# Patient Record
Sex: FEMALE | Race: OTHER | Employment: OTHER | URBAN - METROPOLITAN AREA
[De-identification: names, ages, dates, MRNs, and addresses within clinical notes are randomized per-mention and may not be internally consistent; named-entity substitution may affect disease eponyms.]

---

## 2019-03-14 ENCOUNTER — APPOINTMENT (OUTPATIENT)
Dept: GENERAL RADIOLOGY | Facility: HOSPITAL | Age: 29
DRG: 871 | End: 2019-03-14
Attending: EMERGENCY MEDICINE
Payer: MEDICAID

## 2019-03-14 ENCOUNTER — HOSPITAL ENCOUNTER (INPATIENT)
Facility: HOSPITAL | Age: 29
LOS: 6 days | Discharge: NURSING, REHAB OR LONG-TERM CARE | DRG: 871 | End: 2019-03-20
Attending: EMERGENCY MEDICINE | Admitting: HOSPITALIST
Payer: MEDICAID

## 2019-03-14 DIAGNOSIS — D64.9 ANEMIA, UNSPECIFIED TYPE: ICD-10-CM

## 2019-03-14 DIAGNOSIS — Z51.5 PALLIATIVE CARE ENCOUNTER: ICD-10-CM

## 2019-03-14 DIAGNOSIS — J18.9 NOSOCOMIAL PNEUMONIA: Primary | ICD-10-CM

## 2019-03-14 DIAGNOSIS — E86.0 DEHYDRATION: ICD-10-CM
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
## 2019-03-14 DIAGNOSIS — Z71.89 COUNSELING REGARDING ADVANCE CARE PLANNING AND GOALS OF CARE: ICD-10-CM

## 2019-03-14 DIAGNOSIS — D69.6 THROMBOCYTOPENIA (HCC): ICD-10-CM

## 2019-03-14 PROCEDURE — 99223 1ST HOSP IP/OBS HIGH 75: CPT | Performed by: HOSPITALIST

## 2019-03-14 PROCEDURE — 71045 X-RAY EXAM CHEST 1 VIEW: CPT | Performed by: EMERGENCY MEDICINE

## 2019-03-14 RX ORDER — METOCLOPRAMIDE HYDROCHLORIDE 5 MG/ML
10 INJECTION INTRAMUSCULAR; INTRAVENOUS EVERY 8 HOURS PRN
Status: DISCONTINUED | OUTPATIENT
Start: 2019-03-14 | End: 2019-03-20

## 2019-03-14 RX ORDER — TRAMADOL HYDROCHLORIDE 50 MG/1
50 TABLET ORAL EVERY 6 HOURS PRN
Status: DISCONTINUED | OUTPATIENT
Start: 2019-03-14 | End: 2019-03-20

## 2019-03-14 RX ORDER — ALBUTEROL SULFATE 2.5 MG/3ML
2.5 SOLUTION RESPIRATORY (INHALATION) EVERY 4 HOURS PRN
COMMUNITY

## 2019-03-14 RX ORDER — OXYCODONE HCL 5 MG/5 ML
2.5 SOLUTION, ORAL ORAL EVERY 8 HOURS PRN
Status: DISCONTINUED | OUTPATIENT
Start: 2019-03-14 | End: 2019-03-20

## 2019-03-14 RX ORDER — GABAPENTIN 250 MG/5ML
100 SOLUTION ORAL 2 TIMES DAILY
Status: DISCONTINUED | OUTPATIENT
Start: 2019-03-14 | End: 2019-03-20

## 2019-03-14 RX ORDER — IPRATROPIUM BROMIDE AND ALBUTEROL SULFATE 2.5; .5 MG/3ML; MG/3ML
SOLUTION RESPIRATORY (INHALATION)
Status: COMPLETED
Start: 2019-03-14 | End: 2019-03-14

## 2019-03-14 RX ORDER — ALBUTEROL SULFATE 2.5 MG/3ML
2.5 SOLUTION RESPIRATORY (INHALATION) EVERY 4 HOURS PRN
Status: DISCONTINUED | OUTPATIENT
Start: 2019-03-14 | End: 2019-03-20

## 2019-03-14 RX ORDER — LOPERAMIDE HYDROCHLORIDE 2 MG/1
2 CAPSULE ORAL 4 TIMES DAILY PRN
Status: ON HOLD | COMMUNITY
End: 2019-03-20

## 2019-03-14 RX ORDER — ACETAMINOPHEN 160 MG/5ML
650 SOLUTION ORAL EVERY 6 HOURS PRN
Status: DISCONTINUED | OUTPATIENT
Start: 2019-03-14 | End: 2019-03-15

## 2019-03-14 RX ORDER — PROPRANOLOL HYDROCHLORIDE 10 MG/1
10 TABLET ORAL 3 TIMES DAILY
COMMUNITY

## 2019-03-14 RX ORDER — ACETAMINOPHEN 650 MG/1
650 SUPPOSITORY RECTAL ONCE
Status: COMPLETED | OUTPATIENT
Start: 2019-03-14 | End: 2019-03-14

## 2019-03-14 RX ORDER — GABAPENTIN 100 MG/1
100 CAPSULE ORAL 2 TIMES DAILY
COMMUNITY

## 2019-03-14 RX ORDER — ONDANSETRON 2 MG/ML
4 INJECTION INTRAMUSCULAR; INTRAVENOUS EVERY 6 HOURS PRN
Status: DISCONTINUED | OUTPATIENT
Start: 2019-03-14 | End: 2019-03-20

## 2019-03-14 RX ORDER — OXYCODONE HCL 5 MG/5 ML
5 SOLUTION, ORAL ORAL EVERY 4 HOURS PRN
Status: ON HOLD | COMMUNITY
End: 2019-03-20

## 2019-03-14 RX ORDER — TRAMADOL HYDROCHLORIDE 50 MG/1
50 TABLET ORAL EVERY 6 HOURS PRN
Status: ON HOLD | COMMUNITY
End: 2019-03-20

## 2019-03-14 RX ORDER — PROPRANOLOL HYDROCHLORIDE 20 MG/5ML
SOLUTION ORAL 3 TIMES DAILY
Status: DISCONTINUED | OUTPATIENT
Start: 2019-03-14 | End: 2019-03-20

## 2019-03-14 RX ORDER — SODIUM CHLORIDE 9 MG/ML
INJECTION, SOLUTION INTRAVENOUS CONTINUOUS
Status: DISCONTINUED | OUTPATIENT
Start: 2019-03-14 | End: 2019-03-15

## 2019-03-14 RX ORDER — BACLOFEN 10 MG/1
15 TABLET ORAL 3 TIMES DAILY
COMMUNITY

## 2019-03-14 RX ORDER — ENOXAPARIN SODIUM 100 MG/ML
40 INJECTION SUBCUTANEOUS DAILY
Status: DISCONTINUED | OUTPATIENT
Start: 2019-03-14 | End: 2019-03-15

## 2019-03-14 RX ORDER — LEVOFLOXACIN 750 MG/1
750 TABLET ORAL DAILY
Status: ON HOLD | COMMUNITY
End: 2019-03-20

## 2019-03-15 ENCOUNTER — APPOINTMENT (OUTPATIENT)
Dept: GENERAL RADIOLOGY | Facility: HOSPITAL | Age: 29
DRG: 871 | End: 2019-03-15
Attending: NURSE PRACTITIONER
Payer: MEDICAID

## 2019-03-15 PROBLEM — Z51.5 PALLIATIVE CARE ENCOUNTER: Status: ACTIVE | Noted: 2019-03-15

## 2019-03-15 PROBLEM — Z71.89 COUNSELING REGARDING ADVANCE CARE PLANNING AND GOALS OF CARE: Status: ACTIVE | Noted: 2019-03-15

## 2019-03-15 PROCEDURE — B548ZZA ULTRASONOGRAPHY OF SUPERIOR VENA CAVA, GUIDANCE: ICD-10-PCS | Performed by: HOSPITALIST

## 2019-03-15 PROCEDURE — 99253 IP/OBS CNSLTJ NEW/EST LOW 45: CPT | Performed by: CLINICAL NURSE SPECIALIST

## 2019-03-15 PROCEDURE — 71045 X-RAY EXAM CHEST 1 VIEW: CPT | Performed by: NURSE PRACTITIONER

## 2019-03-15 PROCEDURE — 99223 1ST HOSP IP/OBS HIGH 75: CPT | Performed by: NURSE PRACTITIONER

## 2019-03-15 PROCEDURE — 99232 SBSQ HOSP IP/OBS MODERATE 35: CPT | Performed by: HOSPITALIST

## 2019-03-15 PROCEDURE — 02HV33Z INSERTION OF INFUSION DEVICE INTO SUPERIOR VENA CAVA, PERCUTANEOUS APPROACH: ICD-10-PCS | Performed by: HOSPITALIST

## 2019-03-15 PROCEDURE — 3E0G76Z INTRODUCTION OF NUTRITIONAL SUBSTANCE INTO UPPER GI, VIA NATURAL OR ARTIFICIAL OPENING: ICD-10-PCS | Performed by: HOSPITALIST

## 2019-03-15 RX ORDER — DIPHENHYDRAMINE HCL/ZINC ACET 2 %-0.1 %
AEROSOL, SPRAY (GRAM) TOPICAL
Status: ON HOLD | COMMUNITY
End: 2019-03-20

## 2019-03-15 RX ORDER — SODIUM CHLORIDE 0.9 % (FLUSH) 0.9 %
10 SYRINGE (ML) INJECTION AS NEEDED
Status: DISCONTINUED | OUTPATIENT
Start: 2019-03-15 | End: 2019-03-20

## 2019-03-15 RX ORDER — MAGNESIUM OXIDE 400 MG (241.3 MG MAGNESIUM) TABLET
400 TABLET 2 TIMES DAILY
Status: ON HOLD | COMMUNITY
End: 2019-03-20

## 2019-03-15 RX ORDER — SODIUM CHLORIDE 9 MG/ML
INJECTION, SOLUTION INTRAVENOUS CONTINUOUS
Status: DISCONTINUED | OUTPATIENT
Start: 2019-03-15 | End: 2019-03-16

## 2019-03-15 RX ORDER — ACETAMINOPHEN 160 MG/5ML
512 SOLUTION ORAL EVERY 6 HOURS
Status: DISCONTINUED | OUTPATIENT
Start: 2019-03-15 | End: 2019-03-20

## 2019-03-15 RX ORDER — ACETAMINOPHEN 325 MG/1
650 TABLET ORAL EVERY 6 HOURS PRN
COMMUNITY

## 2019-03-15 RX ORDER — ENOXAPARIN SODIUM 100 MG/ML
30 INJECTION SUBCUTANEOUS NIGHTLY
Status: DISCONTINUED | OUTPATIENT
Start: 2019-03-15 | End: 2019-03-20

## 2019-03-15 NOTE — ED INITIAL ASSESSMENT (HPI)
[facility] difficulty breathing and fever noted by staff. Pt has trach, suctioning by medics produced thick secretions.

## 2019-03-15 NOTE — RESPIRATORY THERAPY NOTE
Flu swab done. PPE worn and sample sent to lab. Place patient on trach collar. 5L 30%. Suctioning large pale yellow thick via trach zelaya.

## 2019-03-15 NOTE — PROGRESS NOTES
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   Endotracheal tube tip about 4 cm above the eliz A total of 35 minutes of critical care time (exclusive of billable procedures) was administered. This involved direct patient intervention, complex decision making, and/or extensive discussions with the patient, family, and clinical staff.

## 2019-03-15 NOTE — PAYOR COMM NOTE
--------------  ADMISSION REVIEW     Payor: Han Treviño #:  [id number]  Authorization Number: Matthew Zelaya date: 3/14/19  Admit time: 2330       Admitting Physician: Rosemarie Benítez MD  Attending Physician:  Jerry Hatch MD  Primary Care Physi ED Triage Vitals   BP 03/14/19 2043 132/74   Pulse 03/14/19 2043 (!) 134   Resp 03/14/19 2043 (!) 32   Temp 03/14/19 2043 (!) 101.4 °F (38.6 °C)   Temp src 03/14/19 2043 Rectal   SpO2 03/14/19 2040 97 %   O2 Device 03/14/19 2040 Trach mask     Current: limits   PROTHROMBIN TIME (PT) - Abnormal; Notable for the following components:    PT 16.1 (*)     INR 1.23 (*)     All other components within normal limits   CBC W/ DIFFERENTIAL - Abnormal; Notable for the following components:    WBC 17.3 (*)     RBC 2 above the carinal.  Left lower lobe consolidation with air bronchograms may reflect pneumonia or atelectasis, seen retrocardiac.  Small blunting costophrenic angles bilateral,   chronicity uncertain, could reflect small effusion or fibrosis.  The heart is Rsoemarie Benítez MD (Physician) filed at 3/14/2019 11:19 PM           [facility]  History and Physical     Juan Miguel Nielsen Patient Status:  Emergency    1990 MRN [id number]   Location [facility] [facility] Attending Abbie Thorpe MD route every 4 (four) hours as needed for Pain. Disp:  Rfl:    Propranolol HCl 10 MG Oral Tab 10 mg by Per G Tube route 3 (three) times daily. Disp:  Rfl:    traMADol HCl 50 MG Oral Tab 50 mg by Per G Tube route every 6 (six) hours as needed for Pain.  Disp: PNA. Also possible she has central fevers. 1. ID   2. Pulm   3. Abx, cultures   2. Anoxic brain injury  1. Nutrition   2. Palliative care to discuss caregiver fatigue and help with long term planning as able   3. Baclofen   3. Chronic pain   1.  Cont home injury  - check abg  - wean O2 as able  - per mother has not required ventilator in months. 5. Fen: dietary for tf recs  6. Prophy: lmwh  7. Dispo: full code. ICu monitoring. D/w mother  - palliative care consulted.   Consideration should be given to DNR suppository 650 mg     Date Action Dose Route User    3/14/2019 2106 Given 650 mg Rectal Sugey Puentes RN      acetaminophen (TYLENOL) 160 MG/5ML oral liquid 650 mg     Date Action Dose Route User    3/15/2019 0859 Given 650 mg Per Bedarthur Rivera RN Per Chon Sams RN      Vancomycin HCl (VANCOCIN) 500 mg in sodium chloride 0.9% 150 mL IVPB     Date Action Dose Route User    3/15/2019 0846 New Bag 500 mg Intravenous Sergio Miller RN      Vancomycin HCl (VANCOCIN) 750 mg in sodium chl

## 2019-03-15 NOTE — PLAN OF CARE
Pt is nonverbal. Contractures to all extremities. HR sinus tachycardia. Lung sounds course. Grimaces with trach suctioning and repositioning. Multiple wounds. Trach collar- 5L 30%. Suctioning moderate to large amount of thick white secretions. Febrile.  t

## 2019-03-15 NOTE — SLP NOTE
Order received per dysphagia screen protocol. Patient baseline is NPO with PEG tube feedings. Not appropriate for swallowing evaluation. Will sign off. Discussed with SEB.     Staci Castro Chris 87 CCC-SLP  Pager [phone]

## 2019-03-15 NOTE — ED NOTES
Disclosure form signed by mother at bedside to attempt to obtain pt medical records from [facility] [place] [facility]. Per mother, pt was admitted there most recently and was discharged to [facility] yesterday.

## 2019-03-15 NOTE — ED PROVIDER NOTES
Patient Seen in: [facility] Emergency Department    History   Patient presents with:  Fever (infectious)    Stated Complaint: VANDANA, fever     HPI    Patient is unable to provide any history of pending results or review of systems.   Some history is ob Physical Exam  General: The patient is nonverbal.  She is tachypneic and tachycardic.   She has a fever of 101.4 °F.  Eyes: sclera white, conjunctiva pale and moist.  Lids and lashes are normal.  Nose: Unremarkable   Or mucosa and lips are dry  Neck RDW-SD 71.8 (*)     Neutrophil Absolute Prelim 12.78 (*)     Neutrophil Absolute 12.78 (*)     Monocyte Absolute 1.64 (*)     Eosinophil Absolute 0.80 (*)     All other components within normal limits   LACTIC ACID, PLASMA - Normal   TROPONIN I - Normal Mild elevation of AST. Urinalysis with 5-10 WBCs and negative nitrites.   Lactic acid neg    Chest x-ray  CONCLUSION:    Rotated to the left.  Endotracheal tube tip about 4 cm above the carinal.  Left lower lobe consolidation with air bronchograms may refl Secs    Skin:  Temp/Moisture: Warm and Dry  Color: Normal      Addison Gandara  3/14/2019  10:46 PM

## 2019-03-15 NOTE — CONSULTS
Pulmonary / Critical Care H&P/Consult       NAME: Pamella Reynolds - ROOM: [address] [place] [address] MRN: [id number] - Age: 34year old - :  1990    Date of Admission: 3/14/2019  8:30 PM  Admission Diagnosis: Dehydration [E86.0]  Thrombocytopenia ([facility] [address] [D69.6]  Nosocomial file        Attends [employer] service: Not on file        Active member of club or organization: Not on file        Attends meetings of clubs or organizations: Not on file        Relationship status: Not on file      Intimate partner violence:        Fear HCl   Per G Tube TID   • enoxaparin  40 mg Subcutaneous Daily     Continuous Infusing Medication:  • sodium chloride 83 mL/hr at 03/15/19 0022     PRN Medication:albuterol sulfate, oxyCODONE HCl, traMADol HCl, acetaminophen, ondansetron HCl, Metoclopramide 03/14/19   2048  03/15/19   0432   WBC  17.3*  15.9*   HGB  8.9*  7.8*   HCT  27.9*  24.3*   PLT  141.0*  118.0*     Recent Labs   Lab  03/14/19 2048   INR  1.23*         Recent Labs   Lab  03/14/19   2048  03/15/19   0432   NA  139  143   K  4.7  4.2

## 2019-03-15 NOTE — CONSULTS
[facility]  Report of Inpatient Wound Care Consultation    Alka Rensarah Patient Status:  Inpatient    1990 MRN [id number]   [facility] 4SW-A Attending Anca Eller MD   Hosp Day # 1 PCP Unknown Pcp     Reason for Consultation:  Stewart Vincent pink, scant serous drainage  Left top of knee stage 2 pressure injury L1.6cmxW1.5cm 100% pink, scant serous drainage  Left elbow dry skin  Right elbow DTI [id number] deep purple intact skin    Left lateral hip stage 3 pressure injury L1.5cmxW1.0cmxD. 1cm 50%

## 2019-03-15 NOTE — CONSULTS
[address] [place] [address]  [id number]  Hospital Day #1  Date of Consult: 03/15/19       Reason for Consultation:      Consult requested by Dr. La Nena Kidd for evaluation of palliative care needs and goals of care dis Lucy. Today is day 1 of hospitalization. HPI has been obtained from [facility] and the patient's mother Martha Magaña.      Medical History:     Past Medical History:   Diagnosis Date   • Anoxic brain injury [facility])    • Dysphagia      Past Surgical History:   Proce solution   Per G Tube TID   traMADol HCl (ULTRAM) tab 50 mg 50 mg Per G Tube Q6H PRN   acetaminophen (TYLENOL) 160 MG/5ML oral liquid 650 mg 650 mg Per G Tube Q6H PRN   ondansetron HCl (ZOFRAN) injection 4 mg 4 mg Intravenous Q6H PRN   Metoclopramide HCl ( Pulse 111   Temp (!) 100.7 °F (38.2 °C) (Temporal)   Resp 18   Ht 4' 9\" (1.448 m)   Wt 75 lb (34 kg)   SpO2 92%   BMI 16.23 kg/m²     General: Lying in bed, eyes closed, grimaces with touching of sternum or when uncovering BLE  Body habitus: Bi-temporal w Emily's mom Laura Moss in a consult room. I introduced palliative care and reason for consultation.  She was familiar with Palliative Care as there was a Palliative physician that saw them when she was at [facility]. She reported that he was very support available prior to patient's accident. Mother is surrogate decision maker according to [address] [place] [address].       Healthcare Agent Appointed: Yes  Healthcare Agent's Name: Jadyn Quesada - mother  Healthcare Agent's Phone Number: [phone]     Psychosocial: Teto Mukherjee spent on this consult; >50% was spent counseling and coordinating care. Thank you for allowing the Palliative Care Team to participate in the care of your patient. We will continue to follow.     Above plan reviewed with the ICU team. Dr. Concetta Ramirez updated

## 2019-03-15 NOTE — PROGRESS NOTES
[facility] dosing service    Initial Pharmacokinetic Consult for Vancomycin Dosing     Denver Lawrence is a 34year old female admitted on 3/14/19 who is being treated for pneumonia. Pharmacy has been asked to dose Vancomycin by Dr. Sandro Sauer.     She has No Known Goal trough level 15-20 ug/mL. 3.  Pharmacy will need BUN/Scr daily while on Vancomycin to assess renal function. 4.  Pharmacy will follow and monitor renal function changes, toxicity and efficacy. Pharmacy will continue to follow her.   We appreci

## 2019-03-15 NOTE — PROGRESS NOTES
[facility]  Progress note     [facility] Patient Status:  Emergency    1990 MRN [id number]   Location [facility] Attending Martha Whittaker MD   Hosp Day # 1 PCP Unknown Pcp     Chief Complaint: Fever, dyspnea     Subj child  5.  Anemia-monitor      Quality:  · DVT Prophylaxis: heparin   · CODE status: full  · Baker: none    Plan of care discussed with mother, dr.mampilly Deion Armstrong MD  [facility]  Internal Medicine Hospitalist  Pager [phone]

## 2019-03-15 NOTE — H&P
[facility]  History and Physical     [facility] Patient Status:  Emergency    1990 MRN [id number]   Location 6[facility] Attending Minal Jonas MD   Hosp Day # 0 PCP Unknown Pcp     Chief Complaint: Fever, dyspnea Oral Tab 750 mg by Per G Tube route daily. Disp:  Rfl:    Loperamide HCl 2 MG Oral Cap Take 2 mg by mouth 4 (four) times daily as needed for Diarrhea.  Disp:  Rfl:    oxyCODONE HCl 5 MG/5ML Oral Solution 5 mg by Per G Tube route every 4 (four) hours as need Clearance: 81 mL/min (based on SCr of 0.56 mg/dL). Recent Labs   Lab  03/14/19 2048   PTP  16.1*   INR  1.23*       Recent Labs   Lab  03/14/19 2048   TROP  <0.045       Imaging: Imaging data reviewed in Epic.       ASSESSMENT / PLAN:     1. Rule out

## 2019-03-15 NOTE — CONSULTS
INFECTIOUS DISEASE CONSULTATION    Micheal Kim Patient Status:  Inpatient    1990 MRN [id number]   [facility] 4SW-A Attending Rolando Ford MD   Hosp Day # 1 PCP Unknown Pcp       Reque 100 mg, Per G Tube, BID  •  oxyCODONE HCl (roxiCODONE) solution 2.5 mg, 2.5 mg, Per G Tube, Q8H PRN  •  Propranolol HCl (INDERAL) 10 mg/2.5 ml solution , , Per G Tube, TID  •  traMADol HCl (ULTRAM) tab 50 mg, 50 mg, Per G Tube, Q6H PRN  •  acetaminophen (T Review of Systems:    A comprehensive 10 point review of systems was completed. Pertinent positives and negatives noted in the the HPI.       Physical Exam:  Non responsive  Vital signs: Temp:  [99.5 °F (37.5 °C)-101.4 °F (38.6 °C)] 100.7 °F (38.2 °C abx, LTAC likely to have MDRO,  -continue meropenem, pending cx update  Sputum cultures likely to also reflect tracheal colonization    2. Sacral wounds/continue local wound care, no signs of necrosis  Wound care to see    3.  CDIFF(presumbed)- was on po va

## 2019-03-15 NOTE — DIETARY MALNUTRITION NOTE
[facility]    NUTRITION INITIAL ASSESSMENT    Pt meets malnutrition criteria.     CRITERIA FOR MALNUTRITION DIAGNOSIS:  Criteria for severe malnutrition diagnosis: chronic illness related to wt loss greater than 10% in 6 months, body fat severe depleti exam.    NUTRITION PRESCRIPTION based on IBW 42 kg:  Calories: 9104-1735 calories/day (40-50 calories per kg)  Protein: >2 grams protein/day (84 grams protein per kg)  Fluid: ~1 ml/kcal or per MD discretion    MONITOR AND EVALUATE/NUTRITION GOALS:    3.  No

## 2019-03-15 NOTE — CM/SW NOTE
Responding to orders for resources, needs, support, meds. Pt here from [address], being there approx 2 days. Was apparently at [facility] before that. Hx trach and PEG S/P anoxic brain injury. Stage 4 sacral wound and multiple other lesser wounds.  Co

## 2019-03-15 NOTE — ED NOTES
RT at bedside for trach suctioning at this time. Thick mucus noted during suctioning. Mucus collected and sent for culture.

## 2019-03-16 PROCEDURE — 99232 SBSQ HOSP IP/OBS MODERATE 35: CPT | Performed by: HOSPITALIST

## 2019-03-16 NOTE — PROGRESS NOTES
INFECTIOUS DISEASE PROGRESS NOTE    Verba Coma Patient Status:  Inpatient    1990 MRN [id number]   [facility] 4SW-A Attending Alida Banuelos MD   Hosp Day # 2 PCP Unknown Pcp     Subjective:  Low grade fever. TC. Still having secretions.  [facility]  [phone]

## 2019-03-16 NOTE — PROGRESS NOTES
Critical Care Progress Note        NAME: Martha Анна - ROOM: 623/793-J - MRN: [id number] - Age: 34year old - : 1990  Date of Admission: 3/14/2019  8:30 PM  Admission Diagnosis: Dehydration [E86.0]  Thrombocytopenia ([facility] [address] [D69.6]  Nosocomial pneumonia [ normal, atraumatic, no cyanosis or edema      Recent Labs      03/14/19   2048  03/15/19   0432  03/16/19   0428   WBC  17.3*  15.9*  7.5   HGB  8.9*  7.8*  7.1*   MCV  113.4*  113.6*  112.6*   PLT  141.0*  118.0*  93.0*   INR  1.23*   --    --        Rece Charles HAASG Pulmonary and Critical Care

## 2019-03-16 NOTE — PROGRESS NOTES
[facility]  Progress Note     Verba Coma Patient Status:  Inpatient    1990 MRN [id number]   [facility] 4SW-A Attending Gilberto Rhodes MD   Hosp Day # 2 PCP Unknown Pcp     Chief Complaint: increased trach secretions    S: Dee Dee Soares HCl  125 mg Per G Tube Q6H   • meropenem  500 mg Intravenous Q12H   • acetaminophen  512 mg Per G Tube Q6H   • baclofen  15 mg Per G Tube TID   • gabapentin  100 mg Per G Tube BID   • Propranolol HCl   Per G Tube TID       ASSESSMENT / PLAN:     1.  Sepsis

## 2019-03-16 NOTE — PLAN OF CARE
RESPIRATORY - ADULT    • Achieves optimal ventilation and oxygenation Progressing          GASTROINTESTINAL - ADULT    • Maintains adequate nutritional intake (undernourished) Progressing        Received patient this am minimally responsive, attempts to op

## 2019-03-17 ENCOUNTER — APPOINTMENT (OUTPATIENT)
Dept: ULTRASOUND IMAGING | Facility: HOSPITAL | Age: 29
DRG: 871 | End: 2019-03-17
Attending: NURSE PRACTITIONER
Payer: MEDICAID

## 2019-03-17 PROCEDURE — 93971 EXTREMITY STUDY: CPT | Performed by: NURSE PRACTITIONER

## 2019-03-17 PROCEDURE — 99232 SBSQ HOSP IP/OBS MODERATE 35: CPT | Performed by: HOSPITALIST

## 2019-03-17 RX ORDER — MAGNESIUM SULFATE HEPTAHYDRATE 40 MG/ML
2 INJECTION, SOLUTION INTRAVENOUS ONCE
Status: COMPLETED | OUTPATIENT
Start: 2019-03-17 | End: 2019-03-17

## 2019-03-17 NOTE — PROGRESS NOTES
Critical Care Progress Note        NAME: Abdoulaye Reilly - ROOM: [facility]- - MRN: [id number] - Age: 34year old - : 1990  Date of Admission: 3/14/2019  8:30 PM  Admission Diagnosis: Dehydration [E86.0]  Thrombocytopenia ([facility] [address] [D69.6]  Nosocomial pneumonia [ swollen by comparison    Recent Labs      03/14/19   2048  03/15/19   0432  03/16/19   0428   WBC  17.3*  15.9*  7.5   HGB  8.9*  7.8*  7.1*   MCV  113.4*  113.6*  112.6*   PLT  141.0*  118.0*  93.0*   INR  1.23*   --    --        Recent Labs      03/14/19

## 2019-03-17 NOTE — PLAN OF CARE
RESPIRATORY - ADULT    • Achieves optimal ventilation and oxygenation Progressing          GASTROINTESTINAL - ADULT    • Maintains adequate nutritional intake (undernourished) Progressing        Received patient this am minimally responsive, occas attempts

## 2019-03-17 NOTE — PROGRESS NOTES
[facility]  Progress Note     Marchangel Lopez Patient Status:  Inpatient    1990 MRN [id number]   [facility] 4SW-A Attending Brenna Tim MD   Hosp Day # 3 PCP Unknown Pcp     Chief Complaint: increased trach secretions    S: Patient w 03/14/19 2048   PTP  16.1*   INR  1.23*       Recent Labs   Lab  03/14/19 2048   TROP  <0.045            Imaging: Imaging data reviewed in Epic.     Medications:   • enoxaparin  30 mg Subcutaneous Nightly   • Vancomycin HCl  125 mg Per G Tube Q6H   • me

## 2019-03-18 PROCEDURE — 99233 SBSQ HOSP IP/OBS HIGH 50: CPT | Performed by: CLINICAL NURSE SPECIALIST

## 2019-03-18 PROCEDURE — 99232 SBSQ HOSP IP/OBS MODERATE 35: CPT | Performed by: HOSPITALIST

## 2019-03-18 RX ORDER — GARLIC EXTRACT 500 MG
1 CAPSULE ORAL DAILY
Status: DISCONTINUED | OUTPATIENT
Start: 2019-03-18 | End: 2019-03-20

## 2019-03-18 RX ORDER — MAGNESIUM SULFATE HEPTAHYDRATE 40 MG/ML
2 INJECTION, SOLUTION INTRAVENOUS ONCE
Status: COMPLETED | OUTPATIENT
Start: 2019-03-18 | End: 2019-03-18

## 2019-03-18 RX ORDER — ALBUTEROL SULFATE 2.5 MG/3ML
2.5 SOLUTION RESPIRATORY (INHALATION)
Status: DISCONTINUED | OUTPATIENT
Start: 2019-03-18 | End: 2019-03-20

## 2019-03-18 NOTE — PALLIATIVE CARE NOTE
[address] Follow Up    Gustavo Peguero  [id number]  Patient seen at: [facility]    Subjective:      Unable to report. Patient seen and evaluated, mother at bedside.      Review of Systems:   Palliative Care symptom needs ass (H) 03/17/2019    CA 8.8 03/17/2019    ALB 1.9 (L) 03/14/2019    ALKPHO 179 (H) 03/14/2019    BILT 0.4 03/14/2019    TP 8.6 (H) 03/14/2019    AST 41 (H) 03/14/2019    ALT 20 03/14/2019    MG 1.8 03/18/2019    TROP <0.045 03/14/2019       Imaging:  Us Venou Full Normal Full   80 Full Some disease  Normal w/effort Full Normal or  Reduced Full   70 Reduced Some disease  Can’t perform job Full Normal or   Reduced Full   60 Reduced Significant disease  Can’t perform hobby Occasional  Assist Normal or   Reduced Fu was accepted to [facility] she would have to change her insurance from [address] to [place] which she would pursue if she was accepted. Discussed with Navarro Buchanan.      Parvez Brito discussed that many doctors at the various facilities have told her that [facility] Palliative care encounter     Counseling regarding advance care planning and goals of care      Palliative Care Recommendations/Plan:     1. Goals of care: Continued aggressive medical care for treatment of infection and for trach care.  Mother requesting r

## 2019-03-18 NOTE — PROGRESS NOTES
[facility]  Progress Note     Martha Jj Patient Status:  Inpatient    1990 MRN [id number]   [facility] 5NW-A Attending Avinash Kc MD   Hosp Day # 4 PCP Unknown Pcp     Chief Complaint: diarrhea, increased secretions    S: Patie 03/14/19 2048   TROP  <0.045            Imaging: Imaging data reviewed in Epic.     Medications:   • albuterol sulfate  2.5 mg Nebulization 6 times per day   • enoxaparin  30 mg Subcutaneous Nightly   • Vancomycin HCl  125 mg Per G Tube Q6H   • meropenem

## 2019-03-18 NOTE — CM/SW NOTE
SW received referral from palliative APN, [facility] for possible alternate placement options. Pt's mother has requested referrals be sent to 1. [facility] & 2. [facility]. Referrals sent via [facility] [address].  / to remain a

## 2019-03-18 NOTE — PROGRESS NOTES
[address] Patient Status:  Inpatient    1990 MRN [id number]   [facility] 5NW-A Attending Yovani Gamino MD   Hosp Day # 4 PCP Unknown Pcp     Pulm / Critical Care Progress Note     S: pt without overnight events.        Venida Lamp 03/16/19   0428  03/17/19   0446   NA  143  143  136   K  4.2  3.9  4.5   CL  110*  111*  104   CO2  32.0  29.0  30.0   BUN  22*  10  7     Creatinine (mg/dL)   Date Value   03/17/2019 0.24 (L)   03/16/2019 0.35 (L)   03/15/2019 0.47 (L)   ]    Recent Labs

## 2019-03-18 NOTE — DIETARY NOTE
[facility]    NUTRITION FOLLOW UP ASSESSMENT    Pt meets malnutrition criteria.     CRITERIA FOR MALNUTRITION DIAGNOSIS:  Criteria for severe malnutrition diagnosis: chronic illness related to wt loss greater than 10% in 6 months, body fat severe deple severe depletion body fat and severe depletion muscle mass per visual exam.     2. Fluid Accumulation: none per visual exam.    NUTRITION PRESCRIPTION based on IBW 42 kg:  Calories: 2659-7187 calories/day (40-50 calories per kg)  Protein: >2 grams protein/

## 2019-03-18 NOTE — PROGRESS NOTES
[facility]                INFECTIOUS DISEASE PROGRESS NOTE    Rosalino Daigle Patient Status:  Inpatient    1990 MRN [id number]   [facility] 5NW-A Attending Consuelo Buenrostro MD   Hosp Day # 4 PCP Unknown Pcp     Antibiotics:Kiera#4    Sub 2.  BLOOD CULTURE     Status: None (Preliminary result)    Collection Time: 03/14/19  9:00 PM   Result Value Ref Range    Blood Culture Result No Growth 3 Days N/A     Sputum culture Once [[id number]] (Abnormal)  Collected: 03/14/19 2048   Order Status: Comp

## 2019-03-19 PROCEDURE — 99232 SBSQ HOSP IP/OBS MODERATE 35: CPT | Performed by: HOSPITALIST

## 2019-03-19 RX ORDER — MAGNESIUM SULFATE 1 G/100ML
1 INJECTION INTRAVENOUS ONCE
Status: COMPLETED | OUTPATIENT
Start: 2019-03-19 | End: 2019-03-20

## 2019-03-19 NOTE — CM/SW NOTE
Received call from [facility] re: referral. At this time, patient can not be accepted to this facility with [place] insurance. Left message to [facility] admissions re: referral. Await call back.      Greg Jefferson RN   [phone]  66

## 2019-03-19 NOTE — PALLIATIVE CARE NOTE
[address] Follow Up    Raymond Rodriguez  [id number]  Patient seen at: [facility]    Subjective:      Unable to contribute  Patient seen and evaluated, mother at bedside.      Review of Systems:   Palliative Care symptom needs a K 4.5 03/17/2019     03/17/2019    CO2 30.0 03/17/2019     (H) 03/17/2019    CA 8.8 03/17/2019    ALB 1.9 (L) 03/14/2019    ALKPHO 179 (H) 03/14/2019    BILT 0.4 03/14/2019    TP 8.6 (H) 03/14/2019    AST 41 (H) 03/14/2019    ALT 20 03/14/2 Extensive Disease  Can’t do any work Max Assist  Total Care Reduced Drowsy/confused   20 Bedbound Extensive Disease  Can’t do any work Max Assist  Total Care Minimal Drowsy/confused   10 Bedbound/coma Extensive Disease  Can’t do any work  coma  Max Assist FEEDING. HCPOA:      Healthcare Agent Appointed: Yes  Healthcare Agent's Name: Jadyn Quesada - mother  Healthcare Agent's Phone Number: [phone]       Disposition: SNF palliative care to be arranged at [facility], likely [facility] in [place].      Problem L

## 2019-03-19 NOTE — PROGRESS NOTES
[facility]ITAL[facility]  Progress Note     Henri Six Patient Status:  Inpatient    1990 MRN [id number]   [facility] 5NW-A Attending Jaxson Kay MD   Hosp Day # 5 PCP Unknown Pcp     Chief Complaint: fever and increased trach secretions    S: (based on SCr of 0.24 mg/dL (L)). Recent Labs   Lab  03/14/19 2048   PTP  16.1*   INR  1.23*       Recent Labs   Lab  03/14/19 2048   TROP  <0.045            Imaging: Imaging data reviewed in Epic.     Medications:   • albuterol sulfate  2.5 mg Nebul

## 2019-03-19 NOTE — CONSULTS
Lovenox Dose Evaluation - Anti Xa Result    Lovenox dose given 3/18 @  0835  AntiXa level drawn   3/19 @  0051  (4.25 hours after dose)    Anti Xa result = 0.25 IUnits/mL (0.2-0.4 unitl/mL)    Anti-Xa result is within therapeutic range for enoxaparin (love

## 2019-03-20 VITALS
TEMPERATURE: 98 F | RESPIRATION RATE: 20 BRPM | OXYGEN SATURATION: 99 % | WEIGHT: 63.94 LBS | HEART RATE: 93 BPM | SYSTOLIC BLOOD PRESSURE: 149 MMHG | HEIGHT: 57 IN | DIASTOLIC BLOOD PRESSURE: 95 MMHG | BODY MASS INDEX: 13.79 KG/M2

## 2019-03-20 PROCEDURE — 99238 HOSP IP/OBS DSCHRG MGMT 30/<: CPT | Performed by: HOSPITALIST

## 2019-03-20 RX ORDER — TRAMADOL HYDROCHLORIDE 50 MG/1
50 TABLET ORAL EVERY 6 HOURS PRN
Qty: 20 TABLET | Refills: 0 | Status: SHIPPED | OUTPATIENT
Start: 2019-03-20

## 2019-03-20 RX ORDER — OXYCODONE HCL 5 MG/5 ML
2.5 SOLUTION, ORAL ORAL EVERY 8 HOURS PRN
Qty: 100 ML | Refills: 0 | Status: SHIPPED | OUTPATIENT
Start: 2019-03-20

## 2019-03-20 RX ORDER — GARLIC EXTRACT 500 MG
1 CAPSULE ORAL DAILY
Qty: 30 CAPSULE | Refills: 0 | Status: SHIPPED | OUTPATIENT
Start: 2019-03-20

## 2019-03-20 NOTE — PALLIATIVE CARE NOTE
Brief visit in follow up to yesterday's visit with lengthy discussion with mother's regarding code status and discussion of POLST form.  Mom has expressed her wishes for CONTINUED FULL CODE AND FULL TREATMENT (including ventilator support) FOR RESPIRATORY D

## 2019-03-20 NOTE — PROGRESS NOTES
PICC line removed. Pressure put to site for first five minutes. Neosporin, gauze, and tegaderm applied. PICC line tip intact at 39 cm joo. [facility].

## 2019-03-20 NOTE — CM/SW NOTE
Met with Naida Delarosa, liaison from [facility] ([phone]) who met with pt/mother today. They are able to accept pt for admission.     Received call from Skylar Valentino with [facility] ([phone]) who spoke with pt's mother and arranged palliative care me

## 2019-03-20 NOTE — PROGRESS NOTES
[facility]                INFECTIOUS DISEASE PROGRESS NOTE    Micheal Kim Patient Status:  Inpatient    1990 MRN [id number]   [facility] 5NW-A Attending Nadia Mosley MD   Hosp Day # 6 PCP Unknown Pcp     Antibiotics:completed 3/1 < > = values in this interval not displayed. No results found for: [facility] Encounter on 03/14/19   1.  URINE CULTURE, ROUTINE     Status: None    Collection Time: 03/14/19  9:18 PM   Result Value Ref Range    Urine Culture No

## 2019-03-20 NOTE — CM/SW NOTE
Late Entry:  ALENA received call from admissions at [facility] who confirms pt has been accepted for transfer upon discharge. SW requested name of contracted palliative care agencies.  SW updated Palliative APN, Serafin Nolan who has updated pt's mother,emmanuel Gonzalez

## 2019-03-20 NOTE — CM/SW NOTE
Spoke with pt's RN who stated pt can discharge to NH today. Met with pt's mother at bedside to discuss DC planning. Pt's mother again stating she prefers pt to go to [facility] facility.   Previous CM notes indicating [facility] is unable to accept pt fo

## 2019-03-20 NOTE — PROGRESS NOTES
NURSING DISCHARGE NOTE    Discharged Nursing home via Ambulance. Accompanied by Family member  Belongings Taken by patient/family. Pt discharged to [facility] with personal belongings and stable vital signs. Trach. G tube.  Pt and mother give

## 2019-03-20 NOTE — PLAN OF CARE
Assumed care of patient at 7am  Patient is grimacing to pain; will not open eyes, will not follow commands, will not withdraw from pain  Trach collar 28%with inline suction of white sputum; lungs coarse  VSS  Abdomen flat; had 4 episodes of yellow mushy st

## 2019-03-20 NOTE — PLAN OF CARE
Multidisciplinary Discharge Rounds held 3/19/2019. Treatment team members present today include , , Charge Nurse,  Nurse, RT, PT and Pharmacy caring for [employer].      Other care providers present:    Patient Active Problem List:

## 2019-03-21 NOTE — CM/SW NOTE
Patient discharged on 3/20/19 as previously planned.        03/21/19 0800   Discharge disposition   Expected discharge disposition Skilled Nurs   Name of [facility] services after discharge Other (comment)  ([address]

## 2019-03-21 NOTE — DISCHARGE SUMMARY
[facility] [place] HOSPITAL[facility]  DISCHARGE SUMMARY     Arvel Draft Patient Status:  Inpatient    1990 MRN [id number]   [facility] 5NW-A Attending No att. providers found   Hosp Day # 6 PCP Unknown Pcp     Date of Admission: 3/14/2019  Date of Discharge 29: Low Risk of readmission after discharge from the hospital. No TCM follow-up needed.     Procedures during hospitalization:   • none    Incidental or significant findings and recommendations (brief descriptions):  • none    Lab/Test results pending at [facility] (six) hours for 14 days.    Stop taking on:  4/3/2019  Quantity:  140 mL  Refills:  0        CONTINUE taking these medications      Instructions Prescription details   acetaminophen 325 MG Tabs  Commonly known as:  TYLENOL      650 mg by Per G Tube route ev chronically ill   HEENT: trach in place  Respiratory: Seemingly clear. Cardiovascular: S1, S2. Regular rate and rhythm. No murmurs, rubs or gallops. Equal pulses.    Abdomen: Soft, g tube in place, no apparent discomfort with palpation   Neurologic: very c

## 2019-03-24 NOTE — PAYOR COMM NOTE
--------------  DISCHARGE REVIEW    Payor: [employer] In[employer] #:  [id number]  Authorization Number: [id number]    Admit date: 3/14/19  Admit time:  2330  Discharge Date: 3/20/2019  6:40 PM     Admitting Physician: Ariana Fonseca MD  Attending Physician:  Sally marrero Synopsis: admitted for above symptoms, initially felt to have pneumonia or tracheitis; was on merrem. Found to have sputum cultures for acinetobacter and pseudomonoas, both resistant to merrem.   ID involved and thought that cultures were more consistent w total) by Per G Tube route every 6 (six) hours as needed. Quantity:  20 tablet  Refills:  0     vancomycin 50 mg/ml Soln  Commonly known as:  VANCOCIN  What changed:  Another medication with the same name was added.  Make sure you understand how and when for each of these medications  · Acidophilus/Pectin Caps  · MULTIVITAL-M Tabs  · oxyCODONE HCl 5 MG/5ML Soln  · traMADol HCl 50 MG Tabs  · Vancomycin HCl 50 MG/ML Solr         [place]    Follow-up appointment:         needs bmp/cbc in 1 week at

## 2019-03-25 NOTE — CDS QUERY
Diagnosis Associated with Risk for Impaired Response to Stimuli  CLINICAL DOCUMENTATION CLARIFICATION FORM    Clinical information (provided below) suggests a diagnosis with risk for impaired response to stimuli.  For accurate ICD-10-CM code assignment to r

## 2019-06-11 ENCOUNTER — HOSPITAL (OUTPATIENT)
Dept: OTHER | Age: 29
End: 2019-06-11

## 2019-06-11 ENCOUNTER — DIAGNOSTIC TRANS (OUTPATIENT)
Dept: OTHER | Age: 29
End: 2019-06-11

## 2019-06-11 LAB
ALBUMIN SERPL-MCNC: 2.2 G/DL (ref 3.6–5.1)
ALBUMIN/GLOB SERPL: 0.3 {RATIO} (ref 1–2.4)
ALP SERPL-CCNC: 286 UNITS/L (ref 45–117)
ALT SERPL-CCNC: 34 UNITS/L
AMORPH SED URNS QL MICRO: ABNORMAL
ANALYZER ANC (IANC): ABNORMAL
ANALYZER ANC (IANC): ABNORMAL
ANION GAP SERPL CALC-SCNC: 6 MMOL/L (ref 10–20)
ANION GAP SERPL CALC-SCNC: 6 MMOL/L (ref 10–20)
APPEARANCE UR: ABNORMAL
APTT PPP: 41 SEC (ref 22–32)
APTT PPP: ABNORMAL S
APTT PPP: ABNORMAL S
AST SERPL-CCNC: 26 UNITS/L
BACTERIA #/AREA URNS HPF: ABNORMAL /HPF
BASE DEFICIT BLDA-SCNC: ABNORMAL MMOL/L
BASE EXCESS BLDA CALC-SCNC: 6 MMOL/L (ref 0–3)
BASOPHILIC STIPLING (BAST): PRESENT
BASOPHILS # BLD: 0 K/MCL (ref 0–0.3)
BASOPHILS # BLD: 0 K/MCL (ref 0–0.3)
BASOPHILS NFR BLD: 0 %
BASOPHILS NFR BLD: 0 %
BILIRUB SERPL-MCNC: 0.2 MG/DL (ref 0.2–1)
BILIRUB UR QL: NEGATIVE
BODY TEMPERATURE: 36.4 DEGREES
BUN SERPL-MCNC: 36 MG/DL (ref 6–20)
BUN SERPL-MCNC: 45 MG/DL (ref 6–20)
BUN/CREAT SERPL: 123 (ref 7–25)
BUN/CREAT SERPL: 125 (ref 7–25)
CALCIUM SERPL-MCNC: 10.8 MG/DL (ref 8.4–10.2)
CALCIUM SERPL-MCNC: 9.8 MG/DL (ref 8.4–10.2)
CAOX CRY URNS QL MICRO: ABNORMAL
CHLORIDE SERPL-SCNC: 106 MMOL/L (ref 98–107)
CHLORIDE SERPL-SCNC: 99 MMOL/L (ref 98–107)
CO2 SERPL-SCNC: 33 MMOL/L (ref 21–32)
CO2 SERPL-SCNC: 36 MMOL/L (ref 21–32)
COLOR UR: ABNORMAL
CREAT SERPL-MCNC: 0.29 MG/DL (ref 0.51–0.95)
CREAT SERPL-MCNC: 0.36 MG/DL (ref 0.51–0.95)
DIFFERENTIAL METHOD BLD: ABNORMAL
DIFFERENTIAL METHOD BLD: ABNORMAL
EOSINOPHIL # BLD: 0.2 K/MCL (ref 0.1–0.5)
EOSINOPHIL # BLD: 0.2 K/MCL (ref 0.1–0.5)
EOSINOPHIL NFR BLD: 1 %
EOSINOPHIL NFR BLD: 1 %
EPITH CASTS #/AREA URNS LPF: ABNORMAL /[LPF]
ERYTHROCYTE [DISTWIDTH] IN BLOOD: 13.9 % (ref 11–15)
ERYTHROCYTE [DISTWIDTH] IN BLOOD: 14.1 % (ref 11–15)
FATTY CASTS #/AREA URNS LPF: ABNORMAL /[LPF]
GAS FLOW.O2 O2 DELIVERY SYS: ABNORMAL L/MIN
GLOBULIN SER-MCNC: 6.5 G/DL (ref 2–4)
GLUCOSE SERPL-MCNC: 100 MG/DL (ref 65–99)
GLUCOSE SERPL-MCNC: 226 MG/DL (ref 65–99)
GLUCOSE UR-MCNC: NEGATIVE MG/DL
GRAN CASTS #/AREA URNS LPF: ABNORMAL /[LPF]
HCO3 BLDA-SCNC: 33 MMOL/L (ref 22–28)
HCT VFR BLD CALC: 25.5 % (ref 36–46.5)
HCT VFR BLD CALC: 31 % (ref 36–46.5)
HGB BLD-MCNC: 7.9 G/DL (ref 12–15.5)
HGB BLD-MCNC: 9.5 G/DL (ref 12–15.5)
HGB UR QL: NEGATIVE
HYALINE CASTS #/AREA URNS LPF: ABNORMAL /LPF (ref 0–5)
INHALED O2 CONCENTRATION: ABNORMAL %
INR PPP: 3
INR PPP: 4.5
INR PPP: ABNORMAL
INR PPP: ABNORMAL
KETONES UR-MCNC: NEGATIVE MG/DL
LACTATE BLDV-SCNC: 0.9 MMOL/L (ref 0–2)
LACTATE BLDV-SCNC: 1 MMOL/L (ref 0–2)
LEUKOCYTE ESTERASE UR QL STRIP: NEGATIVE
LYMPHOCYTES # BLD: 1.3 K/MCL (ref 1–4.8)
LYMPHOCYTES # BLD: 1.4 K/MCL (ref 1–4.8)
LYMPHOCYTES NFR BLD: 7 %
LYMPHOCYTES NFR BLD: 8 %
MACROCYTOSIS (MACRO): ABNORMAL
MACROCYTOSIS (MACRO): ABNORMAL
MCH RBC QN AUTO: 35.3 PG (ref 26–34)
MCH RBC QN AUTO: 35.3 PG (ref 26–34)
MCHC RBC AUTO-ENTMCNC: 30.6 G/DL (ref 32–36.5)
MCHC RBC AUTO-ENTMCNC: 31 G/DL (ref 32–36.5)
MCV RBC AUTO: 113.8 FL (ref 78–100)
MCV RBC AUTO: 115.2 FL (ref 78–100)
MIXED CELL CASTS #/AREA URNS LPF: ABNORMAL /[LPF]
MONOCYTES # BLD: 0.2 K/MCL (ref 0.3–0.9)
MONOCYTES # BLD: 0.8 K/MCL (ref 0.3–0.9)
MONOCYTES NFR BLD: 1 %
MONOCYTES NFR BLD: 4 %
MRSA DNA SPEC QL NAA+PROBE: NORMAL
MRSA DNA SPEC QL NAA+PROBE: NORMAL
MRSA DNA SPEC QL NAA+PROBE: NOT DETECTED
MUCOUS THREADS URNS QL MICRO: PRESENT
NEUTROPHILS # BLD: 16.3 K/MCL (ref 1.8–7.7)
NEUTROPHILS # BLD: 16.8 K/MCL (ref 1.8–7.7)
NEUTS BAND NFR BLD: 2 % (ref 0–10)
NEUTS SEG NFR BLD: 88 %
NEUTS SEG NFR BLD: 88 %
NITRITE UR QL: NEGATIVE
NRBC (NRBCRE): 0 /100 WBC
NRBC (NRBCRE): 0 /100 WBC
OXYHGB MFR BLDA: 93 % (ref 94–98)
PATH REV BLD -IMP: ABNORMAL
PATH REV BLD -IMP: ABNORMAL
PCO2 BLDA: 58 MM HG (ref 32–45)
PH BLDA: 7.37 UNITS (ref 7.35–7.45)
PH UR: 5 UNITS (ref 5–7)
PLAT MORPH BLD: NORMAL
PLAT MORPH BLD: NORMAL
PLATELET # BLD: 106 K/MCL (ref 140–450)
PLATELET # BLD: 151 K/MCL (ref 140–450)
PO2 BLDA: 74 MM HG (ref 83–108)
POLYCHROMASIA (POLY): ABNORMAL
POTASSIUM SERPL-SCNC: 4.5 MMOL/L (ref 3.4–5.1)
POTASSIUM SERPL-SCNC: 5.5 MMOL/L (ref 3.4–5.1)
PROCALCITONIN SERPL IA-MCNC: 4.32 NG/ML
PROCALCITONIN SERPL IA-MCNC: ABNORMAL NG/ML
PROT SERPL-MCNC: 8.7 G/DL (ref 6.4–8.2)
PROT UR QL: 100 MG/DL
PROTHROMBIN TIME (PRT2): ABNORMAL
PROTHROMBIN TIME (PRT2): ABNORMAL
PROTHROMBIN TIME: 29.8 SEC (ref 9.7–11.8)
PROTHROMBIN TIME: 42.9 SEC (ref 9.7–11.8)
RBC # BLD: 2.24 MIL/MCL (ref 4–5.2)
RBC # BLD: 2.69 MIL/MCL (ref 4–5.2)
RBC #/AREA URNS HPF: ABNORMAL /HPF (ref 0–2)
RBC CASTS #/AREA URNS LPF: ABNORMAL /[LPF]
RENAL EPI CELLS #/AREA URNS HPF: ABNORMAL /[HPF]
SAO2 % BLDA: 95 % (ref 95–99)
SODIUM SERPL-SCNC: 135 MMOL/L (ref 135–145)
SODIUM SERPL-SCNC: 140 MMOL/L (ref 135–145)
SP GR UR: 1.02 (ref 1–1.03)
SPECIMEN SOURCE: ABNORMAL
SPECIMEN SOURCE: NORMAL
SPERM URNS QL MICRO: ABNORMAL
SQUAMOUS #/AREA URNS HPF: ABNORMAL /HPF (ref 0–5)
T VAGINALIS URNS QL MICRO: ABNORMAL
TRI-PHOS CRY URNS QL MICRO: ABNORMAL
TROPONIN I SERPL HS-MCNC: <0.02 NG/ML
URATE CRY URNS QL MICRO: ABNORMAL
URINE REFLEX: ABNORMAL
URNS CMNT MICRO: ABNORMAL
UROBILINOGEN UR QL: 0.2 MG/DL (ref 0–1)
WAXY CASTS #/AREA URNS LPF: ABNORMAL /[LPF]
WBC # BLD: 18.1 K/MCL (ref 4.2–11)
WBC # BLD: 19.1 K/MCL (ref 4.2–11)
WBC #/AREA URNS HPF: ABNORMAL /HPF (ref 0–5)
WBC CASTS #/AREA URNS LPF: ABNORMAL /[LPF]
WBC MORPH BLD: NORMAL
WBC MORPH BLD: NORMAL
YEAST HYPHAE URNS QL MICRO: ABNORMAL
YEAST URNS QL MICRO: ABNORMAL

## 2019-06-11 PROCEDURE — 99291 CRITICAL CARE FIRST HOUR: CPT | Performed by: EMERGENCY MEDICINE

## 2019-06-12 LAB
ANALYZER ANC (IANC): ABNORMAL
ANION GAP SERPL CALC-SCNC: 6 MMOL/L (ref 10–20)
BASOPHILS # BLD: 0 K/MCL (ref 0–0.3)
BASOPHILS NFR BLD: 0 %
BUN SERPL-MCNC: 26 MG/DL (ref 6–20)
BUN/CREAT SERPL: 104 (ref 7–25)
CALCIUM SERPL-MCNC: 9.4 MG/DL (ref 8.4–10.2)
CHLORIDE SERPL-SCNC: 109 MMOL/L (ref 98–107)
CO2 SERPL-SCNC: 33 MMOL/L (ref 21–32)
CREAT SERPL-MCNC: 0.25 MG/DL (ref 0.51–0.95)
DIFFERENTIAL METHOD BLD: ABNORMAL
EOSINOPHIL # BLD: 0.2 K/MCL (ref 0.1–0.5)
EOSINOPHIL NFR BLD: 2 %
ERYTHROCYTE [DISTWIDTH] IN BLOOD: 14.2 % (ref 11–15)
GLUCOSE BLDC GLUCOMTR-MCNC: 105 MG/DL (ref 65–99)
GLUCOSE BLDC GLUCOMTR-MCNC: ABNORMAL MG/DL
GLUCOSE SERPL-MCNC: 138 MG/DL (ref 65–99)
HCT VFR BLD CALC: 23.2 % (ref 36–46.5)
HCT VFR BLD CALC: 30.2 % (ref 36–46.5)
HGB BLD-MCNC: 7 G/DL (ref 12–15.5)
HGB BLD-MCNC: 9.9 G/DL (ref 12–15.5)
IMM GRANULOCYTES # BLD AUTO: 0 K/MCL (ref 0–0.2)
IMM GRANULOCYTES NFR BLD: 0 %
INR PPP: 5.8
INR PPP: ABNORMAL
LEGIONELLA AB TITR SER IF: NORMAL {TITER}
LYMPHOCYTES # BLD: 1.1 K/MCL (ref 1–4.8)
LYMPHOCYTES NFR BLD: 10 %
MAGNESIUM SERPL-MCNC: 1.5 MG/DL (ref 1.7–2.4)
MCH RBC QN AUTO: 34.3 PG (ref 26–34)
MCHC RBC AUTO-ENTMCNC: 30.2 G/DL (ref 32–36.5)
MCV RBC AUTO: 113.7 FL (ref 78–100)
MONOCYTES # BLD: 0.8 K/MCL (ref 0.3–0.9)
MONOCYTES NFR BLD: 7 %
NEUTROPHILS # BLD: 9.8 K/MCL (ref 1.8–7.7)
NEUTROPHILS NFR BLD: 81 %
NEUTS SEG NFR BLD: ABNORMAL %
NRBC (NRBCRE): 0 /100 WBC
PHOSPHATE SERPL-MCNC: 1.8 MG/DL (ref 2.4–4.7)
PLATELET # BLD: 101 K/MCL (ref 140–450)
POTASSIUM SERPL-SCNC: 3.9 MMOL/L (ref 3.4–5.1)
PROTHROMBIN TIME (PRT2): ABNORMAL
PROTHROMBIN TIME: 55.2 SEC (ref 9.7–11.8)
RBC # BLD: 2.04 MIL/MCL (ref 4–5.2)
SODIUM SERPL-SCNC: 144 MMOL/L (ref 135–145)
STREP PNEUMONIAE ANTIGEN DETECTION: NORMAL
WBC # BLD: 12 K/MCL (ref 4.2–11)

## 2019-06-13 LAB
2009 H1N1 SUBTYPE (RF1N1): NOT DETECTED
ADENOVIRUS (RADENO): NOT DETECTED
ALBUMIN SERPL-MCNC: 1.9 G/DL (ref 3.6–5.1)
ALBUMIN/GLOB SERPL: 0.4 {RATIO} (ref 1–2.4)
ALP SERPL-CCNC: 250 UNITS/L (ref 45–117)
ALT SERPL-CCNC: 33 UNITS/L
ANALYZER ANC (IANC): ABNORMAL
ANION GAP SERPL CALC-SCNC: 8 MMOL/L (ref 10–20)
AST SERPL-CCNC: 40 UNITS/L
BACTERIA BLD CULT: ABNORMAL
BASOPHILS # BLD: 0 K/MCL (ref 0–0.3)
BASOPHILS NFR BLD: 0 %
BILIRUB SERPL-MCNC: 0.5 MG/DL (ref 0.2–1)
BOCAVIRUS (RBOCA): NOT DETECTED
BUN SERPL-MCNC: 12 MG/DL (ref 6–20)
BUN/CREAT SERPL: 41 (ref 7–25)
C. PNEUMONIAE (RCHLP): NOT DETECTED
CALCIUM SERPL-MCNC: 9.7 MG/DL (ref 8.4–10.2)
CHLORIDE SERPL-SCNC: 113 MMOL/L (ref 98–107)
CO2 SERPL-SCNC: 27 MMOL/L (ref 21–32)
CORONAVIRUS 229E (RC229E): NOT DETECTED
CORONAVIRUS HKU1 (RCHKU1): NOT DETECTED
CORONAVIRUS NL63 (RCNL63): NOT DETECTED
CORONAVIRUS OC43 (RCO43): NOT DETECTED
CREAT SERPL-MCNC: 0.3 MG/DL (ref 0.51–0.95)
DIFFERENTIAL METHOD BLD: ABNORMAL
EOSINOPHIL # BLD: 0.3 K/MCL (ref 0.1–0.5)
EOSINOPHIL NFR BLD: 4 %
ERYTHROCYTE [DISTWIDTH] IN BLOOD: 18 % (ref 11–15)
GLOBULIN SER-MCNC: 5.2 G/DL (ref 2–4)
GLUCOSE BLDC GLUCOMTR-MCNC: 112 MG/DL (ref 65–99)
GLUCOSE BLDC GLUCOMTR-MCNC: 165 MG/DL (ref 65–99)
GLUCOSE SERPL-MCNC: 132 MG/DL (ref 65–99)
HCT VFR BLD CALC: 29.3 % (ref 36–46.5)
HGB BLD-MCNC: 9.4 G/DL (ref 12–15.5)
IMM GRANULOCYTES # BLD AUTO: 0 K/MCL (ref 0–0.2)
IMM GRANULOCYTES NFR BLD: 0 %
INFLUENZA A SUBTYPE H1 (RFLH1): NOT DETECTED
INFLUENZA A SUBTYPE H3 (RFLH3): NOT DETECTED
INFLUENZA A UNSUBTYPABLE (RIAU): NORMAL
INFLUENZA B VIRUS (RFLUB): NOT DETECTED
INR PPP: 3.4
INR PPP: ABNORMAL
LYMPHOCYTES # BLD: 1.2 K/MCL (ref 1–4.8)
LYMPHOCYTES NFR BLD: 15 %
M. PNEUMONIAE (RMYPP): NORMAL
M. PNEUMONIAE (RMYPP): NOT DETECTED
MCH RBC QN AUTO: 33.2 PG (ref 26–34)
MCHC RBC AUTO-ENTMCNC: 32.1 G/DL (ref 32–36.5)
MCV RBC AUTO: 103.5 FL (ref 78–100)
METAPNEUMOVIRUS (RMETA): NOT DETECTED
MONOCYTES # BLD: 0.6 K/MCL (ref 0.3–0.9)
MONOCYTES NFR BLD: 8 %
NEUTROPHILS # BLD: 6 K/MCL (ref 1.8–7.7)
NEUTROPHILS NFR BLD: 73 %
NEUTS SEG NFR BLD: ABNORMAL %
NRBC (NRBCRE): 0 /100 WBC
PARAINFLUENZA, TYPE 1 (RPAR1): NOT DETECTED
PARAINFLUENZA, TYPE 2 (RPAR2): NOT DETECTED
PARAINFLUENZA, TYPE 3 (RPAR3): NOT DETECTED
PARAINFLUENZA, TYPE 4 (RPAR4): NOT DETECTED
PLATELET # BLD: 96 K/MCL (ref 140–450)
PLATELET # BLD: ABNORMAL 10*3/UL
POTASSIUM SERPL-SCNC: 4.1 MMOL/L (ref 3.4–5.1)
PREALB SERPL NEPH-MCNC: 7.3 MG/DL (ref 18–38)
PROCALCITONIN SERPL IA-MCNC: 2.38 NG/ML
PROCALCITONIN SERPL IA-MCNC: ABNORMAL NG/ML
PROT SERPL-MCNC: 7.1 G/DL (ref 6.4–8.2)
PROTHROMBIN TIME (PRT2): ABNORMAL
PROTHROMBIN TIME: 33.4 SEC (ref 9.7–11.8)
RBC # BLD: 2.83 MIL/MCL (ref 4–5.2)
RHINOVIRUS/ENTEROVIRUS (RRHINO): NOT DETECTED
RSV, SUBTYPE A (RRSVA): NOT DETECTED
RSV, SUBTYPE B (RRSVB): NOT DETECTED
SODIUM SERPL-SCNC: 144 MMOL/L (ref 135–145)
SPECIMEN SOURCE: NORMAL
WBC # BLD: 8.2 K/MCL (ref 4.2–11)

## 2019-06-14 LAB
ALBUMIN SERPL-MCNC: 1.9 G/DL (ref 3.6–5.1)
ALBUMIN/GLOB SERPL: 0.3 {RATIO} (ref 1–2.4)
ALP SERPL-CCNC: 252 UNITS/L (ref 45–117)
ALT SERPL-CCNC: 49 UNITS/L
ANALYZER ANC (IANC): ABNORMAL
ANION GAP SERPL CALC-SCNC: 7 MMOL/L (ref 10–20)
AST SERPL-CCNC: 58 UNITS/L
BASOPHILS # BLD: 0 K/MCL (ref 0–0.3)
BASOPHILS NFR BLD: 0 %
BILIRUB SERPL-MCNC: 0.8 MG/DL (ref 0.2–1)
BUN SERPL-MCNC: 8 MG/DL (ref 6–20)
BUN/CREAT SERPL: 31 (ref 7–25)
CALCIUM SERPL-MCNC: 9.7 MG/DL (ref 8.4–10.2)
CHLORIDE SERPL-SCNC: 112 MMOL/L (ref 98–107)
CO2 SERPL-SCNC: 29 MMOL/L (ref 21–32)
CREAT SERPL-MCNC: 0.26 MG/DL (ref 0.51–0.95)
DIFFERENTIAL METHOD BLD: ABNORMAL
EOSINOPHIL # BLD: 0.4 K/MCL (ref 0.1–0.5)
EOSINOPHIL NFR BLD: 4 %
ERYTHROCYTE [DISTWIDTH] IN BLOOD: 17.2 % (ref 11–15)
GLOBULIN SER-MCNC: 5.5 G/DL (ref 2–4)
GLUCOSE SERPL-MCNC: 124 MG/DL (ref 65–99)
HCT VFR BLD CALC: 30.3 % (ref 36–46.5)
HGB BLD-MCNC: 9.5 G/DL (ref 12–15.5)
IMM GRANULOCYTES # BLD AUTO: 0 K/MCL (ref 0–0.2)
IMM GRANULOCYTES NFR BLD: 0 %
INR PPP: 2.8
INR PPP: ABNORMAL
LYMPHOCYTES # BLD: 1.5 K/MCL (ref 1–4.8)
LYMPHOCYTES NFR BLD: 18 %
MCH RBC QN AUTO: 33.2 PG (ref 26–34)
MCHC RBC AUTO-ENTMCNC: 31.4 G/DL (ref 32–36.5)
MCV RBC AUTO: 105.9 FL (ref 78–100)
MONOCYTES # BLD: 0.7 K/MCL (ref 0.3–0.9)
MONOCYTES NFR BLD: 8 %
NEUTROPHILS # BLD: 5.6 K/MCL (ref 1.8–7.7)
NEUTROPHILS NFR BLD: 70 %
NEUTS SEG NFR BLD: ABNORMAL %
NRBC (NRBCRE): 0 /100 WBC
PLATELET # BLD: 94 K/MCL (ref 140–450)
POTASSIUM SERPL-SCNC: 4 MMOL/L (ref 3.4–5.1)
PROT SERPL-MCNC: 7.4 G/DL (ref 6.4–8.2)
PROTHROMBIN TIME (PRT2): ABNORMAL
PROTHROMBIN TIME: 27.4 SEC (ref 9.7–11.8)
RBC # BLD: 2.86 MIL/MCL (ref 4–5.2)
RESPIRATORY CUL/SMR (RTCS) HL: NORMAL
SODIUM SERPL-SCNC: 144 MMOL/L (ref 135–145)
WBC # BLD: 8.1 K/MCL (ref 4.2–11)

## 2019-06-14 PROCEDURE — 99253 IP/OBS CNSLTJ NEW/EST LOW 45: CPT | Performed by: NURSE PRACTITIONER

## 2019-06-14 PROCEDURE — 99497 ADVNCD CARE PLAN 30 MIN: CPT | Performed by: NURSE PRACTITIONER

## 2019-06-16 LAB
BACTERIA BLD CULT: NORMAL

## 2019-07-08 ENCOUNTER — HOSPITAL (OUTPATIENT)
Dept: OTHER | Age: 29
End: 2019-07-08

## 2019-07-09 PROCEDURE — 99283 EMERGENCY DEPT VISIT LOW MDM: CPT | Performed by: EMERGENCY MEDICINE

## 2019-07-10 ENCOUNTER — HOSPITAL (OUTPATIENT)
Dept: OTHER | Age: 29
End: 2019-07-10

## 2019-07-10 LAB
ALBUMIN SERPL-MCNC: 2.4 G/DL (ref 3.6–5.1)
ALBUMIN/GLOB SERPL: 0.4 {RATIO} (ref 1–2.4)
ALP SERPL-CCNC: 295 UNITS/L (ref 45–117)
ALT SERPL-CCNC: 90 UNITS/L
AMORPH SED URNS QL MICRO: ABNORMAL
ANALYZER ANC (IANC): ABNORMAL
ANION GAP SERPL CALC-SCNC: 4 MMOL/L (ref 10–20)
APPEARANCE UR: ABNORMAL
AST SERPL-CCNC: 71 UNITS/L
BACTERIA #/AREA URNS HPF: ABNORMAL /HPF
BASOPHILS # BLD: 0 K/MCL (ref 0–0.3)
BASOPHILS NFR BLD: 0 %
BILIRUB SERPL-MCNC: 0.3 MG/DL (ref 0.2–1)
BILIRUB UR QL: NEGATIVE
BUN SERPL-MCNC: 42 MG/DL (ref 6–20)
BUN/CREAT SERPL: 177 (ref 7–25)
CALCIUM SERPL-MCNC: 10.3 MG/DL (ref 8.4–10.2)
CAOX CRY URNS QL MICRO: ABNORMAL
CHLORIDE SERPL-SCNC: 94 MMOL/L (ref 98–107)
CO2 SERPL-SCNC: 42 MMOL/L (ref 21–32)
CO2 SERPL-SCNC: ABNORMAL MMOL/L
COLOR UR: YELLOW
CREAT SERPL-MCNC: 0.24 MG/DL (ref 0.51–0.95)
DIFFERENTIAL METHOD BLD: ABNORMAL
EOSINOPHIL # BLD: 0.4 K/MCL (ref 0.1–0.5)
EOSINOPHIL NFR BLD: 4 %
EPITH CASTS #/AREA URNS LPF: ABNORMAL /[LPF]
ERYTHROCYTE [DISTWIDTH] IN BLOOD: 14 % (ref 11–15)
FATTY CASTS #/AREA URNS LPF: ABNORMAL /[LPF]
GLOBULIN SER-MCNC: 6 G/DL (ref 2–4)
GLUCOSE SERPL-MCNC: 91 MG/DL (ref 65–99)
GLUCOSE UR-MCNC: NEGATIVE MG/DL
GRAN CASTS #/AREA URNS LPF: ABNORMAL /[LPF]
HCT VFR BLD CALC: 29.4 % (ref 36–46.5)
HGB BLD-MCNC: 9.7 G/DL (ref 12–15.5)
HGB UR QL: NEGATIVE
HYALINE CASTS #/AREA URNS LPF: ABNORMAL /LPF (ref 0–5)
IMM GRANULOCYTES # BLD AUTO: 0 K/MCL (ref 0–0.2)
IMM GRANULOCYTES NFR BLD: 0 %
INR PPP: 1.1
INR PPP: NORMAL
KETONES UR-MCNC: NEGATIVE MG/DL
LACTATE BLDV-SCNC: 0.9 MMOL/L (ref 0–2)
LARGE PLATELETS (PLTL): PRESENT
LEUKOCYTE ESTERASE UR QL STRIP: NEGATIVE
LYMPHOCYTES # BLD: 1.8 K/MCL (ref 1–4.8)
LYMPHOCYTES NFR BLD: 17 %
MCH RBC QN AUTO: 34.4 PG (ref 26–34)
MCHC RBC AUTO-ENTMCNC: 33 G/DL (ref 32–36.5)
MCV RBC AUTO: 104.3 FL (ref 78–100)
MIXED CELL CASTS #/AREA URNS LPF: ABNORMAL /[LPF]
MONOCYTES # BLD: 0.6 K/MCL (ref 0.3–0.9)
MONOCYTES NFR BLD: 6 %
MUCOUS THREADS URNS QL MICRO: PRESENT
NEUTROPHILS # BLD: 7.6 K/MCL (ref 1.8–7.7)
NEUTROPHILS NFR BLD: 73 %
NEUTS SEG NFR BLD: ABNORMAL %
NITRITE UR QL: NEGATIVE
NRBC (NRBCRE): 0 /100 WBC
PH UR: 6 UNITS (ref 5–7)
PLATELET # BLD: 101 K/MCL (ref 140–450)
POTASSIUM SERPL-SCNC: 3.7 MMOL/L (ref 3.4–5.1)
PROT SERPL-MCNC: 8.4 G/DL (ref 6.4–8.2)
PROT UR QL: 30 MG/DL
PROTHROMBIN TIME (PRT2): NORMAL
PROTHROMBIN TIME: 11.6 SEC (ref 9.7–11.8)
RBC # BLD: 2.82 MIL/MCL (ref 4–5.2)
RBC #/AREA URNS HPF: ABNORMAL /HPF (ref 0–2)
RBC CASTS #/AREA URNS LPF: ABNORMAL /[LPF]
RBC MORPH BLD: NORMAL
RENAL EPI CELLS #/AREA URNS HPF: ABNORMAL /[HPF]
SODIUM SERPL-SCNC: 136 MMOL/L (ref 135–145)
SP GR UR: 1.02 (ref 1–1.03)
SPECIMEN SOURCE: ABNORMAL
SPERM URNS QL MICRO: ABNORMAL
SQUAMOUS #/AREA URNS HPF: ABNORMAL /HPF (ref 0–5)
T VAGINALIS URNS QL MICRO: ABNORMAL
TRI-PHOS CRY URNS QL MICRO: ABNORMAL
URATE CRY URNS QL MICRO: ABNORMAL
URINE REFLEX: ABNORMAL
URNS CMNT MICRO: ABNORMAL
UROBILINOGEN UR QL: 0.2 MG/DL (ref 0–1)
WAXY CASTS #/AREA URNS LPF: ABNORMAL /[LPF]
WBC # BLD: 10.6 K/MCL (ref 4.2–11)
WBC #/AREA URNS HPF: ABNORMAL /HPF (ref 0–5)
WBC CASTS #/AREA URNS LPF: ABNORMAL /[LPF]
WBC MORPH BLD: NORMAL
YEAST HYPHAE URNS QL MICRO: ABNORMAL
YEAST URNS QL MICRO: ABNORMAL

## 2019-07-10 PROCEDURE — 99285 EMERGENCY DEPT VISIT HI MDM: CPT | Performed by: EMERGENCY MEDICINE

## 2019-07-11 LAB
B-HCG UR QL: NEGATIVE
BASE DEFICIT BLDV-SCNC: ABNORMAL MMOL/L
BASE EXCESS BLDV CALC-SCNC: 17 MMOL/L (ref 0–2)
BODY TEMPERATURE: 37 DEGREES
GAS FLOW.O2 O2 DELIVERY SYS: ABNORMAL L/MIN
GLUCOSE BLDC GLUCOMTR-MCNC: 151 MG/DL (ref 65–99)
GLUCOSE BLDC GLUCOMTR-MCNC: ABNORMAL MG/DL
HCO3 BLDV-SCNC: 43 MMOL/L (ref 22–28)
HCO3 BLDV-SCNC: ABNORMAL MMOL/L
HCO3 BLDV-SCNC: ABNORMAL MMOL/L
INHALED O2 CONCENTRATION: ABNORMAL %
MRSA DNA SPEC QL NAA+PROBE: NORMAL
MRSA DNA SPEC QL NAA+PROBE: NORMAL
MRSA DNA SPEC QL NAA+PROBE: NOT DETECTED
OXYHGB MFR BLDV: 50 % (ref 60–80)
PCO2 BLDV: 66 MM HG (ref 38–51)
PH BLDV: 7.44 UNITS (ref 7.35–7.45)
PO2 BLDV: 29 MM HG (ref 35–42)
SAO2 % BLDV: 51 % (ref 60–80)
SPECIMEN SOURCE: NORMAL

## 2019-07-12 LAB
2009 H1N1 SUBTYPE (RF1N1): NOT DETECTED
ADENOVIRUS (RADENO): NOT DETECTED
ALBUMIN SERPL-MCNC: 2.4 G/DL (ref 3.6–5.1)
ALP SERPL-CCNC: 267 UNITS/L (ref 45–117)
ALT SERPL-CCNC: 59 UNITS/L
ANALYZER ANC (IANC): ABNORMAL
ANION GAP SERPL CALC-SCNC: 7 MMOL/L (ref 10–20)
AST SERPL-CCNC: 44 UNITS/L
BILIRUB CONJ SERPL-MCNC: 0.2 MG/DL (ref 0–0.2)
BILIRUB SERPL-MCNC: 0.7 MG/DL (ref 0.2–1)
BOCAVIRUS (RBOCA): NOT DETECTED
BUN SERPL-MCNC: 35 MG/DL (ref 6–20)
BUN/CREAT SERPL: 104 (ref 7–25)
C. PNEUMONIAE (RCHLP): NOT DETECTED
CALCIUM SERPL-MCNC: 9.8 MG/DL (ref 8.4–10.2)
CHLORIDE SERPL-SCNC: 100 MMOL/L (ref 98–107)
CO2 SERPL-SCNC: 35 MMOL/L (ref 21–32)
CORONAVIRUS 229E (RC229E): NOT DETECTED
CORONAVIRUS HKU1 (RCHKU1): NOT DETECTED
CORONAVIRUS NL63 (RCNL63): NOT DETECTED
CORONAVIRUS OC43 (RCO43): NOT DETECTED
CREAT SERPL-MCNC: 0.34 MG/DL (ref 0.51–0.95)
ERYTHROCYTE [DISTWIDTH] IN BLOOD: 14.6 % (ref 11–15)
GLUCOSE SERPL-MCNC: 94 MG/DL (ref 65–99)
HCT VFR BLD CALC: 27.1 % (ref 36–46.5)
HGB BLD-MCNC: 8.8 G/DL (ref 12–15.5)
INFLUENZA A SUBTYPE H1 (RFLH1): NOT DETECTED
INFLUENZA A SUBTYPE H3 (RFLH3): NOT DETECTED
INFLUENZA A UNSUBTYPABLE (RIAU): NORMAL
INFLUENZA B VIRUS (RFLUB): NOT DETECTED
LEGIONELLA AB TITR SER IF: NORMAL {TITER}
M. PNEUMONIAE (RMYPP): NORMAL
M. PNEUMONIAE (RMYPP): NOT DETECTED
MCH RBC QN AUTO: 33.3 PG (ref 26–34)
MCHC RBC AUTO-ENTMCNC: 32.5 G/DL (ref 32–36.5)
MCV RBC AUTO: 102.7 FL (ref 78–100)
METAPNEUMOVIRUS (RMETA): NOT DETECTED
NRBC (NRBCRE): 0 /100 WBC
PARAINFLUENZA, TYPE 1 (RPAR1): NOT DETECTED
PARAINFLUENZA, TYPE 2 (RPAR2): NOT DETECTED
PARAINFLUENZA, TYPE 3 (RPAR3): NOT DETECTED
PARAINFLUENZA, TYPE 4 (RPAR4): NOT DETECTED
PLATELET # BLD: 106 K/MCL (ref 140–450)
POTASSIUM SERPL-SCNC: 3.2 MMOL/L (ref 3.4–5.1)
PROCALCITONIN SERPL IA-MCNC: 0.41 NG/ML
PROCALCITONIN SERPL IA-MCNC: ABNORMAL NG/ML
PROT SERPL-MCNC: 8 G/DL (ref 6.4–8.2)
RBC # BLD: 2.64 MIL/MCL (ref 4–5.2)
RHINOVIRUS/ENTEROVIRUS (RRHINO): NOT DETECTED
RSV, SUBTYPE A (RRSVA): NOT DETECTED
RSV, SUBTYPE B (RRSVB): NOT DETECTED
SODIUM SERPL-SCNC: 139 MMOL/L (ref 135–145)
SPECIMEN SOURCE: NORMAL
STREP PNEUMONIAE ANTIGEN DETECTION: NORMAL
VANCOMYCIN TROUGH SERPL-MCNC: 12.6 MCG/ML (ref 10–20)
VANCOMYCIN TROUGH SERPL-MCNC: NORMAL UG/ML
WBC # BLD: 10.7 K/MCL (ref 4.2–11)

## 2019-07-13 LAB
ANALYZER ANC (IANC): ABNORMAL
ANION GAP SERPL CALC-SCNC: 8 MMOL/L (ref 10–20)
BASOPHILS # BLD: 0 K/MCL (ref 0–0.3)
BASOPHILS NFR BLD: 0 %
BUN SERPL-MCNC: 17 MG/DL (ref 6–20)
BUN/CREAT SERPL: 57 (ref 7–25)
CALCIUM SERPL-MCNC: 8.2 MG/DL (ref 8.4–10.2)
CHLORIDE SERPL-SCNC: 108 MMOL/L (ref 98–107)
CO2 SERPL-SCNC: 30 MMOL/L (ref 21–32)
CREAT SERPL-MCNC: 0.3 MG/DL (ref 0.51–0.95)
DIFFERENTIAL METHOD BLD: ABNORMAL
EOSINOPHIL # BLD: 0 K/MCL (ref 0.1–0.5)
EOSINOPHIL NFR BLD: 0 %
ERYTHROCYTE [DISTWIDTH] IN BLOOD: 14.7 % (ref 11–15)
GLUCOSE SERPL-MCNC: 174 MG/DL (ref 65–99)
HCT VFR BLD CALC: 25.7 % (ref 36–46.5)
HGB BLD-MCNC: 8.3 G/DL (ref 12–15.5)
IMM GRANULOCYTES # BLD AUTO: 0 K/MCL (ref 0–0.2)
IMM GRANULOCYTES NFR BLD: 0 %
LDH SERPL L TO P-CCNC: 200 UNITS/L (ref 82–240)
LYMPHOCYTES # BLD: 1.8 K/MCL (ref 1–4.8)
LYMPHOCYTES NFR BLD: 24 %
MCH RBC QN AUTO: 33.7 PG (ref 26–34)
MCHC RBC AUTO-ENTMCNC: 32.3 G/DL (ref 32–36.5)
MCV RBC AUTO: 104.5 FL (ref 78–100)
MONOCYTES # BLD: 0.7 K/MCL (ref 0.3–0.9)
MONOCYTES NFR BLD: 9 %
NEUTROPHILS # BLD: 4.9 K/MCL (ref 1.8–7.7)
NEUTROPHILS NFR BLD: 67 %
NEUTS SEG NFR BLD: ABNORMAL %
NRBC (NRBCRE): 0 /100 WBC
PLATELET # BLD: 85 K/MCL (ref 140–450)
PLATELET # BLD: ABNORMAL 10*3/UL
POTASSIUM SERPL-SCNC: 3.1 MMOL/L (ref 3.4–5.1)
PROCALCITONIN SERPL IA-MCNC: 0.29 NG/ML
PROCALCITONIN SERPL IA-MCNC: ABNORMAL NG/ML
PROT SERPL-MCNC: 7.7 G/DL (ref 6.4–8.2)
RBC # BLD: 2.46 MIL/MCL (ref 4–5.2)
SODIUM SERPL-SCNC: 143 MMOL/L (ref 135–145)
WBC # BLD: 7.4 K/MCL (ref 4.2–11)

## 2019-07-14 LAB
ALBUMIN SERPL-MCNC: 2.5 G/DL (ref 3.6–5.1)
ALBUMIN/GLOB SERPL: 0.5 {RATIO} (ref 1–2.4)
ALP SERPL-CCNC: 250 UNITS/L (ref 45–117)
ALT SERPL-CCNC: 102 UNITS/L
ANALYZER ANC (IANC): ABNORMAL
ANION GAP SERPL CALC-SCNC: 6 MMOL/L (ref 10–20)
AST SERPL-CCNC: 72 UNITS/L
BASOPHILS # BLD: 0 K/MCL (ref 0–0.3)
BASOPHILS NFR BLD: 0 %
BILIRUB SERPL-MCNC: 0.5 MG/DL (ref 0.2–1)
BUN SERPL-MCNC: 12 MG/DL (ref 6–20)
BUN/CREAT SERPL: 45 (ref 7–25)
CALCIUM SERPL-MCNC: 9.1 MG/DL (ref 8.4–10.2)
CHLORIDE SERPL-SCNC: 110 MMOL/L (ref 98–107)
CO2 SERPL-SCNC: 29 MMOL/L (ref 21–32)
CREAT SERPL-MCNC: 0.27 MG/DL (ref 0.51–0.95)
DIFFERENTIAL METHOD BLD: ABNORMAL
EOSINOPHIL # BLD: 0 K/MCL (ref 0.1–0.5)
EOSINOPHIL NFR BLD: 0 %
ERYTHROCYTE [DISTWIDTH] IN BLOOD: 14.5 % (ref 11–15)
GLOBULIN SER-MCNC: 5 G/DL (ref 2–4)
GLUCOSE BLDC GLUCOMTR-MCNC: 141 MG/DL (ref 65–99)
GLUCOSE SERPL-MCNC: 119 MG/DL (ref 65–99)
HCT VFR BLD CALC: 26.9 % (ref 36–46.5)
HGB BLD-MCNC: 8.4 G/DL (ref 12–15.5)
IMM GRANULOCYTES # BLD AUTO: 0 K/MCL (ref 0–0.2)
IMM GRANULOCYTES NFR BLD: 0 %
LYMPHOCYTES # BLD: 2 K/MCL (ref 1–4.8)
LYMPHOCYTES NFR BLD: 22 %
MCH RBC QN AUTO: 33.6 PG (ref 26–34)
MCHC RBC AUTO-ENTMCNC: 31.2 G/DL (ref 32–36.5)
MCV RBC AUTO: 107.6 FL (ref 78–100)
MONOCYTES # BLD: 0.6 K/MCL (ref 0.3–0.9)
MONOCYTES NFR BLD: 7 %
NEUTROPHILS # BLD: 6.4 K/MCL (ref 1.8–7.7)
NEUTROPHILS NFR BLD: 71 %
NEUTS SEG NFR BLD: ABNORMAL %
NRBC (NRBCRE): 0 /100 WBC
PLATELET # BLD: 80 K/MCL (ref 140–450)
POTASSIUM SERPL-SCNC: 3.7 MMOL/L (ref 3.4–5.1)
PROT SERPL-MCNC: 7.5 G/DL (ref 6.4–8.2)
RBC # BLD: 2.5 MIL/MCL (ref 4–5.2)
SODIUM SERPL-SCNC: 141 MMOL/L (ref 135–145)
WBC # BLD: 9 K/MCL (ref 4.2–11)

## 2019-07-15 LAB
ANALYZER ANC (IANC): ABNORMAL
ANION GAP SERPL CALC-SCNC: 10 MMOL/L (ref 10–20)
BACTERIA BLD CULT: ABNORMAL
BASOPHILS # BLD: 0 K/MCL (ref 0–0.3)
BASOPHILS NFR BLD: 0 %
BUN SERPL-MCNC: 11 MG/DL (ref 6–20)
BUN/CREAT SERPL: 43 (ref 7–25)
CALCIUM SERPL-MCNC: 8.3 MG/DL (ref 8.4–10.2)
CHLORIDE SERPL-SCNC: 105 MMOL/L (ref 98–107)
CO2 SERPL-SCNC: 25 MMOL/L (ref 21–32)
CREAT SERPL-MCNC: 0.25 MG/DL (ref 0.51–0.95)
DIFFERENTIAL METHOD BLD: ABNORMAL
EOSINOPHIL # BLD: 0.1 K/MCL (ref 0.1–0.5)
EOSINOPHIL NFR BLD: 1 %
ERYTHROCYTE [DISTWIDTH] IN BLOOD: 14.1 % (ref 11–15)
GLUCOSE SERPL-MCNC: 110 MG/DL (ref 65–99)
HCT VFR BLD CALC: 26.3 % (ref 36–46.5)
HGB BLD-MCNC: 8.4 G/DL (ref 12–15.5)
IMM GRANULOCYTES # BLD AUTO: 0 K/MCL (ref 0–0.2)
IMM GRANULOCYTES NFR BLD: 0 %
LYMPHOCYTES # BLD: 2.2 K/MCL (ref 1–4.8)
LYMPHOCYTES NFR BLD: 26 %
MCH RBC QN AUTO: 33.9 PG (ref 26–34)
MCHC RBC AUTO-ENTMCNC: 31.9 G/DL (ref 32–36.5)
MCV RBC AUTO: 106 FL (ref 78–100)
MONOCYTES # BLD: 0.5 K/MCL (ref 0.3–0.9)
MONOCYTES NFR BLD: 6 %
NEUTROPHILS # BLD: 5.6 K/MCL (ref 1.8–7.7)
NEUTROPHILS NFR BLD: 67 %
NEUTS SEG NFR BLD: ABNORMAL %
NRBC (NRBCRE): 0 /100 WBC
ORGANISM: ABNORMAL
PLATELET # BLD: 87 K/MCL (ref 140–450)
POTASSIUM SERPL-SCNC: 4 MMOL/L (ref 3.4–5.1)
RBC # BLD: 2.48 MIL/MCL (ref 4–5.2)
RESPIRATORY CUL/SMR (RTCS) HL: ABNORMAL
SODIUM SERPL-SCNC: 136 MMOL/L (ref 135–145)
WBC # BLD: 8.4 K/MCL (ref 4.2–11)

## 2019-07-16 LAB
ANALYZER ANC (IANC): ABNORMAL
ANION GAP SERPL CALC-SCNC: 9 MMOL/L (ref 10–20)
BACTERIA BLD CULT: NORMAL
BASOPHILS # BLD: 0 K/MCL (ref 0–0.3)
BASOPHILS NFR BLD: 0 %
BUN SERPL-MCNC: 10 MG/DL (ref 6–20)
BUN/CREAT SERPL: 37 (ref 7–25)
CALCIUM SERPL-MCNC: 8.7 MG/DL (ref 8.4–10.2)
CHLORIDE SERPL-SCNC: 104 MMOL/L (ref 98–107)
CO2 SERPL-SCNC: 26 MMOL/L (ref 21–32)
CREAT SERPL-MCNC: 0.27 MG/DL (ref 0.51–0.95)
DIFFERENTIAL METHOD BLD: ABNORMAL
EOSINOPHIL # BLD: 0.1 K/MCL (ref 0.1–0.5)
EOSINOPHIL NFR BLD: 1 %
ERYTHROCYTE [DISTWIDTH] IN BLOOD: 13.7 % (ref 11–15)
GLUCOSE SERPL-MCNC: 108 MG/DL (ref 65–99)
HCT VFR BLD CALC: 29.1 % (ref 36–46.5)
HGB BLD-MCNC: 9.5 G/DL (ref 12–15.5)
IMM GRANULOCYTES # BLD AUTO: 0 K/MCL (ref 0–0.2)
IMM GRANULOCYTES NFR BLD: 0 %
LYMPHOCYTES # BLD: 2.2 K/MCL (ref 1–4.8)
LYMPHOCYTES NFR BLD: 28 %
MCH RBC QN AUTO: 33.8 PG (ref 26–34)
MCHC RBC AUTO-ENTMCNC: 32.6 G/DL (ref 32–36.5)
MCV RBC AUTO: 103.6 FL (ref 78–100)
MONOCYTES # BLD: 0.6 K/MCL (ref 0.3–0.9)
MONOCYTES NFR BLD: 8 %
NEUTROPHILS # BLD: 5 K/MCL (ref 1.8–7.7)
NEUTROPHILS NFR BLD: 63 %
NEUTS SEG NFR BLD: ABNORMAL %
NRBC (NRBCRE): 0 /100 WBC
PLATELET # BLD: 101 K/MCL (ref 140–450)
PLATELET # BLD: ABNORMAL 10*3/UL
POTASSIUM SERPL-SCNC: 3.8 MMOL/L (ref 3.4–5.1)
RBC # BLD: 2.81 MIL/MCL (ref 4–5.2)
SODIUM SERPL-SCNC: 135 MMOL/L (ref 135–145)
WBC # BLD: 7.9 K/MCL (ref 4.2–11)

## 2019-07-17 LAB
ANALYZER ANC (IANC): ABNORMAL
ANION GAP SERPL CALC-SCNC: 11 MMOL/L (ref 10–20)
BASOPHILS # BLD: 0 K/MCL (ref 0–0.3)
BASOPHILS NFR BLD: 0 %
BUN SERPL-MCNC: 8 MG/DL (ref 6–20)
BUN/CREAT SERPL: 30 (ref 7–25)
CALCIUM SERPL-MCNC: 8.1 MG/DL (ref 8.4–10.2)
CHLORIDE SERPL-SCNC: 106 MMOL/L (ref 98–107)
CO2 SERPL-SCNC: 24 MMOL/L (ref 21–32)
CREAT SERPL-MCNC: 0.27 MG/DL (ref 0.51–0.95)
DIFFERENTIAL METHOD BLD: ABNORMAL
EOSINOPHIL # BLD: 0.1 K/MCL (ref 0.1–0.5)
EOSINOPHIL NFR BLD: 1 %
ERYTHROCYTE [DISTWIDTH] IN BLOOD: 14.2 % (ref 11–15)
GLUCOSE SERPL-MCNC: 101 MG/DL (ref 65–99)
HCT VFR BLD CALC: 24.3 % (ref 36–46.5)
HGB BLD-MCNC: 8 G/DL (ref 12–15.5)
IMM GRANULOCYTES # BLD AUTO: 0 K/MCL (ref 0–0.2)
IMM GRANULOCYTES NFR BLD: 0 %
LYMPHOCYTES # BLD: 2.1 K/MCL (ref 1–4.8)
LYMPHOCYTES NFR BLD: 30 %
MCH RBC QN AUTO: 33.9 PG (ref 26–34)
MCHC RBC AUTO-ENTMCNC: 32.9 G/DL (ref 32–36.5)
MCV RBC AUTO: 103 FL (ref 78–100)
MONOCYTES # BLD: 0.6 K/MCL (ref 0.3–0.9)
MONOCYTES NFR BLD: 8 %
NEUTROPHILS # BLD: 4.3 K/MCL (ref 1.8–7.7)
NEUTROPHILS NFR BLD: 61 %
NEUTS SEG NFR BLD: ABNORMAL %
NRBC (NRBCRE): 0 /100 WBC
PLATELET # BLD: 91 K/MCL (ref 140–450)
PLATELET # BLD: ABNORMAL 10*3/UL
POTASSIUM SERPL-SCNC: 3.7 MMOL/L (ref 3.4–5.1)
RBC # BLD: 2.36 MIL/MCL (ref 4–5.2)
SODIUM SERPL-SCNC: 137 MMOL/L (ref 135–145)
WBC # BLD: 7.1 K/MCL (ref 4.2–11)

## 2019-07-19 LAB
BACTERIA BLD CULT: NORMAL

## 2019-07-31 ENCOUNTER — HOSPITAL (OUTPATIENT)
Dept: OTHER | Age: 29
End: 2019-07-31

## 2019-07-31 LAB
ANALYZER ANC (IANC): ABNORMAL
ANION GAP SERPL CALC-SCNC: 7 MMOL/L (ref 10–20)
BASE DEFICIT BLDA-SCNC: ABNORMAL MMOL/L
BASE DEFICIT BLDV-SCNC: ABNORMAL MMOL/L
BASE EXCESS BLDA CALC-SCNC: 13 MMOL/L (ref 0–3)
BASE EXCESS BLDV CALC-SCNC: 7 MMOL/L (ref 0–2)
BASOPHILS # BLD: 0.1 K/MCL (ref 0–0.3)
BASOPHILS NFR BLD: 0 %
BODY TEMPERATURE: 37 DEGREES
BODY TEMPERATURE: 37 DEGREES
BUN SERPL-MCNC: 22 MG/DL (ref 6–20)
BUN/CREAT SERPL: 90 (ref 7–25)
CALCIUM SERPL-MCNC: 9 MG/DL (ref 8.4–10.2)
CHLORIDE SERPL-SCNC: 97 MMOL/L (ref 98–107)
CO2 SERPL-SCNC: 38 MMOL/L (ref 21–32)
CREAT SERPL-MCNC: 0.24 MG/DL (ref 0.51–0.95)
DIFFERENTIAL METHOD BLD: ABNORMAL
EOSINOPHIL # BLD: 0 K/MCL (ref 0.1–0.5)
EOSINOPHIL NFR BLD: 0 %
ERYTHROCYTE [DISTWIDTH] IN BLOOD: 13.6 % (ref 11–15)
GAS FLOW.O2 O2 DELIVERY SYS: ABNORMAL L/MIN
GAS FLOW.O2 O2 DELIVERY SYS: ABNORMAL L/MIN
GLUCOSE SERPL-MCNC: 244 MG/DL (ref 65–99)
HCO3 BLDA-SCNC: 37 MMOL/L (ref 22–28)
HCO3 BLDV-SCNC: 37 MMOL/L (ref 22–28)
HCT VFR BLD CALC: 31.9 % (ref 36–46.5)
HGB BLD-MCNC: 10.1 G/DL (ref 12–15.5)
IMM GRANULOCYTES # BLD AUTO: 0.1 K/MCL (ref 0–0.2)
IMM GRANULOCYTES NFR BLD: 0 %
INHALED O2 CONCENTRATION: ABNORMAL %
INHALED O2 CONCENTRATION: ABNORMAL %
LYMPHOCYTES # BLD: 0.8 K/MCL (ref 1–4.8)
LYMPHOCYTES NFR BLD: 7 %
MCH RBC QN AUTO: 35.3 PG (ref 26–34)
MCHC RBC AUTO-ENTMCNC: 31.7 G/DL (ref 32–36.5)
MCV RBC AUTO: 111.5 FL (ref 78–100)
MONOCYTES # BLD: 0.8 K/MCL (ref 0.3–0.9)
MONOCYTES NFR BLD: 7 %
NEUTROPHILS # BLD: 9.5 K/MCL (ref 1.8–7.7)
NEUTROPHILS NFR BLD: 86 %
NEUTS SEG NFR BLD: ABNORMAL %
NRBC (NRBCRE): 0 /100 WBC
OXYHGB MFR BLDA: 95 % (ref 94–98)
OXYHGB MFR BLDV: 25 % (ref 60–80)
PCO2 BLDA: 41 MM HG (ref 32–45)
PCO2 BLDV: 87 MM HG (ref 38–51)
PH BLDA: 7.56 UNITS (ref 7.35–7.45)
PH BLDV: 7.25 UNITS (ref 7.35–7.45)
PLATELET # BLD: 58 K/MCL (ref 140–450)
PO2 BLDA: 75 MM HG (ref 83–108)
PO2 BLDV: <25 MM HG (ref 35–42)
POTASSIUM SERPL-SCNC: 5.9 MMOL/L (ref 3.4–5.1)
RBC # BLD: 2.86 MIL/MCL (ref 4–5.2)
SAO2 % BLDA: 97 % (ref 95–99)
SAO2 % BLDV: 25 % (ref 60–80)
SODIUM SERPL-SCNC: 136 MMOL/L (ref 135–145)
WBC # BLD: 11.1 K/MCL (ref 4.2–11)

## 2019-07-31 PROCEDURE — 99285 EMERGENCY DEPT VISIT HI MDM: CPT | Performed by: EMERGENCY MEDICINE

## 2019-08-01 ENCOUNTER — DIAGNOSTIC TRANS (OUTPATIENT)
Dept: OTHER | Age: 29
End: 2019-08-01

## 2019-08-01 LAB
ANALYZER ANC (IANC): ABNORMAL
ANION GAP SERPL CALC-SCNC: 10 MMOL/L (ref 10–20)
BASE DEFICIT BLDA-SCNC: ABNORMAL MMOL/L
BASE DEFICIT BLDA-SCNC: ABNORMAL MMOL/L
BASE EXCESS BLDA CALC-SCNC: 10 MMOL/L (ref 0–3)
BASE EXCESS BLDA CALC-SCNC: 9 MMOL/L (ref 0–3)
BASOPHILS # BLD: 0 K/MCL (ref 0–0.3)
BASOPHILS NFR BLD: 0 %
BODY TEMPERATURE: 36.8 DEGREES
BODY TEMPERATURE: 37 DEGREES
BUN SERPL-MCNC: 23 MG/DL (ref 6–20)
BUN/CREAT SERPL: 92 (ref 7–25)
CALCIUM SERPL-MCNC: 9.8 MG/DL (ref 8.4–10.2)
CHLORIDE SERPL-SCNC: 97 MMOL/L (ref 98–107)
CO2 SERPL-SCNC: 31 MMOL/L (ref 21–32)
CREAT SERPL-MCNC: 0.25 MG/DL (ref 0.51–0.95)
DIFFERENTIAL METHOD BLD: ABNORMAL
EOSINOPHIL # BLD: 0.1 K/MCL (ref 0.1–0.5)
EOSINOPHIL NFR BLD: 1 %
ERYTHROCYTE [DISTWIDTH] IN BLOOD: 13.4 % (ref 11–15)
GAS FLOW.O2 O2 DELIVERY SYS: ABNORMAL L/MIN
GAS FLOW.O2 O2 DELIVERY SYS: ABNORMAL L/MIN
GLUCOSE BLDC GLUCOMTR-MCNC: 108 MG/DL (ref 65–99)
GLUCOSE BLDC GLUCOMTR-MCNC: 116 MG/DL (ref 65–99)
GLUCOSE BLDC GLUCOMTR-MCNC: 142 MG/DL (ref 65–99)
GLUCOSE BLDC GLUCOMTR-MCNC: ABNORMAL MG/DL
GLUCOSE BLDC GLUCOMTR-MCNC: ABNORMAL MG/DL
GLUCOSE SERPL-MCNC: 89 MG/DL (ref 65–99)
HCO3 BLDA-SCNC: 34 MMOL/L (ref 22–28)
HCO3 BLDA-SCNC: 34 MMOL/L (ref 22–28)
HCT VFR BLD CALC: 30.7 % (ref 36–46.5)
HGB BLD-MCNC: 9.9 G/DL (ref 12–15.5)
IMM GRANULOCYTES # BLD AUTO: 0 K/MCL (ref 0–0.2)
IMM GRANULOCYTES NFR BLD: 0 %
INHALED O2 CONCENTRATION: 30 %
INHALED O2 CONCENTRATION: ABNORMAL %
INR PPP: 1.1
INR PPP: NORMAL
LEGIONELLA AB TITR SER IF: NORMAL {TITER}
LYMPHOCYTES # BLD: 1.9 K/MCL (ref 1–4.8)
LYMPHOCYTES NFR BLD: 19 %
MAGNESIUM SERPL-MCNC: 1.8 MG/DL (ref 1.7–2.4)
MCH RBC QN AUTO: 35.4 PG (ref 26–34)
MCHC RBC AUTO-ENTMCNC: 32.2 G/DL (ref 32–36.5)
MCV RBC AUTO: 109.6 FL (ref 78–100)
MONOCYTES # BLD: 0.8 K/MCL (ref 0.3–0.9)
MONOCYTES NFR BLD: 9 %
MRSA DNA SPEC QL NAA+PROBE: NORMAL
MRSA DNA SPEC QL NAA+PROBE: NORMAL
MRSA DNA SPEC QL NAA+PROBE: NOT DETECTED
NEUTROPHILS # BLD: 7 K/MCL (ref 1.8–7.7)
NEUTROPHILS NFR BLD: 71 %
NEUTS SEG NFR BLD: ABNORMAL %
NRBC (NRBCRE): 0 /100 WBC
OXYHGB MFR BLDA: 82 % (ref 94–98)
OXYHGB MFR BLDA: 93 % (ref 94–98)
PCO2 BLDA: 42 MM HG (ref 32–45)
PCO2 BLDA: 50 MM HG (ref 32–45)
PH BLDA: 7.45 UNITS (ref 7.35–7.45)
PH BLDA: 7.52 UNITS (ref 7.35–7.45)
PHOSPHATE SERPL-MCNC: 1.2 MG/DL (ref 2.4–4.7)
PLATELET # BLD: 62 K/MCL (ref 140–450)
PO2 BLDA: 45 MM HG (ref 83–108)
PO2 BLDA: 68 MM HG (ref 83–108)
PO2 BLDA: ABNORMAL MM[HG]
PO2 BLDA: ABNORMAL MM[HG]
POTASSIUM SERPL-SCNC: 4.3 MMOL/L (ref 3.4–5.1)
PROCALCITONIN SERPL IA-MCNC: 0.5 NG/ML
PROCALCITONIN SERPL IA-MCNC: ABNORMAL NG/ML
PROTHROMBIN TIME (PRT2): NORMAL
PROTHROMBIN TIME: 11.8 SEC (ref 9.7–11.8)
RBC # BLD: 2.8 MIL/MCL (ref 4–5.2)
SAO2 % BLDA: 84 % (ref 95–99)
SAO2 % BLDA: 96 % (ref 95–99)
SODIUM SERPL-SCNC: 134 MMOL/L (ref 135–145)
SPECIMEN SOURCE: NORMAL
STREP PNEUMONIAE ANTIGEN DETECTION: NORMAL
WBC # BLD: 9.8 K/MCL (ref 4.2–11)

## 2019-08-02 LAB
ANALYZER ANC (IANC): ABNORMAL
ANION GAP SERPL CALC-SCNC: 14 MMOL/L (ref 10–20)
BASOPHILS # BLD: 0 K/MCL (ref 0–0.3)
BASOPHILS NFR BLD: 0 %
BUN SERPL-MCNC: 14 MG/DL (ref 6–20)
BUN/CREAT SERPL: 54 (ref 7–25)
CALCIUM SERPL-MCNC: 8.6 MG/DL (ref 8.4–10.2)
CHLORIDE SERPL-SCNC: 94 MMOL/L (ref 98–107)
CO2 SERPL-SCNC: 30 MMOL/L (ref 21–32)
CREAT SERPL-MCNC: 0.26 MG/DL (ref 0.51–0.95)
DIFFERENTIAL METHOD BLD: ABNORMAL
EOSINOPHIL # BLD: 0.2 K/MCL (ref 0.1–0.5)
EOSINOPHIL NFR BLD: 2 %
ERYTHROCYTE [DISTWIDTH] IN BLOOD: 13.8 % (ref 11–15)
GLUCOSE BLDC GLUCOMTR-MCNC: 144 MG/DL (ref 65–99)
GLUCOSE BLDC GLUCOMTR-MCNC: 150 MG/DL (ref 65–99)
GLUCOSE BLDC GLUCOMTR-MCNC: ABNORMAL MG/DL
GLUCOSE BLDC GLUCOMTR-MCNC: ABNORMAL MG/DL
GLUCOSE SERPL-MCNC: 148 MG/DL (ref 65–99)
HCT VFR BLD CALC: 30.6 % (ref 36–46.5)
HGB BLD-MCNC: 10 G/DL (ref 12–15.5)
IMM GRANULOCYTES # BLD AUTO: 0 K/MCL (ref 0–0.2)
IMM GRANULOCYTES NFR BLD: 0 %
LYMPHOCYTES # BLD: 1.5 K/MCL (ref 1–4.8)
LYMPHOCYTES NFR BLD: 13 %
MAGNESIUM SERPL-MCNC: 2.1 MG/DL (ref 1.7–2.4)
MCH RBC QN AUTO: 34.7 PG (ref 26–34)
MCHC RBC AUTO-ENTMCNC: 32.7 G/DL (ref 32–36.5)
MCV RBC AUTO: 106.3 FL (ref 78–100)
MONOCYTES # BLD: 0.7 K/MCL (ref 0.3–0.9)
MONOCYTES NFR BLD: 6 %
NEUTROPHILS # BLD: 8.7 K/MCL (ref 1.8–7.7)
NEUTROPHILS NFR BLD: 79 %
NEUTS SEG NFR BLD: ABNORMAL %
NRBC (NRBCRE): 0 /100 WBC
PHOSPHATE SERPL-MCNC: 5.1 MG/DL (ref 2.4–4.7)
PLATELET # BLD: 93 K/MCL (ref 140–450)
POTASSIUM SERPL-SCNC: 4.6 MMOL/L (ref 3.4–5.1)
RBC # BLD: 2.88 MIL/MCL (ref 4–5.2)
SODIUM SERPL-SCNC: 133 MMOL/L (ref 135–145)
WBC # BLD: 11.2 K/MCL (ref 4.2–11)

## 2019-08-06 ENCOUNTER — HOSPITAL (OUTPATIENT)
Dept: OTHER | Age: 29
End: 2019-08-06

## 2019-08-06 LAB
BACTERIA BLD CULT: NORMAL

## 2019-08-06 PROCEDURE — 43762 RPLC GTUBE NO REVJ TRC: CPT | Performed by: EMERGENCY MEDICINE

## 2019-08-06 PROCEDURE — 99283 EMERGENCY DEPT VISIT LOW MDM: CPT | Performed by: EMERGENCY MEDICINE

## 2019-08-22 ENCOUNTER — HOSPITAL (OUTPATIENT)
Dept: OTHER | Age: 29
End: 2019-08-22

## 2019-08-22 ENCOUNTER — DIAGNOSTIC TRANS (OUTPATIENT)
Dept: OTHER | Age: 29
End: 2019-08-22

## 2019-08-22 LAB
ALBUMIN SERPL-MCNC: 1.8 G/DL (ref 3.6–5.1)
ALBUMIN/GLOB SERPL: 0.3 {RATIO} (ref 1–2.4)
ALP SERPL-CCNC: 229 UNITS/L (ref 45–117)
ALT SERPL-CCNC: 21 UNITS/L
AMORPH SED URNS QL MICRO: NORMAL
ANALYZER ANC (IANC): ABNORMAL
ANION GAP SERPL CALC-SCNC: 2 MMOL/L (ref 10–20)
APPEARANCE UR: CLEAR
APTT PPP: 33 SEC (ref 22–32)
APTT PPP: ABNORMAL S
APTT PPP: ABNORMAL S
AST SERPL-CCNC: 19 UNITS/L
B-HCG UR QL: NEGATIVE
BACTERIA #/AREA URNS HPF: NORMAL /HPF
BASE DEFICIT BLDA-SCNC: ABNORMAL MMOL/L
BASE EXCESS BLDA CALC-SCNC: 14 MMOL/L (ref 0–3)
BASOPHILS # BLD: 0 K/MCL (ref 0–0.3)
BASOPHILS NFR BLD: 0 %
BILIRUB SERPL-MCNC: 0.3 MG/DL (ref 0.2–1)
BILIRUB UR QL: NEGATIVE
BODY TEMPERATURE: 38.1 DEGREES
BUN SERPL-MCNC: 31 MG/DL (ref 6–20)
BUN/CREAT SERPL: 121 (ref 7–25)
CALCIUM SERPL-MCNC: 8.4 MG/DL (ref 8.4–10.2)
CAOX CRY URNS QL MICRO: NORMAL
CHLORIDE SERPL-SCNC: 98 MMOL/L (ref 98–107)
CK SERPL-CCNC: 42 UNITS/L (ref 26–192)
CO2 SERPL-SCNC: 46 MMOL/L (ref 21–32)
CO2 SERPL-SCNC: ABNORMAL MMOL/L
COLOR UR: YELLOW
CREAT SERPL-MCNC: 0.26 MG/DL (ref 0.51–0.95)
DIFFERENTIAL METHOD BLD: ABNORMAL
EOSINOPHIL # BLD: 0.2 K/MCL (ref 0.1–0.5)
EOSINOPHIL NFR BLD: 2 %
EPITH CASTS #/AREA URNS LPF: NORMAL /[LPF]
ERYTHROCYTE [DISTWIDTH] IN BLOOD: 13.4 % (ref 11–15)
FATTY CASTS #/AREA URNS LPF: NORMAL /[LPF]
GAS FLOW.O2 O2 DELIVERY SYS: ABNORMAL L/MIN
GLOBULIN SER-MCNC: 5.8 G/DL (ref 2–4)
GLUCOSE BLDC GLUCOMTR-MCNC: 155 MG/DL (ref 65–99)
GLUCOSE SERPL-MCNC: 150 MG/DL (ref 65–99)
GLUCOSE UR-MCNC: NEGATIVE MG/DL
GRAN CASTS #/AREA URNS LPF: NORMAL /[LPF]
HCO3 BLDA-SCNC: 39 MMOL/L (ref 22–28)
HCT VFR BLD CALC: 29.6 % (ref 36–46.5)
HGB BLD-MCNC: 8.7 G/DL (ref 12–15.5)
HGB UR QL: NEGATIVE
HYALINE CASTS #/AREA URNS LPF: NORMAL /LPF (ref 0–5)
IMM GRANULOCYTES # BLD AUTO: 0.1 K/MCL (ref 0–0.2)
IMM GRANULOCYTES NFR BLD: 1 %
INHALED O2 CONCENTRATION: 40 %
INR PPP: 1.4
INR PPP: ABNORMAL
KETONES UR-MCNC: NEGATIVE MG/DL
LACTATE BLDA-SCNC: 1.4 MMOL/L (ref 0–2)
LACTATE BLDV-SCNC: 1.6 MMOL/L (ref 0–2)
LACTATE BLDV-SCNC: 3.3 MMOL/L (ref 0–2)
LACTATE BLDV-SCNC: ABNORMAL MMOL/L
LEUKOCYTE ESTERASE UR QL STRIP: NEGATIVE
LYMPHOCYTES # BLD: 1.9 K/MCL (ref 1–4.8)
LYMPHOCYTES NFR BLD: 16 %
MAGNESIUM SERPL-MCNC: 2 MG/DL (ref 1.7–2.4)
MCH RBC QN AUTO: 34.5 PG (ref 26–34)
MCHC RBC AUTO-ENTMCNC: 29.4 G/DL (ref 32–36.5)
MCV RBC AUTO: 117.5 FL (ref 78–100)
MIXED CELL CASTS #/AREA URNS LPF: NORMAL /[LPF]
MONOCYTES # BLD: 0.7 K/MCL (ref 0.3–0.9)
MONOCYTES NFR BLD: 6 %
MRSA DNA SPEC QL NAA+PROBE: NORMAL
MRSA DNA SPEC QL NAA+PROBE: NORMAL
MRSA DNA SPEC QL NAA+PROBE: NOT DETECTED
MUCOUS THREADS URNS QL MICRO: PRESENT
NEUTROPHILS # BLD: 8.9 K/MCL (ref 1.8–7.7)
NEUTROPHILS NFR BLD: 75 %
NEUTS SEG NFR BLD: ABNORMAL %
NITRITE UR QL: NEGATIVE
NRBC (NRBCRE): 0 /100 WBC
OXYHGB MFR BLDA: 96 % (ref 94–98)
PCO2 BLDA: 58 MM HG (ref 32–45)
PH BLDA: 7.45 UNITS (ref 7.35–7.45)
PH UR: 6 UNITS (ref 5–7)
PLATELET # BLD: 158 K/MCL (ref 140–450)
PO2 BLDA: 233 MM HG (ref 83–108)
POTASSIUM SERPL-SCNC: 4.2 MMOL/L (ref 3.4–5.1)
PROT SERPL-MCNC: 7.6 G/DL (ref 6.4–8.2)
PROT UR QL: NEGATIVE MG/DL
PROTHROMBIN TIME (PRT2): ABNORMAL
PROTHROMBIN TIME: 13.9 SEC (ref 9.7–11.8)
RBC # BLD: 2.52 MIL/MCL (ref 4–5.2)
RBC #/AREA URNS HPF: NORMAL /HPF (ref 0–2)
RBC CASTS #/AREA URNS LPF: NORMAL /[LPF]
RENAL EPI CELLS #/AREA URNS HPF: NORMAL /[HPF]
SAO2 % BLDA: 98 % (ref 95–99)
SODIUM SERPL-SCNC: 142 MMOL/L (ref 135–145)
SP GR UR: 1.02 (ref 1–1.03)
SPECIMEN SOURCE: NORMAL
SPECIMEN SOURCE: NORMAL
SPERM URNS QL MICRO: NORMAL
SQUAMOUS #/AREA URNS HPF: NORMAL /HPF (ref 0–5)
T VAGINALIS URNS QL MICRO: NORMAL
TRI-PHOS CRY URNS QL MICRO: NORMAL
TROPONIN I SERPL HS-MCNC: <0.02 NG/ML
URATE CRY URNS QL MICRO: NORMAL
URINE REFLEX: NORMAL
URNS CMNT MICRO: NORMAL
UROBILINOGEN UR QL: 0.2 MG/DL (ref 0–1)
WAXY CASTS #/AREA URNS LPF: NORMAL /[LPF]
WBC # BLD: 11.9 K/MCL (ref 4.2–11)
WBC #/AREA URNS HPF: NORMAL /HPF (ref 0–5)
WBC CASTS #/AREA URNS LPF: NORMAL /[LPF]
YEAST HYPHAE URNS QL MICRO: NORMAL
YEAST URNS QL MICRO: NORMAL

## 2019-08-22 PROCEDURE — 99291 CRITICAL CARE FIRST HOUR: CPT | Performed by: EMERGENCY MEDICINE

## 2019-08-22 PROCEDURE — 99223 1ST HOSP IP/OBS HIGH 75: CPT | Performed by: PSYCHIATRY & NEUROLOGY

## 2019-08-23 LAB
ALBUMIN SERPL-MCNC: 1.3 G/DL (ref 3.6–5.1)
ALBUMIN/GLOB SERPL: 0.3 {RATIO} (ref 1–2.4)
ALP SERPL-CCNC: 161 UNITS/L (ref 45–117)
ALT SERPL-CCNC: 13 UNITS/L
ANALYZER ANC (IANC): ABNORMAL
ANION GAP SERPL CALC-SCNC: 10 MMOL/L (ref 10–20)
ANION GAP SERPL CALC-SCNC: 10 MMOL/L (ref 10–20)
ANION GAP SERPL CALC-SCNC: 6 MMOL/L (ref 10–20)
ANION GAP SERPL CALC-SCNC: 6 MMOL/L (ref 10–20)
ANION GAP SERPL CALC-SCNC: 8 MMOL/L (ref 10–20)
APTT PPP: 38 SEC (ref 22–32)
APTT PPP: ABNORMAL S
APTT PPP: ABNORMAL S
AST SERPL-CCNC: 22 UNITS/L
BASE DEFICIT BLDA-SCNC: ABNORMAL MMOL/L
BASE EXCESS BLDA CALC-SCNC: 11 MMOL/L (ref 0–3)
BASE EXCESS BLDA CALC-SCNC: 11 MMOL/L (ref 0–3)
BASE EXCESS BLDA CALC-SCNC: 8 MMOL/L (ref 0–3)
BASOPHILS # BLD: 0 K/MCL (ref 0–0.3)
BASOPHILS # BLD: 0 K/MCL (ref 0–0.3)
BASOPHILS NFR BLD: 0 %
BASOPHILS NFR BLD: 0 %
BILIRUB SERPL-MCNC: 0.4 MG/DL (ref 0.2–1)
BODY TEMPERATURE: 36.5 DEGREES
BODY TEMPERATURE: 37 DEGREES
BODY TEMPERATURE: 37.8 DEGREES
BUN SERPL-MCNC: 21 MG/DL (ref 6–20)
BUN SERPL-MCNC: 21 MG/DL (ref 6–20)
BUN SERPL-MCNC: 24 MG/DL (ref 6–20)
BUN SERPL-MCNC: 25 MG/DL (ref 6–20)
BUN SERPL-MCNC: 26 MG/DL (ref 6–20)
BUN/CREAT SERPL: 109 (ref 7–25)
BUN/CREAT SERPL: 77 (ref 7–25)
BUN/CREAT SERPL: 78 (ref 7–25)
BUN/CREAT SERPL: 81 (ref 7–25)
BUN/CREAT SERPL: 93 (ref 7–25)
CALCIUM SERPL-MCNC: 6.6 MG/DL (ref 8.4–10.2)
CALCIUM SERPL-MCNC: 7.4 MG/DL (ref 8.4–10.2)
CALCIUM SERPL-MCNC: 7.5 MG/DL (ref 8.4–10.2)
CALCIUM SERPL-MCNC: 7.7 MG/DL (ref 8.4–10.2)
CALCIUM SERPL-MCNC: 8.3 MG/DL (ref 8.4–10.2)
CHLORIDE SERPL-SCNC: 109 MMOL/L (ref 98–107)
CHLORIDE SERPL-SCNC: 111 MMOL/L (ref 98–107)
CHLORIDE SERPL-SCNC: 113 MMOL/L (ref 98–107)
CHLORIDE SERPL-SCNC: 114 MMOL/L (ref 98–107)
CHLORIDE SERPL-SCNC: 116 MMOL/L (ref 98–107)
CK SERPL-CCNC: 444 UNITS/L (ref 26–192)
CK SERPL-CCNC: 64 UNITS/L (ref 26–192)
CO2 SERPL-SCNC: 27 MMOL/L (ref 21–32)
CO2 SERPL-SCNC: 28 MMOL/L (ref 21–32)
CO2 SERPL-SCNC: 31 MMOL/L (ref 21–32)
CO2 SERPL-SCNC: 33 MMOL/L (ref 21–32)
CO2 SERPL-SCNC: 33 MMOL/L (ref 21–32)
CREAT SERPL-MCNC: 0.24 MG/DL (ref 0.51–0.95)
CREAT SERPL-MCNC: 0.26 MG/DL (ref 0.51–0.95)
CREAT SERPL-MCNC: 0.26 MG/DL (ref 0.51–0.95)
CREAT SERPL-MCNC: 0.27 MG/DL (ref 0.51–0.95)
CREAT SERPL-MCNC: 0.32 MG/DL (ref 0.51–0.95)
DIFFERENTIAL METHOD BLD: ABNORMAL
DIFFERENTIAL METHOD BLD: ABNORMAL
EOSINOPHIL # BLD: 0.2 K/MCL (ref 0.1–0.5)
EOSINOPHIL # BLD: 0.3 K/MCL (ref 0.1–0.5)
EOSINOPHIL NFR BLD: 2 %
EOSINOPHIL NFR BLD: 3 %
ERYTHROCYTE [DISTWIDTH] IN BLOOD: 13.8 % (ref 11–15)
ERYTHROCYTE [DISTWIDTH] IN BLOOD: 14 % (ref 11–15)
ERYTHROCYTE [DISTWIDTH] IN BLOOD: 18.8 % (ref 11–15)
GAS FLOW.O2 O2 DELIVERY SYS: ABNORMAL L/MIN
GLOBULIN SER-MCNC: 4.4 G/DL (ref 2–4)
GLUCOSE BLDC GLUCOMTR-MCNC: 95 MG/DL (ref 65–99)
GLUCOSE SERPL-MCNC: 111 MG/DL (ref 65–99)
GLUCOSE SERPL-MCNC: 75 MG/DL (ref 65–99)
GLUCOSE SERPL-MCNC: 78 MG/DL (ref 65–99)
GLUCOSE SERPL-MCNC: 85 MG/DL (ref 65–99)
GLUCOSE SERPL-MCNC: 97 MG/DL (ref 65–99)
HCO3 BLDA-SCNC: 31 MMOL/L (ref 22–28)
HCO3 BLDA-SCNC: 34 MMOL/L (ref 22–28)
HCO3 BLDA-SCNC: 34 MMOL/L (ref 22–28)
HCT VFR BLD CALC: 21.8 % (ref 36–46.5)
HCT VFR BLD CALC: 22.2 % (ref 36–46.5)
HCT VFR BLD CALC: 32.3 % (ref 36–46.5)
HGB BLD-MCNC: 10.6 G/DL (ref 12–15.5)
HGB BLD-MCNC: 6.9 G/DL (ref 12–15.5)
HGB BLD-MCNC: 7.1 G/DL (ref 12–15.5)
HGB BLD-MCNC: ABNORMAL G/DL
HGB BLD-MCNC: ABNORMAL G/DL
IMM GRANULOCYTES # BLD AUTO: 0.1 K/MCL (ref 0–0.2)
IMM GRANULOCYTES # BLD AUTO: 0.1 K/MCL (ref 0–0.2)
IMM GRANULOCYTES NFR BLD: 1 %
IMM GRANULOCYTES NFR BLD: 1 %
INHALED O2 CONCENTRATION: 40 %
INHALED O2 CONCENTRATION: 40 %
INHALED O2 CONCENTRATION: ABNORMAL %
INR PPP: 1.4
INR PPP: 1.6
INR PPP: 2.5
INR PPP: ABNORMAL
LACTATE BLDA-SCNC: 0.9 MMOL/L (ref 0–2)
LYMPHOCYTES # BLD: 1.5 K/MCL (ref 1–4.8)
LYMPHOCYTES # BLD: 1.8 K/MCL (ref 1–4.8)
LYMPHOCYTES NFR BLD: 14 %
LYMPHOCYTES NFR BLD: 15 %
MAGNESIUM SERPL-MCNC: 1.7 MG/DL (ref 1.7–2.4)
MAGNESIUM SERPL-MCNC: NORMAL MG/DL
MCH RBC QN AUTO: 34.3 PG (ref 26–34)
MCH RBC QN AUTO: 35.4 PG (ref 26–34)
MCH RBC QN AUTO: 36 PG (ref 26–34)
MCHC RBC AUTO-ENTMCNC: 31.7 G/DL (ref 32–36.5)
MCHC RBC AUTO-ENTMCNC: 32 G/DL (ref 32–36.5)
MCHC RBC AUTO-ENTMCNC: 32.8 G/DL (ref 32–36.5)
MCV RBC AUTO: 104.5 FL (ref 78–100)
MCV RBC AUTO: 111.8 FL (ref 78–100)
MCV RBC AUTO: 112.7 FL (ref 78–100)
MONOCYTES # BLD: 1.1 K/MCL (ref 0.3–0.9)
MONOCYTES # BLD: 1.2 K/MCL (ref 0.3–0.9)
MONOCYTES NFR BLD: 10 %
MONOCYTES NFR BLD: 10 %
NEUTROPHILS # BLD: 8 K/MCL (ref 1.8–7.7)
NEUTROPHILS # BLD: 8.8 K/MCL (ref 1.8–7.7)
NEUTROPHILS NFR BLD: 72 %
NEUTROPHILS NFR BLD: 72 %
NEUTS SEG NFR BLD: ABNORMAL %
NEUTS SEG NFR BLD: ABNORMAL %
NRBC (NRBCRE): 0 /100 WBC
OXYHGB MFR BLDA: 95 % (ref 94–98)
OXYHGB MFR BLDA: 96 % (ref 94–98)
OXYHGB MFR BLDA: 96 % (ref 94–98)
PCO2 BLDA: 36 MM HG (ref 32–45)
PCO2 BLDA: 37 MM HG (ref 32–45)
PCO2 BLDA: 41 MM HG (ref 32–45)
PH BLDA: 7.5 UNITS (ref 7.35–7.45)
PH BLDA: 7.56 UNITS (ref 7.35–7.45)
PH BLDA: 7.58 UNITS (ref 7.35–7.45)
PHOSPHATE SERPL-MCNC: 2.4 MG/DL (ref 2.4–4.7)
PLATELET # BLD: 106 K/MCL (ref 140–450)
PLATELET # BLD: 93 K/MCL (ref 140–450)
PLATELET # BLD: 97 K/MCL (ref 140–450)
PLATELET # BLD: ABNORMAL 10*3/UL
PO2 BLDA: 104 MM HG (ref 83–108)
PO2 BLDA: 125 MM HG (ref 83–108)
PO2 BLDA: 161 MM HG (ref 83–108)
POTASSIUM SERPL-SCNC: 2.5 MMOL/L (ref 3.4–5.1)
POTASSIUM SERPL-SCNC: 2.9 MMOL/L (ref 3.4–5.1)
POTASSIUM SERPL-SCNC: 3 MMOL/L (ref 3.4–5.1)
POTASSIUM SERPL-SCNC: 3.6 MMOL/L (ref 3.4–5.1)
POTASSIUM SERPL-SCNC: 6 MMOL/L (ref 3.4–5.1)
POTASSIUM SERPL-SCNC: ABNORMAL MMOL/L
PROCALCITONIN SERPL IA-MCNC: 39.5 NG/ML
PROCALCITONIN SERPL IA-MCNC: ABNORMAL NG/ML
PROT SERPL-MCNC: 5.7 G/DL (ref 6.4–8.2)
PROTHROMBIN TIME (PRT2): ABNORMAL
PROTHROMBIN TIME: 14.7 SEC (ref 9.7–11.8)
PROTHROMBIN TIME: 16.5 SEC (ref 9.7–11.8)
PROTHROMBIN TIME: 24.7 SEC (ref 9.7–11.8)
RBC # BLD: 1.95 MIL/MCL (ref 4–5.2)
RBC # BLD: 1.97 MIL/MCL (ref 4–5.2)
RBC # BLD: 3.09 MIL/MCL (ref 4–5.2)
SAO2 % BLDA: 96 % (ref 95–99)
SAO2 % BLDA: 97 % (ref 95–99)
SAO2 % BLDA: 99 % (ref 95–99)
SODIUM SERPL-SCNC: 147 MMOL/L (ref 135–145)
SODIUM SERPL-SCNC: 148 MMOL/L (ref 135–145)
SODIUM SERPL-SCNC: 148 MMOL/L (ref 135–145)
TROPONIN I SERPL HS-MCNC: <0.02 NG/ML
WBC # BLD: 10.5 K/MCL (ref 4.2–11)
WBC # BLD: 10.9 K/MCL (ref 4.2–11)
WBC # BLD: 12.2 K/MCL (ref 4.2–11)

## 2019-08-23 PROCEDURE — 99233 SBSQ HOSP IP/OBS HIGH 50: CPT | Performed by: PSYCHIATRY & NEUROLOGY

## 2019-08-23 PROCEDURE — 99223 1ST HOSP IP/OBS HIGH 75: CPT | Performed by: SURGERY

## 2019-08-24 LAB
ANALYZER ANC (IANC): ABNORMAL
ANION GAP SERPL CALC-SCNC: 8 MMOL/L (ref 10–20)
BASE DEFICIT BLDA-SCNC: ABNORMAL MMOL/L
BASE EXCESS BLDA CALC-SCNC: 2 MMOL/L (ref 0–3)
BASOPHILS # BLD: 0.1 K/MCL (ref 0–0.3)
BASOPHILS NFR BLD: 1 %
BODY TEMPERATURE: 36.1 DEGREES
BUN SERPL-MCNC: 18 MG/DL (ref 6–20)
BUN/CREAT SERPL: 57 (ref 7–25)
CALCIUM SERPL-MCNC: 7.5 MG/DL (ref 8.4–10.2)
CHLORIDE SERPL-SCNC: 117 MMOL/L (ref 98–107)
CO2 SERPL-SCNC: 28 MMOL/L (ref 21–32)
CREAT SERPL-MCNC: 0.32 MG/DL (ref 0.51–0.95)
DIFFERENTIAL METHOD BLD: ABNORMAL
EOSINOPHIL # BLD: 0.3 K/MCL (ref 0.1–0.5)
EOSINOPHIL NFR BLD: 4 %
ERYTHROCYTE [DISTWIDTH] IN BLOOD: 18.9 % (ref 11–15)
GAS FLOW.O2 O2 DELIVERY SYS: ABNORMAL L/MIN
GENTAMICIN SERPL-MCNC: 2.6 MCG/ML
GENTAMICIN SERPL-MCNC: NORMAL MG/L
GLUCOSE BLDC GLUCOMTR-MCNC: 138 MG/DL (ref 65–99)
GLUCOSE BLDC GLUCOMTR-MCNC: 155 MG/DL (ref 65–99)
GLUCOSE BLDC GLUCOMTR-MCNC: 62 MG/DL (ref 65–99)
GLUCOSE BLDC GLUCOMTR-MCNC: 68 MG/DL (ref 65–99)
GLUCOSE BLDC GLUCOMTR-MCNC: 70 MG/DL (ref 65–99)
GLUCOSE SERPL-MCNC: 147 MG/DL (ref 65–99)
HCO3 BLDA-SCNC: 25 MMOL/L (ref 22–28)
HCT VFR BLD CALC: 30.5 % (ref 36–46.5)
HGB BLD-MCNC: 9.8 G/DL (ref 12–15.5)
IMM GRANULOCYTES # BLD AUTO: 0 K/MCL (ref 0–0.2)
IMM GRANULOCYTES NFR BLD: 1 %
INHALED O2 CONCENTRATION: 30 %
LYMPHOCYTES # BLD: 1.2 K/MCL (ref 1–4.8)
LYMPHOCYTES NFR BLD: 14 %
MAGNESIUM SERPL-MCNC: 1.4 MG/DL (ref 1.7–2.4)
MCH RBC QN AUTO: 33.7 PG (ref 26–34)
MCHC RBC AUTO-ENTMCNC: 32.1 G/DL (ref 32–36.5)
MCV RBC AUTO: 104.8 FL (ref 78–100)
MONOCYTES # BLD: 0.6 K/MCL (ref 0.3–0.9)
MONOCYTES NFR BLD: 7 %
NEUTROPHILS # BLD: 6.2 K/MCL (ref 1.8–7.7)
NEUTROPHILS NFR BLD: 73 %
NEUTS SEG NFR BLD: ABNORMAL %
NRBC (NRBCRE): 0 /100 WBC
OXYHGB MFR BLDA: 95 % (ref 94–98)
PCO2 BLDA: 33 MM HG (ref 32–45)
PH BLDA: 7.49 UNITS (ref 7.35–7.45)
PHOSPHATE SERPL-MCNC: 2.4 MG/DL (ref 2.4–4.7)
PLATELET # BLD: 83 K/MCL (ref 140–450)
PO2 BLDA: 101 MM HG (ref 83–108)
POTASSIUM SERPL-SCNC: 2.9 MMOL/L (ref 3.4–5.1)
POTASSIUM SERPL-SCNC: 3.8 MMOL/L (ref 3.4–5.1)
POTASSIUM SERPL-SCNC: 4.2 MMOL/L (ref 3.4–5.1)
RBC # BLD: 2.91 MIL/MCL (ref 4–5.2)
RESPIRATORY CUL/SMR (RTCS) HL: NORMAL
SAO2 % BLDA: 97 % (ref 95–99)
SODIUM SERPL-SCNC: 143 MMOL/L (ref 135–145)
SODIUM SERPL-SCNC: 146 MMOL/L (ref 135–145)
SODIUM SERPL-SCNC: 148 MMOL/L (ref 135–145)
SODIUM SERPL-SCNC: 150 MMOL/L (ref 135–145)
WBC # BLD: 8.5 K/MCL (ref 4.2–11)

## 2019-08-24 PROCEDURE — 99233 SBSQ HOSP IP/OBS HIGH 50: CPT | Performed by: PSYCHIATRY & NEUROLOGY

## 2019-08-24 PROCEDURE — 99233 SBSQ HOSP IP/OBS HIGH 50: CPT | Performed by: SURGERY

## 2019-08-25 LAB
ANALYZER ANC (IANC): ABNORMAL
ANION GAP SERPL CALC-SCNC: 10 MMOL/L (ref 10–20)
ANION GAP SERPL CALC-SCNC: 8 MMOL/L (ref 10–20)
BASE DEFICIT BLDA-SCNC: ABNORMAL MMOL/L
BASE EXCESS BLDA CALC-SCNC: 3 MMOL/L (ref 0–3)
BASOPHILS # BLD: 0 K/MCL (ref 0–0.3)
BASOPHILS NFR BLD: 0 %
BODY TEMPERATURE: 36.3 DEGREES
BUN SERPL-MCNC: 5 MG/DL (ref 6–20)
BUN SERPL-MCNC: 9 MG/DL (ref 6–20)
BUN/CREAT SERPL: 18 (ref 7–25)
BUN/CREAT SERPL: 34 (ref 7–25)
C DIFF TOX B TCDB STL QL NAA+PROBE: NORMAL
C DIFF TOX B TCDB STL QL NAA+PROBE: NOT DETECTED
CALCIUM SERPL-MCNC: 8 MG/DL (ref 8.4–10.2)
CALCIUM SERPL-MCNC: 8.4 MG/DL (ref 8.4–10.2)
CHLORIDE SERPL-SCNC: 104 MMOL/L (ref 98–107)
CHLORIDE SERPL-SCNC: 109 MMOL/L (ref 98–107)
CO2 SERPL-SCNC: 26 MMOL/L (ref 21–32)
CO2 SERPL-SCNC: 27 MMOL/L (ref 21–32)
CREAT SERPL-MCNC: 0.27 MG/DL (ref 0.51–0.95)
CREAT SERPL-MCNC: 0.28 MG/DL (ref 0.51–0.95)
DIFFERENTIAL METHOD BLD: ABNORMAL
EOSINOPHIL # BLD: 0.5 K/MCL (ref 0.1–0.5)
EOSINOPHIL NFR BLD: 6 %
ERYTHROCYTE [DISTWIDTH] IN BLOOD: 17.5 % (ref 11–15)
FONDAPARINUX SERPL-MCNC: 0.38 MCG/ML
FONDAPARINUX SERPL-MCNC: NORMAL UG/ML
GAS FLOW.O2 O2 DELIVERY SYS: ABNORMAL L/MIN
GLUCOSE BLDC GLUCOMTR-MCNC: 112 MG/DL (ref 65–99)
GLUCOSE BLDC GLUCOMTR-MCNC: 136 MG/DL (ref 65–99)
GLUCOSE BLDC GLUCOMTR-MCNC: 162 MG/DL (ref 65–99)
GLUCOSE BLDC GLUCOMTR-MCNC: 165 MG/DL (ref 65–99)
GLUCOSE BLDC GLUCOMTR-MCNC: 178 MG/DL (ref 65–99)
GLUCOSE BLDC GLUCOMTR-MCNC: 56 MG/DL (ref 65–99)
GLUCOSE BLDC GLUCOMTR-MCNC: 60 MG/DL (ref 65–99)
GLUCOSE BLDC GLUCOMTR-MCNC: 65 MG/DL (ref 65–99)
GLUCOSE BLDC GLUCOMTR-MCNC: 79 MG/DL (ref 65–99)
GLUCOSE SERPL-MCNC: 163 MG/DL (ref 65–99)
GLUCOSE SERPL-MCNC: 76 MG/DL (ref 65–99)
HCO3 BLDA-SCNC: 26 MMOL/L (ref 22–28)
HCT VFR BLD CALC: 31.8 % (ref 36–46.5)
HGB BLD-MCNC: 10.2 G/DL (ref 12–15.5)
IMM GRANULOCYTES # BLD AUTO: 0 K/MCL (ref 0–0.2)
IMM GRANULOCYTES NFR BLD: 0 %
INHALED O2 CONCENTRATION: 30 %
LYMPHOCYTES # BLD: 1.8 K/MCL (ref 1–4.8)
LYMPHOCYTES NFR BLD: 24 %
MAGNESIUM SERPL-MCNC: 2.1 MG/DL (ref 1.7–2.4)
MAGNESIUM SERPL-MCNC: NORMAL MG/DL
MCH RBC QN AUTO: 33.8 PG (ref 26–34)
MCHC RBC AUTO-ENTMCNC: 32.1 G/DL (ref 32–36.5)
MCV RBC AUTO: 105.3 FL (ref 78–100)
MONOCYTES # BLD: 0.8 K/MCL (ref 0.3–0.9)
MONOCYTES NFR BLD: 11 %
NEUTROPHILS # BLD: 4.6 K/MCL (ref 1.8–7.7)
NEUTROPHILS NFR BLD: 59 %
NEUTS SEG NFR BLD: ABNORMAL %
NRBC (NRBCRE): 0 /100 WBC
OXYHGB MFR BLDA: 94 % (ref 94–98)
PCO2 BLDA: 35 MM HG (ref 32–45)
PH BLDA: 7.48 UNITS (ref 7.35–7.45)
PHOSPHATE SERPL-MCNC: 2.2 MG/DL (ref 2.4–4.7)
PLATELET # BLD: 73 K/MCL (ref 140–450)
PO2 BLDA: 87 MM HG (ref 83–108)
POTASSIUM SERPL-SCNC: 4.6 MMOL/L (ref 3.4–5.1)
POTASSIUM SERPL-SCNC: 4.8 MMOL/L (ref 3.4–5.1)
POTASSIUM SERPL-SCNC: ABNORMAL MMOL/L
POTASSIUM SERPL-SCNC: ABNORMAL MMOL/L
PROCALCITONIN SERPL IA-MCNC: 16.1 NG/ML
PROCALCITONIN SERPL IA-MCNC: ABNORMAL NG/ML
RBC # BLD: 3.02 MIL/MCL (ref 4–5.2)
SAO2 % BLDA: 96 % (ref 95–99)
SERVICE CMNT-IMP: NORMAL
SODIUM SERPL-SCNC: 135 MMOL/L (ref 135–145)
SODIUM SERPL-SCNC: 138 MMOL/L (ref 135–145)
SODIUM SERPL-SCNC: 139 MMOL/L (ref 135–145)
SPECIMEN SOURCE: NORMAL
WBC # BLD: 7.8 K/MCL (ref 4.2–11)

## 2019-08-26 LAB
ANALYZER ANC (IANC): ABNORMAL
ANION GAP SERPL CALC-SCNC: 11 MMOL/L (ref 10–20)
BASE DEFICIT BLDA-SCNC: ABNORMAL MMOL/L
BASE EXCESS BLDA CALC-SCNC: 1 MMOL/L (ref 0–3)
BODY TEMPERATURE: 37 DEGREES
BUN SERPL-MCNC: 4 MG/DL (ref 6–20)
BUN/CREAT SERPL: 13 (ref 7–25)
CALCIUM SERPL-MCNC: 7.2 MG/DL (ref 8.4–10.2)
CHLORIDE SERPL-SCNC: 104 MMOL/L (ref 98–107)
CO2 SERPL-SCNC: 26 MMOL/L (ref 21–32)
CREAT SERPL-MCNC: 0.31 MG/DL (ref 0.51–0.95)
ERYTHROCYTE [DISTWIDTH] IN BLOOD: 15.7 % (ref 11–15)
GAS FLOW.O2 O2 DELIVERY SYS: ABNORMAL L/MIN
GLUCOSE BLDC GLUCOMTR-MCNC: 78 MG/DL (ref 65–99)
GLUCOSE BLDC GLUCOMTR-MCNC: 80 MG/DL (ref 65–99)
GLUCOSE BLDC GLUCOMTR-MCNC: 81 MG/DL (ref 65–99)
GLUCOSE BLDC GLUCOMTR-MCNC: 97 MG/DL (ref 65–99)
GLUCOSE BLDC GLUCOMTR-MCNC: 98 MG/DL (ref 65–99)
GLUCOSE BLDC GLUCOMTR-MCNC: 99 MG/DL (ref 65–99)
GLUCOSE BLDC GLUCOMTR-MCNC: NORMAL MG/DL
GLUCOSE SERPL-MCNC: 90 MG/DL (ref 65–99)
HCO3 BLDA-SCNC: 25 MMOL/L (ref 22–28)
HCT VFR BLD CALC: 25.9 % (ref 36–46.5)
HGB BLD-MCNC: 8.6 G/DL (ref 12–15.5)
INHALED O2 CONCENTRATION: ABNORMAL %
MAGNESIUM SERPL-MCNC: 1.6 MG/DL (ref 1.7–2.4)
MCH RBC QN AUTO: 33.9 PG (ref 26–34)
MCHC RBC AUTO-ENTMCNC: 33.2 G/DL (ref 32–36.5)
MCV RBC AUTO: 102 FL (ref 78–100)
NRBC (NRBCRE): 0 /100 WBC
OXYHGB MFR BLDA: 96 % (ref 94–98)
PCO2 BLDA: 38 MM HG (ref 32–45)
PH BLDA: 7.43 UNITS (ref 7.35–7.45)
PHOSPHATE SERPL-MCNC: 5.5 MG/DL (ref 2.4–4.7)
PLATELET # BLD: 77 K/MCL (ref 140–450)
PO2 BLDA: 129 MM HG (ref 83–108)
POTASSIUM SERPL-SCNC: 3.4 MMOL/L (ref 3.4–5.1)
RBC # BLD: 2.54 MIL/MCL (ref 4–5.2)
SAO2 % BLDA: 98 % (ref 95–99)
SODIUM SERPL-SCNC: 136 MMOL/L (ref 135–145)
SODIUM SERPL-SCNC: 138 MMOL/L (ref 135–145)
WBC # BLD: 6.9 K/MCL (ref 4.2–11)

## 2019-08-26 PROCEDURE — 99291 CRITICAL CARE FIRST HOUR: CPT | Performed by: PSYCHIATRY & NEUROLOGY

## 2019-08-26 PROCEDURE — 99232 SBSQ HOSP IP/OBS MODERATE 35: CPT | Performed by: PSYCHIATRY & NEUROLOGY

## 2019-08-27 LAB
ANALYZER ANC (IANC): ABNORMAL
ANION GAP SERPL CALC-SCNC: 12 MMOL/L (ref 10–20)
BACTERIA BLD CULT: NORMAL
BUN SERPL-MCNC: 2 MG/DL (ref 6–20)
BUN/CREAT SERPL: 8 (ref 7–25)
CALCIUM SERPL-MCNC: 7.9 MG/DL (ref 8.4–10.2)
CHLORIDE SERPL-SCNC: 101 MMOL/L (ref 98–107)
CO2 SERPL-SCNC: 24 MMOL/L (ref 21–32)
CREAT SERPL-MCNC: 0.27 MG/DL (ref 0.51–0.95)
ERYTHROCYTE [DISTWIDTH] IN BLOOD: 15.6 % (ref 11–15)
GLUCOSE BLDC GLUCOMTR-MCNC: 117 MG/DL (ref 65–99)
GLUCOSE BLDC GLUCOMTR-MCNC: 125 MG/DL (ref 65–99)
GLUCOSE BLDC GLUCOMTR-MCNC: 91 MG/DL (ref 65–99)
GLUCOSE SERPL-MCNC: 116 MG/DL (ref 65–99)
HCT VFR BLD CALC: 24.1 % (ref 36–46.5)
HGB BLD-MCNC: 7.9 G/DL (ref 12–15.5)
MAGNESIUM SERPL-MCNC: 2.2 MG/DL (ref 1.7–2.4)
MCH RBC QN AUTO: 34.3 PG (ref 26–34)
MCHC RBC AUTO-ENTMCNC: 32.8 G/DL (ref 32–36.5)
MCV RBC AUTO: 104.8 FL (ref 78–100)
NRBC (NRBCRE): 0 /100 WBC
PHOSPHATE SERPL-MCNC: 3.9 MG/DL (ref 2.4–4.7)
PLATELET # BLD: 78 K/MCL (ref 140–450)
POTASSIUM SERPL-SCNC: 4.1 MMOL/L (ref 3.4–5.1)
PREALB SERPL NEPH-MCNC: 6.9 MG/DL (ref 18–38)
PROCALCITONIN SERPL IA-MCNC: 3.6 NG/ML
PROCALCITONIN SERPL IA-MCNC: ABNORMAL NG/ML
RBC # BLD: 2.3 MIL/MCL (ref 4–5.2)
SODIUM SERPL-SCNC: 133 MMOL/L (ref 135–145)
WBC # BLD: 4.7 K/MCL (ref 4.2–11)

## 2019-08-27 PROCEDURE — 95816 EEG AWAKE AND DROWSY: CPT | Performed by: PSYCHIATRY & NEUROLOGY

## 2019-08-27 PROCEDURE — 99291 CRITICAL CARE FIRST HOUR: CPT | Performed by: PSYCHIATRY & NEUROLOGY

## 2019-08-27 PROCEDURE — 99233 SBSQ HOSP IP/OBS HIGH 50: CPT | Performed by: PSYCHIATRY & NEUROLOGY

## 2019-08-29 ENCOUNTER — DIAGNOSTIC TRANS (OUTPATIENT)
Dept: OTHER | Age: 29
End: 2019-08-29

## 2019-09-05 ENCOUNTER — HOSPITAL (OUTPATIENT)
Dept: OTHER | Age: 29
End: 2019-09-05

## 2019-09-05 LAB
ALBUMIN SERPL-MCNC: 1.4 G/DL (ref 3.6–5.1)
ALP SERPL-CCNC: 181 UNITS/L (ref 45–117)
ALT SERPL-CCNC: 37 UNITS/L
AMORPH SED URNS QL MICRO: ABNORMAL
AMYLASE SERPL-CCNC: 25 UNITS/L (ref 25–115)
ANALYZER ANC (IANC): ABNORMAL
ANION GAP SERPL CALC-SCNC: 4 MMOL/L (ref 10–20)
ANION GAP SERPL CALC-SCNC: 6 MMOL/L (ref 10–20)
APPEARANCE UR: CLEAR
AST SERPL-CCNC: 65 UNITS/L
BACTERIA #/AREA URNS HPF: ABNORMAL /HPF
BASE DEFICIT BLDV-SCNC: ABNORMAL MMOL/L
BASE EXCESS BLDV CALC-SCNC: 7 MMOL/L (ref 0–2)
BASOPHILS # BLD: 0 K/MCL (ref 0–0.3)
BASOPHILS NFR BLD: 0 %
BILIRUB CONJ SERPL-MCNC: 0.1 MG/DL (ref 0–0.2)
BILIRUB SERPL-MCNC: 0.3 MG/DL (ref 0.2–1)
BILIRUB UR QL: NEGATIVE
BODY TEMPERATURE: 37 DEGREES
BUN SERPL-MCNC: 18 MG/DL (ref 6–20)
BUN SERPL-MCNC: 22 MG/DL (ref 6–20)
BUN/CREAT SERPL: 76 (ref 7–25)
BUN/CREAT SERPL: 80 (ref 7–25)
CALCIUM SERPL-MCNC: 8.2 MG/DL (ref 8.4–10.2)
CALCIUM SERPL-MCNC: 8.6 MG/DL (ref 8.4–10.2)
CAOX CRY URNS QL MICRO: ABNORMAL
CHLORIDE SERPL-SCNC: 108 MMOL/L (ref 98–107)
CHLORIDE SERPL-SCNC: 99 MMOL/L (ref 98–107)
CO2 SERPL-SCNC: 36 MMOL/L (ref 21–32)
CO2 SERPL-SCNC: 37 MMOL/L (ref 21–32)
COLOR UR: YELLOW
CREAT SERPL-MCNC: 0.24 MG/DL (ref 0.51–0.95)
CREAT SERPL-MCNC: 0.28 MG/DL (ref 0.51–0.95)
DIFFERENTIAL METHOD BLD: ABNORMAL
EOSINOPHIL # BLD: 1.3 K/MCL (ref 0.1–0.5)
EOSINOPHIL NFR BLD: 14 %
EPITH CASTS #/AREA URNS LPF: ABNORMAL /[LPF]
ERYTHROCYTE [DISTWIDTH] IN BLOOD: 14.9 % (ref 11–15)
FATTY CASTS #/AREA URNS LPF: ABNORMAL /[LPF]
GAS FLOW.O2 O2 DELIVERY SYS: ABNORMAL L/MIN
GLUCOSE SERPL-MCNC: 116 MG/DL (ref 65–99)
GLUCOSE SERPL-MCNC: 90 MG/DL (ref 65–99)
GLUCOSE UR-MCNC: NEGATIVE MG/DL
GRAN CASTS #/AREA URNS LPF: ABNORMAL /[LPF]
HCO3 BLDV-SCNC: 34 MMOL/L (ref 22–28)
HCT VFR BLD CALC: 26.4 % (ref 36–46.5)
HGB BLD-MCNC: 8.3 G/DL (ref 12–15.5)
HGB UR QL: NEGATIVE
HYALINE CASTS #/AREA URNS LPF: ABNORMAL /LPF (ref 0–5)
INHALED O2 CONCENTRATION: ABNORMAL %
KETONES UR-MCNC: NEGATIVE MG/DL
LACTATE BLDV-SCNC: 1 MMOL/L (ref 0–2)
LACTATE BLDV-SCNC: 2.4 MMOL/L (ref 0–2)
LACTATE BLDV-SCNC: ABNORMAL MMOL/L
LEUKOCYTE ESTERASE UR QL STRIP: ABNORMAL
LEUKOCYTE ESTERASE UR QL STRIP: ABNORMAL
LIPASE SERPL-CCNC: <50 UNITS/L (ref 73–393)
LYMPHOCYTES # BLD: 1.3 K/MCL (ref 1–4.8)
LYMPHOCYTES NFR BLD: 14 %
MAGNESIUM SERPL-MCNC: 1.9 MG/DL (ref 1.7–2.4)
MCH RBC QN AUTO: 35 PG (ref 26–34)
MCHC RBC AUTO-ENTMCNC: 31.4 G/DL (ref 32–36.5)
MCV RBC AUTO: 111.4 FL (ref 78–100)
MIXED CELL CASTS #/AREA URNS LPF: ABNORMAL /[LPF]
MONOCYTES # BLD: 0.9 K/MCL (ref 0.3–0.9)
MONOCYTES NFR BLD: 9 %
MUCOUS THREADS URNS QL MICRO: PRESENT
NEUTROPHILS # BLD: 6 K/MCL (ref 1.8–7.7)
NEUTS SEG NFR BLD: 63 %
NITRITE UR QL: NEGATIVE
NRBC (NRBCRE): 0 /100 WBC
OXYHGB MFR BLDV: 57 % (ref 60–80)
PATH REV BLD -IMP: ABNORMAL
PCO2 BLDV: 65 MM HG (ref 38–51)
PH BLDV: 7.34 UNITS (ref 7.35–7.45)
PH UR: 8 UNITS (ref 5–7)
PLAT MORPH BLD: NORMAL
PLATELET # BLD: 126 K/MCL (ref 140–450)
PO2 BLDV: 36 MM HG (ref 35–42)
POTASSIUM SERPL-SCNC: 3.7 MMOL/L (ref 3.4–5.1)
POTASSIUM SERPL-SCNC: 5 MMOL/L (ref 3.4–5.1)
PROCALCITONIN SERPL IA-MCNC: 45.88 NG/ML
PROCALCITONIN SERPL IA-MCNC: ABNORMAL NG/ML
PROT SERPL-MCNC: 6.8 G/DL (ref 6.4–8.2)
PROT UR QL: NEGATIVE MG/DL
RBC # BLD: 2.37 MIL/MCL (ref 4–5.2)
RBC #/AREA URNS HPF: ABNORMAL /HPF (ref 0–2)
RBC CASTS #/AREA URNS LPF: ABNORMAL /[LPF]
RBC MORPH BLD: NORMAL
RENAL EPI CELLS #/AREA URNS HPF: ABNORMAL /[HPF]
SAO2 % BLDV: 58 % (ref 60–80)
SODIUM SERPL-SCNC: 137 MMOL/L (ref 135–145)
SODIUM SERPL-SCNC: 144 MMOL/L (ref 135–145)
SP GR UR: 1.01 (ref 1–1.03)
SPECIMEN SOURCE: ABNORMAL
SPERM URNS QL MICRO: ABNORMAL
SQUAMOUS #/AREA URNS HPF: ABNORMAL /HPF (ref 0–5)
T VAGINALIS URNS QL MICRO: ABNORMAL
TRI-PHOS CRY URNS QL MICRO: ABNORMAL
URATE CRY URNS QL MICRO: ABNORMAL
URINE REFLEX: ABNORMAL
URNS CMNT MICRO: ABNORMAL
UROBILINOGEN UR QL: 0.2 MG/DL (ref 0–1)
WAXY CASTS #/AREA URNS LPF: ABNORMAL /[LPF]
WBC # BLD: 9.5 K/MCL (ref 4.2–11)
WBC #/AREA URNS HPF: ABNORMAL /HPF (ref 0–5)
WBC CASTS #/AREA URNS LPF: ABNORMAL /[LPF]
WBC MORPH BLD: NORMAL
YEAST HYPHAE URNS QL MICRO: ABNORMAL
YEAST URNS QL MICRO: ABNORMAL

## 2019-09-05 PROCEDURE — 99291 CRITICAL CARE FIRST HOUR: CPT | Performed by: EMERGENCY MEDICINE

## 2019-09-05 PROCEDURE — 99292 CRITICAL CARE ADDL 30 MIN: CPT | Performed by: EMERGENCY MEDICINE

## 2019-09-06 ENCOUNTER — DIAGNOSTIC TRANS (OUTPATIENT)
Dept: OTHER | Age: 29
End: 2019-09-06

## 2019-09-06 LAB
ANALYZER ANC (IANC): ABNORMAL
ANION GAP SERPL CALC-SCNC: 8 MMOL/L (ref 10–20)
BACTERIA UR CULT: NORMAL
BASE DEFICIT BLDV-SCNC: ABNORMAL MMOL/L
BASE EXCESS BLDV CALC-SCNC: 8 MMOL/L (ref 0–2)
BODY TEMPERATURE: 37 DEGREES
BUN SERPL-MCNC: 16 MG/DL (ref 6–20)
BUN/CREAT SERPL: 67 (ref 7–25)
CALCIUM SERPL-MCNC: 8.1 MG/DL (ref 8.4–10.2)
CHLORIDE SERPL-SCNC: 108 MMOL/L (ref 98–107)
CK SERPL-CCNC: 73 UNITS/L (ref 26–192)
CO2 SERPL-SCNC: 32 MMOL/L (ref 21–32)
CREAT SERPL-MCNC: 0.24 MG/DL (ref 0.51–0.95)
ERYTHROCYTE [DISTWIDTH] IN BLOOD: 15.2 % (ref 11–15)
GAS FLOW.O2 O2 DELIVERY SYS: ABNORMAL L/MIN
GLUCOSE BLDC GLUCOMTR-MCNC: 89 MG/DL (ref 65–99)
GLUCOSE SERPL-MCNC: 88 MG/DL (ref 65–99)
HCO3 BLDV-SCNC: 33 MMOL/L (ref 22–28)
HCT VFR BLD CALC: 23.3 % (ref 36–46.5)
HGB BLD-MCNC: 7.4 G/DL (ref 12–15.5)
INHALED O2 CONCENTRATION: ABNORMAL %
MAGNESIUM SERPL-MCNC: 1.7 MG/DL (ref 1.7–2.4)
MCH RBC QN AUTO: 35.1 PG (ref 26–34)
MCHC RBC AUTO-ENTMCNC: 31.8 G/DL (ref 32–36.5)
MCV RBC AUTO: 110.4 FL (ref 78–100)
MRSA DNA SPEC QL NAA+PROBE: NORMAL
MRSA DNA SPEC QL NAA+PROBE: NORMAL
MRSA DNA SPEC QL NAA+PROBE: NOT DETECTED
NRBC (NRBCRE): 0 /100 WBC
OXYHGB MFR BLDV: 81 % (ref 60–80)
PCO2 BLDV: 54 MM HG (ref 38–51)
PH BLDV: 7.41 UNITS (ref 7.35–7.45)
PHOSPHATE SERPL-MCNC: 2.9 MG/DL (ref 2.4–4.7)
PLATELET # BLD: 111 K/MCL (ref 140–450)
PO2 BLDV: 49 MM HG (ref 35–42)
POTASSIUM SERPL-SCNC: 3.6 MMOL/L (ref 3.4–5.1)
RBC # BLD: 2.11 MIL/MCL (ref 4–5.2)
SAO2 % BLDV: 83 % (ref 60–80)
SODIUM SERPL-SCNC: 144 MMOL/L (ref 135–145)
SPECIMEN SOURCE: NORMAL
VANCOMYCIN SERPL-MCNC: 2 MCG/ML
VANCOMYCIN SERPL-MCNC: NORMAL UG/ML
WBC # BLD: 9.8 K/MCL (ref 4.2–11)

## 2019-09-06 PROCEDURE — 99223 1ST HOSP IP/OBS HIGH 75: CPT | Performed by: PSYCHIATRY & NEUROLOGY

## 2019-09-07 LAB
AMORPH SED URNS QL MICRO: ABNORMAL
ANALYZER ANC (IANC): ABNORMAL
ANION GAP SERPL CALC-SCNC: 7 MMOL/L (ref 10–20)
APPEARANCE UR: CLEAR
BACTERIA #/AREA URNS HPF: ABNORMAL /HPF
BILIRUB UR QL: NEGATIVE
BUN SERPL-MCNC: 12 MG/DL (ref 6–20)
BUN/CREAT SERPL: 38 (ref 7–25)
C DIFF TOX B TCDB STL QL NAA+PROBE: NORMAL
C DIFF TOX B TCDB STL QL NAA+PROBE: NOT DETECTED
CALCIUM SERPL-MCNC: 7.9 MG/DL (ref 8.4–10.2)
CAOX CRY URNS QL MICRO: ABNORMAL
CHLORIDE SERPL-SCNC: 109 MMOL/L (ref 98–107)
CO2 SERPL-SCNC: 30 MMOL/L (ref 21–32)
COLOR UR: YELLOW
CREAT SERPL-MCNC: 0.32 MG/DL (ref 0.51–0.95)
EPITH CASTS #/AREA URNS LPF: ABNORMAL /[LPF]
ERYTHROCYTE [DISTWIDTH] IN BLOOD: 15.7 % (ref 11–15)
FATTY CASTS #/AREA URNS LPF: ABNORMAL /[LPF]
GLUCOSE BLDC GLUCOMTR-MCNC: 105 MG/DL (ref 65–99)
GLUCOSE BLDC GLUCOMTR-MCNC: 98 MG/DL (ref 65–99)
GLUCOSE BLDC GLUCOMTR-MCNC: ABNORMAL MG/DL
GLUCOSE BLDC GLUCOMTR-MCNC: NORMAL MG/DL
GLUCOSE SERPL-MCNC: 96 MG/DL (ref 65–99)
GLUCOSE UR-MCNC: NEGATIVE MG/DL
GRAN CASTS #/AREA URNS LPF: ABNORMAL /[LPF]
HCT VFR BLD CALC: 22.5 % (ref 36–46.5)
HGB BLD-MCNC: 7.1 G/DL (ref 12–15.5)
HGB UR QL: NEGATIVE
HYALINE CASTS #/AREA URNS LPF: ABNORMAL /LPF (ref 0–5)
KETONES UR-MCNC: NEGATIVE MG/DL
LEUKOCYTE ESTERASE UR QL STRIP: NEGATIVE
MAGNESIUM SERPL-MCNC: 1.7 MG/DL (ref 1.7–2.4)
MCH RBC QN AUTO: 34.6 PG (ref 26–34)
MCHC RBC AUTO-ENTMCNC: 31.6 G/DL (ref 32–36.5)
MCV RBC AUTO: 109.8 FL (ref 78–100)
MIXED CELL CASTS #/AREA URNS LPF: ABNORMAL /[LPF]
MUCOUS THREADS URNS QL MICRO: PRESENT
NITRITE UR QL: NEGATIVE
NRBC (NRBCRE): 0 /100 WBC
PH UR: 7 UNITS (ref 5–7)
PHOSPHATE SERPL-MCNC: 3.7 MG/DL (ref 2.4–4.7)
PLATELET # BLD: 98 K/MCL (ref 140–450)
PLATELET # BLD: ABNORMAL 10*3/UL
POTASSIUM SERPL-SCNC: 3.7 MMOL/L (ref 3.4–5.1)
PROCALCITONIN SERPL IA-MCNC: 31.43 NG/ML
PROCALCITONIN SERPL IA-MCNC: ABNORMAL NG/ML
PROT UR QL: NEGATIVE MG/DL
RBC # BLD: 2.05 MIL/MCL (ref 4–5.2)
RBC #/AREA URNS HPF: ABNORMAL /HPF (ref 0–2)
RBC CASTS #/AREA URNS LPF: ABNORMAL /[LPF]
RENAL EPI CELLS #/AREA URNS HPF: ABNORMAL /[HPF]
SERVICE CMNT-IMP: NORMAL
SODIUM SERPL-SCNC: 142 MMOL/L (ref 135–145)
SP GR UR: 1.02 (ref 1–1.03)
SPECIMEN SOURCE: ABNORMAL
SPECIMEN SOURCE: NORMAL
SPERM URNS QL MICRO: ABNORMAL
SQUAMOUS #/AREA URNS HPF: ABNORMAL /HPF (ref 0–5)
T VAGINALIS URNS QL MICRO: ABNORMAL
TRI-PHOS CRY URNS QL MICRO: ABNORMAL
URATE CRY URNS QL MICRO: ABNORMAL
URINE REFLEX: ABNORMAL
URNS CMNT MICRO: ABNORMAL
UROBILINOGEN UR QL: 0.2 MG/DL (ref 0–1)
VANCOMYCIN TROUGH SERPL-MCNC: 13.3 MCG/ML (ref 10–20)
VANCOMYCIN TROUGH SERPL-MCNC: NORMAL UG/ML
WAXY CASTS #/AREA URNS LPF: ABNORMAL /[LPF]
WBC # BLD: 8.4 K/MCL (ref 4.2–11)
WBC #/AREA URNS HPF: ABNORMAL /HPF (ref 0–5)
WBC CASTS #/AREA URNS LPF: ABNORMAL /[LPF]
YEAST HYPHAE URNS QL MICRO: ABNORMAL
YEAST URNS QL MICRO: ABNORMAL

## 2019-09-07 PROCEDURE — 99232 SBSQ HOSP IP/OBS MODERATE 35: CPT | Performed by: PSYCHIATRY & NEUROLOGY

## 2019-09-08 LAB
ANALYZER ANC (IANC): ABNORMAL
ANION GAP SERPL CALC-SCNC: 8 MMOL/L (ref 10–20)
BASE DEFICIT BLDV-SCNC: ABNORMAL MMOL/L
BASE EXCESS BLDV CALC-SCNC: 2 MMOL/L (ref 0–2)
BASOPHILS # BLD: 0 K/MCL (ref 0–0.3)
BASOPHILS NFR BLD: 0 %
BODY TEMPERATURE: 36.2 DEGREES
BUN SERPL-MCNC: 6 MG/DL (ref 6–20)
BUN/CREAT SERPL: 25 (ref 7–25)
CALCIUM SERPL-MCNC: 7.7 MG/DL (ref 8.4–10.2)
CHLORIDE SERPL-SCNC: 106 MMOL/L (ref 98–107)
CO2 SERPL-SCNC: 27 MMOL/L (ref 21–32)
CREAT SERPL-MCNC: 0.24 MG/DL (ref 0.51–0.95)
DIFFERENTIAL METHOD BLD: ABNORMAL
EOSINOPHIL # BLD: 0.8 K/MCL (ref 0.1–0.5)
EOSINOPHIL NFR BLD: 9 %
ERYTHROCYTE [DISTWIDTH] IN BLOOD: 15.3 % (ref 11–15)
GAS FLOW.O2 O2 DELIVERY SYS: ABNORMAL L/MIN
GLUCOSE BLDC GLUCOMTR-MCNC: 116 MG/DL (ref 65–99)
GLUCOSE BLDC GLUCOMTR-MCNC: 145 MG/DL (ref 65–99)
GLUCOSE SERPL-MCNC: 134 MG/DL (ref 65–99)
HCO3 BLDV-SCNC: 27 MMOL/L (ref 22–28)
HCT VFR BLD CALC: 20.5 % (ref 36–46.5)
HGB BLD-MCNC: 6.7 G/DL (ref 12–15.5)
HGB BLD-MCNC: ABNORMAL G/DL
HGB BLD-MCNC: ABNORMAL G/DL
IMM GRANULOCYTES # BLD AUTO: 0 K/MCL (ref 0–0.2)
IMM GRANULOCYTES NFR BLD: 0 %
INHALED O2 CONCENTRATION: 30 %
LYMPHOCYTES # BLD: 1.1 K/MCL (ref 1–4.8)
LYMPHOCYTES NFR BLD: 14 %
MAGNESIUM SERPL-MCNC: 1.9 MG/DL (ref 1.7–2.4)
MCH RBC QN AUTO: 35.1 PG (ref 26–34)
MCHC RBC AUTO-ENTMCNC: 32.7 G/DL (ref 32–36.5)
MCV RBC AUTO: 107.3 FL (ref 78–100)
MONOCYTES # BLD: 0.6 K/MCL (ref 0.3–0.9)
MONOCYTES NFR BLD: 8 %
NEUTROPHILS # BLD: 5.5 K/MCL (ref 1.8–7.7)
NEUTROPHILS NFR BLD: 69 %
NEUTS SEG NFR BLD: ABNORMAL %
NRBC (NRBCRE): 0 /100 WBC
OXYHGB MFR BLDV: 77 % (ref 60–80)
PCO2 BLDV: 45 MM HG (ref 38–51)
PH BLDV: 7.39 UNITS (ref 7.35–7.45)
PHOSPHATE SERPL-MCNC: 2.4 MG/DL (ref 2.4–4.7)
PLATELET # BLD: 76 K/MCL (ref 140–450)
PO2 BLDV: 43 MM HG (ref 35–42)
POTASSIUM SERPL-SCNC: 3.9 MMOL/L (ref 3.4–5.1)
RBC # BLD: 1.91 MIL/MCL (ref 4–5.2)
SAO2 % BLDV: 79 % (ref 60–80)
SODIUM SERPL-SCNC: 137 MMOL/L (ref 135–145)
WBC # BLD: 8 K/MCL (ref 4.2–11)

## 2019-09-09 LAB
ANALYZER ANC (IANC): ABNORMAL
ANION GAP SERPL CALC-SCNC: 9 MMOL/L (ref 10–20)
BASOPHILS # BLD: 0 K/MCL (ref 0–0.3)
BASOPHILS NFR BLD: 0 %
BUN SERPL-MCNC: 6 MG/DL (ref 6–20)
BUN/CREAT SERPL: 20 (ref 7–25)
CALCIUM SERPL-MCNC: 8 MG/DL (ref 8.4–10.2)
CHLORIDE SERPL-SCNC: 104 MMOL/L (ref 98–107)
CO2 SERPL-SCNC: 28 MMOL/L (ref 21–32)
CREAT SERPL-MCNC: 0.3 MG/DL (ref 0.51–0.95)
CRP SERPL-MCNC: 2.4 MG/DL
DIFFERENTIAL METHOD BLD: ABNORMAL
EOSINOPHIL # BLD: 0.5 K/MCL (ref 0.1–0.5)
EOSINOPHIL NFR BLD: 7 %
ERYTHROCYTE [DISTWIDTH] IN BLOOD: 18.1 % (ref 11–15)
ERYTHROCYTE [SEDIMENTATION RATE] IN BLOOD BY WESTERGREN METHOD: 64 MM/HR (ref 0–20)
FOLATE SERPL-MCNC: 19.5 NG/ML
GLUCOSE SERPL-MCNC: 142 MG/DL (ref 65–99)
HCT VFR BLD CALC: 25.8 % (ref 36–46.5)
HGB BLD-MCNC: 8.5 G/DL (ref 12–15.5)
IMM GRANULOCYTES # BLD AUTO: 0 K/MCL (ref 0–0.2)
IMM GRANULOCYTES NFR BLD: 0 %
IRON SATN MFR SERPL: 118 % (ref 15–45)
IRON SERPL-MCNC: 93 MCG/DL (ref 50–170)
LYMPHOCYTES # BLD: 1.1 K/MCL (ref 1–4.8)
LYMPHOCYTES NFR BLD: 16 %
MAGNESIUM SERPL-MCNC: 1.8 MG/DL (ref 1.7–2.4)
MCH RBC QN AUTO: 34 PG (ref 26–34)
MCHC RBC AUTO-ENTMCNC: 32.9 G/DL (ref 32–36.5)
MCV RBC AUTO: 103.2 FL (ref 78–100)
MONOCYTES # BLD: 0.7 K/MCL (ref 0.3–0.9)
MONOCYTES NFR BLD: 9 %
NEUTROPHILS # BLD: 4.8 K/MCL (ref 1.8–7.7)
NEUTROPHILS NFR BLD: 68 %
NEUTS SEG NFR BLD: ABNORMAL %
NRBC (NRBCRE): 0 /100 WBC
PHOSPHATE SERPL-MCNC: 3.7 MG/DL (ref 2.4–4.7)
PLATELET # BLD: 69 K/MCL (ref 140–450)
POTASSIUM SERPL-SCNC: 3.6 MMOL/L (ref 3.4–5.1)
RBC # BLD: 2.5 MIL/MCL (ref 4–5.2)
SODIUM SERPL-SCNC: 137 MMOL/L (ref 135–145)
TIBC SERPL-MCNC: 79 MCG/DL (ref 250–450)
VIT B12 SERPL-MCNC: 482 PG/ML (ref 211–911)
WBC # BLD: 7.1 K/MCL (ref 4.2–11)

## 2019-09-09 PROCEDURE — 99232 SBSQ HOSP IP/OBS MODERATE 35: CPT | Performed by: PSYCHIATRY & NEUROLOGY

## 2019-09-10 LAB
ANALYZER ANC (IANC): ABNORMAL
ANION GAP SERPL CALC-SCNC: 4 MMOL/L (ref 10–20)
BACTERIA BLD CULT: NORMAL
BACTERIA SPEC AEROBE CULT: ABNORMAL
BASOPHILS # BLD: 0 K/MCL (ref 0–0.3)
BASOPHILS NFR BLD: 0 %
BUN SERPL-MCNC: 5 MG/DL (ref 6–20)
BUN/CREAT SERPL: 20 (ref 7–25)
CALCIUM SERPL-MCNC: 7.9 MG/DL (ref 8.4–10.2)
CHLORIDE SERPL-SCNC: 104 MMOL/L (ref 98–107)
CO2 SERPL-SCNC: 32 MMOL/L (ref 21–32)
CREAT SERPL-MCNC: 0.25 MG/DL (ref 0.51–0.95)
DIFFERENTIAL METHOD BLD: ABNORMAL
EOSINOPHIL # BLD: 0.7 K/MCL (ref 0.1–0.5)
EOSINOPHIL NFR BLD: 14 %
ERYTHROCYTE [DISTWIDTH] IN BLOOD: 17.5 % (ref 11–15)
GENTAMICIN SERPL-MCNC: 1.5 MCG/ML
GENTAMICIN SERPL-MCNC: NORMAL MG/L
GLUCOSE SERPL-MCNC: 155 MG/DL (ref 65–99)
HCT VFR BLD CALC: 26.5 % (ref 36–46.5)
HGB BLD-MCNC: 8.7 G/DL (ref 12–15.5)
IMM GRANULOCYTES # BLD AUTO: 0 K/MCL (ref 0–0.2)
IMM GRANULOCYTES NFR BLD: 0 %
LYMPHOCYTES # BLD: 1.2 K/MCL (ref 1–4.8)
LYMPHOCYTES NFR BLD: 24 %
MAGNESIUM SERPL-MCNC: 2 MG/DL (ref 1.7–2.4)
MCH RBC QN AUTO: 34.5 PG (ref 26–34)
MCHC RBC AUTO-ENTMCNC: 32.8 G/DL (ref 32–36.5)
MCV RBC AUTO: 105.2 FL (ref 78–100)
MONOCYTES # BLD: 0.6 K/MCL (ref 0.3–0.9)
MONOCYTES NFR BLD: 12 %
NEUTROPHILS # BLD: 2.5 K/MCL (ref 1.8–7.7)
NEUTROPHILS NFR BLD: 50 %
NEUTS SEG NFR BLD: ABNORMAL %
NRBC (NRBCRE): 0 /100 WBC
ORGANISM: ABNORMAL
PHOSPHATE SERPL-MCNC: 3.2 MG/DL (ref 2.4–4.7)
PLATELET # BLD: 70 K/MCL (ref 140–450)
POTASSIUM SERPL-SCNC: 3.9 MMOL/L (ref 3.4–5.1)
PROCALCITONIN SERPL IA-MCNC: 3.74 NG/ML
PROCALCITONIN SERPL IA-MCNC: ABNORMAL NG/ML
RBC # BLD: 2.52 MIL/MCL (ref 4–5.2)
RESPIRATORY CUL/SMR (RTCS) HL: ABNORMAL
SODIUM SERPL-SCNC: 136 MMOL/L (ref 135–145)
WBC # BLD: 5 K/MCL (ref 4.2–11)

## 2019-09-10 PROCEDURE — 99232 SBSQ HOSP IP/OBS MODERATE 35: CPT | Performed by: PSYCHIATRY & NEUROLOGY

## 2019-09-14 ENCOUNTER — HOSPITAL (OUTPATIENT)
Dept: OTHER | Age: 29
End: 2019-09-14

## 2019-09-14 LAB
ANALYZER ANC (IANC): ABNORMAL
ANION GAP SERPL CALC-SCNC: 5 MMOL/L (ref 10–20)
BASOPHILS # BLD: 0.1 K/MCL (ref 0–0.3)
BASOPHILS NFR BLD: 0 %
BUN SERPL-MCNC: 30 MG/DL (ref 6–20)
BUN/CREAT SERPL: 90 (ref 7–25)
CALCIUM SERPL-MCNC: 8.6 MG/DL (ref 8.4–10.2)
CHLORIDE SERPL-SCNC: 101 MMOL/L (ref 98–107)
CO2 SERPL-SCNC: 37 MMOL/L (ref 21–32)
CREAT SERPL-MCNC: 0.34 MG/DL (ref 0.51–0.95)
DIFFERENTIAL METHOD BLD: ABNORMAL
EOSINOPHIL # BLD: 0.8 K/MCL (ref 0.1–0.5)
EOSINOPHIL NFR BLD: 6 %
ERYTHROCYTE [DISTWIDTH] IN BLOOD: 16.6 % (ref 11–15)
GLUCOSE SERPL-MCNC: 107 MG/DL (ref 65–99)
HCT VFR BLD CALC: 29.4 % (ref 36–46.5)
HGB BLD-MCNC: 9.5 G/DL (ref 12–15.5)
IMM GRANULOCYTES # BLD AUTO: 0.1 K/MCL (ref 0–0.2)
IMM GRANULOCYTES NFR BLD: 0 %
LACTATE BLDV-SCNC: 1.9 MMOL/L (ref 0–2)
LYMPHOCYTES # BLD: 1.4 K/MCL (ref 1–4.8)
LYMPHOCYTES NFR BLD: 12 %
MCH RBC QN AUTO: 34.3 PG (ref 26–34)
MCHC RBC AUTO-ENTMCNC: 32.3 G/DL (ref 32–36.5)
MCV RBC AUTO: 106.1 FL (ref 78–100)
MONOCYTES # BLD: 1 K/MCL (ref 0.3–0.9)
MONOCYTES NFR BLD: 8 %
NEUTROPHILS # BLD: 8.8 K/MCL (ref 1.8–7.7)
NEUTROPHILS NFR BLD: 74 %
NEUTS SEG NFR BLD: ABNORMAL %
NRBC (NRBCRE): 0 /100 WBC
PLATELET # BLD: 146 K/MCL (ref 140–450)
POTASSIUM SERPL-SCNC: 4.2 MMOL/L (ref 3.4–5.1)
RBC # BLD: 2.77 MIL/MCL (ref 4–5.2)
SODIUM SERPL-SCNC: 139 MMOL/L (ref 135–145)
WBC # BLD: 12 K/MCL (ref 4.2–11)

## 2019-09-14 PROCEDURE — 99285 EMERGENCY DEPT VISIT HI MDM: CPT | Performed by: EMERGENCY MEDICINE

## 2019-09-15 LAB
ALBUMIN SERPL-MCNC: 1.5 G/DL (ref 3.6–5.1)
ALBUMIN/GLOB SERPL: 0.3 {RATIO} (ref 1–2.4)
ALP SERPL-CCNC: 169 UNITS/L (ref 45–117)
ALT SERPL-CCNC: 21 UNITS/L
AMORPH SED URNS QL MICRO: NORMAL
ANALYZER ANC (IANC): ABNORMAL
ANION GAP SERPL CALC-SCNC: 6 MMOL/L (ref 10–20)
APPEARANCE UR: CLEAR
AST SERPL-CCNC: 20 UNITS/L
BACTERIA #/AREA URNS HPF: NORMAL /HPF
BASOPHILS # BLD: 0 K/MCL (ref 0–0.3)
BASOPHILS NFR BLD: 1 %
BILIRUB SERPL-MCNC: 0.3 MG/DL (ref 0.2–1)
BILIRUB UR QL: NEGATIVE
BUN SERPL-MCNC: 21 MG/DL (ref 6–20)
BUN/CREAT SERPL: 85 (ref 7–25)
C DIFF TOX B TCDB STL QL NAA+PROBE: NORMAL
C DIFF TOX B TCDB STL QL NAA+PROBE: NOT DETECTED
CALCIUM SERPL-MCNC: 8.3 MG/DL (ref 8.4–10.2)
CAOX CRY URNS QL MICRO: NORMAL
CHLORIDE SERPL-SCNC: 105 MMOL/L (ref 98–107)
CO2 SERPL-SCNC: 33 MMOL/L (ref 21–32)
COLOR UR: YELLOW
CREAT SERPL-MCNC: 0.25 MG/DL (ref 0.51–0.95)
DIFFERENTIAL METHOD BLD: ABNORMAL
EOSINOPHIL # BLD: 0.8 K/MCL (ref 0.1–0.5)
EOSINOPHIL NFR BLD: 10 %
EPITH CASTS #/AREA URNS LPF: NORMAL /[LPF]
ERYTHROCYTE [DISTWIDTH] IN BLOOD: 17 % (ref 11–15)
FATTY CASTS #/AREA URNS LPF: NORMAL /[LPF]
GENTAMICIN TROUGH SERPL-MCNC: 0.3 MCG/ML
GENTAMICIN TROUGH SERPL-MCNC: NORMAL MG/L
GLOBULIN SER-MCNC: 5.3 G/DL (ref 2–4)
GLUCOSE BLDC GLUCOMTR-MCNC: 100 MG/DL (ref 70–99)
GLUCOSE SERPL-MCNC: 83 MG/DL (ref 65–99)
GLUCOSE UR-MCNC: NEGATIVE MG/DL
GRAN CASTS #/AREA URNS LPF: NORMAL /[LPF]
HCT VFR BLD CALC: 25.8 % (ref 36–46.5)
HGB BLD-MCNC: 8.1 G/DL (ref 12–15.5)
HGB UR QL: NEGATIVE
HYALINE CASTS #/AREA URNS LPF: NORMAL /LPF (ref 0–5)
IMM GRANULOCYTES # BLD AUTO: 0 K/MCL (ref 0–0.2)
IMM GRANULOCYTES NFR BLD: 0 %
KETONES UR-MCNC: NEGATIVE MG/DL
LACTATE BLDV-SCNC: 1.3 MMOL/L (ref 0–2)
LEUKOCYTE ESTERASE UR QL STRIP: NEGATIVE
LYMPHOCYTES # BLD: 1.4 K/MCL (ref 1–4.8)
LYMPHOCYTES NFR BLD: 17 %
MAGNESIUM SERPL-MCNC: 2 MG/DL (ref 1.7–2.4)
MCH RBC QN AUTO: 33.6 PG (ref 26–34)
MCHC RBC AUTO-ENTMCNC: 31.4 G/DL (ref 32–36.5)
MCV RBC AUTO: 107.1 FL (ref 78–100)
MIXED CELL CASTS #/AREA URNS LPF: NORMAL /[LPF]
MONOCYTES # BLD: 0.6 K/MCL (ref 0.3–0.9)
MONOCYTES NFR BLD: 7 %
MRSA DNA SPEC QL NAA+PROBE: NORMAL
MRSA DNA SPEC QL NAA+PROBE: NORMAL
MRSA DNA SPEC QL NAA+PROBE: NOT DETECTED
MUCOUS THREADS URNS QL MICRO: PRESENT
NEUTROPHILS # BLD: 5.4 K/MCL (ref 1.8–7.7)
NEUTROPHILS NFR BLD: 65 %
NEUTS SEG NFR BLD: ABNORMAL %
NITRITE UR QL: NEGATIVE
NRBC (NRBCRE): 0 /100 WBC
PH UR: 7 UNITS (ref 5–7)
PHOSPHATE SERPL-MCNC: 2.7 MG/DL (ref 2.4–4.7)
PLATELET # BLD: 122 K/MCL (ref 140–450)
POTASSIUM SERPL-SCNC: 3.5 MMOL/L (ref 3.4–5.1)
PROCALCITONIN SERPL IA-MCNC: 1.04 NG/ML
PROCALCITONIN SERPL IA-MCNC: ABNORMAL NG/ML
PROT SERPL-MCNC: 6.8 G/DL (ref 6.4–8.2)
PROT UR QL: NEGATIVE MG/DL
RBC # BLD: 2.41 MIL/MCL (ref 4–5.2)
RBC #/AREA URNS HPF: NORMAL /HPF (ref 0–2)
RBC CASTS #/AREA URNS LPF: NORMAL /[LPF]
RENAL EPI CELLS #/AREA URNS HPF: NORMAL /[HPF]
SERVICE CMNT-IMP: NORMAL
SODIUM SERPL-SCNC: 141 MMOL/L (ref 135–145)
SP GR UR: 1.02 (ref 1–1.03)
SPECIMEN SOURCE: NORMAL
SPERM URNS QL MICRO: NORMAL
SQUAMOUS #/AREA URNS HPF: NORMAL /HPF (ref 0–5)
T VAGINALIS URNS QL MICRO: NORMAL
TRI-PHOS CRY URNS QL MICRO: NORMAL
URATE CRY URNS QL MICRO: NORMAL
URINE REFLEX: NORMAL
URNS CMNT MICRO: NORMAL
UROBILINOGEN UR QL: 0.2 MG/DL (ref 0–1)
WAXY CASTS #/AREA URNS LPF: NORMAL /[LPF]
WBC # BLD: 8.3 K/MCL (ref 4.2–11)
WBC #/AREA URNS HPF: NORMAL /HPF (ref 0–5)
WBC CASTS #/AREA URNS LPF: NORMAL /[LPF]
YEAST HYPHAE URNS QL MICRO: NORMAL
YEAST URNS QL MICRO: NORMAL

## 2019-09-16 LAB
ALBUMIN SERPL-MCNC: 1.8 G/DL (ref 3.6–5.1)
ALBUMIN/GLOB SERPL: 0.3 {RATIO} (ref 1–2.4)
ALP SERPL-CCNC: 212 UNITS/L (ref 45–117)
ALT SERPL-CCNC: 25 UNITS/L
ANALYZER ANC (IANC): ABNORMAL
ANION GAP SERPL CALC-SCNC: 10 MMOL/L (ref 10–20)
AST SERPL-CCNC: 37 UNITS/L
BASOPHILS # BLD: 0 K/MCL (ref 0–0.3)
BASOPHILS NFR BLD: 1 %
BILIRUB SERPL-MCNC: 0.5 MG/DL (ref 0.2–1)
BUN SERPL-MCNC: 14 MG/DL (ref 6–20)
BUN/CREAT SERPL: 42 (ref 7–25)
CALCIUM SERPL-MCNC: 8.9 MG/DL (ref 8.4–10.2)
CHLORIDE SERPL-SCNC: 103 MMOL/L (ref 98–107)
CO2 SERPL-SCNC: 25 MMOL/L (ref 21–32)
CREAT SERPL-MCNC: 0.33 MG/DL (ref 0.51–0.95)
DIFFERENTIAL METHOD BLD: ABNORMAL
EOSINOPHIL # BLD: 0.4 K/MCL (ref 0.1–0.5)
EOSINOPHIL NFR BLD: 5 %
ERYTHROCYTE [DISTWIDTH] IN BLOOD: 16.8 % (ref 11–15)
GLOBULIN SER-MCNC: 6.2 G/DL (ref 2–4)
GLUCOSE BLDC GLUCOMTR-MCNC: 108 MG/DL (ref 70–99)
GLUCOSE BLDC GLUCOMTR-MCNC: 116 MG/DL (ref 70–99)
GLUCOSE BLDC GLUCOMTR-MCNC: 127 MG/DL (ref 70–99)
GLUCOSE BLDC GLUCOMTR-MCNC: 96 MG/DL (ref 70–99)
GLUCOSE BLDC GLUCOMTR-MCNC: ABNORMAL MG/DL
GLUCOSE BLDC GLUCOMTR-MCNC: ABNORMAL MG/DL
GLUCOSE SERPL-MCNC: 90 MG/DL (ref 65–99)
HCT VFR BLD CALC: 29.7 % (ref 36–46.5)
HGB BLD-MCNC: 9.6 G/DL (ref 12–15.5)
IMM GRANULOCYTES # BLD AUTO: 0 K/MCL (ref 0–0.2)
IMM GRANULOCYTES NFR BLD: 0 %
LYMPHOCYTES # BLD: 1.5 K/MCL (ref 1–4.8)
LYMPHOCYTES NFR BLD: 18 %
MAGNESIUM SERPL-MCNC: 1.7 MG/DL (ref 1.7–2.4)
MCH RBC QN AUTO: 34.5 PG (ref 26–34)
MCHC RBC AUTO-ENTMCNC: 32.3 G/DL (ref 32–36.5)
MCV RBC AUTO: 106.8 FL (ref 78–100)
MONOCYTES # BLD: 0.6 K/MCL (ref 0.3–0.9)
MONOCYTES NFR BLD: 7 %
NEUTROPHILS # BLD: 6 K/MCL (ref 1.8–7.7)
NEUTROPHILS NFR BLD: 69 %
NEUTS SEG NFR BLD: ABNORMAL %
NRBC (NRBCRE): 0 /100 WBC
PHOSPHATE SERPL-MCNC: 3.6 MG/DL (ref 2.4–4.7)
PLATELET # BLD: 161 K/MCL (ref 140–450)
POTASSIUM SERPL-SCNC: 4 MMOL/L (ref 3.4–5.1)
PROT SERPL-MCNC: 8 G/DL (ref 6.4–8.2)
RBC # BLD: 2.78 MIL/MCL (ref 4–5.2)
SODIUM SERPL-SCNC: 134 MMOL/L (ref 135–145)
WBC # BLD: 8.5 K/MCL (ref 4.2–11)

## 2019-09-17 LAB
ANALYZER ANC (IANC): ABNORMAL
ANION GAP SERPL CALC-SCNC: 9 MMOL/L (ref 10–20)
BACTERIA UR CULT: NORMAL
BASOPHILS # BLD: 0 K/MCL (ref 0–0.3)
BASOPHILS NFR BLD: 0 %
BUN SERPL-MCNC: 10 MG/DL (ref 6–20)
BUN/CREAT SERPL: 39 (ref 7–25)
CALCIUM SERPL-MCNC: 8.2 MG/DL (ref 8.4–10.2)
CHLORIDE SERPL-SCNC: 104 MMOL/L (ref 98–107)
CO2 SERPL-SCNC: 25 MMOL/L (ref 21–32)
CREAT SERPL-MCNC: 0.26 MG/DL (ref 0.51–0.95)
DIFFERENTIAL METHOD BLD: ABNORMAL
EOSINOPHIL # BLD: 0.7 K/MCL (ref 0.1–0.5)
EOSINOPHIL NFR BLD: 10 %
ERYTHROCYTE [DISTWIDTH] IN BLOOD: 16.4 % (ref 11–15)
GLUCOSE BLDC GLUCOMTR-MCNC: 105 MG/DL (ref 70–99)
GLUCOSE BLDC GLUCOMTR-MCNC: 121 MG/DL (ref 70–99)
GLUCOSE BLDC GLUCOMTR-MCNC: 135 MG/DL (ref 70–99)
GLUCOSE BLDC GLUCOMTR-MCNC: 159 MG/DL (ref 70–99)
GLUCOSE BLDC GLUCOMTR-MCNC: ABNORMAL MG/DL
GLUCOSE BLDC GLUCOMTR-MCNC: ABNORMAL MG/DL
GLUCOSE SERPL-MCNC: 82 MG/DL (ref 65–99)
HCT VFR BLD CALC: 28.3 % (ref 36–46.5)
HGB BLD-MCNC: 9.3 G/DL (ref 12–15.5)
IMM GRANULOCYTES # BLD AUTO: 0 K/MCL (ref 0–0.2)
IMM GRANULOCYTES NFR BLD: 0 %
LYMPHOCYTES # BLD: 1.4 K/MCL (ref 1–4.8)
LYMPHOCYTES NFR BLD: 22 %
MCH RBC QN AUTO: 34.1 PG (ref 26–34)
MCHC RBC AUTO-ENTMCNC: 32.9 G/DL (ref 32–36.5)
MCV RBC AUTO: 103.7 FL (ref 78–100)
MONOCYTES # BLD: 0.6 K/MCL (ref 0.3–0.9)
MONOCYTES NFR BLD: 9 %
NEUTROPHILS # BLD: 3.9 K/MCL (ref 1.8–7.7)
NEUTROPHILS NFR BLD: 59 %
NEUTS SEG NFR BLD: ABNORMAL %
NRBC (NRBCRE): 0 /100 WBC
PLATELET # BLD: 143 K/MCL (ref 140–450)
POTASSIUM SERPL-SCNC: 3.8 MMOL/L (ref 3.4–5.1)
RBC # BLD: 2.73 MIL/MCL (ref 4–5.2)
SODIUM SERPL-SCNC: 134 MMOL/L (ref 135–145)
WBC # BLD: 6.6 K/MCL (ref 4.2–11)

## 2019-09-18 LAB
ANALYZER ANC (IANC): ABNORMAL
ANION GAP SERPL CALC-SCNC: 11 MMOL/L (ref 10–20)
BASOPHILS # BLD: 0 K/MCL (ref 0–0.3)
BASOPHILS NFR BLD: 1 %
BUN SERPL-MCNC: 11 MG/DL (ref 6–20)
BUN/CREAT SERPL: 43 (ref 7–25)
CALCIUM SERPL-MCNC: 8.5 MG/DL (ref 8.4–10.2)
CHLORIDE SERPL-SCNC: 105 MMOL/L (ref 98–107)
CO2 SERPL-SCNC: 26 MMOL/L (ref 21–32)
CREAT SERPL-MCNC: 0.25 MG/DL (ref 0.51–0.95)
DIFFERENTIAL METHOD BLD: ABNORMAL
EOSINOPHIL # BLD: 0.6 K/MCL (ref 0.1–0.5)
EOSINOPHIL NFR BLD: 11 %
ERYTHROCYTE [DISTWIDTH] IN BLOOD: 16.5 % (ref 11–15)
GLUCOSE BLDC GLUCOMTR-MCNC: 103 MG/DL (ref 70–99)
GLUCOSE BLDC GLUCOMTR-MCNC: 114 MG/DL (ref 70–99)
GLUCOSE BLDC GLUCOMTR-MCNC: 124 MG/DL (ref 70–99)
GLUCOSE BLDC GLUCOMTR-MCNC: 138 MG/DL (ref 70–99)
GLUCOSE BLDC GLUCOMTR-MCNC: ABNORMAL MG/DL
GLUCOSE SERPL-MCNC: 106 MG/DL (ref 65–99)
HCT VFR BLD CALC: 27.8 % (ref 36–46.5)
HGB BLD-MCNC: 9.3 G/DL (ref 12–15.5)
IMM GRANULOCYTES # BLD AUTO: 0 K/MCL (ref 0–0.2)
IMM GRANULOCYTES NFR BLD: 0 %
LYMPHOCYTES # BLD: 1.3 K/MCL (ref 1–4.8)
LYMPHOCYTES NFR BLD: 23 %
MCH RBC QN AUTO: 34.4 PG (ref 26–34)
MCHC RBC AUTO-ENTMCNC: 33.5 G/DL (ref 32–36.5)
MCV RBC AUTO: 103 FL (ref 78–100)
MONOCYTES # BLD: 0.6 K/MCL (ref 0.3–0.9)
MONOCYTES NFR BLD: 10 %
NEUTROPHILS # BLD: 3.1 K/MCL (ref 1.8–7.7)
NEUTROPHILS NFR BLD: 55 %
NEUTS SEG NFR BLD: ABNORMAL %
NRBC (NRBCRE): 0 /100 WBC
PLATELET # BLD: 123 K/MCL (ref 140–450)
POTASSIUM SERPL-SCNC: 3.6 MMOL/L (ref 3.4–5.1)
RBC # BLD: 2.7 MIL/MCL (ref 4–5.2)
SODIUM SERPL-SCNC: 138 MMOL/L (ref 135–145)
WBC # BLD: 5.6 K/MCL (ref 4.2–11)

## 2019-09-19 LAB
GLUCOSE BLDC GLUCOMTR-MCNC: 104 MG/DL (ref 70–99)
GLUCOSE BLDC GLUCOMTR-MCNC: 113 MG/DL (ref 70–99)
GLUCOSE BLDC GLUCOMTR-MCNC: 115 MG/DL (ref 70–99)
GLUCOSE BLDC GLUCOMTR-MCNC: 131 MG/DL (ref 70–99)
GLUCOSE BLDC GLUCOMTR-MCNC: ABNORMAL MG/DL
GLUCOSE BLDC GLUCOMTR-MCNC: ABNORMAL MG/DL

## 2019-09-20 LAB
BACTERIA BLD CULT: NORMAL
GLUCOSE BLDC GLUCOMTR-MCNC: 101 MG/DL (ref 70–99)
GLUCOSE BLDC GLUCOMTR-MCNC: 117 MG/DL (ref 70–99)
GLUCOSE BLDC GLUCOMTR-MCNC: 123 MG/DL (ref 70–99)
GLUCOSE BLDC GLUCOMTR-MCNC: 92 MG/DL (ref 70–99)
GLUCOSE BLDC GLUCOMTR-MCNC: ABNORMAL MG/DL
GLUCOSE BLDC GLUCOMTR-MCNC: ABNORMAL MG/DL

## 2019-11-11 ENCOUNTER — HOSPITAL (OUTPATIENT)
Dept: OTHER | Age: 29
End: 2019-11-11

## 2019-11-12 LAB — CREAT BLD-MCNC: <0.2 MG/DL (ref 0.51–0.95)

## 2020-01-01 ENCOUNTER — EXTERNAL RECORD (OUTPATIENT)
Dept: OTHER | Age: 30
End: 2020-01-01

## 2020-01-16 ENCOUNTER — DIAGNOSTIC TRANS (OUTPATIENT)
Dept: OTHER | Age: 30
End: 2020-01-16

## 2020-01-16 ENCOUNTER — HOSPITAL (OUTPATIENT)
Dept: OTHER | Age: 30
End: 2020-01-16

## 2020-01-16 LAB
ALBUMIN SERPL-MCNC: 2.7 G/DL (ref 3.6–5.1)
ALBUMIN/GLOB SERPL: 0.5 {RATIO} (ref 1–2.4)
ALP SERPL-CCNC: 254 UNITS/L (ref 45–117)
ALT SERPL-CCNC: 177 UNITS/L
ANALYZER ANC (IANC): ABNORMAL
ANION GAP SERPL CALC-SCNC: 8 MMOL/L (ref 10–20)
AST SERPL-CCNC: 72 UNITS/L
BASOPHILS # BLD: 0 K/MCL (ref 0–0.3)
BASOPHILS NFR BLD: 1 %
BILIRUB SERPL-MCNC: 0.3 MG/DL (ref 0.2–1)
BUN SERPL-MCNC: 19 MG/DL (ref 6–20)
BUN/CREAT SERPL: 87 (ref 7–25)
CALCIUM SERPL-MCNC: 9.2 MG/DL (ref 8.4–10.2)
CHLORIDE SERPL-SCNC: 102 MMOL/L (ref 98–107)
CO2 SERPL-SCNC: 31 MMOL/L (ref 21–32)
CREAT SERPL-MCNC: 0.22 MG/DL (ref 0.51–0.95)
DIFFERENTIAL METHOD BLD: ABNORMAL
EOSINOPHIL # BLD: 0.6 K/MCL (ref 0.1–0.5)
EOSINOPHIL NFR BLD: 9 %
ERYTHROCYTE [DISTWIDTH] IN BLOOD: 11.1 % (ref 11–15)
GLOBULIN SER-MCNC: 5.3 G/DL (ref 2–4)
GLUCOSE SERPL-MCNC: 109 MG/DL (ref 65–99)
HCT VFR BLD CALC: 30.1 % (ref 36–46.5)
HGB BLD-MCNC: 9.9 G/DL (ref 12–15.5)
IMM GRANULOCYTES # BLD AUTO: 0 K/MCL (ref 0–0.2)
IMM GRANULOCYTES NFR BLD: 0 %
LIPASE SERPL-CCNC: <50 UNITS/L (ref 73–393)
LYMPHOCYTES # BLD: 1.6 K/MCL (ref 1–4.8)
LYMPHOCYTES NFR BLD: 22 %
MCH RBC QN AUTO: 34.4 PG (ref 26–34)
MCHC RBC AUTO-ENTMCNC: 32.9 G/DL (ref 32–36.5)
MCV RBC AUTO: 104.5 FL (ref 78–100)
MONOCYTES # BLD: 0.6 K/MCL (ref 0.3–0.9)
MONOCYTES NFR BLD: 9 %
NEUTROPHILS # BLD: 4.2 K/MCL (ref 1.8–7.7)
NEUTROPHILS NFR BLD: 59 %
NEUTS SEG NFR BLD: ABNORMAL %
NRBC (NRBCRE): 0 /100 WBC
PLATELET # BLD: 123 K/MCL (ref 140–450)
POTASSIUM SERPL-SCNC: 4.6 MMOL/L (ref 3.4–5.1)
PROT SERPL-MCNC: 8 G/DL (ref 6.4–8.2)
RBC # BLD: 2.88 MIL/MCL (ref 4–5.2)
SODIUM SERPL-SCNC: 136 MMOL/L (ref 135–145)
WBC # BLD: 7.1 K/MCL (ref 4.2–11)

## 2020-01-16 PROCEDURE — 99285 EMERGENCY DEPT VISIT HI MDM: CPT | Performed by: EMERGENCY MEDICINE

## 2020-01-17 LAB
ANALYZER ANC (IANC): ABNORMAL
ANION GAP SERPL CALC-SCNC: 8 MMOL/L (ref 10–20)
BASOPHILS # BLD: 0.1 K/MCL (ref 0–0.3)
BASOPHILS NFR BLD: 1 %
BUN SERPL-MCNC: 22 MG/DL (ref 6–20)
BUN/CREAT SERPL: 77 (ref 7–25)
CALCIUM SERPL-MCNC: 8.5 MG/DL (ref 8.4–10.2)
CHLORIDE SERPL-SCNC: 113 MMOL/L (ref 98–107)
CO2 SERPL-SCNC: 25 MMOL/L (ref 21–32)
CREAT SERPL-MCNC: 0.29 MG/DL (ref 0.51–0.95)
DIFFERENTIAL METHOD BLD: ABNORMAL
EOSINOPHIL # BLD: 0.3 K/MCL (ref 0.1–0.5)
EOSINOPHIL NFR BLD: 4 %
ERYTHROCYTE [DISTWIDTH] IN BLOOD: 11.3 % (ref 11–15)
GLUCOSE BLDC GLUCOMTR-MCNC: 106 MG/DL (ref 70–99)
GLUCOSE SERPL-MCNC: 66 MG/DL (ref 65–99)
HCT VFR BLD CALC: 26.1 % (ref 36–46.5)
HGB BLD-MCNC: 8.5 G/DL (ref 12–15.5)
IMM GRANULOCYTES # BLD AUTO: 0 K/MCL (ref 0–0.2)
IMM GRANULOCYTES NFR BLD: 0 %
INR PPP: 1.2
INR PPP: ABNORMAL
LYMPHOCYTES # BLD: 1.2 K/MCL (ref 1–4.8)
LYMPHOCYTES NFR BLD: 14 %
MCH RBC QN AUTO: 33.7 PG (ref 26–34)
MCHC RBC AUTO-ENTMCNC: 32.6 G/DL (ref 32–36.5)
MCV RBC AUTO: 103.6 FL (ref 78–100)
MONOCYTES # BLD: 0.5 K/MCL (ref 0.3–0.9)
MONOCYTES NFR BLD: 7 %
NEUTROPHILS # BLD: 6 K/MCL (ref 1.8–7.7)
NEUTROPHILS NFR BLD: 74 %
NEUTS SEG NFR BLD: ABNORMAL %
NRBC (NRBCRE): 0 /100 WBC
PLATELET # BLD: 102 K/MCL (ref 140–450)
POTASSIUM SERPL-SCNC: 4.3 MMOL/L (ref 3.4–5.1)
PROTHROMBIN TIME (PRT2): ABNORMAL
PROTHROMBIN TIME: 13 SEC (ref 9.7–11.8)
RBC # BLD: 2.52 MIL/MCL (ref 4–5.2)
SODIUM SERPL-SCNC: 142 MMOL/L (ref 135–145)
WBC # BLD: 8.1 K/MCL (ref 4.2–11)

## 2020-04-29 ENCOUNTER — HOSPITAL ENCOUNTER (EMERGENCY)
Age: 30
Discharge: HOME | End: 2020-04-29
Attending: EMERGENCY MEDICINE

## 2020-04-29 ENCOUNTER — APPOINTMENT (OUTPATIENT)
Dept: GENERAL RADIOLOGY | Age: 30
End: 2020-04-29
Attending: STUDENT IN AN ORGANIZED HEALTH CARE EDUCATION/TRAINING PROGRAM

## 2020-04-29 VITALS
RESPIRATION RATE: 12 BRPM | HEART RATE: 87 BPM | DIASTOLIC BLOOD PRESSURE: 68 MMHG | SYSTOLIC BLOOD PRESSURE: 92 MMHG | OXYGEN SATURATION: 98 % | TEMPERATURE: 97.9 F

## 2020-04-29 DIAGNOSIS — Z43.1: Primary | ICD-10-CM

## 2020-04-29 PROCEDURE — 99283 EMERGENCY DEPT VISIT LOW MDM: CPT | Performed by: STUDENT IN AN ORGANIZED HEALTH CARE EDUCATION/TRAINING PROGRAM

## 2020-04-29 PROCEDURE — 43762 RPLC GTUBE NO REVJ TRC: CPT | Performed by: STUDENT IN AN ORGANIZED HEALTH CARE EDUCATION/TRAINING PROGRAM

## 2020-04-29 PROCEDURE — 43762 RPLC GTUBE NO REVJ TRC: CPT

## 2020-04-29 PROCEDURE — X1094 NO CHARGE VISIT: HCPCS | Performed by: EMERGENCY MEDICINE

## 2020-04-29 PROCEDURE — 10004281 HB COUNTER-STAFF TIME PER 15 MIN

## 2020-04-29 PROCEDURE — 74018 RADEX ABDOMEN 1 VIEW: CPT

## 2020-04-29 PROCEDURE — 99284 EMERGENCY DEPT VISIT MOD MDM: CPT

## 2020-04-29 PROCEDURE — 94002 VENT MGMT INPAT INIT DAY: CPT

## 2020-07-03 ENCOUNTER — APPOINTMENT (OUTPATIENT)
Dept: GENERAL RADIOLOGY | Age: 30
DRG: 720 | End: 2020-07-03
Attending: INTERNAL MEDICINE

## 2020-07-03 ENCOUNTER — HOSPITAL ENCOUNTER (INPATIENT)
Age: 30
LOS: 4 days | Discharge: NURSING, REHAB OR LONG-TERM CARE | DRG: 720 | End: 2020-07-07
Attending: EMERGENCY MEDICINE | Admitting: INTERNAL MEDICINE

## 2020-07-03 DIAGNOSIS — D64.9 ANEMIA, UNSPECIFIED TYPE: Primary | ICD-10-CM

## 2020-07-03 DIAGNOSIS — E86.0 DEHYDRATION: ICD-10-CM

## 2020-07-03 LAB
ALBUMIN SERPL-MCNC: 2.1 G/DL (ref 3.6–5.1)
ALBUMIN/GLOB SERPL: 0.3 {RATIO} (ref 1–2.4)
ALP SERPL-CCNC: 226 UNITS/L (ref 45–117)
ALT SERPL-CCNC: 62 UNITS/L
ANION GAP SERPL CALC-SCNC: 7 MMOL/L (ref 10–20)
AST SERPL-CCNC: 26 UNITS/L
ATRIAL RATE (BPM): 140
BASOPHILS # BLD: 0 K/MCL (ref 0–0.3)
BASOPHILS NFR BLD: 1 %
BILIRUB SERPL-MCNC: 0.3 MG/DL (ref 0.2–1)
BUN SERPL-MCNC: 44 MG/DL (ref 6–20)
BUN/CREAT SERPL: 133 (ref 7–25)
CALCIUM SERPL-MCNC: 8.4 MG/DL (ref 8.4–10.2)
CHLORIDE SERPL-SCNC: 97 MMOL/L (ref 98–107)
CO2 SERPL-SCNC: 35 MMOL/L (ref 21–32)
CREAT SERPL-MCNC: 0.33 MG/DL (ref 0.51–0.95)
DIFFERENTIAL METHOD BLD: ABNORMAL
EOSINOPHIL # BLD: 0.1 K/MCL (ref 0.1–0.5)
EOSINOPHIL NFR BLD: 1 %
ERYTHROCYTE [DISTWIDTH] IN BLOOD: 11.8 % (ref 11–15)
GLOBULIN SER-MCNC: 6.2 G/DL (ref 2–4)
GLUCOSE SERPL-MCNC: 156 MG/DL (ref 65–99)
HCT VFR BLD CALC: 23.5 % (ref 36–46.5)
HGB BLD-MCNC: 7.8 G/DL (ref 12–15.5)
IMM GRANULOCYTES # BLD AUTO: 0 K/MCL (ref 0–0.2)
IMM GRANULOCYTES NFR BLD: 0 %
LACTATE BLDV-SCNC: 1.9 MMOL/L (ref 0–2)
LACTATE BLDV-SCNC: 2.3 MMOL/L (ref 0–2)
LYMPHOCYTES # BLD: 1.1 K/MCL (ref 1–4.8)
LYMPHOCYTES NFR BLD: 13 %
MAGNESIUM SERPL-MCNC: 2.1 MG/DL (ref 1.7–2.4)
MCH RBC QN AUTO: 35.1 PG (ref 26–34)
MCHC RBC AUTO-ENTMCNC: 33.2 G/DL (ref 32–36.5)
MCV RBC AUTO: 105.9 FL (ref 78–100)
MONOCYTES # BLD: 0.7 K/MCL (ref 0.3–0.9)
MONOCYTES NFR BLD: 9 %
NEUTROPHILS # BLD: 6.5 K/MCL (ref 1.8–7.7)
NEUTROPHILS NFR BLD: 76 %
NRBC BLD MANUAL-RTO: 0 /100 WBC
P AXIS (DEGREES): 82
PHOSPHATE SERPL-MCNC: 3.5 MG/DL (ref 2.4–4.7)
PLATELET # BLD: 136 K/MCL (ref 140–450)
POTASSIUM SERPL-SCNC: 4.7 MMOL/L (ref 3.4–5.1)
PR-INTERVAL (MSEC): 134
PROCALCITONIN SERPL IA-MCNC: 5.63 NG/ML
PROT SERPL-MCNC: 8.3 G/DL (ref 6.4–8.2)
QRS-INTERVAL (MSEC): 71
QT-INTERVAL (MSEC): 270
QTC: 412
R AXIS (DEGREES): 83
RBC # BLD: 2.22 MIL/MCL (ref 4–5.2)
REPORT TEXT: NORMAL
SARS-COV-2 RNA RESP QL NAA+PROBE: NOT DETECTED
SERVICE CMNT-IMP: NORMAL
SODIUM SERPL-SCNC: 134 MMOL/L (ref 135–145)
SPECIMEN SOURCE: NORMAL
T AXIS (DEGREES): 17
VENTRICULAR RATE EKG/MIN (BPM): 140
WBC # BLD: 8.4 K/MCL (ref 4.2–11)

## 2020-07-03 PROCEDURE — 87086 URINE CULTURE/COLONY COUNT: CPT

## 2020-07-03 PROCEDURE — 10006027 HB SUPPLY 278

## 2020-07-03 PROCEDURE — 87635 SARS-COV-2 COVID-19 AMP PRB: CPT

## 2020-07-03 PROCEDURE — 84100 ASSAY OF PHOSPHORUS: CPT

## 2020-07-03 PROCEDURE — 99285 EMERGENCY DEPT VISIT HI MDM: CPT

## 2020-07-03 PROCEDURE — 10002800 HB RX 250 W HCPCS: Performed by: EMERGENCY MEDICINE

## 2020-07-03 PROCEDURE — 99285 EMERGENCY DEPT VISIT HI MDM: CPT | Performed by: EMERGENCY MEDICINE

## 2020-07-03 PROCEDURE — 84145 PROCALCITONIN (PCT): CPT

## 2020-07-03 PROCEDURE — P9612 CATHETERIZE FOR URINE SPEC: HCPCS

## 2020-07-03 PROCEDURE — 93005 ELECTROCARDIOGRAM TRACING: CPT | Performed by: INTERNAL MEDICINE

## 2020-07-03 PROCEDURE — 80053 COMPREHEN METABOLIC PANEL: CPT

## 2020-07-03 PROCEDURE — 83605 ASSAY OF LACTIC ACID: CPT

## 2020-07-03 PROCEDURE — 10002803 HB RX 637: Performed by: EMERGENCY MEDICINE

## 2020-07-03 PROCEDURE — 10002807 HB RX 258: Performed by: INTERNAL MEDICINE

## 2020-07-03 PROCEDURE — 87186 SC STD MICRODIL/AGAR DIL: CPT

## 2020-07-03 PROCEDURE — 85025 COMPLETE CBC W/AUTO DIFF WBC: CPT

## 2020-07-03 PROCEDURE — 10004651 HB RX, NO CHARGE ITEM: Performed by: INTERNAL MEDICINE

## 2020-07-03 PROCEDURE — 83735 ASSAY OF MAGNESIUM: CPT

## 2020-07-03 PROCEDURE — 94002 VENT MGMT INPAT INIT DAY: CPT

## 2020-07-03 PROCEDURE — 10000002 HB ROOM CHARGE MED SURG

## 2020-07-03 PROCEDURE — C9803 HOPD COVID-19 SPEC COLLECT: HCPCS

## 2020-07-03 PROCEDURE — 10002807 HB RX 258: Performed by: EMERGENCY MEDICINE

## 2020-07-03 PROCEDURE — 87088 URINE BACTERIA CULTURE: CPT

## 2020-07-03 PROCEDURE — 10002803 HB RX 637: Performed by: NURSE PRACTITIONER

## 2020-07-03 PROCEDURE — 5A1945Z RESPIRATORY VENTILATION, 24-96 CONSECUTIVE HOURS: ICD-10-PCS | Performed by: INTERNAL MEDICINE

## 2020-07-03 PROCEDURE — 71045 X-RAY EXAM CHEST 1 VIEW: CPT

## 2020-07-03 PROCEDURE — 96360 HYDRATION IV INFUSION INIT: CPT

## 2020-07-03 RX ORDER — VIT A/VIT D2/E/B-O/PETROLAT
1 OINTMENT (GRAM) TOPICAL EVERY 8 HOURS
COMMUNITY
End: 2020-07-26 | Stop reason: ALTCHOICE

## 2020-07-03 RX ORDER — LACOSAMIDE 10 MG/ML
20 SOLUTION ORAL EVERY 12 HOURS SCHEDULED
Status: DISCONTINUED | OUTPATIENT
Start: 2020-07-03 | End: 2020-07-03

## 2020-07-03 RX ORDER — LEVETIRACETAM 100 MG/ML
500 SOLUTION ORAL 2 TIMES DAILY
Status: DISCONTINUED | OUTPATIENT
Start: 2020-07-03 | End: 2020-07-07 | Stop reason: HOSPADM

## 2020-07-03 RX ORDER — ASCORBIC ACID 500 MG
500 TABLET ORAL DAILY
Status: DISCONTINUED | OUTPATIENT
Start: 2020-07-04 | End: 2020-07-07 | Stop reason: HOSPADM

## 2020-07-03 RX ORDER — FAMOTIDINE 20 MG/1
20 TABLET, FILM COATED ORAL DAILY
COMMUNITY
End: 2020-10-05 | Stop reason: ALTCHOICE

## 2020-07-03 RX ORDER — GABAPENTIN 100 MG/1
100 CAPSULE ORAL 2 TIMES DAILY
Status: DISCONTINUED | OUTPATIENT
Start: 2020-07-03 | End: 2020-07-07 | Stop reason: HOSPADM

## 2020-07-03 RX ORDER — METOPROLOL TARTRATE 25 MG/1
12.5 TABLET, FILM COATED ORAL EVERY 12 HOURS SCHEDULED
Status: DISCONTINUED | OUTPATIENT
Start: 2020-07-03 | End: 2020-07-07 | Stop reason: HOSPADM

## 2020-07-03 RX ORDER — SODIUM CHLORIDE 9 MG/ML
INJECTION, SOLUTION INTRAVENOUS CONTINUOUS PRN
Status: DISCONTINUED | OUTPATIENT
Start: 2020-07-03 | End: 2020-07-07 | Stop reason: HOSPADM

## 2020-07-03 RX ORDER — FOLIC ACID 1 MG/1
0.5 TABLET ORAL DAILY
Status: DISCONTINUED | OUTPATIENT
Start: 2020-07-04 | End: 2020-07-07 | Stop reason: HOSPADM

## 2020-07-03 RX ORDER — FAMOTIDINE 20 MG/1
20 TABLET, FILM COATED ORAL DAILY
Status: DISCONTINUED | OUTPATIENT
Start: 2020-07-04 | End: 2020-07-03 | Stop reason: RX

## 2020-07-03 RX ORDER — ALBUTEROL SULFATE 90 UG/1
2 AEROSOL, METERED RESPIRATORY (INHALATION)
Status: DISCONTINUED | OUTPATIENT
Start: 2020-07-03 | End: 2020-07-07 | Stop reason: HOSPADM

## 2020-07-03 RX ORDER — ACETAMINOPHEN 160 MG/5ML
500 LIQUID ORAL EVERY 6 HOURS PRN
Status: DISCONTINUED | OUTPATIENT
Start: 2020-07-03 | End: 2020-07-07 | Stop reason: HOSPADM

## 2020-07-03 RX ORDER — FOLIC ACID 1 MG/1
0.5 TABLET ORAL DAILY
Status: ON HOLD | COMMUNITY
End: 2020-11-11 | Stop reason: SDUPTHER

## 2020-07-03 RX ORDER — L. ACIDOPHILUS/PECTIN, CITRUS 25MM-100MG
1 TABLET ORAL 2 TIMES DAILY
Status: DISCONTINUED | OUTPATIENT
Start: 2020-07-03 | End: 2020-07-07 | Stop reason: HOSPADM

## 2020-07-03 RX ORDER — OXYCODONE HYDROCHLORIDE 5 MG/1
2.5 TABLET ORAL EVERY 6 HOURS PRN
Status: DISCONTINUED | OUTPATIENT
Start: 2020-07-03 | End: 2020-07-07 | Stop reason: HOSPADM

## 2020-07-03 RX ORDER — ACETAMINOPHEN 325 MG/1
325 TABLET ORAL EVERY 6 HOURS PRN
COMMUNITY

## 2020-07-03 RX ORDER — SODIUM CHLORIDE 9 MG/ML
INJECTION, SOLUTION INTRAVENOUS CONTINUOUS
Status: DISCONTINUED | OUTPATIENT
Start: 2020-07-03 | End: 2020-07-07 | Stop reason: HOSPADM

## 2020-07-03 RX ORDER — ASCORBIC ACID 500 MG
500 TABLET ORAL DAILY
COMMUNITY

## 2020-07-03 RX ORDER — GABAPENTIN 250 MG/5ML
100 SOLUTION ORAL 2 TIMES DAILY
Status: ON HOLD | COMMUNITY
End: 2020-11-11 | Stop reason: SDUPTHER

## 2020-07-03 RX ORDER — ENOXAPARIN SODIUM 100 MG/ML
30 INJECTION SUBCUTANEOUS DAILY
Status: DISCONTINUED | OUTPATIENT
Start: 2020-07-04 | End: 2020-07-07 | Stop reason: HOSPADM

## 2020-07-03 RX ORDER — LEVETIRACETAM 100 MG/ML
500 SOLUTION ORAL EVERY 12 HOURS SCHEDULED
Status: ON HOLD | COMMUNITY
End: 2020-11-11 | Stop reason: SDUPTHER

## 2020-07-03 RX ORDER — SODIUM CHLORIDE, SODIUM LACTATE, POTASSIUM CHLORIDE, CALCIUM CHLORIDE 600; 310; 30; 20 MG/100ML; MG/100ML; MG/100ML; MG/100ML
INJECTION, SOLUTION INTRAVENOUS CONTINUOUS
Status: DISCONTINUED | OUTPATIENT
Start: 2020-07-03 | End: 2020-07-03

## 2020-07-03 RX ORDER — CIDER VINEGAR 300 MG
2 TABLET ORAL DAILY
Status: ON HOLD | COMMUNITY
End: 2020-11-04

## 2020-07-03 RX ORDER — LACOSAMIDE 200 MG/1
200 TABLET ORAL EVERY 12 HOURS SCHEDULED
Status: ON HOLD | COMMUNITY
End: 2020-11-11 | Stop reason: SDUPTHER

## 2020-07-03 RX ORDER — ACETAMINOPHEN 160 MG/5ML
80 LIQUID ORAL EVERY 6 HOURS PRN
Status: DISCONTINUED | OUTPATIENT
Start: 2020-07-03 | End: 2020-07-03

## 2020-07-03 RX ORDER — UREA 10 %
220 LOTION (ML) TOPICAL DAILY
Status: DISCONTINUED | OUTPATIENT
Start: 2020-07-04 | End: 2020-07-07 | Stop reason: HOSPADM

## 2020-07-03 RX ORDER — CHLORHEXIDINE GLUCONATE ORAL RINSE 1.2 MG/ML
2 SOLUTION DENTAL 2 TIMES DAILY
Status: ON HOLD | COMMUNITY
End: 2020-11-04

## 2020-07-03 RX ORDER — BACLOFEN 10 MG/1
15 TABLET ORAL EVERY 8 HOURS SCHEDULED
Status: ON HOLD | COMMUNITY
End: 2020-11-11 | Stop reason: SDUPTHER

## 2020-07-03 RX ORDER — OXYCODONE HYDROCHLORIDE 5 MG/1
2.5 TABLET ORAL EVERY 8 HOURS SCHEDULED
Status: ON HOLD | COMMUNITY
End: 2020-07-28 | Stop reason: SDUPTHER

## 2020-07-03 RX ORDER — 0.9 % SODIUM CHLORIDE 0.9 %
2 VIAL (ML) INJECTION EVERY 12 HOURS SCHEDULED
Status: DISCONTINUED | OUTPATIENT
Start: 2020-07-03 | End: 2020-07-07 | Stop reason: HOSPADM

## 2020-07-03 RX ORDER — LACOSAMIDE 10 MG/ML
200 SOLUTION ORAL EVERY 12 HOURS SCHEDULED
Status: DISCONTINUED | OUTPATIENT
Start: 2020-07-03 | End: 2020-07-07 | Stop reason: HOSPADM

## 2020-07-03 RX ADMIN — PANCRELIPASE 1 CAPSULE: 30000; 6000; 19000 CAPSULE, DELAYED RELEASE PELLETS ORAL at 23:18

## 2020-07-03 RX ADMIN — LEVETIRACETAM 500 MG: 100 SOLUTION ORAL at 23:18

## 2020-07-03 RX ADMIN — METOPROLOL TARTRATE 12.5 MG: 25 TABLET, FILM COATED ORAL at 23:17

## 2020-07-03 RX ADMIN — BACLOFEN 15 MG: 10 TABLET ORAL at 23:17

## 2020-07-03 RX ADMIN — HYDROCODONE BITARTRATE AND ACETAMINOPHEN 10 ML: 7.5; 325 SOLUTION ORAL at 12:36

## 2020-07-03 RX ADMIN — SODIUM CHLORIDE, POTASSIUM CHLORIDE, SODIUM LACTATE AND CALCIUM CHLORIDE: 600; 310; 30; 20 INJECTION, SOLUTION INTRAVENOUS at 14:00

## 2020-07-03 RX ADMIN — CEFEPIME HYDROCHLORIDE 2000 MG: 2 INJECTION, POWDER, FOR SOLUTION INTRAVENOUS at 14:45

## 2020-07-03 RX ADMIN — SODIUM CHLORIDE 1000 ML: 0.9 INJECTION, SOLUTION INTRAVENOUS at 21:55

## 2020-07-03 RX ADMIN — Medication 1 TABLET: at 23:18

## 2020-07-03 RX ADMIN — GABAPENTIN 100 MG: 100 CAPSULE ORAL at 23:17

## 2020-07-03 RX ADMIN — SODIUM CHLORIDE, PRESERVATIVE FREE 2 ML: 5 INJECTION INTRAVENOUS at 22:01

## 2020-07-03 RX ADMIN — OXYCODONE HYDROCHLORIDE 2.5 MG: 5 TABLET ORAL at 23:17

## 2020-07-03 RX ADMIN — SODIUM CHLORIDE: 0.9 INJECTION, SOLUTION INTRAVENOUS at 23:16

## 2020-07-03 ASSESSMENT — PAIN SCALES - BEHAVIORAL PAIN SCALE (BPS)
BPS_SCORE: 8
BPS_SCORE: 7
BPS_SCORE: 7

## 2020-07-03 ASSESSMENT — PAIN SCALES - WONG BAKER: WONGBAKER_NUMERICALRESPONSE: 0

## 2020-07-04 ENCOUNTER — APPOINTMENT (OUTPATIENT)
Dept: NEUROLOGY | Age: 30
DRG: 720 | End: 2020-07-04
Attending: INTERNAL MEDICINE

## 2020-07-04 LAB
ANION GAP SERPL CALC-SCNC: 7 MMOL/L (ref 10–20)
BASOPHILS # BLD: 0 K/MCL (ref 0–0.3)
BASOPHILS NFR BLD: 0 %
BUN SERPL-MCNC: 25 MG/DL (ref 6–20)
BUN/CREAT SERPL: 96 (ref 7–25)
CALCIUM SERPL-MCNC: 8.2 MG/DL (ref 8.4–10.2)
CHLORIDE SERPL-SCNC: 104 MMOL/L (ref 98–107)
CO2 SERPL-SCNC: 31 MMOL/L (ref 21–32)
CREAT SERPL-MCNC: 0.26 MG/DL (ref 0.51–0.95)
DIFFERENTIAL METHOD BLD: ABNORMAL
EOSINOPHIL # BLD: 0 K/MCL (ref 0.1–0.5)
EOSINOPHIL NFR BLD: 1 %
ERYTHROCYTE [DISTWIDTH] IN BLOOD: 11.7 % (ref 11–15)
FERRITIN SERPL-MCNC: 1216 NG/ML (ref 8–252)
FOLATE SERPL-MCNC: 21.4 NG/ML
GLUCOSE BLDC GLUCOMTR-MCNC: 112 MG/DL (ref 70–99)
GLUCOSE BLDC GLUCOMTR-MCNC: 93 MG/DL (ref 70–99)
GLUCOSE SERPL-MCNC: 116 MG/DL (ref 65–99)
HCT VFR BLD CALC: 22.8 % (ref 36–46.5)
HGB BLD-MCNC: 7.2 G/DL (ref 12–15.5)
IMM GRANULOCYTES # BLD AUTO: 0 K/MCL (ref 0–0.2)
IMM GRANULOCYTES NFR BLD: 0 %
IRON SATN MFR SERPL: 40 % (ref 15–45)
IRON SERPL-MCNC: 59 MCG/DL (ref 50–170)
LYMPHOCYTES # BLD: 0.9 K/MCL (ref 1–4.8)
LYMPHOCYTES NFR BLD: 14 %
MAGNESIUM SERPL-MCNC: 1.9 MG/DL (ref 1.7–2.4)
MCH RBC QN AUTO: 33.6 PG (ref 26–34)
MCHC RBC AUTO-ENTMCNC: 31.6 G/DL (ref 32–36.5)
MCV RBC AUTO: 106.5 FL (ref 78–100)
MONOCYTES # BLD: 0.5 K/MCL (ref 0.3–0.9)
MONOCYTES NFR BLD: 8 %
NEUTROPHILS # BLD: 4.9 K/MCL (ref 1.8–7.7)
NEUTROPHILS NFR BLD: 77 %
NRBC BLD MANUAL-RTO: 0 /100 WBC
PHOSPHATE SERPL-MCNC: 2.5 MG/DL (ref 2.4–4.7)
PLATELET # BLD: 119 K/MCL (ref 140–450)
POTASSIUM SERPL-SCNC: 4 MMOL/L (ref 3.4–5.1)
PROCALCITONIN SERPL IA-MCNC: 5.09 NG/ML
RBC # BLD: 2.14 MIL/MCL (ref 4–5.2)
SODIUM SERPL-SCNC: 138 MMOL/L (ref 135–145)
TIBC SERPL-MCNC: 146 MCG/DL (ref 250–450)
VIT B12 SERPL-MCNC: 483 PG/ML (ref 211–911)
WBC # BLD: 6.4 K/MCL (ref 4.2–11)

## 2020-07-04 PROCEDURE — 85025 COMPLETE CBC W/AUTO DIFF WBC: CPT

## 2020-07-04 PROCEDURE — 95819 EEG AWAKE AND ASLEEP: CPT

## 2020-07-04 PROCEDURE — 10004651 HB RX, NO CHARGE ITEM: Performed by: INTERNAL MEDICINE

## 2020-07-04 PROCEDURE — 84145 PROCALCITONIN (PCT): CPT

## 2020-07-04 PROCEDURE — 82728 ASSAY OF FERRITIN: CPT

## 2020-07-04 PROCEDURE — 94003 VENT MGMT INPAT SUBQ DAY: CPT

## 2020-07-04 PROCEDURE — 10002807 HB RX 258: Performed by: INTERNAL MEDICINE

## 2020-07-04 PROCEDURE — 10002800 HB RX 250 W HCPCS: Performed by: INTERNAL MEDICINE

## 2020-07-04 PROCEDURE — 83540 ASSAY OF IRON: CPT

## 2020-07-04 PROCEDURE — 10000002 HB ROOM CHARGE MED SURG

## 2020-07-04 PROCEDURE — 84466 ASSAY OF TRANSFERRIN: CPT

## 2020-07-04 PROCEDURE — 80048 BASIC METABOLIC PNL TOTAL CA: CPT

## 2020-07-04 PROCEDURE — 10002803 HB RX 637: Performed by: NURSE PRACTITIONER

## 2020-07-04 PROCEDURE — 83735 ASSAY OF MAGNESIUM: CPT

## 2020-07-04 PROCEDURE — 84100 ASSAY OF PHOSPHORUS: CPT

## 2020-07-04 PROCEDURE — 95816 EEG AWAKE AND DROWSY: CPT | Performed by: SPECIALIST

## 2020-07-04 PROCEDURE — 82746 ASSAY OF FOLIC ACID SERUM: CPT

## 2020-07-04 PROCEDURE — 10002801 HB RX 250 W/O HCPCS: Performed by: NURSE PRACTITIONER

## 2020-07-04 RX ORDER — LORAZEPAM 2 MG/ML
1 INJECTION INTRAMUSCULAR ONCE
Status: COMPLETED | OUTPATIENT
Start: 2020-07-04 | End: 2020-07-04

## 2020-07-04 RX ADMIN — CYANOCOBALAMIN TAB 250 MCG 250 MCG: 250 TAB at 09:23

## 2020-07-04 RX ADMIN — LORAZEPAM 1 MG: 2 INJECTION INTRAMUSCULAR; INTRAVENOUS at 01:42

## 2020-07-04 RX ADMIN — CEFEPIME HYDROCHLORIDE 1 G: 1 INJECTION, POWDER, FOR SOLUTION INTRAMUSCULAR; INTRAVENOUS at 06:05

## 2020-07-04 RX ADMIN — RIFAXIMIN 200 MG: 200 TABLET ORAL at 14:56

## 2020-07-04 RX ADMIN — LEVETIRACETAM 500 MG: 100 SOLUTION ORAL at 21:39

## 2020-07-04 RX ADMIN — BACLOFEN 15 MG: 10 TABLET ORAL at 14:56

## 2020-07-04 RX ADMIN — RIFAXIMIN 200 MG: 200 TABLET ORAL at 21:38

## 2020-07-04 RX ADMIN — SODIUM CHLORIDE, PRESERVATIVE FREE 2 ML: 5 INJECTION INTRAVENOUS at 21:40

## 2020-07-04 RX ADMIN — LACOSAMIDE 200 MG: 10 SOLUTION ORAL at 11:23

## 2020-07-04 RX ADMIN — GABAPENTIN 100 MG: 100 CAPSULE ORAL at 20:34

## 2020-07-04 RX ADMIN — BACLOFEN 15 MG: 10 TABLET ORAL at 21:38

## 2020-07-04 RX ADMIN — Medication 1 TABLET: at 21:39

## 2020-07-04 RX ADMIN — PANTOPRAZOLE 40 MG: 40 TABLET, DELAYED RELEASE ORAL at 09:22

## 2020-07-04 RX ADMIN — Medication 220 MG: at 09:22

## 2020-07-04 RX ADMIN — CEFEPIME HYDROCHLORIDE 1 G: 1 INJECTION, POWDER, FOR SOLUTION INTRAMUSCULAR; INTRAVENOUS at 21:36

## 2020-07-04 RX ADMIN — LEVETIRACETAM 500 MG: 100 SOLUTION ORAL at 09:22

## 2020-07-04 RX ADMIN — METOPROLOL TARTRATE 12.5 MG: 25 TABLET, FILM COATED ORAL at 09:22

## 2020-07-04 RX ADMIN — Medication 1 TABLET: at 09:22

## 2020-07-04 RX ADMIN — BACLOFEN 15 MG: 10 TABLET ORAL at 09:22

## 2020-07-04 RX ADMIN — RIFAXIMIN 200 MG: 200 TABLET ORAL at 09:22

## 2020-07-04 RX ADMIN — PANCRELIPASE 1 CAPSULE: 30000; 6000; 19000 CAPSULE, DELAYED RELEASE PELLETS ORAL at 18:00

## 2020-07-04 RX ADMIN — CEFEPIME HYDROCHLORIDE 1 G: 1 INJECTION, POWDER, FOR SOLUTION INTRAMUSCULAR; INTRAVENOUS at 14:57

## 2020-07-04 RX ADMIN — PANCRELIPASE 1 CAPSULE: 30000; 6000; 19000 CAPSULE, DELAYED RELEASE PELLETS ORAL at 11:26

## 2020-07-04 RX ADMIN — MINERAL SUPPLEMENT IRON 300 MG / 5 ML STRENGTH LIQUID 100 PER BOX UNFLAVORED 300 MG: at 09:22

## 2020-07-04 RX ADMIN — GABAPENTIN 100 MG: 100 CAPSULE ORAL at 09:22

## 2020-07-04 RX ADMIN — LACOSAMIDE 200 MG: 10 SOLUTION ORAL at 02:07

## 2020-07-04 RX ADMIN — PANCRELIPASE 1 CAPSULE: 30000; 6000; 19000 CAPSULE, DELAYED RELEASE PELLETS ORAL at 09:22

## 2020-07-04 RX ADMIN — OXYCODONE HYDROCHLORIDE AND ACETAMINOPHEN 500 MG: 500 TABLET ORAL at 09:23

## 2020-07-04 RX ADMIN — LACOSAMIDE 200 MG: 10 SOLUTION ORAL at 21:36

## 2020-07-04 RX ADMIN — METOPROLOL TARTRATE 12.5 MG: 25 TABLET, FILM COATED ORAL at 21:38

## 2020-07-04 RX ADMIN — CEFEPIME HYDROCHLORIDE 1 G: 1 INJECTION, POWDER, FOR SOLUTION INTRAMUSCULAR; INTRAVENOUS at 01:17

## 2020-07-04 RX ADMIN — SODIUM CHLORIDE, PRESERVATIVE FREE 2 ML: 5 INJECTION INTRAVENOUS at 09:21

## 2020-07-04 RX ADMIN — FOLIC ACID 0.5 MG: 1 TABLET ORAL at 09:23

## 2020-07-04 ASSESSMENT — PAIN SCALES - BEHAVIORAL PAIN SCALE (BPS)
BPS_SCORE: 6
BPS_SCORE: 6
BPS_SCORE: 4
BPS_SCORE: 6

## 2020-07-04 ASSESSMENT — PAIN SCALES - WONG BAKER
WONGBAKER_NUMERICALRESPONSE: 0

## 2020-07-05 LAB
ALBUMIN SERPL-MCNC: 1.7 G/DL (ref 3.6–5.1)
ALBUMIN/GLOB SERPL: 0.3 {RATIO} (ref 1–2.4)
ALP SERPL-CCNC: 168 UNITS/L (ref 45–117)
ALT SERPL-CCNC: 41 UNITS/L
ANION GAP SERPL CALC-SCNC: 5 MMOL/L (ref 10–20)
AST SERPL-CCNC: 24 UNITS/L
BACTERIA UR CULT: ABNORMAL
BASOPHILS # BLD: 0 K/MCL (ref 0–0.3)
BASOPHILS NFR BLD: 0 %
BILIRUB SERPL-MCNC: 0.2 MG/DL (ref 0.2–1)
BUN SERPL-MCNC: 12 MG/DL (ref 6–20)
BUN/CREAT SERPL: 46 (ref 7–25)
CALCIUM SERPL-MCNC: 7.5 MG/DL (ref 8.4–10.2)
CHLORIDE SERPL-SCNC: 104 MMOL/L (ref 98–107)
CO2 SERPL-SCNC: 31 MMOL/L (ref 21–32)
CREAT SERPL-MCNC: 0.26 MG/DL (ref 0.51–0.95)
DIFFERENTIAL METHOD BLD: ABNORMAL
EOSINOPHIL # BLD: 0.1 K/MCL (ref 0.1–0.5)
EOSINOPHIL NFR BLD: 3 %
ERYTHROCYTE [DISTWIDTH] IN BLOOD: 11.9 % (ref 11–15)
GLOBULIN SER-MCNC: 5.1 G/DL (ref 2–4)
GLUCOSE SERPL-MCNC: 144 MG/DL (ref 65–99)
HCT VFR BLD CALC: 24.2 % (ref 36–46.5)
HGB BLD-MCNC: 7.7 G/DL (ref 12–15.5)
IMM GRANULOCYTES # BLD AUTO: 0 K/MCL (ref 0–0.2)
IMM GRANULOCYTES NFR BLD: 1 %
LYMPHOCYTES # BLD: 0.9 K/MCL (ref 1–4.8)
LYMPHOCYTES NFR BLD: 20 %
MCH RBC QN AUTO: 34.2 PG (ref 26–34)
MCHC RBC AUTO-ENTMCNC: 31.8 G/DL (ref 32–36.5)
MCV RBC AUTO: 107.6 FL (ref 78–100)
MONOCYTES # BLD: 0.5 K/MCL (ref 0.3–0.9)
MONOCYTES NFR BLD: 10 %
NEUTROPHILS # BLD: 2.9 K/MCL (ref 1.8–7.7)
NEUTROPHILS NFR BLD: 66 %
NRBC BLD MANUAL-RTO: 0 /100 WBC
ORGANISM: ABNORMAL
PLATELET # BLD: 112 K/MCL (ref 140–450)
POTASSIUM SERPL-SCNC: 3.7 MMOL/L (ref 3.4–5.1)
PROT SERPL-MCNC: 6.8 G/DL (ref 6.4–8.2)
RBC # BLD: 2.25 MIL/MCL (ref 4–5.2)
REPORT STATUS (RPT): ABNORMAL
SODIUM SERPL-SCNC: 136 MMOL/L (ref 135–145)
SPECIMEN SOURCE: ABNORMAL
TRANSFERRIN SERPL-MCNC: 107 MG/DL (ref 202–336)
WBC # BLD: 4.4 K/MCL (ref 4.2–11)

## 2020-07-05 PROCEDURE — 94003 VENT MGMT INPAT SUBQ DAY: CPT

## 2020-07-05 PROCEDURE — 10004651 HB RX, NO CHARGE ITEM: Performed by: INTERNAL MEDICINE

## 2020-07-05 PROCEDURE — 80053 COMPREHEN METABOLIC PANEL: CPT

## 2020-07-05 PROCEDURE — 10000002 HB ROOM CHARGE MED SURG

## 2020-07-05 PROCEDURE — 10002803 HB RX 637: Performed by: NURSE PRACTITIONER

## 2020-07-05 PROCEDURE — 10002800 HB RX 250 W HCPCS: Performed by: INTERNAL MEDICINE

## 2020-07-05 PROCEDURE — 85025 COMPLETE CBC W/AUTO DIFF WBC: CPT

## 2020-07-05 PROCEDURE — 10002801 HB RX 250 W/O HCPCS: Performed by: NURSE PRACTITIONER

## 2020-07-05 PROCEDURE — 10002807 HB RX 258: Performed by: INTERNAL MEDICINE

## 2020-07-05 RX ADMIN — CEFEPIME HYDROCHLORIDE 1 G: 1 INJECTION, POWDER, FOR SOLUTION INTRAMUSCULAR; INTRAVENOUS at 13:41

## 2020-07-05 RX ADMIN — RIFAXIMIN 200 MG: 200 TABLET ORAL at 13:41

## 2020-07-05 RX ADMIN — RIFAXIMIN 200 MG: 200 TABLET ORAL at 20:18

## 2020-07-05 RX ADMIN — PANCRELIPASE 1 CAPSULE: 30000; 6000; 19000 CAPSULE, DELAYED RELEASE PELLETS ORAL at 13:45

## 2020-07-05 RX ADMIN — Medication 220 MG: at 09:03

## 2020-07-05 RX ADMIN — LACOSAMIDE 200 MG: 10 SOLUTION ORAL at 10:31

## 2020-07-05 RX ADMIN — Medication 1 TABLET: at 20:18

## 2020-07-05 RX ADMIN — RIFAXIMIN 200 MG: 200 TABLET ORAL at 09:04

## 2020-07-05 RX ADMIN — OXYCODONE HYDROCHLORIDE AND ACETAMINOPHEN 500 MG: 500 TABLET ORAL at 09:04

## 2020-07-05 RX ADMIN — METOPROLOL TARTRATE 12.5 MG: 25 TABLET, FILM COATED ORAL at 20:18

## 2020-07-05 RX ADMIN — MINERAL SUPPLEMENT IRON 300 MG / 5 ML STRENGTH LIQUID 100 PER BOX UNFLAVORED 300 MG: at 09:03

## 2020-07-05 RX ADMIN — LEVETIRACETAM 500 MG: 100 SOLUTION ORAL at 20:18

## 2020-07-05 RX ADMIN — SODIUM CHLORIDE, PRESERVATIVE FREE 2 ML: 5 INJECTION INTRAVENOUS at 20:21

## 2020-07-05 RX ADMIN — LEVETIRACETAM 500 MG: 100 SOLUTION ORAL at 09:03

## 2020-07-05 RX ADMIN — PANCRELIPASE 1 CAPSULE: 30000; 6000; 19000 CAPSULE, DELAYED RELEASE PELLETS ORAL at 09:03

## 2020-07-05 RX ADMIN — CEFEPIME HYDROCHLORIDE 1 G: 1 INJECTION, POWDER, FOR SOLUTION INTRAMUSCULAR; INTRAVENOUS at 05:38

## 2020-07-05 RX ADMIN — PANTOPRAZOLE 40 MG: 40 TABLET, DELAYED RELEASE ORAL at 09:22

## 2020-07-05 RX ADMIN — CEFEPIME HYDROCHLORIDE 1 G: 1 INJECTION, POWDER, FOR SOLUTION INTRAMUSCULAR; INTRAVENOUS at 21:39

## 2020-07-05 RX ADMIN — ENOXAPARIN SODIUM 30 MG: 30 INJECTION SUBCUTANEOUS at 10:29

## 2020-07-05 RX ADMIN — BACLOFEN 15 MG: 10 TABLET ORAL at 20:18

## 2020-07-05 RX ADMIN — SODIUM CHLORIDE, PRESERVATIVE FREE 2 ML: 5 INJECTION INTRAVENOUS at 09:04

## 2020-07-05 RX ADMIN — OXYCODONE HYDROCHLORIDE 2.5 MG: 5 TABLET ORAL at 15:56

## 2020-07-05 RX ADMIN — FOLIC ACID 0.5 MG: 1 TABLET ORAL at 09:04

## 2020-07-05 RX ADMIN — METOPROLOL TARTRATE 12.5 MG: 25 TABLET, FILM COATED ORAL at 09:04

## 2020-07-05 RX ADMIN — GABAPENTIN 100 MG: 100 CAPSULE ORAL at 09:04

## 2020-07-05 RX ADMIN — BACLOFEN 15 MG: 10 TABLET ORAL at 09:04

## 2020-07-05 RX ADMIN — CYANOCOBALAMIN TAB 250 MCG 250 MCG: 250 TAB at 09:03

## 2020-07-05 RX ADMIN — GABAPENTIN 100 MG: 100 CAPSULE ORAL at 20:18

## 2020-07-05 RX ADMIN — LACOSAMIDE 200 MG: 10 SOLUTION ORAL at 20:20

## 2020-07-05 RX ADMIN — BACLOFEN 15 MG: 10 TABLET ORAL at 13:41

## 2020-07-05 RX ADMIN — Medication 1 TABLET: at 09:04

## 2020-07-05 RX ADMIN — PANCRELIPASE 1 CAPSULE: 30000; 6000; 19000 CAPSULE, DELAYED RELEASE PELLETS ORAL at 17:28

## 2020-07-05 ASSESSMENT — PAIN SCALES - BEHAVIORAL PAIN SCALE (BPS)
BPS_SCORE: 4
BPS_SCORE: 5
BPS_SCORE: 4
BPS_SCORE: 6

## 2020-07-06 LAB
BASOPHILS # BLD: 0 K/MCL (ref 0–0.3)
BASOPHILS NFR BLD: 0 %
DIFFERENTIAL METHOD BLD: ABNORMAL
EOSINOPHIL # BLD: 0.2 K/MCL (ref 0.1–0.5)
EOSINOPHIL NFR BLD: 5 %
ERYTHROCYTE [DISTWIDTH] IN BLOOD: 11.8 % (ref 11–15)
GLUCOSE BLDC GLUCOMTR-MCNC: 133 MG/DL (ref 70–99)
HCT VFR BLD CALC: 23.2 % (ref 36–46.5)
HGB BLD-MCNC: 7.6 G/DL (ref 12–15.5)
IMM GRANULOCYTES # BLD AUTO: 0 K/MCL (ref 0–0.2)
IMM GRANULOCYTES NFR BLD: 0 %
LYMPHOCYTES # BLD: 1 K/MCL (ref 1–4.8)
LYMPHOCYTES NFR BLD: 21 %
MCH RBC QN AUTO: 34.7 PG (ref 26–34)
MCHC RBC AUTO-ENTMCNC: 32.8 G/DL (ref 32–36.5)
MCV RBC AUTO: 105.9 FL (ref 78–100)
MONOCYTES # BLD: 0.5 K/MCL (ref 0.3–0.9)
MONOCYTES NFR BLD: 10 %
NEUTROPHILS # BLD: 3 K/MCL (ref 1.8–7.7)
NEUTROPHILS NFR BLD: 64 %
NRBC BLD MANUAL-RTO: 0 /100 WBC
PLATELET # BLD: 104 K/MCL (ref 140–450)
RBC # BLD: 2.19 MIL/MCL (ref 4–5.2)
WBC # BLD: 4.8 K/MCL (ref 4.2–11)

## 2020-07-06 PROCEDURE — 85025 COMPLETE CBC W/AUTO DIFF WBC: CPT

## 2020-07-06 PROCEDURE — 13003348 HB PATIENT LVL 3 SUPPLIES IP WND

## 2020-07-06 PROCEDURE — 10004651 HB RX, NO CHARGE ITEM: Performed by: INTERNAL MEDICINE

## 2020-07-06 PROCEDURE — 10002800 HB RX 250 W HCPCS: Performed by: INTERNAL MEDICINE

## 2020-07-06 PROCEDURE — 10000002 HB ROOM CHARGE MED SURG

## 2020-07-06 PROCEDURE — 10002801 HB RX 250 W/O HCPCS: Performed by: NURSE PRACTITIONER

## 2020-07-06 PROCEDURE — 10002807 HB RX 258: Performed by: INTERNAL MEDICINE

## 2020-07-06 PROCEDURE — 10002803 HB RX 637: Performed by: NURSE PRACTITIONER

## 2020-07-06 PROCEDURE — 10002807 HB RX 258

## 2020-07-06 PROCEDURE — 94003 VENT MGMT INPAT SUBQ DAY: CPT

## 2020-07-06 PROCEDURE — 10002803 HB RX 637: Performed by: INTERNAL MEDICINE

## 2020-07-06 RX ORDER — SODIUM CHLORIDE 9 MG/ML
INJECTION, SOLUTION INTRAVENOUS
Status: COMPLETED
Start: 2020-07-06 | End: 2020-07-06

## 2020-07-06 RX ADMIN — RIFAXIMIN 200 MG: 200 TABLET ORAL at 08:19

## 2020-07-06 RX ADMIN — OXYCODONE HYDROCHLORIDE 2.5 MG: 5 TABLET ORAL at 14:36

## 2020-07-06 RX ADMIN — RIFAXIMIN 200 MG: 200 TABLET ORAL at 14:32

## 2020-07-06 RX ADMIN — CEFEPIME HYDROCHLORIDE 1 G: 1 INJECTION, POWDER, FOR SOLUTION INTRAMUSCULAR; INTRAVENOUS at 14:30

## 2020-07-06 RX ADMIN — METOPROLOL TARTRATE 12.5 MG: 25 TABLET, FILM COATED ORAL at 21:39

## 2020-07-06 RX ADMIN — GABAPENTIN 100 MG: 100 CAPSULE ORAL at 21:38

## 2020-07-06 RX ADMIN — LACOSAMIDE 200 MG: 10 SOLUTION ORAL at 08:19

## 2020-07-06 RX ADMIN — METOPROLOL TARTRATE 12.5 MG: 25 TABLET, FILM COATED ORAL at 08:19

## 2020-07-06 RX ADMIN — SODIUM CHLORIDE, PRESERVATIVE FREE 2 ML: 5 INJECTION INTRAVENOUS at 21:40

## 2020-07-06 RX ADMIN — OXYCODONE HYDROCHLORIDE 2.5 MG: 5 TABLET ORAL at 20:01

## 2020-07-06 RX ADMIN — GABAPENTIN 100 MG: 100 CAPSULE ORAL at 08:20

## 2020-07-06 RX ADMIN — MINERAL SUPPLEMENT IRON 300 MG / 5 ML STRENGTH LIQUID 100 PER BOX UNFLAVORED 300 MG: at 08:19

## 2020-07-06 RX ADMIN — CYANOCOBALAMIN TAB 250 MCG 250 MCG: 250 TAB at 08:19

## 2020-07-06 RX ADMIN — PANCRELIPASE 1 CAPSULE: 30000; 6000; 19000 CAPSULE, DELAYED RELEASE PELLETS ORAL at 08:19

## 2020-07-06 RX ADMIN — Medication 220 MG: at 08:20

## 2020-07-06 RX ADMIN — PANTOPRAZOLE 40 MG: 40 TABLET, DELAYED RELEASE ORAL at 08:19

## 2020-07-06 RX ADMIN — OXYCODONE HYDROCHLORIDE AND ACETAMINOPHEN 500 MG: 500 TABLET ORAL at 08:19

## 2020-07-06 RX ADMIN — Medication 1 TABLET: at 21:38

## 2020-07-06 RX ADMIN — BACLOFEN 15 MG: 10 TABLET ORAL at 21:38

## 2020-07-06 RX ADMIN — LACOSAMIDE 200 MG: 10 SOLUTION ORAL at 22:02

## 2020-07-06 RX ADMIN — ACETAMINOPHEN 500 MG: 650 SOLUTION ORAL at 18:06

## 2020-07-06 RX ADMIN — BACLOFEN 15 MG: 10 TABLET ORAL at 08:20

## 2020-07-06 RX ADMIN — BACLOFEN 15 MG: 10 TABLET ORAL at 14:32

## 2020-07-06 RX ADMIN — LEVETIRACETAM 500 MG: 100 SOLUTION ORAL at 08:19

## 2020-07-06 RX ADMIN — FOLIC ACID 0.5 MG: 1 TABLET ORAL at 08:20

## 2020-07-06 RX ADMIN — PANCRELIPASE 1 CAPSULE: 30000; 6000; 19000 CAPSULE, DELAYED RELEASE PELLETS ORAL at 16:40

## 2020-07-06 RX ADMIN — RIFAXIMIN 200 MG: 200 TABLET ORAL at 21:40

## 2020-07-06 RX ADMIN — CEFEPIME HYDROCHLORIDE 1 G: 1 INJECTION, POWDER, FOR SOLUTION INTRAMUSCULAR; INTRAVENOUS at 22:03

## 2020-07-06 RX ADMIN — ENOXAPARIN SODIUM 30 MG: 30 INJECTION SUBCUTANEOUS at 08:20

## 2020-07-06 RX ADMIN — SODIUM CHLORIDE, PRESERVATIVE FREE 2 ML: 5 INJECTION INTRAVENOUS at 08:19

## 2020-07-06 RX ADMIN — ACETAMINOPHEN 500 MG: 650 SOLUTION ORAL at 01:02

## 2020-07-06 RX ADMIN — Medication 1 TABLET: at 08:19

## 2020-07-06 RX ADMIN — LEVETIRACETAM 500 MG: 100 SOLUTION ORAL at 21:38

## 2020-07-06 RX ADMIN — PANCRELIPASE 1 CAPSULE: 30000; 6000; 19000 CAPSULE, DELAYED RELEASE PELLETS ORAL at 12:09

## 2020-07-06 RX ADMIN — SODIUM CHLORIDE: 0.9 INJECTION, SOLUTION INTRAVENOUS at 20:10

## 2020-07-06 RX ADMIN — SODIUM CHLORIDE 25 ML: 9 INJECTION, SOLUTION INTRAVENOUS at 14:53

## 2020-07-06 RX ADMIN — CEFEPIME HYDROCHLORIDE 1 G: 1 INJECTION, POWDER, FOR SOLUTION INTRAMUSCULAR; INTRAVENOUS at 05:25

## 2020-07-06 ASSESSMENT — ACTIVITIES OF DAILY LIVING (ADL)
TOILETING: DEPENDENT
TRANSFERRING: DEPENDENT
ADL_BEFORE_ADMISSION: NEEDS/REQUIRES ASSISTANCE
BATHING: DEPENDENT
DRESSING YOURSELF: DEPENDENT
MOBILITY_ASSIST_DEVICES: TRANSFER MAT/PAD/SHEET;TOTAL LIFT
ADL_SHORT_OF_BREATH: NO
CHRONIC_PAIN_PRESENT: UNABLE TO ASSESS
FEEDING YOURSELF: DEPENDENT
RECENT_DECLINE_ADL: NO
ADL_SCORE: 0
CONTINENCE: DEPENDENT

## 2020-07-06 ASSESSMENT — COGNITIVE AND FUNCTIONAL STATUS - GENERAL
ARE YOU DEAF OR DO YOU HAVE SERIOUS DIFFICULTY  HEARING: NO
DO YOU HAVE DIFFICULTY DRESSING OR BATHING: YES
DO YOU HAVE DIFFICULTY DRESSING OR BATHING: YES
BECAUSE OF A PHYSICAL, MENTAL, OR EMOTIONAL CONDITION, DO YOU HAVE DIFFICULTY DOING ERRANDS ALONE: YES
ARE YOU BLIND OR DO YOU HAVE SERIOUS DIFFICULTY SEEING, EVEN WHEN WEARING GLASSES: NO
BECAUSE OF A PHYSICAL, MENTAL, OR EMOTIONAL CONDITION, DO YOU HAVE SERIOUS DIFFICULTY CONCENTRATING, REMEMBERING OR MAKING DECISIONS: YES
BECAUSE OF A PHYSICAL, MENTAL, OR EMOTIONAL CONDITION, DO YOU HAVE DIFFICULTY DOING ERRANDS ALONE: YES
DO YOU HAVE SERIOUS DIFFICULTY WALKING OR CLIMBING STAIRS: YES
BECAUSE OF A PHYSICAL, MENTAL, OR EMOTIONAL CONDITION, DO YOU HAVE SERIOUS DIFFICULTY CONCENTRATING, REMEMBERING OR MAKING DECISIONS: YES
DO YOU HAVE SERIOUS DIFFICULTY WALKING OR CLIMBING STAIRS: YES

## 2020-07-06 ASSESSMENT — PAIN SCALES - BEHAVIORAL PAIN SCALE (BPS)
BPS_SCORE: 5
BPS_SCORE: 7
BPS_SCORE: 6
BPS_SCORE: 6
BPS_SCORE: 5
BPS_SCORE: 5
BPS_SCORE: 7

## 2020-07-07 VITALS
SYSTOLIC BLOOD PRESSURE: 99 MMHG | DIASTOLIC BLOOD PRESSURE: 72 MMHG | HEIGHT: 55 IN | RESPIRATION RATE: 12 BRPM | OXYGEN SATURATION: 100 % | TEMPERATURE: 96.1 F | BODY MASS INDEX: 12.45 KG/M2 | HEART RATE: 66 BPM | WEIGHT: 53.79 LBS

## 2020-07-07 LAB
ALBUMIN SERPL-MCNC: 1.6 G/DL (ref 3.6–5.1)
ALBUMIN/GLOB SERPL: 0.3 {RATIO} (ref 1–2.4)
ALP SERPL-CCNC: 162 UNITS/L (ref 45–117)
ALT SERPL-CCNC: 78 UNITS/L
ANION GAP SERPL CALC-SCNC: 7 MMOL/L (ref 10–20)
AST SERPL-CCNC: 54 UNITS/L
BILIRUB SERPL-MCNC: 0.3 MG/DL (ref 0.2–1)
BUN SERPL-MCNC: 9 MG/DL (ref 6–20)
BUN/CREAT SERPL: 44 (ref 7–25)
CALCIUM SERPL-MCNC: 7.1 MG/DL (ref 8.4–10.2)
CHLORIDE SERPL-SCNC: 106 MMOL/L (ref 98–107)
CO2 SERPL-SCNC: 26 MMOL/L (ref 21–32)
CREAT SERPL-MCNC: 0.2 MG/DL (ref 0.51–0.95)
GLOBULIN SER-MCNC: 4.9 G/DL (ref 2–4)
GLUCOSE BLDC GLUCOMTR-MCNC: 121 MG/DL (ref 70–99)
GLUCOSE SERPL-MCNC: 106 MG/DL (ref 65–99)
MAGNESIUM SERPL-MCNC: 1.9 MG/DL (ref 1.7–2.4)
POTASSIUM SERPL-SCNC: 4.2 MMOL/L (ref 3.4–5.1)
PROT SERPL-MCNC: 6.5 G/DL (ref 6.4–8.2)
SODIUM SERPL-SCNC: 135 MMOL/L (ref 135–145)

## 2020-07-07 PROCEDURE — 10002800 HB RX 250 W HCPCS: Performed by: INTERNAL MEDICINE

## 2020-07-07 PROCEDURE — 10002801 HB RX 250 W/O HCPCS: Performed by: NURSE PRACTITIONER

## 2020-07-07 PROCEDURE — 10004651 HB RX, NO CHARGE ITEM: Performed by: INTERNAL MEDICINE

## 2020-07-07 PROCEDURE — 80053 COMPREHEN METABOLIC PANEL: CPT

## 2020-07-07 PROCEDURE — 10002803 HB RX 637: Performed by: NURSE PRACTITIONER

## 2020-07-07 PROCEDURE — 10002807 HB RX 258: Performed by: INTERNAL MEDICINE

## 2020-07-07 PROCEDURE — 83735 ASSAY OF MAGNESIUM: CPT

## 2020-07-07 PROCEDURE — 94003 VENT MGMT INPAT SUBQ DAY: CPT

## 2020-07-07 RX ORDER — ALBUTEROL SULFATE 90 UG/1
2 AEROSOL, METERED RESPIRATORY (INHALATION)
Qty: 1 INHALER | Refills: 12 | Status: SHIPPED | OUTPATIENT
Start: 2020-07-07 | End: 2020-07-26

## 2020-07-07 RX ORDER — L. ACIDOPHILUS/PECTIN, CITRUS 25MM-100MG
1 TABLET ORAL 2 TIMES DAILY
Refills: 0 | Status: SHIPPED | COMMUNITY
Start: 2020-07-07 | End: 2020-07-26 | Stop reason: SDUPTHER

## 2020-07-07 RX ORDER — ENOXAPARIN SODIUM 100 MG/ML
30 INJECTION SUBCUTANEOUS DAILY
Status: SHIPPED | COMMUNITY
Start: 2020-07-08 | End: 2020-07-26

## 2020-07-07 RX ADMIN — OXYCODONE HYDROCHLORIDE 2.5 MG: 5 TABLET ORAL at 13:11

## 2020-07-07 RX ADMIN — RIFAXIMIN 200 MG: 200 TABLET ORAL at 08:30

## 2020-07-07 RX ADMIN — BACLOFEN 15 MG: 10 TABLET ORAL at 08:26

## 2020-07-07 RX ADMIN — PANCRELIPASE 1 CAPSULE: 30000; 6000; 19000 CAPSULE, DELAYED RELEASE PELLETS ORAL at 08:26

## 2020-07-07 RX ADMIN — LEVETIRACETAM 500 MG: 100 SOLUTION ORAL at 08:26

## 2020-07-07 RX ADMIN — Medication 1 TABLET: at 08:29

## 2020-07-07 RX ADMIN — LACOSAMIDE 200 MG: 10 SOLUTION ORAL at 09:55

## 2020-07-07 RX ADMIN — PANTOPRAZOLE 40 MG: 40 TABLET, DELAYED RELEASE ORAL at 08:26

## 2020-07-07 RX ADMIN — CYANOCOBALAMIN TAB 250 MCG 250 MCG: 250 TAB at 08:28

## 2020-07-07 RX ADMIN — Medication 220 MG: at 08:29

## 2020-07-07 RX ADMIN — CEFEPIME HYDROCHLORIDE 1 G: 1 INJECTION, POWDER, FOR SOLUTION INTRAMUSCULAR; INTRAVENOUS at 13:00

## 2020-07-07 RX ADMIN — PANCRELIPASE 1 CAPSULE: 30000; 6000; 19000 CAPSULE, DELAYED RELEASE PELLETS ORAL at 12:59

## 2020-07-07 RX ADMIN — METOPROLOL TARTRATE 12.5 MG: 25 TABLET, FILM COATED ORAL at 08:29

## 2020-07-07 RX ADMIN — ENOXAPARIN SODIUM 30 MG: 30 INJECTION SUBCUTANEOUS at 08:27

## 2020-07-07 RX ADMIN — GABAPENTIN 100 MG: 100 CAPSULE ORAL at 08:26

## 2020-07-07 RX ADMIN — BACLOFEN 15 MG: 10 TABLET ORAL at 13:00

## 2020-07-07 RX ADMIN — FOLIC ACID 0.5 MG: 1 TABLET ORAL at 08:28

## 2020-07-07 RX ADMIN — SODIUM CHLORIDE, PRESERVATIVE FREE 2 ML: 5 INJECTION INTRAVENOUS at 08:30

## 2020-07-07 RX ADMIN — OXYCODONE HYDROCHLORIDE AND ACETAMINOPHEN 500 MG: 500 TABLET ORAL at 08:26

## 2020-07-07 RX ADMIN — CEFEPIME HYDROCHLORIDE 1 G: 1 INJECTION, POWDER, FOR SOLUTION INTRAMUSCULAR; INTRAVENOUS at 05:45

## 2020-07-07 RX ADMIN — MINERAL SUPPLEMENT IRON 300 MG / 5 ML STRENGTH LIQUID 100 PER BOX UNFLAVORED 300 MG: at 08:28

## 2020-07-07 RX ADMIN — OXYCODONE HYDROCHLORIDE 2.5 MG: 5 TABLET ORAL at 07:11

## 2020-07-07 ASSESSMENT — PAIN SCALES - BEHAVIORAL PAIN SCALE (BPS)
BPS_SCORE: 7
BPS_SCORE: 7
BPS_SCORE: 6
BPS_SCORE: 6

## 2020-07-07 ASSESSMENT — PAIN SCALES - WONG BAKER: WONGBAKER_NUMERICALRESPONSE: 0

## 2020-07-26 ENCOUNTER — HOSPITAL ENCOUNTER (INPATIENT)
Age: 30
LOS: 2 days | Discharge: NURSING, REHAB OR LONG-TERM CARE | DRG: 133 | End: 2020-07-28
Attending: EMERGENCY MEDICINE | Admitting: INTERNAL MEDICINE

## 2020-07-26 ENCOUNTER — APPOINTMENT (OUTPATIENT)
Dept: GENERAL RADIOLOGY | Age: 30
DRG: 133 | End: 2020-07-26
Attending: EMERGENCY MEDICINE

## 2020-07-26 DIAGNOSIS — A41.9 SEPSIS, DUE TO UNSPECIFIED ORGANISM, UNSPECIFIED WHETHER ACUTE ORGAN DYSFUNCTION PRESENT (CMD): Primary | ICD-10-CM

## 2020-07-26 LAB
ALBUMIN SERPL-MCNC: 2.5 G/DL (ref 3.6–5.1)
ALP SERPL-CCNC: 293 UNITS/L (ref 45–117)
ALT SERPL-CCNC: 199 UNITS/L
ANION GAP SERPL CALC-SCNC: 8 MMOL/L (ref 10–20)
AST SERPL-CCNC: 140 UNITS/L
BASOPHILS # BLD: 0.1 K/MCL (ref 0–0.3)
BASOPHILS NFR BLD: 0 %
BILIRUB CONJ SERPL-MCNC: 0.1 MG/DL (ref 0–0.2)
BILIRUB SERPL-MCNC: 0.2 MG/DL (ref 0.2–1)
BUN SERPL-MCNC: 29 MG/DL (ref 6–20)
BUN/CREAT SERPL: 122 (ref 7–25)
CALCIUM SERPL-MCNC: 8.3 MG/DL (ref 8.4–10.2)
CHLORIDE SERPL-SCNC: 93 MMOL/L (ref 98–107)
CO2 SERPL-SCNC: 30 MMOL/L (ref 21–32)
CREAT SERPL-MCNC: 0.24 MG/DL (ref 0.51–0.95)
DIFFERENTIAL METHOD BLD: ABNORMAL
EOSINOPHIL # BLD: 0.1 K/MCL (ref 0.1–0.5)
EOSINOPHIL NFR BLD: 1 %
ERYTHROCYTE [DISTWIDTH] IN BLOOD: 12 % (ref 11–15)
GLUCOSE BLDC GLUCOMTR-MCNC: 80 MG/DL (ref 70–99)
GLUCOSE SERPL-MCNC: 268 MG/DL (ref 65–99)
HCT VFR BLD CALC: 30.7 % (ref 36–46.5)
HGB BLD-MCNC: 10.7 G/DL (ref 12–15.5)
IMM GRANULOCYTES # BLD AUTO: 0.1 K/MCL (ref 0–0.2)
IMM GRANULOCYTES NFR BLD: 0 %
LACTATE BLDV-SCNC: 1.1 MMOL/L (ref 0–2)
LYMPHOCYTES # BLD: 0.9 K/MCL (ref 1–4.8)
LYMPHOCYTES NFR BLD: 6 %
MCH RBC QN AUTO: 37.2 PG (ref 26–34)
MCHC RBC AUTO-ENTMCNC: 34.9 G/DL (ref 32–36.5)
MCV RBC AUTO: 106.6 FL (ref 78–100)
MONOCYTES # BLD: 0.5 K/MCL (ref 0.3–0.9)
MONOCYTES NFR BLD: 4 %
NEUTROPHILS # BLD: 12.4 K/MCL (ref 1.8–7.7)
NEUTROPHILS NFR BLD: 89 %
NRBC BLD MANUAL-RTO: 0 /100 WBC
PLATELET # BLD: 178 K/MCL (ref 140–450)
POTASSIUM SERPL-SCNC: 4.5 MMOL/L (ref 3.4–5.1)
PROT SERPL-MCNC: 9 G/DL (ref 6.4–8.2)
RBC # BLD: 2.88 MIL/MCL (ref 4–5.2)
SODIUM SERPL-SCNC: 127 MMOL/L (ref 135–145)
TROPONIN I SERPL HS-MCNC: <0.02 NG/ML
WBC # BLD: 14 K/MCL (ref 4.2–11)

## 2020-07-26 PROCEDURE — 10006031 HB ROOM CHARGE TELEMETRY

## 2020-07-26 PROCEDURE — 80048 BASIC METABOLIC PNL TOTAL CA: CPT

## 2020-07-26 PROCEDURE — 71045 X-RAY EXAM CHEST 1 VIEW: CPT

## 2020-07-26 PROCEDURE — 85025 COMPLETE CBC W/AUTO DIFF WBC: CPT

## 2020-07-26 PROCEDURE — 87040 BLOOD CULTURE FOR BACTERIA: CPT

## 2020-07-26 PROCEDURE — 10006027 HB SUPPLY 278

## 2020-07-26 PROCEDURE — 5A1945Z RESPIRATORY VENTILATION, 24-96 CONSECUTIVE HOURS: ICD-10-PCS | Performed by: INTERNAL MEDICINE

## 2020-07-26 PROCEDURE — 10002800 HB RX 250 W HCPCS: Performed by: EMERGENCY MEDICINE

## 2020-07-26 PROCEDURE — 96374 THER/PROPH/DIAG INJ IV PUSH: CPT

## 2020-07-26 PROCEDURE — 83605 ASSAY OF LACTIC ACID: CPT

## 2020-07-26 PROCEDURE — 10002807 HB RX 258: Performed by: EMERGENCY MEDICINE

## 2020-07-26 PROCEDURE — A7521 TRACH/LARYN TUBE CUFFED: HCPCS

## 2020-07-26 PROCEDURE — 99285 EMERGENCY DEPT VISIT HI MDM: CPT

## 2020-07-26 PROCEDURE — 99285 EMERGENCY DEPT VISIT HI MDM: CPT | Performed by: EMERGENCY MEDICINE

## 2020-07-26 PROCEDURE — 36415 COLL VENOUS BLD VENIPUNCTURE: CPT

## 2020-07-26 PROCEDURE — 87635 SARS-COV-2 COVID-19 AMP PRB: CPT

## 2020-07-26 PROCEDURE — 94002 VENT MGMT INPAT INIT DAY: CPT

## 2020-07-26 PROCEDURE — 84484 ASSAY OF TROPONIN QUANT: CPT

## 2020-07-26 PROCEDURE — 80076 HEPATIC FUNCTION PANEL: CPT

## 2020-07-26 PROCEDURE — 10003056 HB DISPOSABLE INSTRUMENT/SUPPLY 1

## 2020-07-26 PROCEDURE — 96365 THER/PROPH/DIAG IV INF INIT: CPT

## 2020-07-26 PROCEDURE — 93005 ELECTROCARDIOGRAM TRACING: CPT | Performed by: EMERGENCY MEDICINE

## 2020-07-26 RX ORDER — UREA 10 %
220 LOTION (ML) TOPICAL DAILY
Status: ON HOLD | COMMUNITY
End: 2020-11-11 | Stop reason: SDUPTHER

## 2020-07-26 RX ORDER — NICOTINE POLACRILEX 4 MG
15 LOZENGE BUCCAL PRN
Status: DISCONTINUED | OUTPATIENT
Start: 2020-07-26 | End: 2020-07-28 | Stop reason: HOSPADM

## 2020-07-26 RX ORDER — DEXTROSE MONOHYDRATE 50 MG/ML
INJECTION, SOLUTION INTRAVENOUS CONTINUOUS PRN
Status: DISCONTINUED | OUTPATIENT
Start: 2020-07-26 | End: 2020-07-28 | Stop reason: HOSPADM

## 2020-07-26 RX ORDER — DEXTROSE MONOHYDRATE 25 G/50ML
12.5 INJECTION, SOLUTION INTRAVENOUS PRN
Status: DISCONTINUED | OUTPATIENT
Start: 2020-07-26 | End: 2020-07-28 | Stop reason: HOSPADM

## 2020-07-26 RX ORDER — DEXTROSE MONOHYDRATE 25 G/50ML
25 INJECTION, SOLUTION INTRAVENOUS PRN
Status: DISCONTINUED | OUTPATIENT
Start: 2020-07-26 | End: 2020-07-28 | Stop reason: HOSPADM

## 2020-07-26 RX ORDER — 0.9 % SODIUM CHLORIDE 0.9 %
2 VIAL (ML) INJECTION EVERY 12 HOURS SCHEDULED
Status: DISCONTINUED | OUTPATIENT
Start: 2020-07-27 | End: 2020-07-28 | Stop reason: HOSPADM

## 2020-07-26 RX ORDER — SODIUM CHLORIDE 9 MG/ML
INJECTION, SOLUTION INTRAVENOUS CONTINUOUS
Status: DISCONTINUED | OUTPATIENT
Start: 2020-07-26 | End: 2020-07-28 | Stop reason: HOSPADM

## 2020-07-26 RX ORDER — NICOTINE POLACRILEX 4 MG
30 LOZENGE BUCCAL PRN
Status: DISCONTINUED | OUTPATIENT
Start: 2020-07-26 | End: 2020-07-28 | Stop reason: HOSPADM

## 2020-07-26 RX ADMIN — SODIUM CHLORIDE: 9 INJECTION, SOLUTION INTRAVENOUS at 17:20

## 2020-07-26 RX ADMIN — CEFEPIME HYDROCHLORIDE 1000 MG: 1 INJECTION, POWDER, FOR SOLUTION INTRAMUSCULAR; INTRAVENOUS at 18:55

## 2020-07-26 ASSESSMENT — PAIN SCALES - BEHAVIORAL PAIN SCALE (BPS): BPS_SCORE: 5

## 2020-07-27 LAB
ALBUMIN SERPL-MCNC: 2.2 G/DL (ref 3.6–5.1)
ALBUMIN/GLOB SERPL: 0.4 {RATIO} (ref 1–2.4)
ALP SERPL-CCNC: 229 UNITS/L (ref 45–117)
ALT SERPL-CCNC: 140 UNITS/L
ANION GAP SERPL CALC-SCNC: 7 MMOL/L (ref 10–20)
APPEARANCE UR: CLEAR
AST SERPL-CCNC: 66 UNITS/L
ATRIAL RATE (BPM): 115
BACTERIA #/AREA URNS HPF: ABNORMAL /HPF
BASOPHILS # BLD: 0 K/MCL (ref 0–0.3)
BASOPHILS NFR BLD: 1 %
BILIRUB SERPL-MCNC: 0.3 MG/DL (ref 0.2–1)
BILIRUB UR QL STRIP: NEGATIVE
BUN SERPL-MCNC: 23 MG/DL (ref 6–20)
BUN/CREAT SERPL: 105 (ref 7–25)
CALCIUM SERPL-MCNC: 8 MG/DL (ref 8.4–10.2)
CHLORIDE SERPL-SCNC: 103 MMOL/L (ref 98–107)
CO2 SERPL-SCNC: 29 MMOL/L (ref 21–32)
COLOR UR: YELLOW
CREAT SERPL-MCNC: 0.22 MG/DL (ref 0.51–0.95)
DIFFERENTIAL METHOD BLD: ABNORMAL
EOSINOPHIL # BLD: 0.2 K/MCL (ref 0.1–0.5)
EOSINOPHIL NFR BLD: 3 %
ERYTHROCYTE [DISTWIDTH] IN BLOOD: 12 % (ref 11–15)
GLOBULIN SER-MCNC: 4.9 G/DL (ref 2–4)
GLUCOSE BLDC GLUCOMTR-MCNC: 110 MG/DL (ref 70–99)
GLUCOSE BLDC GLUCOMTR-MCNC: 114 MG/DL (ref 70–99)
GLUCOSE BLDC GLUCOMTR-MCNC: 120 MG/DL (ref 70–99)
GLUCOSE SERPL-MCNC: 109 MG/DL (ref 65–99)
GLUCOSE UR STRIP-MCNC: NEGATIVE MG/DL
HCT VFR BLD CALC: 21.7 % (ref 36–46.5)
HGB BLD-MCNC: 7.8 G/DL (ref 12–15.5)
HGB UR QL STRIP: NEGATIVE
HYALINE CASTS #/AREA URNS LPF: ABNORMAL /LPF (ref 0–5)
IMM GRANULOCYTES # BLD AUTO: 0 K/MCL (ref 0–0.2)
IMM GRANULOCYTES NFR BLD: 0 %
KETONES UR STRIP-MCNC: NEGATIVE MG/DL
LEUKOCYTE ESTERASE UR QL STRIP: NEGATIVE
LYMPHOCYTES # BLD: 1 K/MCL (ref 1–4.8)
LYMPHOCYTES NFR BLD: 17 %
MAGNESIUM SERPL-MCNC: 1.8 MG/DL (ref 1.7–2.4)
MCH RBC QN AUTO: 38 PG (ref 26–34)
MCHC RBC AUTO-ENTMCNC: 35.9 G/DL (ref 32–36.5)
MCV RBC AUTO: 105.9 FL (ref 78–100)
MONOCYTES # BLD: 0.4 K/MCL (ref 0.3–0.9)
MONOCYTES NFR BLD: 7 %
MUCOUS THREADS URNS QL MICRO: PRESENT
NEUTROPHILS # BLD: 4.2 K/MCL (ref 1.8–7.7)
NEUTROPHILS NFR BLD: 72 %
NITRITE UR QL STRIP: NEGATIVE
NRBC BLD MANUAL-RTO: 0 /100 WBC
P AXIS (DEGREES): 79
PH UR STRIP: 5 UNITS (ref 5–7)
PHOSPHATE SERPL-MCNC: 3.3 MG/DL (ref 2.4–4.7)
PLATELET # BLD: 144 K/MCL (ref 140–450)
POTASSIUM SERPL-SCNC: 4 MMOL/L (ref 3.4–5.1)
PR-INTERVAL (MSEC): 146
PREALB SERPL NEPH-MCNC: 10.3 MG/DL (ref 18–38)
PROT SERPL-MCNC: 7.1 G/DL (ref 6.4–8.2)
PROT UR STRIP-MCNC: 30 MG/DL
QRS-INTERVAL (MSEC): 90
QT-INTERVAL (MSEC): 336
QTC: 465
R AXIS (DEGREES): 82
RBC # BLD: 2.05 MIL/MCL (ref 4–5.2)
RBC #/AREA URNS HPF: ABNORMAL /HPF (ref 0–2)
REPORT TEXT: NORMAL
SARS-COV-2 RNA RESP QL NAA+PROBE: NOT DETECTED
SERVICE CMNT-IMP: NORMAL
SODIUM SERPL-SCNC: 135 MMOL/L (ref 135–145)
SP GR UR STRIP: 1.02 (ref 1–1.03)
SPECIMEN SOURCE: ABNORMAL
SPECIMEN SOURCE: NORMAL
SQUAMOUS #/AREA URNS HPF: ABNORMAL /HPF (ref 0–5)
T AXIS (DEGREES): 28
UROBILINOGEN UR STRIP-MCNC: 0.2 MG/DL (ref 0–1)
VENTRICULAR RATE EKG/MIN (BPM): 115
WBC # BLD: 5.8 K/MCL (ref 4.2–11)
WBC #/AREA URNS HPF: ABNORMAL /HPF (ref 0–5)

## 2020-07-27 PROCEDURE — 10002807 HB RX 258: Performed by: NURSE PRACTITIONER

## 2020-07-27 PROCEDURE — 85025 COMPLETE CBC W/AUTO DIFF WBC: CPT

## 2020-07-27 PROCEDURE — 80053 COMPREHEN METABOLIC PANEL: CPT

## 2020-07-27 PROCEDURE — 10000002 HB ROOM CHARGE MED SURG

## 2020-07-27 PROCEDURE — 10004281 HB COUNTER-STAFF TIME PER 15 MIN

## 2020-07-27 PROCEDURE — 94003 VENT MGMT INPAT SUBQ DAY: CPT

## 2020-07-27 PROCEDURE — 83735 ASSAY OF MAGNESIUM: CPT

## 2020-07-27 PROCEDURE — 10002800 HB RX 250 W HCPCS: Performed by: NURSE PRACTITIONER

## 2020-07-27 PROCEDURE — 10002807 HB RX 258: Performed by: EMERGENCY MEDICINE

## 2020-07-27 PROCEDURE — 84134 ASSAY OF PREALBUMIN: CPT

## 2020-07-27 PROCEDURE — 84100 ASSAY OF PHOSPHORUS: CPT

## 2020-07-27 PROCEDURE — 10002803 HB RX 637: Performed by: NURSE PRACTITIONER

## 2020-07-27 PROCEDURE — 13003348 HB PATIENT LVL 3 SUPPLIES IP WND

## 2020-07-27 PROCEDURE — 10004651 HB RX, NO CHARGE ITEM: Performed by: NURSE PRACTITIONER

## 2020-07-27 PROCEDURE — 81001 URINALYSIS AUTO W/SCOPE: CPT

## 2020-07-27 RX ORDER — ALBUTEROL SULFATE 90 UG/1
2 AEROSOL, METERED RESPIRATORY (INHALATION)
Status: DISCONTINUED | OUTPATIENT
Start: 2020-07-27 | End: 2020-07-28 | Stop reason: HOSPADM

## 2020-07-27 RX ORDER — VITAMIN B COMPLEX
1000 TABLET ORAL DAILY
Status: DISCONTINUED | OUTPATIENT
Start: 2020-07-27 | End: 2020-07-28 | Stop reason: HOSPADM

## 2020-07-27 RX ORDER — ASCORBIC ACID 500 MG
500 TABLET ORAL DAILY
Status: DISCONTINUED | OUTPATIENT
Start: 2020-07-27 | End: 2020-07-28 | Stop reason: HOSPADM

## 2020-07-27 RX ORDER — UREA 10 %
220 LOTION (ML) TOPICAL DAILY
Status: DISCONTINUED | OUTPATIENT
Start: 2020-07-27 | End: 2020-07-28 | Stop reason: HOSPADM

## 2020-07-27 RX ORDER — FERROUS SULFATE 7.5 MG/0.5
220 SYRINGE (EA) ORAL
Status: DISCONTINUED | OUTPATIENT
Start: 2020-07-27 | End: 2020-07-28 | Stop reason: HOSPADM

## 2020-07-27 RX ORDER — OXYCODONE HYDROCHLORIDE 5 MG/1
2.5 TABLET ORAL EVERY 8 HOURS SCHEDULED
Status: DISCONTINUED | OUTPATIENT
Start: 2020-07-27 | End: 2020-07-28 | Stop reason: HOSPADM

## 2020-07-27 RX ORDER — GABAPENTIN 100 MG/1
100 CAPSULE ORAL 2 TIMES DAILY
Status: DISCONTINUED | OUTPATIENT
Start: 2020-07-27 | End: 2020-07-28 | Stop reason: HOSPADM

## 2020-07-27 RX ORDER — FAMOTIDINE 20 MG/1
20 TABLET, FILM COATED ORAL DAILY
Status: DISCONTINUED | OUTPATIENT
Start: 2020-07-27 | End: 2020-07-28 | Stop reason: HOSPADM

## 2020-07-27 RX ORDER — FOLIC ACID 1 MG/1
0.5 TABLET ORAL DAILY
Status: DISCONTINUED | OUTPATIENT
Start: 2020-07-27 | End: 2020-07-28 | Stop reason: HOSPADM

## 2020-07-27 RX ORDER — MULTIVITAMIN,THER AND MINERALS
1 TABLET ORAL DAILY
Status: DISCONTINUED | OUTPATIENT
Start: 2020-07-27 | End: 2020-07-28 | Stop reason: HOSPADM

## 2020-07-27 RX ORDER — LEVETIRACETAM 100 MG/ML
500 SOLUTION ORAL EVERY 12 HOURS SCHEDULED
Status: DISCONTINUED | OUTPATIENT
Start: 2020-07-27 | End: 2020-07-28 | Stop reason: HOSPADM

## 2020-07-27 RX ORDER — METOPROLOL TARTRATE 25 MG/1
12.5 TABLET, FILM COATED ORAL EVERY 12 HOURS SCHEDULED
Status: DISCONTINUED | OUTPATIENT
Start: 2020-07-27 | End: 2020-07-28 | Stop reason: HOSPADM

## 2020-07-27 RX ORDER — LACOSAMIDE 200 MG/1
200 TABLET ORAL EVERY 12 HOURS SCHEDULED
Status: DISCONTINUED | OUTPATIENT
Start: 2020-07-27 | End: 2020-07-28 | Stop reason: HOSPADM

## 2020-07-27 RX ORDER — LACOSAMIDE 10 MG/ML
200 SOLUTION ORAL EVERY 12 HOURS SCHEDULED
Status: DISCONTINUED | OUTPATIENT
Start: 2020-07-27 | End: 2020-07-27

## 2020-07-27 RX ADMIN — LACOSAMIDE 200 MG: 200 TABLET, FILM COATED ORAL at 02:23

## 2020-07-27 RX ADMIN — CEFEPIME HYDROCHLORIDE 1000 MG: 1 INJECTION, POWDER, FOR SOLUTION INTRAMUSCULAR; INTRAVENOUS at 22:21

## 2020-07-27 RX ADMIN — GABAPENTIN 100 MG: 100 CAPSULE ORAL at 08:39

## 2020-07-27 RX ADMIN — OXYCODONE HYDROCHLORIDE AND ACETAMINOPHEN 500 MG: 500 TABLET ORAL at 08:39

## 2020-07-27 RX ADMIN — RIFAXIMIN 200 MG: 200 TABLET ORAL at 22:15

## 2020-07-27 RX ADMIN — LEVETIRACETAM 500 MG: 100 SOLUTION ORAL at 20:04

## 2020-07-27 RX ADMIN — CYANOCOBALAMIN TAB 250 MCG 250 MCG: 250 TAB at 08:39

## 2020-07-27 RX ADMIN — METOPROLOL TARTRATE 12.5 MG: 25 TABLET, FILM COATED ORAL at 08:40

## 2020-07-27 RX ADMIN — Medication 220 MG: at 08:40

## 2020-07-27 RX ADMIN — VITAMIN D, TAB 1000IU (100/BT) 1000 UNITS: 25 TAB at 08:39

## 2020-07-27 RX ADMIN — Medication 225 MG: at 08:39

## 2020-07-27 RX ADMIN — BACLOFEN 15 MG: 10 TABLET ORAL at 22:15

## 2020-07-27 RX ADMIN — OXYCODONE HYDROCHLORIDE 2.5 MG: 5 TABLET ORAL at 14:21

## 2020-07-27 RX ADMIN — PANCRELIPASE 1 CAPSULE: 30000; 6000; 19000 CAPSULE, DELAYED RELEASE PELLETS ORAL at 14:23

## 2020-07-27 RX ADMIN — FOLIC ACID 0.5 MG: 1 TABLET ORAL at 08:39

## 2020-07-27 RX ADMIN — FAMOTIDINE 20 MG: 20 TABLET ORAL at 08:39

## 2020-07-27 RX ADMIN — PANCRELIPASE 1 CAPSULE: 30000; 6000; 19000 CAPSULE, DELAYED RELEASE PELLETS ORAL at 20:04

## 2020-07-27 RX ADMIN — LACOSAMIDE 200 MG: 200 TABLET, FILM COATED ORAL at 08:39

## 2020-07-27 RX ADMIN — CEFEPIME HYDROCHLORIDE 1000 MG: 1 INJECTION, POWDER, FOR SOLUTION INTRAMUSCULAR; INTRAVENOUS at 02:01

## 2020-07-27 RX ADMIN — CEFEPIME HYDROCHLORIDE 1000 MG: 1 INJECTION, POWDER, FOR SOLUTION INTRAMUSCULAR; INTRAVENOUS at 14:36

## 2020-07-27 RX ADMIN — SODIUM CHLORIDE: 9 INJECTION, SOLUTION INTRAVENOUS at 11:08

## 2020-07-27 RX ADMIN — SODIUM CHLORIDE, PRESERVATIVE FREE 2 ML: 5 INJECTION INTRAVENOUS at 08:41

## 2020-07-27 RX ADMIN — OXYCODONE HYDROCHLORIDE 2.5 MG: 5 TABLET ORAL at 22:15

## 2020-07-27 RX ADMIN — LEVETIRACETAM 500 MG: 100 SOLUTION ORAL at 08:39

## 2020-07-27 RX ADMIN — MULTIPLE VITAMINS W/ MINERALS TAB 1 TABLET: TAB at 09:00

## 2020-07-27 RX ADMIN — METOPROLOL TARTRATE 12.5 MG: 25 TABLET, FILM COATED ORAL at 01:52

## 2020-07-27 RX ADMIN — GABAPENTIN 100 MG: 100 CAPSULE ORAL at 20:04

## 2020-07-27 RX ADMIN — SODIUM CHLORIDE, PRESERVATIVE FREE 2 ML: 5 INJECTION INTRAVENOUS at 20:04

## 2020-07-27 RX ADMIN — BACLOFEN 15 MG: 10 TABLET ORAL at 14:23

## 2020-07-27 RX ADMIN — RIFAXIMIN 200 MG: 200 TABLET ORAL at 06:00

## 2020-07-27 RX ADMIN — LACOSAMIDE 200 MG: 200 TABLET, FILM COATED ORAL at 20:04

## 2020-07-27 RX ADMIN — OXYCODONE HYDROCHLORIDE 2.5 MG: 5 TABLET ORAL at 06:00

## 2020-07-27 RX ADMIN — PANCRELIPASE 1 CAPSULE: 30000; 6000; 19000 CAPSULE, DELAYED RELEASE PELLETS ORAL at 08:39

## 2020-07-27 RX ADMIN — BACLOFEN 15 MG: 10 TABLET ORAL at 06:00

## 2020-07-27 RX ADMIN — LEVETIRACETAM 500 MG: 100 SOLUTION ORAL at 01:52

## 2020-07-27 RX ADMIN — RIFAXIMIN 200 MG: 200 TABLET ORAL at 14:23

## 2020-07-27 RX ADMIN — MORPHINE SULFATE 2 MG: 2 INJECTION, SOLUTION INTRAMUSCULAR; INTRAVENOUS at 01:52

## 2020-07-27 RX ADMIN — METOPROLOL TARTRATE 12.5 MG: 25 TABLET, FILM COATED ORAL at 22:15

## 2020-07-27 ASSESSMENT — PAIN SCALES - BEHAVIORAL PAIN SCALE (BPS)
BPS_SCORE: 5
BPS_SCORE: 6
BPS_SCORE: 5

## 2020-07-27 ASSESSMENT — COGNITIVE AND FUNCTIONAL STATUS - GENERAL
DO YOU HAVE SERIOUS DIFFICULTY WALKING OR CLIMBING STAIRS: YES
BECAUSE OF A PHYSICAL, MENTAL, OR EMOTIONAL CONDITION, DO YOU HAVE DIFFICULTY DOING ERRANDS ALONE: YES
DO YOU HAVE DIFFICULTY DRESSING OR BATHING: YES
BECAUSE OF A PHYSICAL, MENTAL, OR EMOTIONAL CONDITION, DO YOU HAVE SERIOUS DIFFICULTY CONCENTRATING, REMEMBERING OR MAKING DECISIONS: YES

## 2020-07-28 VITALS
BODY MASS INDEX: 10.41 KG/M2 | OXYGEN SATURATION: 100 % | WEIGHT: 44.97 LBS | HEIGHT: 55 IN | HEART RATE: 68 BPM | SYSTOLIC BLOOD PRESSURE: 108 MMHG | DIASTOLIC BLOOD PRESSURE: 70 MMHG | RESPIRATION RATE: 20 BRPM | TEMPERATURE: 98.1 F

## 2020-07-28 LAB
ALBUMIN SERPL-MCNC: 2 G/DL (ref 3.6–5.1)
ALBUMIN/GLOB SERPL: 0.4 {RATIO} (ref 1–2.4)
ALP SERPL-CCNC: 216 UNITS/L (ref 45–117)
ALT SERPL-CCNC: 127 UNITS/L
ANION GAP SERPL CALC-SCNC: 7 MMOL/L (ref 10–20)
AST SERPL-CCNC: 72 UNITS/L
BASOPHILS # BLD: 0 K/MCL (ref 0–0.3)
BASOPHILS NFR BLD: 0 %
BILIRUB SERPL-MCNC: 0.3 MG/DL (ref 0.2–1)
BUN SERPL-MCNC: 13 MG/DL (ref 6–20)
BUN/CREAT SERPL: 63 (ref 7–25)
CALCIUM SERPL-MCNC: 7.6 MG/DL (ref 8.4–10.2)
CHLORIDE SERPL-SCNC: 104 MMOL/L (ref 98–107)
CO2 SERPL-SCNC: 27 MMOL/L (ref 21–32)
CREAT SERPL-MCNC: 0.21 MG/DL (ref 0.51–0.95)
DIFFERENTIAL METHOD BLD: ABNORMAL
EOSINOPHIL # BLD: 0.3 K/MCL (ref 0.1–0.5)
EOSINOPHIL NFR BLD: 5 %
ERYTHROCYTE [DISTWIDTH] IN BLOOD: 12.1 % (ref 11–15)
GLOBULIN SER-MCNC: 5 G/DL (ref 2–4)
GLUCOSE BLDC GLUCOMTR-MCNC: 110 MG/DL (ref 70–99)
GLUCOSE BLDC GLUCOMTR-MCNC: 116 MG/DL (ref 70–99)
GLUCOSE BLDC GLUCOMTR-MCNC: 83 MG/DL (ref 70–99)
GLUCOSE SERPL-MCNC: 109 MG/DL (ref 65–99)
HCT VFR BLD CALC: 22.8 % (ref 36–46.5)
HGB BLD-MCNC: 7.4 G/DL (ref 12–15.5)
IMM GRANULOCYTES # BLD AUTO: 0 K/MCL (ref 0–0.2)
IMM GRANULOCYTES NFR BLD: 0 %
LYMPHOCYTES # BLD: 0.9 K/MCL (ref 1–4.8)
LYMPHOCYTES NFR BLD: 15 %
MCH RBC QN AUTO: 34.1 PG (ref 26–34)
MCHC RBC AUTO-ENTMCNC: 32.5 G/DL (ref 32–36.5)
MCV RBC AUTO: 105.1 FL (ref 78–100)
MONOCYTES # BLD: 0.4 K/MCL (ref 0.3–0.9)
MONOCYTES NFR BLD: 6 %
NEUTROPHILS # BLD: 4.8 K/MCL (ref 1.8–7.7)
NEUTROPHILS NFR BLD: 74 %
NRBC BLD MANUAL-RTO: 0 /100 WBC
PLATELET # BLD: 120 K/MCL (ref 140–450)
POTASSIUM SERPL-SCNC: 4.1 MMOL/L (ref 3.4–5.1)
PROT SERPL-MCNC: 7 G/DL (ref 6.4–8.2)
RBC # BLD: 2.17 MIL/MCL (ref 4–5.2)
SODIUM SERPL-SCNC: 134 MMOL/L (ref 135–145)
WBC # BLD: 6.4 K/MCL (ref 4.2–11)

## 2020-07-28 PROCEDURE — 94003 VENT MGMT INPAT SUBQ DAY: CPT

## 2020-07-28 PROCEDURE — 36415 COLL VENOUS BLD VENIPUNCTURE: CPT

## 2020-07-28 PROCEDURE — 10002807 HB RX 258: Performed by: NURSE PRACTITIONER

## 2020-07-28 PROCEDURE — 10002803 HB RX 637: Performed by: NURSE PRACTITIONER

## 2020-07-28 PROCEDURE — 10004651 HB RX, NO CHARGE ITEM: Performed by: NURSE PRACTITIONER

## 2020-07-28 PROCEDURE — 10002800 HB RX 250 W HCPCS: Performed by: NURSE PRACTITIONER

## 2020-07-28 PROCEDURE — 80053 COMPREHEN METABOLIC PANEL: CPT

## 2020-07-28 PROCEDURE — 10002807 HB RX 258: Performed by: INTERNAL MEDICINE

## 2020-07-28 PROCEDURE — 10004281 HB COUNTER-STAFF TIME PER 15 MIN

## 2020-07-28 PROCEDURE — 85025 COMPLETE CBC W/AUTO DIFF WBC: CPT

## 2020-07-28 RX ORDER — OXYCODONE HYDROCHLORIDE 5 MG/1
2.5 TABLET ORAL EVERY 12 HOURS PRN
Qty: 14 TABLET | Refills: 0 | Status: ON HOLD | OUTPATIENT
Start: 2020-07-28 | End: 2020-11-11 | Stop reason: SDUPTHER

## 2020-07-28 RX ADMIN — PANCRELIPASE 1 CAPSULE: 30000; 6000; 19000 CAPSULE, DELAYED RELEASE PELLETS ORAL at 08:04

## 2020-07-28 RX ADMIN — FOLIC ACID 0.5 MG: 1 TABLET ORAL at 08:05

## 2020-07-28 RX ADMIN — RIFAXIMIN 200 MG: 200 TABLET ORAL at 05:53

## 2020-07-28 RX ADMIN — Medication 220 MG: at 08:04

## 2020-07-28 RX ADMIN — LACOSAMIDE 200 MG: 200 TABLET, FILM COATED ORAL at 08:04

## 2020-07-28 RX ADMIN — VITAMIN D, TAB 1000IU (100/BT) 1000 UNITS: 25 TAB at 08:04

## 2020-07-28 RX ADMIN — OXYCODONE HYDROCHLORIDE AND ACETAMINOPHEN 500 MG: 500 TABLET ORAL at 08:05

## 2020-07-28 RX ADMIN — BACLOFEN 15 MG: 10 TABLET ORAL at 05:53

## 2020-07-28 RX ADMIN — Medication 225 MG: at 08:05

## 2020-07-28 RX ADMIN — CYANOCOBALAMIN TAB 250 MCG 250 MCG: 250 TAB at 08:05

## 2020-07-28 RX ADMIN — FAMOTIDINE 20 MG: 20 TABLET ORAL at 08:05

## 2020-07-28 RX ADMIN — GABAPENTIN 100 MG: 100 CAPSULE ORAL at 08:04

## 2020-07-28 RX ADMIN — SODIUM CHLORIDE: 9 INJECTION, SOLUTION INTRAVENOUS at 05:54

## 2020-07-28 RX ADMIN — METOPROLOL TARTRATE 12.5 MG: 25 TABLET, FILM COATED ORAL at 08:04

## 2020-07-28 RX ADMIN — MULTIPLE VITAMINS W/ MINERALS TAB 1 TABLET: TAB at 08:05

## 2020-07-28 RX ADMIN — OXYCODONE HYDROCHLORIDE 2.5 MG: 5 TABLET ORAL at 05:54

## 2020-07-28 RX ADMIN — CEFEPIME HYDROCHLORIDE 1000 MG: 1 INJECTION, POWDER, FOR SOLUTION INTRAMUSCULAR; INTRAVENOUS at 05:57

## 2020-07-28 RX ADMIN — LEVETIRACETAM 500 MG: 100 SOLUTION ORAL at 08:05

## 2020-07-28 RX ADMIN — SODIUM CHLORIDE, PRESERVATIVE FREE 2 ML: 5 INJECTION INTRAVENOUS at 08:04

## 2020-07-28 ASSESSMENT — PAIN SCALES - BEHAVIORAL PAIN SCALE (BPS): BPS_SCORE: 6

## 2020-07-31 LAB
BACTERIA BLD CULT: NORMAL
BACTERIA BLD CULT: NORMAL
REPORT STATUS (RPT): NORMAL
REPORT STATUS (RPT): NORMAL
SPECIMEN SOURCE: NORMAL
SPECIMEN SOURCE: NORMAL

## 2020-10-05 ENCOUNTER — APPOINTMENT (OUTPATIENT)
Dept: CT IMAGING | Age: 30
DRG: 130 | End: 2020-10-05
Attending: EMERGENCY MEDICINE

## 2020-10-05 ENCOUNTER — HOSPITAL ENCOUNTER (INPATIENT)
Age: 30
LOS: 10 days | Discharge: NURSING, REHAB OR LONG-TERM CARE | DRG: 130 | End: 2020-10-15
Attending: EMERGENCY MEDICINE | Admitting: INTERNAL MEDICINE

## 2020-10-05 ENCOUNTER — APPOINTMENT (OUTPATIENT)
Dept: GENERAL RADIOLOGY | Age: 30
DRG: 130 | End: 2020-10-05
Attending: STUDENT IN AN ORGANIZED HEALTH CARE EDUCATION/TRAINING PROGRAM

## 2020-10-05 DIAGNOSIS — R06.03 ACUTE RESPIRATORY DISTRESS: Primary | ICD-10-CM

## 2020-10-05 LAB
ALBUMIN SERPL-MCNC: 2.1 G/DL (ref 3.6–5.1)
ALBUMIN/GLOB SERPL: 0.3 {RATIO} (ref 1–2.4)
ALP SERPL-CCNC: 213 UNITS/L (ref 45–117)
ALT SERPL-CCNC: 66 UNITS/L
AMORPH SED URNS QL MICRO: PRESENT
ANION GAP SERPL CALC-SCNC: 4 MMOL/L (ref 10–20)
APPEARANCE UR: CLEAR
AST SERPL-CCNC: 43 UNITS/L
ATRIAL RATE (BPM): 115
BACTERIA #/AREA URNS HPF: ABNORMAL /HPF
BASE EXCESS BLDV CALC-SCNC: 4 MMOL/L (ref 0–2)
BASE EXCESS BLDV CALC-SCNC: 5 MMOL/L (ref 0–2)
BASOPHILS # BLD: 0 K/MCL (ref 0–0.3)
BASOPHILS NFR BLD: 0 %
BILIRUB SERPL-MCNC: 0.2 MG/DL (ref 0.2–1)
BILIRUB UR QL STRIP: NEGATIVE
BODY TEMPERATURE: 37 DEGREES
BODY TEMPERATURE: 37 DEGREES
BUN SERPL-MCNC: 23 MG/DL (ref 6–20)
BUN/CREAT SERPL: 76 (ref 7–25)
CALCIUM SERPL-MCNC: 8.6 MG/DL (ref 8.4–10.2)
CHLORIDE SERPL-SCNC: 101 MMOL/L (ref 98–107)
CO2 SERPL-SCNC: 35 MMOL/L (ref 21–32)
COLOR UR: YELLOW
CREAT SERPL-MCNC: 0.3 MG/DL (ref 0.51–0.95)
D DIMER PPP FEU-MCNC: 1.73 MG/L (FEU)
DIFFERENTIAL METHOD BLD: ABNORMAL
EOSINOPHIL # BLD: 0.3 K/MCL (ref 0.1–0.5)
EOSINOPHIL NFR BLD: 3 %
ERYTHROCYTE [DISTWIDTH] IN BLOOD: 11.5 % (ref 11–15)
GLOBULIN SER-MCNC: 7.7 G/DL (ref 2–4)
GLUCOSE BLDC GLUCOMTR-MCNC: 102 MG/DL (ref 70–99)
GLUCOSE SERPL-MCNC: 116 MG/DL (ref 65–99)
GLUCOSE UR STRIP-MCNC: NEGATIVE MG/DL
HCO3 BLDV-SCNC: 33 MMOL/L (ref 22–28)
HCO3 BLDV-SCNC: 33 MMOL/L (ref 22–28)
HCT VFR BLD CALC: 27.8 % (ref 36–46.5)
HGB BLD-MCNC: 9.2 G/DL (ref 12–15.5)
HGB UR QL STRIP: NEGATIVE
HYALINE CASTS #/AREA URNS LPF: ABNORMAL /LPF (ref 0–5)
IMM GRANULOCYTES # BLD AUTO: 0 K/MCL (ref 0–0.2)
IMM GRANULOCYTES NFR BLD: 0 %
KETONES UR STRIP-MCNC: NEGATIVE MG/DL
LACTATE BLDV-SCNC: 1.4 MMOL/L (ref 0–2)
LEUKOCYTE ESTERASE UR QL STRIP: NEGATIVE
LYMPHOCYTES # BLD: 1.1 K/MCL (ref 1–4.8)
LYMPHOCYTES NFR BLD: 11 %
MCH RBC QN AUTO: 35.7 PG (ref 26–34)
MCHC RBC AUTO-ENTMCNC: 33.1 G/DL (ref 32–36.5)
MCV RBC AUTO: 107.8 FL (ref 78–100)
MONOCYTES # BLD: 0.6 K/MCL (ref 0.3–0.9)
MONOCYTES NFR BLD: 6 %
NEUTROPHILS # BLD: 8.1 K/MCL (ref 1.8–7.7)
NEUTROPHILS NFR BLD: 80 %
NITRITE UR QL STRIP: NEGATIVE
NRBC BLD MANUAL-RTO: 0 /100 WBC
OXYHGB MFR BLDV: 46 % (ref 60–80)
OXYHGB MFR BLDV: 56 % (ref 60–80)
P AXIS (DEGREES): 66
PCO2 BLDV: 71 MM HG (ref 38–51)
PCO2 BLDV: 78 MM HG (ref 38–51)
PH BLDV: 7.25 UNITS (ref 7.35–7.45)
PH BLDV: 7.28 UNITS (ref 7.35–7.45)
PH UR STRIP: 5 UNITS (ref 5–7)
PLATELET # BLD: 157 K/MCL (ref 140–450)
PO2 BLDV: 34 MM HG (ref 35–42)
PO2 BLDV: 38 MM HG (ref 35–42)
POTASSIUM SERPL-SCNC: 4 MMOL/L (ref 3.4–5.1)
PR-INTERVAL (MSEC): 165
PROT SERPL-MCNC: 9.8 G/DL (ref 6.4–8.2)
PROT UR STRIP-MCNC: 100 MG/DL
QRS-INTERVAL (MSEC): 85
QT-INTERVAL (MSEC): 324
QTC: 447
R AXIS (DEGREES): 78
RBC # BLD: 2.58 MIL/MCL (ref 4–5.2)
RBC #/AREA URNS HPF: ABNORMAL /HPF (ref 0–2)
REPORT TEXT: NORMAL
SAO2 % BLDV: 47 % (ref 60–80)
SAO2 % BLDV: 57 % (ref 60–80)
SODIUM SERPL-SCNC: 136 MMOL/L (ref 135–145)
SP GR UR STRIP: 1.02 (ref 1–1.03)
SPECIMEN SOURCE: ABNORMAL
SQUAMOUS #/AREA URNS HPF: ABNORMAL /HPF (ref 0–5)
T AXIS (DEGREES): 53
TROPONIN I SERPL HS-MCNC: <0.02 NG/ML
UROBILINOGEN UR STRIP-MCNC: 0.2 MG/DL (ref 0–1)
VENTRICULAR RATE EKG/MIN (BPM): 114
WBC # BLD: 10.1 K/MCL (ref 4.2–11)
WBC #/AREA URNS HPF: ABNORMAL /HPF (ref 0–5)

## 2020-10-05 PROCEDURE — 10004281 HB COUNTER-STAFF TIME PER 15 MIN

## 2020-10-05 PROCEDURE — 13003287

## 2020-10-05 PROCEDURE — 10002803 HB RX 637: Performed by: INTERNAL MEDICINE

## 2020-10-05 PROCEDURE — 87635 SARS-COV-2 COVID-19 AMP PRB: CPT

## 2020-10-05 PROCEDURE — C9803 HOPD COVID-19 SPEC COLLECT: HCPCS

## 2020-10-05 PROCEDURE — 82803 BLOOD GASES ANY COMBINATION: CPT

## 2020-10-05 PROCEDURE — 94002 VENT MGMT INPAT INIT DAY: CPT

## 2020-10-05 PROCEDURE — 81001 URINALYSIS AUTO W/SCOPE: CPT

## 2020-10-05 PROCEDURE — 84484 ASSAY OF TROPONIN QUANT: CPT

## 2020-10-05 PROCEDURE — 10000002 HB ROOM CHARGE MED SURG

## 2020-10-05 PROCEDURE — 85025 COMPLETE CBC W/AUTO DIFF WBC: CPT

## 2020-10-05 PROCEDURE — 10006027 HB SUPPLY 278

## 2020-10-05 PROCEDURE — 10002807 HB RX 258: Performed by: STUDENT IN AN ORGANIZED HEALTH CARE EDUCATION/TRAINING PROGRAM

## 2020-10-05 PROCEDURE — 96360 HYDRATION IV INFUSION INIT: CPT

## 2020-10-05 PROCEDURE — 80053 COMPREHEN METABOLIC PANEL: CPT

## 2020-10-05 PROCEDURE — 10002803 HB RX 637: Performed by: STUDENT IN AN ORGANIZED HEALTH CARE EDUCATION/TRAINING PROGRAM

## 2020-10-05 PROCEDURE — 71275 CT ANGIOGRAPHY CHEST: CPT

## 2020-10-05 PROCEDURE — 5A1955Z RESPIRATORY VENTILATION, GREATER THAN 96 CONSECUTIVE HOURS: ICD-10-PCS | Performed by: INTERNAL MEDICINE

## 2020-10-05 PROCEDURE — 85379 FIBRIN DEGRADATION QUANT: CPT

## 2020-10-05 PROCEDURE — 71045 X-RAY EXAM CHEST 1 VIEW: CPT

## 2020-10-05 PROCEDURE — 83605 ASSAY OF LACTIC ACID: CPT

## 2020-10-05 PROCEDURE — X1094 NO CHARGE VISIT: HCPCS | Performed by: STUDENT IN AN ORGANIZED HEALTH CARE EDUCATION/TRAINING PROGRAM

## 2020-10-05 PROCEDURE — 96361 HYDRATE IV INFUSION ADD-ON: CPT

## 2020-10-05 PROCEDURE — 99285 EMERGENCY DEPT VISIT HI MDM: CPT

## 2020-10-05 PROCEDURE — 10002805 HB CONTRAST AGENT: Performed by: EMERGENCY MEDICINE

## 2020-10-05 PROCEDURE — 93005 ELECTROCARDIOGRAM TRACING: CPT | Performed by: STUDENT IN AN ORGANIZED HEALTH CARE EDUCATION/TRAINING PROGRAM

## 2020-10-05 PROCEDURE — 99285 EMERGENCY DEPT VISIT HI MDM: CPT | Performed by: EMERGENCY MEDICINE

## 2020-10-05 RX ORDER — LORAZEPAM 0.5 MG/1
0.5 TABLET ORAL EVERY 6 HOURS PRN
Status: DISCONTINUED | OUTPATIENT
Start: 2020-10-05 | End: 2020-10-15 | Stop reason: HOSPADM

## 2020-10-05 RX ORDER — SODIUM CHLORIDE 1 G/1
1 TABLET ORAL DAILY
Status: ON HOLD | COMMUNITY
End: 2020-10-14 | Stop reason: HOSPADM

## 2020-10-05 RX ORDER — LORAZEPAM 0.5 MG/1
0.5 TABLET ORAL EVERY 8 HOURS PRN
Status: ON HOLD | COMMUNITY
End: 2020-11-11 | Stop reason: SDUPTHER

## 2020-10-05 RX ORDER — OXYCODONE HYDROCHLORIDE 5 MG/1
5 TABLET ORAL EVERY 12 HOURS PRN
Status: DISCONTINUED | OUTPATIENT
Start: 2020-10-05 | End: 2020-10-05

## 2020-10-05 RX ORDER — ACETAMINOPHEN 160 MG/5ML
500 SUSPENSION ORAL ONCE
Status: COMPLETED | OUTPATIENT
Start: 2020-10-05 | End: 2020-10-05

## 2020-10-05 RX ORDER — ACETAMINOPHEN 160 MG/5ML
80 SUSPENSION ORAL EVERY 6 HOURS PRN
Status: DISCONTINUED | OUTPATIENT
Start: 2020-10-05 | End: 2020-10-15 | Stop reason: HOSPADM

## 2020-10-05 RX ORDER — LORAZEPAM 0.5 MG/1
0.5 TABLET ORAL EVERY 6 HOURS PRN
Status: DISCONTINUED | OUTPATIENT
Start: 2020-10-05 | End: 2020-10-05

## 2020-10-05 RX ORDER — ACETAMINOPHEN 160 MG/5ML
80 SUSPENSION ORAL EVERY 6 HOURS PRN
Status: DISCONTINUED | OUTPATIENT
Start: 2020-10-05 | End: 2020-10-05

## 2020-10-05 RX ORDER — OXYCODONE HYDROCHLORIDE 5 MG/1
2.5 TABLET ORAL EVERY 12 HOURS PRN
Status: DISCONTINUED | OUTPATIENT
Start: 2020-10-05 | End: 2020-10-15 | Stop reason: HOSPADM

## 2020-10-05 RX ORDER — OMEPRAZOLE 20 MG/1
20 CAPSULE, DELAYED RELEASE ORAL DAILY
Status: ON HOLD | COMMUNITY
End: 2020-11-11 | Stop reason: SDUPTHER

## 2020-10-05 RX ADMIN — ACETAMINOPHEN 500 MG: 160 SUSPENSION ORAL at 17:00

## 2020-10-05 RX ADMIN — SODIUM CHLORIDE 750 ML: 0.9 INJECTION, SOLUTION INTRAVENOUS at 11:01

## 2020-10-05 RX ADMIN — IOHEXOL 80 ML: 350 INJECTION, SOLUTION INTRAVENOUS at 14:30

## 2020-10-05 RX ADMIN — LORAZEPAM 0.5 MG: 0.5 TABLET ORAL at 20:47

## 2020-10-05 RX ADMIN — OXYCODONE HYDROCHLORIDE 2.5 MG: 5 TABLET ORAL at 20:47

## 2020-10-05 ASSESSMENT — COLUMBIA-SUICIDE SEVERITY RATING SCALE - C-SSRS: IS THE PATIENT ABLE TO COMPLETE C-SSRS: NO, DEFER FOR CLINICAL INSTABILITY

## 2020-10-05 ASSESSMENT — COGNITIVE AND FUNCTIONAL STATUS - GENERAL
ARE YOU DEAF OR DO YOU HAVE SERIOUS DIFFICULTY  HEARING: NO
ARE YOU BLIND OR DO YOU HAVE SERIOUS DIFFICULTY SEEING, EVEN WHEN WEARING GLASSES: NO

## 2020-10-05 ASSESSMENT — PAIN SCALES - BEHAVIORAL PAIN SCALE (BPS)
BPS_SCORE: 6
BPS_SCORE: 5
BPS_SCORE: 4

## 2020-10-05 ASSESSMENT — LIFESTYLE VARIABLES
ALCOHOL_USE_STATUS: NO OR LOW RISK WITH VALIDATED TOOL
HOW OFTEN DO YOU HAVE A DRINK CONTAINING ALCOHOL: NEVER
HOW OFTEN DO YOU HAVE 6 OR MORE DRINKS ON ONE OCCASION: NEVER

## 2020-10-05 ASSESSMENT — PATIENT HEALTH QUESTIONNAIRE - PHQ9: IS PATIENT ABLE TO COMPLETE PHQ2 OR PHQ9: NO, PATIENT WILL NEVER BE ABLE TO COMPLETE

## 2020-10-05 ASSESSMENT — PAIN SCALES - WONG BAKER: WONGBAKER_NUMERICALRESPONSE: 3

## 2020-10-06 ENCOUNTER — APPOINTMENT (OUTPATIENT)
Dept: CARDIOLOGY | Age: 30
DRG: 130 | End: 2020-10-06
Attending: INTERNAL MEDICINE

## 2020-10-06 LAB
ANION GAP SERPL CALC-SCNC: 8 MMOL/L (ref 10–20)
BASE EXCESS BLDA CALC-SCNC: 5 MMOL/L (ref 0–3)
BASOPHILS # BLD: 0.1 K/MCL (ref 0–0.3)
BASOPHILS NFR BLD: 1 %
BODY TEMPERATURE: 37 DEGREES
BUN SERPL-MCNC: 19 MG/DL (ref 6–20)
BUN/CREAT SERPL: 73 (ref 7–25)
CALCIUM SERPL-MCNC: 8.6 MG/DL (ref 8.4–10.2)
CHLORIDE SERPL-SCNC: 106 MMOL/L (ref 98–107)
CO2 SERPL-SCNC: 30 MMOL/L (ref 21–32)
CREAT SERPL-MCNC: 0.26 MG/DL (ref 0.51–0.95)
DIFFERENTIAL METHOD BLD: ABNORMAL
EOSINOPHIL # BLD: 0.2 K/MCL (ref 0.1–0.5)
EOSINOPHIL NFR BLD: 2 %
ERYTHROCYTE [DISTWIDTH] IN BLOOD: 11.5 % (ref 11–15)
GLUCOSE BLDC GLUCOMTR-MCNC: 100 MG/DL (ref 70–99)
GLUCOSE BLDC GLUCOMTR-MCNC: 115 MG/DL (ref 70–99)
GLUCOSE BLDC GLUCOMTR-MCNC: 95 MG/DL (ref 70–99)
GLUCOSE BLDC GLUCOMTR-MCNC: 96 MG/DL (ref 70–99)
GLUCOSE SERPL-MCNC: 96 MG/DL (ref 65–99)
HCO3 BLDA-SCNC: 30 MMOL/L (ref 22–28)
HCT VFR BLD CALC: 24.8 % (ref 36–46.5)
HGB BLD-MCNC: 7.9 G/DL (ref 12–15.5)
IMM GRANULOCYTES # BLD AUTO: 0 K/MCL (ref 0–0.2)
IMM GRANULOCYTES NFR BLD: 0 %
INHALED O2 CONCENTRATION: 30 %
LYMPHOCYTES # BLD: 1.1 K/MCL (ref 1–4.8)
LYMPHOCYTES NFR BLD: 12 %
MCH RBC QN AUTO: 34.6 PG (ref 26–34)
MCHC RBC AUTO-ENTMCNC: 31.9 G/DL (ref 32–36.5)
MCV RBC AUTO: 108.8 FL (ref 78–100)
MONOCYTES # BLD: 0.6 K/MCL (ref 0.3–0.9)
MONOCYTES NFR BLD: 7 %
NEUTROPHILS # BLD: 6.9 K/MCL (ref 1.8–7.7)
NEUTROPHILS NFR BLD: 78 %
NRBC BLD MANUAL-RTO: 0 /100 WBC
OXYHGB MFR BLDA: 94 % (ref 94–98)
PCO2 BLDA: 51 MM HG (ref 32–45)
PH BLDA: 7.39 UNITS (ref 7.35–7.45)
PLATELET # BLD: 155 K/MCL (ref 140–450)
PO2 BLDA: 94 MM HG (ref 83–108)
POTASSIUM SERPL-SCNC: 4.2 MMOL/L (ref 3.4–5.1)
RBC # BLD: 2.28 MIL/MCL (ref 4–5.2)
SAO2 % BLDA: 96 % (ref 95–99)
SARS-COV-2 RNA RESP QL NAA+PROBE: NOT DETECTED
SERVICE CMNT-IMP: NORMAL
SODIUM SERPL-SCNC: 140 MMOL/L (ref 135–145)
SPECIMEN SOURCE: NORMAL
WBC # BLD: 8.8 K/MCL (ref 4.2–11)

## 2020-10-06 PROCEDURE — 36600 WITHDRAWAL OF ARTERIAL BLOOD: CPT

## 2020-10-06 PROCEDURE — 10004281 HB COUNTER-STAFF TIME PER 15 MIN

## 2020-10-06 PROCEDURE — 94640 AIRWAY INHALATION TREATMENT: CPT

## 2020-10-06 PROCEDURE — 10002801 HB RX 250 W/O HCPCS: Performed by: INTERNAL MEDICINE

## 2020-10-06 PROCEDURE — 94668 MNPJ CHEST WALL SBSQ: CPT

## 2020-10-06 PROCEDURE — 87077 CULTURE AEROBIC IDENTIFY: CPT

## 2020-10-06 PROCEDURE — 10000002 HB ROOM CHARGE MED SURG

## 2020-10-06 PROCEDURE — 94003 VENT MGMT INPAT SUBQ DAY: CPT

## 2020-10-06 PROCEDURE — 87070 CULTURE OTHR SPECIMN AEROBIC: CPT

## 2020-10-06 PROCEDURE — 94667 MNPJ CHEST WALL 1ST: CPT

## 2020-10-06 PROCEDURE — 36415 COLL VENOUS BLD VENIPUNCTURE: CPT

## 2020-10-06 PROCEDURE — 80048 BASIC METABOLIC PNL TOTAL CA: CPT

## 2020-10-06 PROCEDURE — 85025 COMPLETE CBC W/AUTO DIFF WBC: CPT

## 2020-10-06 PROCEDURE — 87186 SC STD MICRODIL/AGAR DIL: CPT

## 2020-10-06 PROCEDURE — 13003348 HB PATIENT LVL 3 SUPPLIES IP WND

## 2020-10-06 PROCEDURE — 93306 TTE W/DOPPLER COMPLETE: CPT

## 2020-10-06 PROCEDURE — 82803 BLOOD GASES ANY COMBINATION: CPT

## 2020-10-06 PROCEDURE — 10002803 HB RX 637: Performed by: INTERNAL MEDICINE

## 2020-10-06 RX ORDER — LACOSAMIDE 10 MG/ML
200 SOLUTION ORAL EVERY 12 HOURS SCHEDULED
Status: DISCONTINUED | OUTPATIENT
Start: 2020-10-06 | End: 2020-10-15 | Stop reason: HOSPADM

## 2020-10-06 RX ORDER — BACLOFEN 10 MG/1
15 TABLET ORAL EVERY 8 HOURS SCHEDULED
Status: DISCONTINUED | OUTPATIENT
Start: 2020-10-06 | End: 2020-10-15 | Stop reason: HOSPADM

## 2020-10-06 RX ORDER — FOLIC ACID 1 MG/1
0.5 TABLET ORAL DAILY
Status: DISCONTINUED | OUTPATIENT
Start: 2020-10-06 | End: 2020-10-15 | Stop reason: HOSPADM

## 2020-10-06 RX ORDER — METOPROLOL TARTRATE 25 MG/1
12.5 TABLET, FILM COATED ORAL EVERY 12 HOURS SCHEDULED
Status: DISCONTINUED | OUTPATIENT
Start: 2020-10-06 | End: 2020-10-07

## 2020-10-06 RX ORDER — LEVETIRACETAM 100 MG/ML
500 SOLUTION ORAL EVERY 12 HOURS SCHEDULED
Status: DISCONTINUED | OUTPATIENT
Start: 2020-10-06 | End: 2020-10-12 | Stop reason: SDUPTHER

## 2020-10-06 RX ORDER — GABAPENTIN 100 MG/1
100 CAPSULE ORAL 2 TIMES DAILY
Status: DISCONTINUED | OUTPATIENT
Start: 2020-10-06 | End: 2020-10-15 | Stop reason: HOSPADM

## 2020-10-06 RX ADMIN — LEVETIRACETAM 500 MG: 100 SOLUTION ORAL at 08:54

## 2020-10-06 RX ADMIN — RIFAXIMIN 200 MG: 200 TABLET ORAL at 21:00

## 2020-10-06 RX ADMIN — LACOSAMIDE 200 MG: 10 SOLUTION ORAL at 21:03

## 2020-10-06 RX ADMIN — IPRATROPIUM BROMIDE 0.5 MG: 0.5 SOLUTION RESPIRATORY (INHALATION) at 18:55

## 2020-10-06 RX ADMIN — FOLIC ACID 0.5 MG: 1 TABLET ORAL at 08:54

## 2020-10-06 RX ADMIN — LORAZEPAM 0.5 MG: 0.5 TABLET ORAL at 20:09

## 2020-10-06 RX ADMIN — GABAPENTIN 100 MG: 100 CAPSULE ORAL at 21:03

## 2020-10-06 RX ADMIN — BACLOFEN 15 MG: 10 TABLET ORAL at 13:50

## 2020-10-06 RX ADMIN — RIFAXIMIN 200 MG: 200 TABLET ORAL at 13:50

## 2020-10-06 RX ADMIN — LACOSAMIDE 200 MG: 10 SOLUTION ORAL at 10:49

## 2020-10-06 RX ADMIN — IPRATROPIUM BROMIDE 0.5 MG: 0.5 SOLUTION RESPIRATORY (INHALATION) at 15:36

## 2020-10-06 RX ADMIN — RIFAXIMIN 200 MG: 200 TABLET ORAL at 08:55

## 2020-10-06 RX ADMIN — BACLOFEN 15 MG: 10 TABLET ORAL at 21:00

## 2020-10-06 RX ADMIN — METOPROLOL TARTRATE 12.5 MG: 25 TABLET, FILM COATED ORAL at 20:08

## 2020-10-06 RX ADMIN — GABAPENTIN 100 MG: 100 CAPSULE ORAL at 08:55

## 2020-10-06 RX ADMIN — METOPROLOL TARTRATE 12.5 MG: 25 TABLET, FILM COATED ORAL at 08:55

## 2020-10-06 RX ADMIN — OXYCODONE HYDROCHLORIDE 2.5 MG: 5 TABLET ORAL at 10:59

## 2020-10-06 RX ADMIN — LEVETIRACETAM 500 MG: 100 SOLUTION ORAL at 20:09

## 2020-10-06 RX ADMIN — LORAZEPAM 0.5 MG: 0.5 TABLET ORAL at 05:41

## 2020-10-06 RX ADMIN — BACLOFEN 15 MG: 10 TABLET ORAL at 08:55

## 2020-10-06 ASSESSMENT — PAIN SCALES - BEHAVIORAL PAIN SCALE (BPS)
BPS_SCORE: 4
BPS_SCORE: 4

## 2020-10-06 ASSESSMENT — PAIN SCALES - WONG BAKER
WONGBAKER_NUMERICALRESPONSE: 0
WONGBAKER_NUMERICALRESPONSE: 5
WONGBAKER_NUMERICALRESPONSE: 0

## 2020-10-06 ASSESSMENT — COGNITIVE AND FUNCTIONAL STATUS - GENERAL
DO YOU HAVE SERIOUS DIFFICULTY WALKING OR CLIMBING STAIRS: YES
DO YOU HAVE DIFFICULTY DRESSING OR BATHING: YES
BECAUSE OF A PHYSICAL, MENTAL, OR EMOTIONAL CONDITION, DO YOU HAVE DIFFICULTY DOING ERRANDS ALONE: YES
BECAUSE OF A PHYSICAL, MENTAL, OR EMOTIONAL CONDITION, DO YOU HAVE SERIOUS DIFFICULTY CONCENTRATING, REMEMBERING OR MAKING DECISIONS: YES

## 2020-10-07 LAB
ALBUMIN SERPL-MCNC: 1.9 G/DL (ref 3.6–5.1)
ALBUMIN/GLOB SERPL: 0.3 {RATIO} (ref 1–2.4)
ALP SERPL-CCNC: 188 UNITS/L (ref 45–117)
ALT SERPL-CCNC: 58 UNITS/L
AMMONIA PLAS-SCNC: 67 MCMOL/L
ANION GAP SERPL CALC-SCNC: 7 MMOL/L (ref 10–20)
AST SERPL-CCNC: 41 UNITS/L
BILIRUB SERPL-MCNC: 0.4 MG/DL (ref 0.2–1)
BUN SERPL-MCNC: 20 MG/DL (ref 6–20)
BUN/CREAT SERPL: 92 (ref 7–25)
CALCIUM SERPL-MCNC: 8.4 MG/DL (ref 8.4–10.2)
CHLORIDE SERPL-SCNC: 97 MMOL/L (ref 98–107)
CO2 SERPL-SCNC: 31 MMOL/L (ref 21–32)
CREAT SERPL-MCNC: 0.22 MG/DL (ref 0.51–0.95)
GLOBULIN SER-MCNC: 6.6 G/DL (ref 2–4)
GLUCOSE BLDC GLUCOMTR-MCNC: 115 MG/DL (ref 70–99)
GLUCOSE BLDC GLUCOMTR-MCNC: 116 MG/DL (ref 70–99)
GLUCOSE BLDC GLUCOMTR-MCNC: 117 MG/DL (ref 70–99)
GLUCOSE BLDC GLUCOMTR-MCNC: 125 MG/DL (ref 70–99)
GLUCOSE SERPL-MCNC: 85 MG/DL (ref 65–99)
POTASSIUM SERPL-SCNC: 4.8 MMOL/L (ref 3.4–5.1)
PROCALCITONIN SERPL IA-MCNC: 0.09 NG/ML
PROT SERPL-MCNC: 8.5 G/DL (ref 6.4–8.2)
SODIUM SERPL-SCNC: 130 MMOL/L (ref 135–145)

## 2020-10-07 PROCEDURE — 10000002 HB ROOM CHARGE MED SURG

## 2020-10-07 PROCEDURE — 10002803 HB RX 637: Performed by: INTERNAL MEDICINE

## 2020-10-07 PROCEDURE — 82140 ASSAY OF AMMONIA: CPT

## 2020-10-07 PROCEDURE — 10002801 HB RX 250 W/O HCPCS: Performed by: INTERNAL MEDICINE

## 2020-10-07 PROCEDURE — 36415 COLL VENOUS BLD VENIPUNCTURE: CPT

## 2020-10-07 PROCEDURE — 84145 PROCALCITONIN (PCT): CPT

## 2020-10-07 PROCEDURE — 80053 COMPREHEN METABOLIC PANEL: CPT

## 2020-10-07 PROCEDURE — 94003 VENT MGMT INPAT SUBQ DAY: CPT

## 2020-10-07 PROCEDURE — 94667 MNPJ CHEST WALL 1ST: CPT

## 2020-10-07 PROCEDURE — 94668 MNPJ CHEST WALL SBSQ: CPT

## 2020-10-07 PROCEDURE — 94640 AIRWAY INHALATION TREATMENT: CPT

## 2020-10-07 RX ORDER — SODIUM CHLORIDE 1 G/1
2 TABLET ORAL ONCE
Status: COMPLETED | OUTPATIENT
Start: 2020-10-07 | End: 2020-10-07

## 2020-10-07 RX ADMIN — OXYCODONE HYDROCHLORIDE 2.5 MG: 5 TABLET ORAL at 17:04

## 2020-10-07 RX ADMIN — RIFAXIMIN 200 MG: 200 TABLET ORAL at 13:51

## 2020-10-07 RX ADMIN — BACLOFEN 15 MG: 10 TABLET ORAL at 05:00

## 2020-10-07 RX ADMIN — IPRATROPIUM BROMIDE 0.5 MG: 0.5 SOLUTION RESPIRATORY (INHALATION) at 07:43

## 2020-10-07 RX ADMIN — OXYCODONE HYDROCHLORIDE 2.5 MG: 5 TABLET ORAL at 04:35

## 2020-10-07 RX ADMIN — LEVETIRACETAM 500 MG: 100 SOLUTION ORAL at 21:05

## 2020-10-07 RX ADMIN — RIFAXIMIN 200 MG: 200 TABLET ORAL at 05:00

## 2020-10-07 RX ADMIN — GABAPENTIN 100 MG: 100 CAPSULE ORAL at 21:00

## 2020-10-07 RX ADMIN — IPRATROPIUM BROMIDE 0.5 MG: 0.5 SOLUTION RESPIRATORY (INHALATION) at 18:44

## 2020-10-07 RX ADMIN — IPRATROPIUM BROMIDE 0.5 MG: 0.5 SOLUTION RESPIRATORY (INHALATION) at 11:40

## 2020-10-07 RX ADMIN — IPRATROPIUM BROMIDE 0.5 MG: 0.5 SOLUTION RESPIRATORY (INHALATION) at 15:57

## 2020-10-07 RX ADMIN — FOLIC ACID 0.5 MG: 1 TABLET ORAL at 08:12

## 2020-10-07 RX ADMIN — BACLOFEN 15 MG: 10 TABLET ORAL at 21:00

## 2020-10-07 RX ADMIN — LORAZEPAM 0.5 MG: 0.5 TABLET ORAL at 04:36

## 2020-10-07 RX ADMIN — LACOSAMIDE 200 MG: 10 SOLUTION ORAL at 23:05

## 2020-10-07 RX ADMIN — LEVETIRACETAM 500 MG: 100 SOLUTION ORAL at 08:12

## 2020-10-07 RX ADMIN — RIFAXIMIN 200 MG: 200 TABLET ORAL at 21:00

## 2020-10-07 RX ADMIN — SODIUM CHLORIDE TAB 1 GM 2000 MG: 1 TAB at 11:19

## 2020-10-07 RX ADMIN — LACOSAMIDE 200 MG: 10 SOLUTION ORAL at 08:12

## 2020-10-07 RX ADMIN — METOPROLOL TARTRATE 25 MG: 25 TABLET, FILM COATED ORAL at 21:00

## 2020-10-07 RX ADMIN — BACLOFEN 15 MG: 10 TABLET ORAL at 13:51

## 2020-10-07 RX ADMIN — METOPROLOL TARTRATE 12.5 MG: 25 TABLET, FILM COATED ORAL at 08:13

## 2020-10-07 RX ADMIN — ACETAMINOPHEN 80 MG: 160 SUSPENSION ORAL at 08:26

## 2020-10-07 RX ADMIN — GABAPENTIN 100 MG: 100 CAPSULE ORAL at 08:13

## 2020-10-07 ASSESSMENT — PAIN SCALES - BEHAVIORAL PAIN SCALE (BPS)
BPS_SCORE: 3
BPS_SCORE: 5
BPS_SCORE: 4
BPS_SCORE: 4
BPS_SCORE: 3
BPS_SCORE: 3
BPS_SCORE: 6
BPS_SCORE: 3

## 2020-10-08 LAB
GLUCOSE BLDC GLUCOMTR-MCNC: 125 MG/DL (ref 70–99)
GLUCOSE BLDC GLUCOMTR-MCNC: 133 MG/DL (ref 70–99)
GLUCOSE BLDC GLUCOMTR-MCNC: 142 MG/DL (ref 70–99)
GLUCOSE BLDC GLUCOMTR-MCNC: 143 MG/DL (ref 70–99)

## 2020-10-08 PROCEDURE — 10002803 HB RX 637: Performed by: INTERNAL MEDICINE

## 2020-10-08 PROCEDURE — 94003 VENT MGMT INPAT SUBQ DAY: CPT

## 2020-10-08 PROCEDURE — 10002801 HB RX 250 W/O HCPCS: Performed by: INTERNAL MEDICINE

## 2020-10-08 PROCEDURE — 94640 AIRWAY INHALATION TREATMENT: CPT

## 2020-10-08 PROCEDURE — 94667 MNPJ CHEST WALL 1ST: CPT

## 2020-10-08 PROCEDURE — 94668 MNPJ CHEST WALL SBSQ: CPT

## 2020-10-08 PROCEDURE — 10000002 HB ROOM CHARGE MED SURG

## 2020-10-08 RX ORDER — ACETAMINOPHEN 160 MG/5ML
LIQUID ORAL
Status: DISPENSED
Start: 2020-10-08 | End: 2020-10-09

## 2020-10-08 RX ORDER — SODIUM CHLORIDE 1 G/1
1 TABLET ORAL DAILY
Status: DISCONTINUED | OUTPATIENT
Start: 2020-10-08 | End: 2020-10-13

## 2020-10-08 RX ADMIN — OXYCODONE HYDROCHLORIDE 2.5 MG: 5 TABLET ORAL at 08:32

## 2020-10-08 RX ADMIN — RIFAXIMIN 200 MG: 200 TABLET ORAL at 05:24

## 2020-10-08 RX ADMIN — PANCRELIPASE 1 CAPSULE: 30000; 6000; 19000 CAPSULE, DELAYED RELEASE PELLETS ORAL at 14:53

## 2020-10-08 RX ADMIN — BACLOFEN 15 MG: 10 TABLET ORAL at 13:31

## 2020-10-08 RX ADMIN — IPRATROPIUM BROMIDE 0.5 MG: 0.5 SOLUTION RESPIRATORY (INHALATION) at 11:17

## 2020-10-08 RX ADMIN — SODIUM CHLORIDE TAB 1 GM 1000 MG: 1 TAB at 13:37

## 2020-10-08 RX ADMIN — LORAZEPAM 0.5 MG: 0.5 TABLET ORAL at 17:41

## 2020-10-08 RX ADMIN — RIFAXIMIN 200 MG: 200 TABLET ORAL at 21:31

## 2020-10-08 RX ADMIN — GABAPENTIN 100 MG: 100 CAPSULE ORAL at 21:32

## 2020-10-08 RX ADMIN — ACETAMINOPHEN 80 MG: 160 SUSPENSION ORAL at 05:26

## 2020-10-08 RX ADMIN — LEVETIRACETAM 500 MG: 100 SOLUTION ORAL at 08:31

## 2020-10-08 RX ADMIN — BACLOFEN 15 MG: 10 TABLET ORAL at 05:23

## 2020-10-08 RX ADMIN — RIFAXIMIN 200 MG: 200 TABLET ORAL at 13:31

## 2020-10-08 RX ADMIN — LEVETIRACETAM 500 MG: 100 SOLUTION ORAL at 21:39

## 2020-10-08 RX ADMIN — ACETAMINOPHEN 80 MG: 160 SUSPENSION ORAL at 13:32

## 2020-10-08 RX ADMIN — IPRATROPIUM BROMIDE 0.5 MG: 0.5 SOLUTION RESPIRATORY (INHALATION) at 15:35

## 2020-10-08 RX ADMIN — ACETAMINOPHEN 80 MG: 160 SUSPENSION ORAL at 21:37

## 2020-10-08 RX ADMIN — IPRATROPIUM BROMIDE 0.5 MG: 0.5 SOLUTION RESPIRATORY (INHALATION) at 08:24

## 2020-10-08 RX ADMIN — IPRATROPIUM BROMIDE 0.5 MG: 0.5 SOLUTION RESPIRATORY (INHALATION) at 20:10

## 2020-10-08 RX ADMIN — LACOSAMIDE 200 MG: 10 SOLUTION ORAL at 21:51

## 2020-10-08 RX ADMIN — GABAPENTIN 100 MG: 100 CAPSULE ORAL at 08:31

## 2020-10-08 RX ADMIN — METOPROLOL TARTRATE 25 MG: 25 TABLET, FILM COATED ORAL at 08:32

## 2020-10-08 RX ADMIN — PANCRELIPASE 1 CAPSULE: 30000; 6000; 19000 CAPSULE, DELAYED RELEASE PELLETS ORAL at 22:19

## 2020-10-08 RX ADMIN — BACLOFEN 15 MG: 10 TABLET ORAL at 21:31

## 2020-10-08 RX ADMIN — LACOSAMIDE 200 MG: 10 SOLUTION ORAL at 08:32

## 2020-10-08 RX ADMIN — FOLIC ACID 0.5 MG: 1 TABLET ORAL at 08:33

## 2020-10-08 ASSESSMENT — PAIN SCALES - BEHAVIORAL PAIN SCALE (BPS)
BPS_SCORE: 4
BPS_SCORE: 3
BPS_SCORE: 6
BPS_SCORE: 6

## 2020-10-09 LAB
BACTERIA SPEC RESP CULT: ABNORMAL
BACTERIA SPEC RESP CULT: ABNORMAL
GLUCOSE BLDC GLUCOMTR-MCNC: 104 MG/DL (ref 70–99)
GLUCOSE BLDC GLUCOMTR-MCNC: 114 MG/DL (ref 70–99)
GLUCOSE BLDC GLUCOMTR-MCNC: 120 MG/DL (ref 70–99)
GLUCOSE BLDC GLUCOMTR-MCNC: 98 MG/DL (ref 70–99)
GRAM STN SPEC: ABNORMAL
ORGANISM: ABNORMAL
REPORT STATUS (RPT): ABNORMAL
SPECIMEN SOURCE: ABNORMAL

## 2020-10-09 PROCEDURE — 10002801 HB RX 250 W/O HCPCS: Performed by: INTERNAL MEDICINE

## 2020-10-09 PROCEDURE — 94668 MNPJ CHEST WALL SBSQ: CPT

## 2020-10-09 PROCEDURE — 94003 VENT MGMT INPAT SUBQ DAY: CPT

## 2020-10-09 PROCEDURE — 10002803 HB RX 637: Performed by: INTERNAL MEDICINE

## 2020-10-09 PROCEDURE — 94640 AIRWAY INHALATION TREATMENT: CPT

## 2020-10-09 PROCEDURE — 10000002 HB ROOM CHARGE MED SURG

## 2020-10-09 PROCEDURE — 10002800 HB RX 250 W HCPCS: Performed by: INTERNAL MEDICINE

## 2020-10-09 PROCEDURE — 94667 MNPJ CHEST WALL 1ST: CPT

## 2020-10-09 RX ORDER — ONDANSETRON 2 MG/ML
4 INJECTION INTRAMUSCULAR; INTRAVENOUS EVERY 8 HOURS PRN
Status: DISCONTINUED | OUTPATIENT
Start: 2020-10-09 | End: 2020-10-15 | Stop reason: HOSPADM

## 2020-10-09 RX ADMIN — LEVETIRACETAM 500 MG: 100 SOLUTION ORAL at 11:16

## 2020-10-09 RX ADMIN — IPRATROPIUM BROMIDE 0.5 MG: 0.5 SOLUTION RESPIRATORY (INHALATION) at 07:41

## 2020-10-09 RX ADMIN — RIFAXIMIN 200 MG: 200 TABLET ORAL at 13:38

## 2020-10-09 RX ADMIN — PANCRELIPASE 1 CAPSULE: 30000; 6000; 19000 CAPSULE, DELAYED RELEASE PELLETS ORAL at 08:33

## 2020-10-09 RX ADMIN — BACLOFEN 15 MG: 10 TABLET ORAL at 05:24

## 2020-10-09 RX ADMIN — OXYCODONE HYDROCHLORIDE 2.5 MG: 5 TABLET ORAL at 18:40

## 2020-10-09 RX ADMIN — FOLIC ACID 0.5 MG: 1 TABLET ORAL at 08:34

## 2020-10-09 RX ADMIN — RIFAXIMIN 200 MG: 200 TABLET ORAL at 21:55

## 2020-10-09 RX ADMIN — ACETAMINOPHEN 80 MG: 160 SUSPENSION ORAL at 05:24

## 2020-10-09 RX ADMIN — BACLOFEN 15 MG: 10 TABLET ORAL at 13:38

## 2020-10-09 RX ADMIN — BACLOFEN 15 MG: 10 TABLET ORAL at 21:55

## 2020-10-09 RX ADMIN — LACOSAMIDE 200 MG: 10 SOLUTION ORAL at 08:33

## 2020-10-09 RX ADMIN — LORAZEPAM 0.5 MG: 0.5 TABLET ORAL at 08:33

## 2020-10-09 RX ADMIN — PANCRELIPASE 1 CAPSULE: 30000; 6000; 19000 CAPSULE, DELAYED RELEASE PELLETS ORAL at 13:43

## 2020-10-09 RX ADMIN — GABAPENTIN 100 MG: 100 CAPSULE ORAL at 21:55

## 2020-10-09 RX ADMIN — ONDANSETRON 4 MG: 2 INJECTION INTRAMUSCULAR; INTRAVENOUS at 04:49

## 2020-10-09 RX ADMIN — GABAPENTIN 100 MG: 100 CAPSULE ORAL at 08:33

## 2020-10-09 RX ADMIN — IPRATROPIUM BROMIDE 0.5 MG: 0.5 SOLUTION RESPIRATORY (INHALATION) at 16:00

## 2020-10-09 RX ADMIN — RIFAXIMIN 200 MG: 200 TABLET ORAL at 05:24

## 2020-10-09 RX ADMIN — METOPROLOL TARTRATE 25 MG: 25 TABLET, FILM COATED ORAL at 11:16

## 2020-10-09 RX ADMIN — LEVETIRACETAM 500 MG: 100 SOLUTION ORAL at 23:10

## 2020-10-09 RX ADMIN — IPRATROPIUM BROMIDE 0.5 MG: 0.5 SOLUTION RESPIRATORY (INHALATION) at 11:41

## 2020-10-09 RX ADMIN — SODIUM CHLORIDE TAB 1 GM 1000 MG: 1 TAB at 08:33

## 2020-10-09 RX ADMIN — IPRATROPIUM BROMIDE 0.5 MG: 0.5 SOLUTION RESPIRATORY (INHALATION) at 19:47

## 2020-10-09 RX ADMIN — PANCRELIPASE 1 CAPSULE: 30000; 6000; 19000 CAPSULE, DELAYED RELEASE PELLETS ORAL at 21:55

## 2020-10-09 RX ADMIN — LACOSAMIDE 200 MG: 10 SOLUTION ORAL at 21:55

## 2020-10-09 ASSESSMENT — PAIN SCALES - BEHAVIORAL PAIN SCALE (BPS)
BPS_SCORE: 4
BPS_SCORE: 3
BPS_SCORE: 4
BPS_SCORE: 6
BPS_SCORE: 6
BPS_SCORE: 3

## 2020-10-09 ASSESSMENT — PAIN SCALES - WONG BAKER: WONGBAKER_NUMERICALRESPONSE: 0

## 2020-10-10 LAB
GLUCOSE BLDC GLUCOMTR-MCNC: 101 MG/DL (ref 70–99)
GLUCOSE BLDC GLUCOMTR-MCNC: 117 MG/DL (ref 70–99)
GLUCOSE BLDC GLUCOMTR-MCNC: 120 MG/DL (ref 70–99)
GLUCOSE BLDC GLUCOMTR-MCNC: 145 MG/DL (ref 70–99)

## 2020-10-10 PROCEDURE — 94668 MNPJ CHEST WALL SBSQ: CPT

## 2020-10-10 PROCEDURE — 10002801 HB RX 250 W/O HCPCS: Performed by: INTERNAL MEDICINE

## 2020-10-10 PROCEDURE — 10002803 HB RX 637: Performed by: INTERNAL MEDICINE

## 2020-10-10 PROCEDURE — 94003 VENT MGMT INPAT SUBQ DAY: CPT

## 2020-10-10 PROCEDURE — 10000002 HB ROOM CHARGE MED SURG

## 2020-10-10 PROCEDURE — 94667 MNPJ CHEST WALL 1ST: CPT

## 2020-10-10 PROCEDURE — 94640 AIRWAY INHALATION TREATMENT: CPT

## 2020-10-10 RX ADMIN — RIFAXIMIN 200 MG: 200 TABLET ORAL at 05:23

## 2020-10-10 RX ADMIN — RIFAXIMIN 200 MG: 200 TABLET ORAL at 22:00

## 2020-10-10 RX ADMIN — SODIUM CHLORIDE TAB 1 GM 1000 MG: 1 TAB at 08:34

## 2020-10-10 RX ADMIN — BACLOFEN 15 MG: 10 TABLET ORAL at 14:13

## 2020-10-10 RX ADMIN — LORAZEPAM 0.5 MG: 0.5 TABLET ORAL at 09:53

## 2020-10-10 RX ADMIN — PANCRELIPASE 1 CAPSULE: 30000; 6000; 19000 CAPSULE, DELAYED RELEASE PELLETS ORAL at 17:14

## 2020-10-10 RX ADMIN — IPRATROPIUM BROMIDE 0.5 MG: 0.5 SOLUTION RESPIRATORY (INHALATION) at 11:35

## 2020-10-10 RX ADMIN — OXYCODONE HYDROCHLORIDE 2.5 MG: 5 TABLET ORAL at 19:02

## 2020-10-10 RX ADMIN — LEVETIRACETAM 500 MG: 100 SOLUTION ORAL at 22:00

## 2020-10-10 RX ADMIN — ACETAMINOPHEN 80 MG: 160 SUSPENSION ORAL at 05:24

## 2020-10-10 RX ADMIN — METOPROLOL TARTRATE 25 MG: 25 TABLET, FILM COATED ORAL at 09:36

## 2020-10-10 RX ADMIN — BACLOFEN 15 MG: 10 TABLET ORAL at 05:23

## 2020-10-10 RX ADMIN — ACETAMINOPHEN 80 MG: 160 SUSPENSION ORAL at 17:14

## 2020-10-10 RX ADMIN — RIFAXIMIN 200 MG: 200 TABLET ORAL at 14:12

## 2020-10-10 RX ADMIN — FOLIC ACID 0.5 MG: 1 TABLET ORAL at 08:34

## 2020-10-10 RX ADMIN — PANCRELIPASE 1 CAPSULE: 30000; 6000; 19000 CAPSULE, DELAYED RELEASE PELLETS ORAL at 12:33

## 2020-10-10 RX ADMIN — GABAPENTIN 100 MG: 100 CAPSULE ORAL at 22:00

## 2020-10-10 RX ADMIN — BACLOFEN 15 MG: 10 TABLET ORAL at 22:00

## 2020-10-10 RX ADMIN — LACOSAMIDE 200 MG: 10 SOLUTION ORAL at 08:35

## 2020-10-10 RX ADMIN — IPRATROPIUM BROMIDE 0.5 MG: 0.5 SOLUTION RESPIRATORY (INHALATION) at 07:38

## 2020-10-10 RX ADMIN — IPRATROPIUM BROMIDE 0.5 MG: 0.5 SOLUTION RESPIRATORY (INHALATION) at 16:59

## 2020-10-10 RX ADMIN — LEVETIRACETAM 500 MG: 100 SOLUTION ORAL at 08:34

## 2020-10-10 RX ADMIN — PANCRELIPASE 1 CAPSULE: 30000; 6000; 19000 CAPSULE, DELAYED RELEASE PELLETS ORAL at 08:34

## 2020-10-10 RX ADMIN — LACOSAMIDE 200 MG: 10 SOLUTION ORAL at 22:00

## 2020-10-10 RX ADMIN — IPRATROPIUM BROMIDE 0.5 MG: 0.5 SOLUTION RESPIRATORY (INHALATION) at 20:16

## 2020-10-10 RX ADMIN — GABAPENTIN 100 MG: 100 CAPSULE ORAL at 08:34

## 2020-10-10 ASSESSMENT — PAIN SCALES - BEHAVIORAL PAIN SCALE (BPS)
BPS_SCORE: 4
BPS_SCORE: 6
BPS_SCORE: 7
BPS_SCORE: 3
BPS_SCORE: 5
BPS_SCORE: 7

## 2020-10-10 ASSESSMENT — PAIN SCALES - WONG BAKER: WONGBAKER_NUMERICALRESPONSE: 0

## 2020-10-11 LAB
GLUCOSE BLDC GLUCOMTR-MCNC: 108 MG/DL (ref 70–99)
GLUCOSE BLDC GLUCOMTR-MCNC: 118 MG/DL (ref 70–99)
GLUCOSE BLDC GLUCOMTR-MCNC: 121 MG/DL (ref 70–99)
GLUCOSE BLDC GLUCOMTR-MCNC: 126 MG/DL (ref 70–99)

## 2020-10-11 PROCEDURE — 10002801 HB RX 250 W/O HCPCS: Performed by: INTERNAL MEDICINE

## 2020-10-11 PROCEDURE — 94667 MNPJ CHEST WALL 1ST: CPT

## 2020-10-11 PROCEDURE — 10000002 HB ROOM CHARGE MED SURG

## 2020-10-11 PROCEDURE — 10004281 HB COUNTER-STAFF TIME PER 15 MIN

## 2020-10-11 PROCEDURE — 10002803 HB RX 637: Performed by: INTERNAL MEDICINE

## 2020-10-11 PROCEDURE — 94640 AIRWAY INHALATION TREATMENT: CPT

## 2020-10-11 PROCEDURE — 94003 VENT MGMT INPAT SUBQ DAY: CPT

## 2020-10-11 PROCEDURE — 94668 MNPJ CHEST WALL SBSQ: CPT

## 2020-10-11 RX ADMIN — PANCRELIPASE 1 CAPSULE: 30000; 6000; 19000 CAPSULE, DELAYED RELEASE PELLETS ORAL at 12:56

## 2020-10-11 RX ADMIN — LACOSAMIDE 200 MG: 10 SOLUTION ORAL at 09:02

## 2020-10-11 RX ADMIN — PANCRELIPASE 1 CAPSULE: 30000; 6000; 19000 CAPSULE, DELAYED RELEASE PELLETS ORAL at 17:29

## 2020-10-11 RX ADMIN — BACLOFEN 15 MG: 10 TABLET ORAL at 12:55

## 2020-10-11 RX ADMIN — BACLOFEN 15 MG: 10 TABLET ORAL at 21:53

## 2020-10-11 RX ADMIN — PANCRELIPASE 1 CAPSULE: 30000; 6000; 19000 CAPSULE, DELAYED RELEASE PELLETS ORAL at 09:01

## 2020-10-11 RX ADMIN — METOPROLOL TARTRATE 37.5 MG: 25 TABLET, FILM COATED ORAL at 09:01

## 2020-10-11 RX ADMIN — BACLOFEN 15 MG: 10 TABLET ORAL at 06:22

## 2020-10-11 RX ADMIN — FOLIC ACID 0.5 MG: 1 TABLET ORAL at 09:01

## 2020-10-11 RX ADMIN — IPRATROPIUM BROMIDE 0.5 MG: 0.5 SOLUTION RESPIRATORY (INHALATION) at 07:23

## 2020-10-11 RX ADMIN — LACOSAMIDE 200 MG: 10 SOLUTION ORAL at 21:56

## 2020-10-11 RX ADMIN — GABAPENTIN 100 MG: 100 CAPSULE ORAL at 21:53

## 2020-10-11 RX ADMIN — LEVETIRACETAM 500 MG: 100 SOLUTION ORAL at 09:01

## 2020-10-11 RX ADMIN — RIFAXIMIN 200 MG: 200 TABLET ORAL at 21:53

## 2020-10-11 RX ADMIN — RIFAXIMIN 200 MG: 200 TABLET ORAL at 12:56

## 2020-10-11 RX ADMIN — RIFAXIMIN 200 MG: 200 TABLET ORAL at 06:22

## 2020-10-11 RX ADMIN — SODIUM CHLORIDE TAB 1 GM 1000 MG: 1 TAB at 09:01

## 2020-10-11 RX ADMIN — LEVETIRACETAM 500 MG: 100 SOLUTION ORAL at 21:54

## 2020-10-11 RX ADMIN — GABAPENTIN 100 MG: 100 CAPSULE ORAL at 09:01

## 2020-10-11 RX ADMIN — IPRATROPIUM BROMIDE 0.5 MG: 0.5 SOLUTION RESPIRATORY (INHALATION) at 15:40

## 2020-10-11 RX ADMIN — IPRATROPIUM BROMIDE 0.5 MG: 0.5 SOLUTION RESPIRATORY (INHALATION) at 11:23

## 2020-10-11 RX ADMIN — IPRATROPIUM BROMIDE 0.5 MG: 0.5 SOLUTION RESPIRATORY (INHALATION) at 18:21

## 2020-10-11 ASSESSMENT — PAIN SCALES - WONG BAKER: WONGBAKER_NUMERICALRESPONSE: 0

## 2020-10-11 ASSESSMENT — PAIN SCALES - BEHAVIORAL PAIN SCALE (BPS)
BPS_SCORE: 3
BPS_SCORE: 3

## 2020-10-12 LAB
ANION GAP SERPL CALC-SCNC: 6 MMOL/L (ref 10–20)
ANION GAP SERPL CALC-SCNC: 7 MMOL/L (ref 10–20)
ANION GAP SERPL CALC-SCNC: 7 MMOL/L (ref 10–20)
BASOPHILS # BLD: 0 K/MCL (ref 0–0.3)
BASOPHILS NFR BLD: 0 %
BUN SERPL-MCNC: 14 MG/DL (ref 6–20)
BUN SERPL-MCNC: 14 MG/DL (ref 6–20)
BUN SERPL-MCNC: 15 MG/DL (ref 6–20)
BUN/CREAT SERPL: 66 (ref 7–25)
BUN/CREAT SERPL: 68 (ref 7–25)
BUN/CREAT SERPL: 73 (ref 7–25)
CALCIUM SERPL-MCNC: 7.9 MG/DL (ref 8.4–10.2)
CALCIUM SERPL-MCNC: 8 MG/DL (ref 8.4–10.2)
CALCIUM SERPL-MCNC: 8.2 MG/DL (ref 8.4–10.2)
CHLORIDE SERPL-SCNC: 92 MMOL/L (ref 98–107)
CHLORIDE SERPL-SCNC: 94 MMOL/L (ref 98–107)
CHLORIDE SERPL-SCNC: 97 MMOL/L (ref 98–107)
CHLORIDE UR-SCNC: 57 MMOL/L
CO2 SERPL-SCNC: 29 MMOL/L (ref 21–32)
CO2 SERPL-SCNC: 29 MMOL/L (ref 21–32)
CO2 SERPL-SCNC: 30 MMOL/L (ref 21–32)
CREAT SERPL-MCNC: 0.19 MG/DL (ref 0.51–0.95)
CREAT SERPL-MCNC: 0.21 MG/DL (ref 0.51–0.95)
CREAT SERPL-MCNC: 0.22 MG/DL (ref 0.51–0.95)
DIFFERENTIAL METHOD BLD: ABNORMAL
EOSINOPHIL # BLD: 0.1 K/MCL (ref 0.1–0.5)
EOSINOPHIL NFR BLD: 2 %
ERYTHROCYTE [DISTWIDTH] IN BLOOD: 11.9 % (ref 11–15)
GLUCOSE BLDC GLUCOMTR-MCNC: 112 MG/DL (ref 70–99)
GLUCOSE BLDC GLUCOMTR-MCNC: 117 MG/DL (ref 70–99)
GLUCOSE BLDC GLUCOMTR-MCNC: 135 MG/DL (ref 70–99)
GLUCOSE BLDC GLUCOMTR-MCNC: 91 MG/DL (ref 70–99)
GLUCOSE SERPL-MCNC: 119 MG/DL (ref 65–99)
GLUCOSE SERPL-MCNC: 131 MG/DL (ref 65–99)
GLUCOSE SERPL-MCNC: 98 MG/DL (ref 65–99)
HCT VFR BLD CALC: 25.1 % (ref 36–46.5)
HGB BLD-MCNC: 8.2 G/DL (ref 12–15.5)
IMM GRANULOCYTES # BLD AUTO: 0 K/MCL (ref 0–0.2)
IMM GRANULOCYTES NFR BLD: 0 %
LYMPHOCYTES # BLD: 0.9 K/MCL (ref 1–4.8)
LYMPHOCYTES NFR BLD: 11 %
MCH RBC QN AUTO: 34.3 PG (ref 26–34)
MCHC RBC AUTO-ENTMCNC: 32.7 G/DL (ref 32–36.5)
MCV RBC AUTO: 105 FL (ref 78–100)
MONOCYTES # BLD: 0.6 K/MCL (ref 0.3–0.9)
MONOCYTES NFR BLD: 7 %
NEUTROPHILS # BLD: 6.6 K/MCL (ref 1.8–7.7)
NEUTROPHILS NFR BLD: 80 %
NRBC BLD MANUAL-RTO: 0 /100 WBC
OSMOLALITY UR: 606 MOSM/KG (ref 50–1200)
PLATELET # BLD: 175 K/MCL (ref 140–450)
POTASSIUM SERPL-SCNC: 4.1 MMOL/L (ref 3.4–5.1)
POTASSIUM SERPL-SCNC: 4.4 MMOL/L (ref 3.4–5.1)
POTASSIUM SERPL-SCNC: 4.4 MMOL/L (ref 3.4–5.1)
POTASSIUM UR-SCNC: 52 MMOL/L
RBC # BLD: 2.39 MIL/MCL (ref 4–5.2)
SODIUM SERPL-SCNC: 124 MMOL/L (ref 135–145)
SODIUM SERPL-SCNC: 126 MMOL/L (ref 135–145)
SODIUM SERPL-SCNC: 129 MMOL/L (ref 135–145)
SODIUM UR-SCNC: 47 MMOL/L
WBC # BLD: 8.3 K/MCL (ref 4.2–11)

## 2020-10-12 PROCEDURE — 82436 ASSAY OF URINE CHLORIDE: CPT

## 2020-10-12 PROCEDURE — 84300 ASSAY OF URINE SODIUM: CPT

## 2020-10-12 PROCEDURE — 94667 MNPJ CHEST WALL 1ST: CPT

## 2020-10-12 PROCEDURE — 94640 AIRWAY INHALATION TREATMENT: CPT

## 2020-10-12 PROCEDURE — 85025 COMPLETE CBC W/AUTO DIFF WBC: CPT

## 2020-10-12 PROCEDURE — 94003 VENT MGMT INPAT SUBQ DAY: CPT

## 2020-10-12 PROCEDURE — 36415 COLL VENOUS BLD VENIPUNCTURE: CPT

## 2020-10-12 PROCEDURE — 83935 ASSAY OF URINE OSMOLALITY: CPT

## 2020-10-12 PROCEDURE — 80048 BASIC METABOLIC PNL TOTAL CA: CPT

## 2020-10-12 PROCEDURE — 10002803 HB RX 637: Performed by: INTERNAL MEDICINE

## 2020-10-12 PROCEDURE — 94668 MNPJ CHEST WALL SBSQ: CPT

## 2020-10-12 PROCEDURE — 84133 ASSAY OF URINE POTASSIUM: CPT

## 2020-10-12 PROCEDURE — 10002801 HB RX 250 W/O HCPCS: Performed by: INTERNAL MEDICINE

## 2020-10-12 PROCEDURE — 10000002 HB ROOM CHARGE MED SURG

## 2020-10-12 PROCEDURE — 10002807 HB RX 258: Performed by: INTERNAL MEDICINE

## 2020-10-12 PROCEDURE — 10004281 HB COUNTER-STAFF TIME PER 15 MIN

## 2020-10-12 RX ORDER — LEVETIRACETAM 100 MG/ML
500 SOLUTION ORAL EVERY 12 HOURS SCHEDULED
Status: DISCONTINUED | OUTPATIENT
Start: 2020-10-12 | End: 2020-10-15 | Stop reason: HOSPADM

## 2020-10-12 RX ORDER — SODIUM CHLORIDE 9 MG/ML
INJECTION, SOLUTION INTRAVENOUS CONTINUOUS
Status: DISCONTINUED | OUTPATIENT
Start: 2020-10-12 | End: 2020-10-12 | Stop reason: CLARIF

## 2020-10-12 RX ORDER — LEVETIRACETAM 100 MG/ML
500 SOLUTION ORAL EVERY 12 HOURS SCHEDULED
Status: DISCONTINUED | OUTPATIENT
Start: 2020-10-12 | End: 2020-10-12 | Stop reason: CLARIF

## 2020-10-12 RX ADMIN — FOLIC ACID 0.5 MG: 1 TABLET ORAL at 08:29

## 2020-10-12 RX ADMIN — OXYCODONE HYDROCHLORIDE 2.5 MG: 5 TABLET ORAL at 16:15

## 2020-10-12 RX ADMIN — RIFAXIMIN 200 MG: 200 TABLET ORAL at 13:44

## 2020-10-12 RX ADMIN — GABAPENTIN 100 MG: 100 CAPSULE ORAL at 08:26

## 2020-10-12 RX ADMIN — RIFAXIMIN 200 MG: 200 TABLET ORAL at 06:00

## 2020-10-12 RX ADMIN — RIFAXIMIN 200 MG: 200 TABLET ORAL at 21:11

## 2020-10-12 RX ADMIN — BACLOFEN 15 MG: 10 TABLET ORAL at 21:11

## 2020-10-12 RX ADMIN — IPRATROPIUM BROMIDE 0.5 MG: 0.5 SOLUTION RESPIRATORY (INHALATION) at 15:53

## 2020-10-12 RX ADMIN — PANCRELIPASE 1 CAPSULE: 30000; 6000; 19000 CAPSULE, DELAYED RELEASE PELLETS ORAL at 18:15

## 2020-10-12 RX ADMIN — LEVETIRACETAM 500 MG: 100 SOLUTION ORAL at 08:30

## 2020-10-12 RX ADMIN — LORAZEPAM 0.5 MG: 0.5 TABLET ORAL at 14:17

## 2020-10-12 RX ADMIN — LEVETIRACETAM 500 MG: 100 SOLUTION ORAL at 21:12

## 2020-10-12 RX ADMIN — BACLOFEN 15 MG: 10 TABLET ORAL at 06:00

## 2020-10-12 RX ADMIN — METOPROLOL TARTRATE 37.5 MG: 25 TABLET, FILM COATED ORAL at 08:29

## 2020-10-12 RX ADMIN — IPRATROPIUM BROMIDE 0.5 MG: 0.5 SOLUTION RESPIRATORY (INHALATION) at 07:34

## 2020-10-12 RX ADMIN — ACETAMINOPHEN 80 MG: 160 SUSPENSION ORAL at 06:00

## 2020-10-12 RX ADMIN — PANCRELIPASE 1 CAPSULE: 30000; 6000; 19000 CAPSULE, DELAYED RELEASE PELLETS ORAL at 08:27

## 2020-10-12 RX ADMIN — BACLOFEN 15 MG: 10 TABLET ORAL at 13:44

## 2020-10-12 RX ADMIN — SODIUM CHLORIDE TAB 1 GM 1000 MG: 1 TAB at 08:25

## 2020-10-12 RX ADMIN — SODIUM CHLORIDE: 0.9 INJECTION, SOLUTION INTRAVENOUS at 09:55

## 2020-10-12 RX ADMIN — IPRATROPIUM BROMIDE 0.5 MG: 0.5 SOLUTION RESPIRATORY (INHALATION) at 19:15

## 2020-10-12 RX ADMIN — GABAPENTIN 100 MG: 100 CAPSULE ORAL at 21:11

## 2020-10-12 RX ADMIN — ACETAMINOPHEN 80 MG: 160 SUSPENSION ORAL at 21:12

## 2020-10-12 RX ADMIN — IPRATROPIUM BROMIDE 0.5 MG: 0.5 SOLUTION RESPIRATORY (INHALATION) at 11:27

## 2020-10-12 RX ADMIN — PANCRELIPASE 1 CAPSULE: 30000; 6000; 19000 CAPSULE, DELAYED RELEASE PELLETS ORAL at 12:09

## 2020-10-12 RX ADMIN — LACOSAMIDE 200 MG: 10 SOLUTION ORAL at 08:29

## 2020-10-12 RX ADMIN — LACOSAMIDE 200 MG: 10 SOLUTION ORAL at 21:12

## 2020-10-12 ASSESSMENT — PAIN SCALES - BEHAVIORAL PAIN SCALE (BPS)
BPS_SCORE: 4
BPS_SCORE: 7
BPS_SCORE: 6
BPS_SCORE: 3
BPS_SCORE: 6
BPS_SCORE: 4
BPS_SCORE: 5

## 2020-10-12 ASSESSMENT — PAIN SCALES - WONG BAKER: WONGBAKER_NUMERICALRESPONSE: 7

## 2020-10-13 LAB
ANION GAP SERPL CALC-SCNC: 5 MMOL/L (ref 10–20)
ANION GAP SERPL CALC-SCNC: 7 MMOL/L (ref 10–20)
ANION GAP SERPL CALC-SCNC: 7 MMOL/L (ref 10–20)
ANION GAP SERPL CALC-SCNC: 8 MMOL/L (ref 10–20)
BUN SERPL-MCNC: 14 MG/DL (ref 6–20)
BUN SERPL-MCNC: 15 MG/DL (ref 6–20)
BUN/CREAT SERPL: 58 (ref 7–25)
BUN/CREAT SERPL: 60 (ref 7–25)
BUN/CREAT SERPL: 64 (ref 7–25)
BUN/CREAT SERPL: 83 (ref 7–25)
CALCIUM SERPL-MCNC: 7.8 MG/DL (ref 8.4–10.2)
CALCIUM SERPL-MCNC: 7.9 MG/DL (ref 8.4–10.2)
CALCIUM SERPL-MCNC: 8 MG/DL (ref 8.4–10.2)
CALCIUM SERPL-MCNC: 8.2 MG/DL (ref 8.4–10.2)
CHLORIDE SERPL-SCNC: 97 MMOL/L (ref 98–107)
CHLORIDE SERPL-SCNC: 97 MMOL/L (ref 98–107)
CHLORIDE SERPL-SCNC: 98 MMOL/L (ref 98–107)
CHLORIDE SERPL-SCNC: 99 MMOL/L (ref 98–107)
CO2 SERPL-SCNC: 28 MMOL/L (ref 21–32)
CREAT SERPL-MCNC: 0.18 MG/DL (ref 0.51–0.95)
CREAT SERPL-MCNC: 0.22 MG/DL (ref 0.51–0.95)
CREAT SERPL-MCNC: 0.23 MG/DL (ref 0.51–0.95)
CREAT SERPL-MCNC: 0.24 MG/DL (ref 0.51–0.95)
GLUCOSE BLDC GLUCOMTR-MCNC: 108 MG/DL (ref 70–99)
GLUCOSE BLDC GLUCOMTR-MCNC: 113 MG/DL (ref 70–99)
GLUCOSE BLDC GLUCOMTR-MCNC: 121 MG/DL (ref 70–99)
GLUCOSE SERPL-MCNC: 110 MG/DL (ref 65–99)
GLUCOSE SERPL-MCNC: 111 MG/DL (ref 65–99)
GLUCOSE SERPL-MCNC: 111 MG/DL (ref 65–99)
GLUCOSE SERPL-MCNC: 130 MG/DL (ref 65–99)
POTASSIUM SERPL-SCNC: 4 MMOL/L (ref 3.4–5.1)
POTASSIUM SERPL-SCNC: 4 MMOL/L (ref 3.4–5.1)
POTASSIUM SERPL-SCNC: 4.3 MMOL/L (ref 3.4–5.1)
POTASSIUM SERPL-SCNC: 4.5 MMOL/L (ref 3.4–5.1)
SODIUM SERPL-SCNC: 128 MMOL/L (ref 135–145)
SODIUM SERPL-SCNC: 129 MMOL/L (ref 135–145)

## 2020-10-13 PROCEDURE — 36415 COLL VENOUS BLD VENIPUNCTURE: CPT

## 2020-10-13 PROCEDURE — 10002803 HB RX 637: Performed by: INTERNAL MEDICINE

## 2020-10-13 PROCEDURE — 10004281 HB COUNTER-STAFF TIME PER 15 MIN

## 2020-10-13 PROCEDURE — 13003349 HB PATIENT LVL 4 SUPPLIES IP WND

## 2020-10-13 PROCEDURE — 10002807 HB RX 258: Performed by: INTERNAL MEDICINE

## 2020-10-13 PROCEDURE — 10000002 HB ROOM CHARGE MED SURG

## 2020-10-13 PROCEDURE — 80048 BASIC METABOLIC PNL TOTAL CA: CPT

## 2020-10-13 PROCEDURE — 10002801 HB RX 250 W/O HCPCS: Performed by: INTERNAL MEDICINE

## 2020-10-13 PROCEDURE — 94640 AIRWAY INHALATION TREATMENT: CPT

## 2020-10-13 PROCEDURE — 94003 VENT MGMT INPAT SUBQ DAY: CPT

## 2020-10-13 PROCEDURE — 94667 MNPJ CHEST WALL 1ST: CPT

## 2020-10-13 RX ORDER — SODIUM CHLORIDE 9 MG/ML
INJECTION, SOLUTION INTRAVENOUS CONTINUOUS
Status: DISCONTINUED | OUTPATIENT
Start: 2020-10-13 | End: 2020-10-14

## 2020-10-13 RX ORDER — SODIUM CHLORIDE 1 G/1
1 TABLET ORAL 2 TIMES DAILY WITH MEALS
Status: DISCONTINUED | OUTPATIENT
Start: 2020-10-13 | End: 2020-10-14

## 2020-10-13 RX ADMIN — LEVETIRACETAM 500 MG: 100 SOLUTION ORAL at 23:04

## 2020-10-13 RX ADMIN — LEVETIRACETAM 500 MG: 100 SOLUTION ORAL at 08:31

## 2020-10-13 RX ADMIN — IPRATROPIUM BROMIDE 0.5 MG: 0.5 SOLUTION RESPIRATORY (INHALATION) at 08:00

## 2020-10-13 RX ADMIN — METOPROLOL TARTRATE 37.5 MG: 25 TABLET, FILM COATED ORAL at 08:32

## 2020-10-13 RX ADMIN — RIFAXIMIN 200 MG: 200 TABLET ORAL at 23:04

## 2020-10-13 RX ADMIN — IPRATROPIUM BROMIDE 0.5 MG: 0.5 SOLUTION RESPIRATORY (INHALATION) at 20:05

## 2020-10-13 RX ADMIN — SODIUM CHLORIDE TAB 1 GM 1000 MG: 1 TAB at 18:12

## 2020-10-13 RX ADMIN — IPRATROPIUM BROMIDE 0.5 MG: 0.5 SOLUTION RESPIRATORY (INHALATION) at 16:04

## 2020-10-13 RX ADMIN — RIFAXIMIN 200 MG: 200 TABLET ORAL at 05:10

## 2020-10-13 RX ADMIN — RIFAXIMIN 200 MG: 200 TABLET ORAL at 15:32

## 2020-10-13 RX ADMIN — BACLOFEN 15 MG: 10 TABLET ORAL at 15:32

## 2020-10-13 RX ADMIN — BACLOFEN 15 MG: 10 TABLET ORAL at 05:09

## 2020-10-13 RX ADMIN — GABAPENTIN 100 MG: 100 CAPSULE ORAL at 08:32

## 2020-10-13 RX ADMIN — IPRATROPIUM BROMIDE 0.5 MG: 0.5 SOLUTION RESPIRATORY (INHALATION) at 11:25

## 2020-10-13 RX ADMIN — GABAPENTIN 100 MG: 100 CAPSULE ORAL at 23:04

## 2020-10-13 RX ADMIN — FOLIC ACID 0.5 MG: 1 TABLET ORAL at 08:32

## 2020-10-13 RX ADMIN — LACOSAMIDE 200 MG: 10 SOLUTION ORAL at 08:31

## 2020-10-13 RX ADMIN — PANCRELIPASE 1 CAPSULE: 30000; 6000; 19000 CAPSULE, DELAYED RELEASE PELLETS ORAL at 18:12

## 2020-10-13 RX ADMIN — BACLOFEN 15 MG: 10 TABLET ORAL at 23:04

## 2020-10-13 RX ADMIN — SODIUM CHLORIDE TAB 1 GM 1000 MG: 1 TAB at 08:32

## 2020-10-13 RX ADMIN — PANCRELIPASE 1 CAPSULE: 30000; 6000; 19000 CAPSULE, DELAYED RELEASE PELLETS ORAL at 08:32

## 2020-10-13 RX ADMIN — PANCRELIPASE 1 CAPSULE: 30000; 6000; 19000 CAPSULE, DELAYED RELEASE PELLETS ORAL at 11:25

## 2020-10-13 RX ADMIN — LACOSAMIDE 200 MG: 10 SOLUTION ORAL at 23:05

## 2020-10-13 RX ADMIN — SODIUM CHLORIDE: 0.9 INJECTION, SOLUTION INTRAVENOUS at 11:25

## 2020-10-13 ASSESSMENT — PAIN SCALES - BEHAVIORAL PAIN SCALE (BPS)
BPS_SCORE: 4
BPS_SCORE: 4

## 2020-10-13 ASSESSMENT — PAIN SCALES - WONG BAKER: WONGBAKER_NUMERICALRESPONSE: 0

## 2020-10-14 LAB
ANION GAP SERPL CALC-SCNC: 8 MMOL/L (ref 10–20)
BUN SERPL-MCNC: 14 MG/DL (ref 6–20)
BUN SERPL-MCNC: 15 MG/DL (ref 6–20)
BUN SERPL-MCNC: 16 MG/DL (ref 6–20)
BUN/CREAT SERPL: 65 (ref 7–25)
BUN/CREAT SERPL: 72 (ref 7–25)
BUN/CREAT SERPL: 99 (ref 7–25)
CALCIUM SERPL-MCNC: 7.8 MG/DL (ref 8.4–10.2)
CALCIUM SERPL-MCNC: 8 MG/DL (ref 8.4–10.2)
CALCIUM SERPL-MCNC: 8.3 MG/DL (ref 8.4–10.2)
CHLORIDE SERPL-SCNC: 96 MMOL/L (ref 98–107)
CHLORIDE SERPL-SCNC: 98 MMOL/L (ref 98–107)
CHLORIDE SERPL-SCNC: 99 MMOL/L (ref 98–107)
CO2 SERPL-SCNC: 27 MMOL/L (ref 21–32)
CO2 SERPL-SCNC: 27 MMOL/L (ref 21–32)
CO2 SERPL-SCNC: 28 MMOL/L (ref 21–32)
CREAT SERPL-MCNC: 0.16 MG/DL (ref 0.51–0.95)
CREAT SERPL-MCNC: 0.2 MG/DL (ref 0.51–0.95)
CREAT SERPL-MCNC: 0.23 MG/DL (ref 0.51–0.95)
GLUCOSE BLDC GLUCOMTR-MCNC: 103 MG/DL (ref 70–99)
GLUCOSE BLDC GLUCOMTR-MCNC: 109 MG/DL (ref 70–99)
GLUCOSE BLDC GLUCOMTR-MCNC: 111 MG/DL (ref 70–99)
GLUCOSE BLDC GLUCOMTR-MCNC: 99 MG/DL (ref 70–99)
GLUCOSE SERPL-MCNC: 100 MG/DL (ref 65–99)
GLUCOSE SERPL-MCNC: 107 MG/DL (ref 65–99)
GLUCOSE SERPL-MCNC: 95 MG/DL (ref 65–99)
POTASSIUM SERPL-SCNC: 4.4 MMOL/L (ref 3.4–5.1)
POTASSIUM SERPL-SCNC: 4.5 MMOL/L (ref 3.4–5.1)
POTASSIUM SERPL-SCNC: 4.9 MMOL/L (ref 3.4–5.1)
PROCALCITONIN SERPL IA-MCNC: 0.07 NG/ML
SODIUM SERPL-SCNC: 127 MMOL/L (ref 135–145)
SODIUM SERPL-SCNC: 129 MMOL/L (ref 135–145)
SODIUM SERPL-SCNC: 130 MMOL/L (ref 135–145)

## 2020-10-14 PROCEDURE — 80048 BASIC METABOLIC PNL TOTAL CA: CPT

## 2020-10-14 PROCEDURE — 10002803 HB RX 637: Performed by: INTERNAL MEDICINE

## 2020-10-14 PROCEDURE — 94003 VENT MGMT INPAT SUBQ DAY: CPT

## 2020-10-14 PROCEDURE — 10002801 HB RX 250 W/O HCPCS: Performed by: INTERNAL MEDICINE

## 2020-10-14 PROCEDURE — 94640 AIRWAY INHALATION TREATMENT: CPT

## 2020-10-14 PROCEDURE — 10004281 HB COUNTER-STAFF TIME PER 15 MIN

## 2020-10-14 PROCEDURE — 84145 PROCALCITONIN (PCT): CPT

## 2020-10-14 PROCEDURE — 94667 MNPJ CHEST WALL 1ST: CPT

## 2020-10-14 PROCEDURE — 94668 MNPJ CHEST WALL SBSQ: CPT

## 2020-10-14 PROCEDURE — 36415 COLL VENOUS BLD VENIPUNCTURE: CPT

## 2020-10-14 PROCEDURE — 10000002 HB ROOM CHARGE MED SURG

## 2020-10-14 RX ORDER — METOPROLOL TARTRATE 50 MG/1
50 TABLET, FILM COATED ORAL EVERY 12 HOURS SCHEDULED
Qty: 60 TABLET | Refills: 0 | Status: ON HOLD | OUTPATIENT
Start: 2020-10-14 | End: 2020-11-04

## 2020-10-14 RX ORDER — SODIUM CHLORIDE 1 G/1
2 TABLET ORAL
Qty: 30 TABLET | Refills: 0 | Status: SHIPPED | OUTPATIENT
Start: 2020-10-14 | End: 2020-10-15 | Stop reason: HOSPADM

## 2020-10-14 RX ORDER — METOPROLOL TARTRATE 50 MG/1
50 TABLET, FILM COATED ORAL EVERY 12 HOURS SCHEDULED
Status: DISCONTINUED | OUTPATIENT
Start: 2020-10-14 | End: 2020-10-15 | Stop reason: HOSPADM

## 2020-10-14 RX ORDER — SODIUM CHLORIDE 1 G/1
2 TABLET ORAL
Status: DISCONTINUED | OUTPATIENT
Start: 2020-10-14 | End: 2020-10-14

## 2020-10-14 RX ORDER — SODIUM CHLORIDE 1 G/1
1 TABLET ORAL
Status: DISCONTINUED | OUTPATIENT
Start: 2020-10-14 | End: 2020-10-15 | Stop reason: HOSPADM

## 2020-10-14 RX ADMIN — RIFAXIMIN 200 MG: 200 TABLET ORAL at 05:32

## 2020-10-14 RX ADMIN — IPRATROPIUM BROMIDE 0.5 MG: 0.5 SOLUTION RESPIRATORY (INHALATION) at 15:37

## 2020-10-14 RX ADMIN — METOPROLOL TARTRATE 50 MG: 50 TABLET, FILM COATED ORAL at 20:21

## 2020-10-14 RX ADMIN — BACLOFEN 15 MG: 10 TABLET ORAL at 05:32

## 2020-10-14 RX ADMIN — LACOSAMIDE 200 MG: 10 SOLUTION ORAL at 08:38

## 2020-10-14 RX ADMIN — PANCRELIPASE 1 CAPSULE: 30000; 6000; 19000 CAPSULE, DELAYED RELEASE PELLETS ORAL at 12:57

## 2020-10-14 RX ADMIN — IPRATROPIUM BROMIDE 0.5 MG: 0.5 SOLUTION RESPIRATORY (INHALATION) at 19:54

## 2020-10-14 RX ADMIN — BACLOFEN 15 MG: 10 TABLET ORAL at 21:00

## 2020-10-14 RX ADMIN — RIFAXIMIN 200 MG: 200 TABLET ORAL at 14:38

## 2020-10-14 RX ADMIN — ACETAMINOPHEN 80 MG: 160 SUSPENSION ORAL at 07:23

## 2020-10-14 RX ADMIN — OXYCODONE HYDROCHLORIDE 2.5 MG: 5 TABLET ORAL at 14:37

## 2020-10-14 RX ADMIN — RIFAXIMIN 200 MG: 200 TABLET ORAL at 21:00

## 2020-10-14 RX ADMIN — LEVETIRACETAM 500 MG: 100 SOLUTION ORAL at 08:38

## 2020-10-14 RX ADMIN — SODIUM CHLORIDE TAB 1 GM 2000 MG: 1 TAB at 12:57

## 2020-10-14 RX ADMIN — IPRATROPIUM BROMIDE 0.5 MG: 0.5 SOLUTION RESPIRATORY (INHALATION) at 07:37

## 2020-10-14 RX ADMIN — PANCRELIPASE 1 CAPSULE: 30000; 6000; 19000 CAPSULE, DELAYED RELEASE PELLETS ORAL at 08:38

## 2020-10-14 RX ADMIN — LEVETIRACETAM 500 MG: 100 SOLUTION ORAL at 20:22

## 2020-10-14 RX ADMIN — IPRATROPIUM BROMIDE 0.5 MG: 0.5 SOLUTION RESPIRATORY (INHALATION) at 11:15

## 2020-10-14 RX ADMIN — SODIUM CHLORIDE TAB 1 GM 1000 MG: 1 TAB at 16:04

## 2020-10-14 RX ADMIN — GABAPENTIN 100 MG: 100 CAPSULE ORAL at 20:28

## 2020-10-14 RX ADMIN — GABAPENTIN 100 MG: 100 CAPSULE ORAL at 08:38

## 2020-10-14 RX ADMIN — PANCRELIPASE 1 CAPSULE: 30000; 6000; 19000 CAPSULE, DELAYED RELEASE PELLETS ORAL at 16:04

## 2020-10-14 RX ADMIN — BACLOFEN 15 MG: 10 TABLET ORAL at 14:38

## 2020-10-14 RX ADMIN — LORAZEPAM 0.5 MG: 0.5 TABLET ORAL at 16:04

## 2020-10-14 RX ADMIN — METOPROLOL TARTRATE 37.5 MG: 25 TABLET, FILM COATED ORAL at 08:47

## 2020-10-14 RX ADMIN — LACOSAMIDE 200 MG: 10 SOLUTION ORAL at 20:23

## 2020-10-14 RX ADMIN — FOLIC ACID 0.5 MG: 1 TABLET ORAL at 08:38

## 2020-10-14 ASSESSMENT — PAIN SCALES - BEHAVIORAL PAIN SCALE (BPS)
BPS_SCORE: 4

## 2020-10-15 VITALS
DIASTOLIC BLOOD PRESSURE: 74 MMHG | HEART RATE: 79 BPM | OXYGEN SATURATION: 97 % | SYSTOLIC BLOOD PRESSURE: 130 MMHG | TEMPERATURE: 99.7 F | RESPIRATION RATE: 24 BRPM | WEIGHT: 51.59 LBS | BODY MASS INDEX: 11.99 KG/M2

## 2020-10-15 LAB
ANION GAP SERPL CALC-SCNC: 8 MMOL/L (ref 10–20)
BASOPHILS # BLD: 0 K/MCL (ref 0–0.3)
BASOPHILS NFR BLD: 1 %
BUN SERPL-MCNC: 17 MG/DL (ref 6–20)
BUN/CREAT SERPL: 78 (ref 7–25)
CALCIUM SERPL-MCNC: 8.3 MG/DL (ref 8.4–10.2)
CHLORIDE SERPL-SCNC: 99 MMOL/L (ref 98–107)
CO2 SERPL-SCNC: 28 MMOL/L (ref 21–32)
CREAT SERPL-MCNC: 0.22 MG/DL (ref 0.51–0.95)
DIFFERENTIAL METHOD BLD: ABNORMAL
EOSINOPHIL # BLD: 0.2 K/MCL (ref 0.1–0.5)
EOSINOPHIL NFR BLD: 3 %
ERYTHROCYTE [DISTWIDTH] IN BLOOD: 12.6 % (ref 11–15)
GLUCOSE BLDC GLUCOMTR-MCNC: 104 MG/DL (ref 70–99)
GLUCOSE BLDC GLUCOMTR-MCNC: 117 MG/DL (ref 70–99)
GLUCOSE SERPL-MCNC: 101 MG/DL (ref 65–99)
HCT VFR BLD CALC: 24.6 % (ref 36–46.5)
HGB BLD-MCNC: 8.1 G/DL (ref 12–15.5)
IMM GRANULOCYTES # BLD AUTO: 0 K/MCL (ref 0–0.2)
IMM GRANULOCYTES NFR BLD: 1 %
LYMPHOCYTES # BLD: 1.1 K/MCL (ref 1–4.8)
LYMPHOCYTES NFR BLD: 13 %
MCH RBC QN AUTO: 34.6 PG (ref 26–34)
MCHC RBC AUTO-ENTMCNC: 32.9 G/DL (ref 32–36.5)
MCV RBC AUTO: 105.1 FL (ref 78–100)
MONOCYTES # BLD: 0.7 K/MCL (ref 0.3–0.9)
MONOCYTES NFR BLD: 8 %
NEUTROPHILS # BLD: 6.4 K/MCL (ref 1.8–7.7)
NEUTROPHILS NFR BLD: 74 %
NRBC BLD MANUAL-RTO: 0 /100 WBC
PLATELET # BLD: 203 K/MCL (ref 140–450)
POTASSIUM SERPL-SCNC: 4.6 MMOL/L (ref 3.4–5.1)
RBC # BLD: 2.34 MIL/MCL (ref 4–5.2)
SODIUM SERPL-SCNC: 130 MMOL/L (ref 135–145)
WBC # BLD: 8.6 K/MCL (ref 4.2–11)

## 2020-10-15 PROCEDURE — 10002803 HB RX 637: Performed by: INTERNAL MEDICINE

## 2020-10-15 PROCEDURE — 10002801 HB RX 250 W/O HCPCS: Performed by: INTERNAL MEDICINE

## 2020-10-15 PROCEDURE — 36415 COLL VENOUS BLD VENIPUNCTURE: CPT

## 2020-10-15 PROCEDURE — 85025 COMPLETE CBC W/AUTO DIFF WBC: CPT

## 2020-10-15 PROCEDURE — 94640 AIRWAY INHALATION TREATMENT: CPT

## 2020-10-15 PROCEDURE — 94003 VENT MGMT INPAT SUBQ DAY: CPT

## 2020-10-15 PROCEDURE — 94667 MNPJ CHEST WALL 1ST: CPT

## 2020-10-15 PROCEDURE — 80048 BASIC METABOLIC PNL TOTAL CA: CPT

## 2020-10-15 RX ORDER — SODIUM CHLORIDE 1 G/1
1 TABLET ORAL
Qty: 30 TABLET | Refills: 0 | Status: ON HOLD | OUTPATIENT
Start: 2020-10-15 | End: 2020-11-11 | Stop reason: SDUPTHER

## 2020-10-15 RX ADMIN — RIFAXIMIN 200 MG: 200 TABLET ORAL at 05:16

## 2020-10-15 RX ADMIN — LEVETIRACETAM 500 MG: 100 SOLUTION ORAL at 10:00

## 2020-10-15 RX ADMIN — IPRATROPIUM BROMIDE 0.5 MG: 0.5 SOLUTION RESPIRATORY (INHALATION) at 07:40

## 2020-10-15 RX ADMIN — FOLIC ACID 0.5 MG: 1 TABLET ORAL at 09:59

## 2020-10-15 RX ADMIN — METOPROLOL TARTRATE 50 MG: 50 TABLET, FILM COATED ORAL at 10:00

## 2020-10-15 RX ADMIN — LORAZEPAM 0.5 MG: 0.5 TABLET ORAL at 10:00

## 2020-10-15 RX ADMIN — GABAPENTIN 100 MG: 100 CAPSULE ORAL at 09:59

## 2020-10-15 RX ADMIN — PANCRELIPASE 1 CAPSULE: 30000; 6000; 19000 CAPSULE, DELAYED RELEASE PELLETS ORAL at 10:00

## 2020-10-15 RX ADMIN — SODIUM CHLORIDE TAB 1 GM 1000 MG: 1 TAB at 08:00

## 2020-10-15 RX ADMIN — LACOSAMIDE 200 MG: 10 SOLUTION ORAL at 10:00

## 2020-10-15 RX ADMIN — OXYCODONE HYDROCHLORIDE 2.5 MG: 5 TABLET ORAL at 05:29

## 2020-10-15 RX ADMIN — OXYCODONE HYDROCHLORIDE 2.5 MG: 5 TABLET ORAL at 10:00

## 2020-10-15 RX ADMIN — BACLOFEN 15 MG: 10 TABLET ORAL at 05:16

## 2020-10-15 ASSESSMENT — PAIN SCALES - BEHAVIORAL PAIN SCALE (BPS)
BPS_SCORE: 6
BPS_SCORE: 6

## 2020-10-15 ASSESSMENT — PAIN SCALES - WONG BAKER: WONGBAKER_NUMERICALRESPONSE: 0

## 2020-10-19 DIAGNOSIS — A41.9 SEPSIS, DUE TO UNSPECIFIED ORGANISM, UNSPECIFIED WHETHER ACUTE ORGAN DYSFUNCTION PRESENT (CMD): ICD-10-CM

## 2020-10-30 LAB — SARS-COV-2 RNA SPEC QL NAA+PROBE: NEGATIVE

## 2020-11-03 ENCOUNTER — APPOINTMENT (OUTPATIENT)
Dept: GENERAL RADIOLOGY | Age: 30
DRG: 720 | End: 2020-11-03
Attending: INTERNAL MEDICINE

## 2020-11-03 ENCOUNTER — HOSPITAL ENCOUNTER (INPATIENT)
Age: 30
LOS: 8 days | Discharge: HOME | DRG: 720 | End: 2020-11-12
Attending: EMERGENCY MEDICINE | Admitting: INTERNAL MEDICINE

## 2020-11-03 DIAGNOSIS — J18.9 PNEUMONIA DUE TO INFECTIOUS ORGANISM, UNSPECIFIED LATERALITY, UNSPECIFIED PART OF LUNG: Primary | ICD-10-CM

## 2020-11-03 DIAGNOSIS — J96.10 CHRONIC RESPIRATORY FAILURE, UNSPECIFIED WHETHER WITH HYPOXIA OR HYPERCAPNIA (CMD): ICD-10-CM

## 2020-11-03 LAB
ALBUMIN SERPL-MCNC: 1.8 G/DL (ref 3.6–5.1)
ALBUMIN/GLOB SERPL: 0.3 {RATIO} (ref 1–2.4)
ALP SERPL-CCNC: 183 UNITS/L (ref 45–117)
ALT SERPL-CCNC: 55 UNITS/L
ANION GAP SERPL CALC-SCNC: 9 MMOL/L (ref 10–20)
APPEARANCE UR: CLEAR
AST SERPL-CCNC: 57 UNITS/L
BACTERIA #/AREA URNS HPF: ABNORMAL /HPF
BASOPHILS # BLD: 0 K/MCL (ref 0–0.3)
BASOPHILS NFR BLD: 0 %
BILIRUB SERPL-MCNC: 0.5 MG/DL (ref 0.2–1)
BILIRUB UR QL STRIP: NEGATIVE
BUN SERPL-MCNC: 30 MG/DL (ref 6–20)
BUN/CREAT SERPL: 110 (ref 7–25)
CALCIUM SERPL-MCNC: 7.1 MG/DL (ref 8.4–10.2)
CHLORIDE SERPL-SCNC: 111 MMOL/L (ref 98–107)
CO2 SERPL-SCNC: 25 MMOL/L (ref 21–32)
COLOR UR: YELLOW
CREAT SERPL-MCNC: 0.27 MG/DL (ref 0.51–0.95)
D DIMER PPP FEU-MCNC: 1.44 MG/L (FEU)
DIFFERENTIAL METHOD BLD: ABNORMAL
EOSINOPHIL # BLD: 0.1 K/MCL (ref 0.1–0.5)
EOSINOPHIL NFR BLD: 1 %
ERYTHROCYTE [DISTWIDTH] IN BLOOD: 13.8 % (ref 11–15)
GLOBULIN SER-MCNC: 5.7 G/DL (ref 2–4)
GLUCOSE SERPL-MCNC: 74 MG/DL (ref 65–99)
GLUCOSE UR STRIP-MCNC: NEGATIVE MG/DL
HCT VFR BLD CALC: 28.5 % (ref 36–46.5)
HGB BLD-MCNC: 9.4 G/DL (ref 12–15.5)
HGB UR QL STRIP: NEGATIVE
HYALINE CASTS #/AREA URNS LPF: ABNORMAL /LPF (ref 0–5)
IMM GRANULOCYTES # BLD AUTO: 0 K/MCL (ref 0–0.2)
IMM GRANULOCYTES NFR BLD: 0 %
KETONES UR STRIP-MCNC: NEGATIVE MG/DL
LEUKOCYTE ESTERASE UR QL STRIP: ABNORMAL
LYMPHOCYTES # BLD: 0.8 K/MCL (ref 1–4.8)
LYMPHOCYTES NFR BLD: 7 %
MAGNESIUM SERPL-MCNC: 1.7 MG/DL (ref 1.7–2.4)
MCH RBC QN AUTO: 35.5 PG (ref 26–34)
MCHC RBC AUTO-ENTMCNC: 33 G/DL (ref 32–36.5)
MCV RBC AUTO: 107.5 FL (ref 78–100)
MONOCYTES # BLD: 0.6 K/MCL (ref 0.3–0.9)
MONOCYTES NFR BLD: 5 %
MUCOUS THREADS URNS QL MICRO: PRESENT
NEUTROPHILS # BLD: 9.7 K/MCL (ref 1.8–7.7)
NEUTROPHILS NFR BLD: 87 %
NITRITE UR QL STRIP: NEGATIVE
NRBC BLD MANUAL-RTO: 0 /100 WBC
NT-PROBNP SERPL-MCNC: 415 PG/ML
PH UR STRIP: 5 UNITS (ref 5–7)
PLATELET # BLD: 236 K/MCL (ref 140–450)
POTASSIUM SERPL-SCNC: 4.7 MMOL/L (ref 3.4–5.1)
PROT SERPL-MCNC: 7.5 G/DL (ref 6.4–8.2)
PROT UR STRIP-MCNC: 100 MG/DL
RBC # BLD: 2.65 MIL/MCL (ref 4–5.2)
RBC #/AREA URNS HPF: ABNORMAL /HPF (ref 0–2)
SODIUM SERPL-SCNC: 140 MMOL/L (ref 135–145)
SP GR UR STRIP: 1.03 (ref 1–1.03)
SPECIMEN SOURCE: ABNORMAL
SQUAMOUS #/AREA URNS HPF: ABNORMAL /HPF (ref 0–5)
TROPONIN I SERPL HS-MCNC: <0.02 NG/ML
UROBILINOGEN UR STRIP-MCNC: 0.2 MG/DL (ref 0–1)
WBC # BLD: 11.3 K/MCL (ref 4.2–11)
WBC #/AREA URNS HPF: ABNORMAL /HPF (ref 0–5)

## 2020-11-03 PROCEDURE — 83880 ASSAY OF NATRIURETIC PEPTIDE: CPT

## 2020-11-03 PROCEDURE — X1094 NO CHARGE VISIT: HCPCS | Performed by: EMERGENCY MEDICINE

## 2020-11-03 PROCEDURE — 99285 EMERGENCY DEPT VISIT HI MDM: CPT | Performed by: EMERGENCY MEDICINE

## 2020-11-03 PROCEDURE — 99285 EMERGENCY DEPT VISIT HI MDM: CPT

## 2020-11-03 PROCEDURE — 10006027 HB SUPPLY 278

## 2020-11-03 PROCEDURE — 80053 COMPREHEN METABOLIC PANEL: CPT

## 2020-11-03 PROCEDURE — 87086 URINE CULTURE/COLONY COUNT: CPT

## 2020-11-03 PROCEDURE — 10004281 HB COUNTER-STAFF TIME PER 15 MIN

## 2020-11-03 PROCEDURE — 96360 HYDRATION IV INFUSION INIT: CPT

## 2020-11-03 PROCEDURE — 81001 URINALYSIS AUTO W/SCOPE: CPT

## 2020-11-03 PROCEDURE — 10002807 HB RX 258: Performed by: INTERNAL MEDICINE

## 2020-11-03 PROCEDURE — 94002 VENT MGMT INPAT INIT DAY: CPT

## 2020-11-03 PROCEDURE — 83735 ASSAY OF MAGNESIUM: CPT

## 2020-11-03 PROCEDURE — 84484 ASSAY OF TROPONIN QUANT: CPT

## 2020-11-03 PROCEDURE — 71045 X-RAY EXAM CHEST 1 VIEW: CPT

## 2020-11-03 PROCEDURE — 85025 COMPLETE CBC W/AUTO DIFF WBC: CPT

## 2020-11-03 PROCEDURE — 93005 ELECTROCARDIOGRAM TRACING: CPT | Performed by: INTERNAL MEDICINE

## 2020-11-03 PROCEDURE — 85379 FIBRIN DEGRADATION QUANT: CPT

## 2020-11-03 RX ADMIN — SODIUM CHLORIDE 1000 ML: 9 INJECTION, SOLUTION INTRAVENOUS at 18:41

## 2020-11-04 ENCOUNTER — APPOINTMENT (OUTPATIENT)
Dept: CT IMAGING | Age: 30
DRG: 720 | End: 2020-11-04
Attending: INTERNAL MEDICINE

## 2020-11-04 LAB
ATRIAL RATE (BPM): 133
ATRIAL RATE (BPM): 97
GLUCOSE BLDC GLUCOMTR-MCNC: 83 MG/DL (ref 70–99)
GLUCOSE BLDC GLUCOMTR-MCNC: 86 MG/DL (ref 70–99)
P AXIS (DEGREES): 74
P AXIS (DEGREES): 76
PR-INTERVAL (MSEC): 148
PR-INTERVAL (MSEC): 161
PROCALCITONIN SERPL IA-MCNC: 0.77 NG/ML
QRS-INTERVAL (MSEC): 71
QRS-INTERVAL (MSEC): 79
QT-INTERVAL (MSEC): 290
QT-INTERVAL (MSEC): 354
QTC: 432
QTC: 448
R AXIS (DEGREES): 83
R AXIS (DEGREES): 84
REPORT TEXT: NORMAL
REPORT TEXT: NORMAL
SARS-COV-2 RNA RESP QL NAA+PROBE: NOT DETECTED
SERVICE CMNT-IMP: NORMAL
SPECIMEN SOURCE: NORMAL
T AXIS (DEGREES): -12
T AXIS (DEGREES): 77
VENTRICULAR RATE EKG/MIN (BPM): 133
VENTRICULAR RATE EKG/MIN (BPM): 96

## 2020-11-04 PROCEDURE — 94003 VENT MGMT INPAT SUBQ DAY: CPT

## 2020-11-04 PROCEDURE — 13003350 HB PATIENT LVL 5 SUPPLIES IP WND

## 2020-11-04 PROCEDURE — 36415 COLL VENOUS BLD VENIPUNCTURE: CPT

## 2020-11-04 PROCEDURE — 10004651 HB RX, NO CHARGE ITEM: Performed by: INTERNAL MEDICINE

## 2020-11-04 PROCEDURE — 71275 CT ANGIOGRAPHY CHEST: CPT

## 2020-11-04 PROCEDURE — 84145 PROCALCITONIN (PCT): CPT

## 2020-11-04 PROCEDURE — 93005 ELECTROCARDIOGRAM TRACING: CPT | Performed by: INTERNAL MEDICINE

## 2020-11-04 PROCEDURE — 87205 SMEAR GRAM STAIN: CPT

## 2020-11-04 PROCEDURE — 10002803 HB RX 637: Performed by: EMERGENCY MEDICINE

## 2020-11-04 PROCEDURE — 10002800 HB RX 250 W HCPCS: Performed by: INTERNAL MEDICINE

## 2020-11-04 PROCEDURE — 10002800 HB RX 250 W HCPCS: Performed by: EMERGENCY MEDICINE

## 2020-11-04 PROCEDURE — 5A1955Z RESPIRATORY VENTILATION, GREATER THAN 96 CONSECUTIVE HOURS: ICD-10-PCS | Performed by: INTERNAL MEDICINE

## 2020-11-04 PROCEDURE — U0003 INFECTIOUS AGENT DETECTION BY NUCLEIC ACID (DNA OR RNA); SEVERE ACUTE RESPIRATORY SYNDROME CORONAVIRUS 2 (SARS-COV-2) (CORONAVIRUS DISEASE [COVID-19]), AMPLIFIED PROBE TECHNIQUE, MAKING USE OF HIGH THROUGHPUT TECHNOLOGIES AS DESCRIBED BY CMS-2020-01-R: HCPCS

## 2020-11-04 PROCEDURE — 87147 CULTURE TYPE IMMUNOLOGIC: CPT

## 2020-11-04 PROCEDURE — 10000008 HB ROOM CHARGE ICU OR CCU

## 2020-11-04 PROCEDURE — 82962 GLUCOSE BLOOD TEST: CPT

## 2020-11-04 PROCEDURE — 87040 BLOOD CULTURE FOR BACTERIA: CPT

## 2020-11-04 PROCEDURE — 10002803 HB RX 637

## 2020-11-04 PROCEDURE — 87077 CULTURE AEROBIC IDENTIFY: CPT

## 2020-11-04 PROCEDURE — 87181 SC STD AGAR DILUTION PER AGT: CPT

## 2020-11-04 PROCEDURE — 87186 SC STD MICRODIL/AGAR DIL: CPT

## 2020-11-04 PROCEDURE — 10002807 HB RX 258: Performed by: INTERNAL MEDICINE

## 2020-11-04 PROCEDURE — 10002803 HB RX 637: Performed by: INTERNAL MEDICINE

## 2020-11-04 PROCEDURE — 10004281 HB COUNTER-STAFF TIME PER 15 MIN

## 2020-11-04 PROCEDURE — 10002805 HB CONTRAST AGENT: Performed by: INTERNAL MEDICINE

## 2020-11-04 RX ORDER — BACLOFEN 10 MG/1
15 TABLET ORAL EVERY 8 HOURS SCHEDULED
Status: DISCONTINUED | OUTPATIENT
Start: 2020-11-04 | End: 2020-11-12 | Stop reason: HOSPADM

## 2020-11-04 RX ORDER — LORAZEPAM 2 MG/ML
1 INJECTION INTRAMUSCULAR ONCE
Status: COMPLETED | OUTPATIENT
Start: 2020-11-04 | End: 2020-11-04

## 2020-11-04 RX ORDER — SODIUM CHLORIDE 1 G/1
1 TABLET ORAL
Status: DISCONTINUED | OUTPATIENT
Start: 2020-11-04 | End: 2020-11-12 | Stop reason: HOSPADM

## 2020-11-04 RX ORDER — BACLOFEN 10 MG/1
15 TABLET ORAL 3 TIMES DAILY
Status: DISCONTINUED | OUTPATIENT
Start: 2020-11-04 | End: 2020-11-04

## 2020-11-04 RX ORDER — ACETAMINOPHEN 325 MG/1
325 TABLET ORAL EVERY 6 HOURS PRN
Status: DISCONTINUED | OUTPATIENT
Start: 2020-11-04 | End: 2020-11-12 | Stop reason: HOSPADM

## 2020-11-04 RX ORDER — FOLIC ACID 1 MG/1
0.5 TABLET ORAL DAILY
Status: DISCONTINUED | OUTPATIENT
Start: 2020-11-04 | End: 2020-11-12 | Stop reason: HOSPADM

## 2020-11-04 RX ORDER — ACETAMINOPHEN 650 MG/1
SUPPOSITORY RECTAL
Status: COMPLETED
Start: 2020-11-04 | End: 2020-11-04

## 2020-11-04 RX ORDER — CHOLECALCIFEROL (VITAMIN D3) 125 MCG
250 CAPSULE ORAL DAILY
Status: DISCONTINUED | OUTPATIENT
Start: 2020-11-04 | End: 2020-11-12 | Stop reason: HOSPADM

## 2020-11-04 RX ORDER — ERGOCALCIFEROL 1.25 MG/1
1.25 CAPSULE ORAL
Status: ON HOLD | COMMUNITY
End: 2020-11-11 | Stop reason: SDUPTHER

## 2020-11-04 RX ORDER — ENOXAPARIN SODIUM 300 MG/3ML
0.5 INJECTION INTRAVENOUS; SUBCUTANEOUS EVERY 12 HOURS SCHEDULED
Status: DISCONTINUED | OUTPATIENT
Start: 2020-11-04 | End: 2020-11-12 | Stop reason: HOSPADM

## 2020-11-04 RX ORDER — LORAZEPAM 0.5 MG/1
0.5 TABLET ORAL EVERY 8 HOURS PRN
Status: DISCONTINUED | OUTPATIENT
Start: 2020-11-04 | End: 2020-11-12 | Stop reason: HOSPADM

## 2020-11-04 RX ORDER — MIDODRINE HYDROCHLORIDE 5 MG/1
5 TABLET ORAL EVERY 8 HOURS
Status: ON HOLD | COMMUNITY
End: 2020-11-11 | Stop reason: SDUPTHER

## 2020-11-04 RX ORDER — GABAPENTIN 250 MG/5ML
100 SOLUTION ORAL 2 TIMES DAILY
Status: DISCONTINUED | OUTPATIENT
Start: 2020-11-04 | End: 2020-11-12 | Stop reason: HOSPADM

## 2020-11-04 RX ORDER — ASCORBIC ACID 500 MG
500 TABLET ORAL DAILY
Status: DISCONTINUED | OUTPATIENT
Start: 2020-11-04 | End: 2020-11-12 | Stop reason: HOSPADM

## 2020-11-04 RX ORDER — LACOSAMIDE 200 MG/1
200 TABLET ORAL EVERY 12 HOURS SCHEDULED
Status: DISCONTINUED | OUTPATIENT
Start: 2020-11-04 | End: 2020-11-12 | Stop reason: HOSPADM

## 2020-11-04 RX ORDER — LEVETIRACETAM 100 MG/ML
500 SOLUTION ORAL EVERY 12 HOURS SCHEDULED
Status: DISCONTINUED | OUTPATIENT
Start: 2020-11-04 | End: 2020-11-12 | Stop reason: HOSPADM

## 2020-11-04 RX ORDER — OXYCODONE HYDROCHLORIDE 5 MG/1
2.5 TABLET ORAL EVERY 12 HOURS PRN
Status: DISCONTINUED | OUTPATIENT
Start: 2020-11-04 | End: 2020-11-12 | Stop reason: HOSPADM

## 2020-11-04 RX ORDER — ACETAMINOPHEN 650 MG/1
650 SUPPOSITORY RECTAL ONCE
Status: COMPLETED | OUTPATIENT
Start: 2020-11-04 | End: 2020-11-04

## 2020-11-04 RX ADMIN — SODIUM CHLORIDE TAB 1 GM 1000 MG: 1 TAB at 10:57

## 2020-11-04 RX ADMIN — PANCRELIPASE 1 CAPSULE: 30000; 6000; 19000 CAPSULE, DELAYED RELEASE PELLETS ORAL at 10:54

## 2020-11-04 RX ADMIN — ACETAMINOPHEN 650 MG: 650 SUPPOSITORY RECTAL at 02:43

## 2020-11-04 RX ADMIN — PANCRELIPASE 1 CAPSULE: 30000; 6000; 19000 CAPSULE, DELAYED RELEASE PELLETS ORAL at 14:10

## 2020-11-04 RX ADMIN — MINERAL SUPPLEMENT IRON 300 MG / 5 ML STRENGTH LIQUID 100 PER BOX UNFLAVORED 300 MG: at 10:54

## 2020-11-04 RX ADMIN — VANCOMYCIN HYDROCHLORIDE 500 MG: 500 INJECTION, POWDER, LYOPHILIZED, FOR SOLUTION INTRAVENOUS at 04:01

## 2020-11-04 RX ADMIN — GABAPENTIN 100 MG: 250 SOLUTION ORAL at 11:05

## 2020-11-04 RX ADMIN — ENOXAPARIN SODIUM 10 MG: 300 INJECTION INTRAVENOUS; SUBCUTANEOUS at 21:00

## 2020-11-04 RX ADMIN — VANCOMYCIN HYDROCHLORIDE 250 MG: 500 INJECTION, POWDER, LYOPHILIZED, FOR SOLUTION INTRAVENOUS at 23:53

## 2020-11-04 RX ADMIN — Medication 220 MG: at 10:53

## 2020-11-04 RX ADMIN — METOPROLOL TARTRATE 25 MG: 25 TABLET, FILM COATED ORAL at 10:53

## 2020-11-04 RX ADMIN — LEVETIRACETAM 500 MG: 100 SOLUTION ORAL at 10:55

## 2020-11-04 RX ADMIN — LORAZEPAM 1 MG: 2 INJECTION INTRAMUSCULAR; INTRAVENOUS at 03:49

## 2020-11-04 RX ADMIN — PANCRELIPASE 1 CAPSULE: 30000; 6000; 19000 CAPSULE, DELAYED RELEASE PELLETS ORAL at 17:37

## 2020-11-04 RX ADMIN — LEVETIRACETAM 500 MG: 100 SOLUTION ORAL at 20:57

## 2020-11-04 RX ADMIN — CEFEPIME HYDROCHLORIDE 1000 MG: 1 INJECTION, POWDER, FOR SOLUTION INTRAMUSCULAR; INTRAVENOUS at 15:53

## 2020-11-04 RX ADMIN — CEFEPIME HYDROCHLORIDE 1000 MG: 1 INJECTION, POWDER, FOR SOLUTION INTRAMUSCULAR; INTRAVENOUS at 03:30

## 2020-11-04 RX ADMIN — FOLIC ACID 0.5 MG: 1 TABLET ORAL at 10:52

## 2020-11-04 RX ADMIN — IOHEXOL 100 ML: 350 INJECTION, SOLUTION INTRAVENOUS at 01:45

## 2020-11-04 RX ADMIN — SODIUM CHLORIDE TAB 1 GM 1000 MG: 1 TAB at 17:36

## 2020-11-04 RX ADMIN — GABAPENTIN 100 MG: 250 SOLUTION ORAL at 21:17

## 2020-11-04 RX ADMIN — ENOXAPARIN SODIUM 10 MG: 300 INJECTION INTRAVENOUS; SUBCUTANEOUS at 10:53

## 2020-11-04 RX ADMIN — METOPROLOL TARTRATE 25 MG: 25 TABLET, FILM COATED ORAL at 20:59

## 2020-11-04 RX ADMIN — ACETAMINOPHEN 325 MG: 325 TABLET, FILM COATED ORAL at 20:57

## 2020-11-04 RX ADMIN — BACLOFEN 15 MG: 10 TABLET ORAL at 20:55

## 2020-11-04 RX ADMIN — OXYCODONE HYDROCHLORIDE AND ACETAMINOPHEN 500 MG: 500 TABLET ORAL at 10:53

## 2020-11-04 RX ADMIN — Medication 250 MCG: at 11:08

## 2020-11-04 RX ADMIN — LACOSAMIDE 200 MG: 200 TABLET, FILM COATED ORAL at 21:00

## 2020-11-04 RX ADMIN — PANTOPRAZOLE 40 MG: 40 TABLET, DELAYED RELEASE ORAL at 10:54

## 2020-11-04 RX ADMIN — LACOSAMIDE 200 MG: 200 TABLET, FILM COATED ORAL at 10:56

## 2020-11-04 RX ADMIN — BACLOFEN 15 MG: 10 TABLET ORAL at 05:15

## 2020-11-04 RX ADMIN — CEFEPIME HYDROCHLORIDE 1000 MG: 1 INJECTION, POWDER, FOR SOLUTION INTRAMUSCULAR; INTRAVENOUS at 11:10

## 2020-11-04 RX ADMIN — BACLOFEN 15 MG: 10 TABLET ORAL at 14:10

## 2020-11-04 SDOH — HEALTH STABILITY: MENTAL HEALTH: HOW OFTEN DO YOU HAVE A DRINK CONTAINING ALCOHOL?: NEVER

## 2020-11-04 ASSESSMENT — PAIN SCALES - BEHAVIORAL PAIN SCALE (BPS)
BPS_SCORE: 5
BPS_SCORE: 5
BPS_SCORE: 9
BPS_SCORE: 5

## 2020-11-04 ASSESSMENT — ACTIVITIES OF DAILY LIVING (ADL)
DRESSING YOURSELF: DEPENDENT
CHRONIC_PAIN_PRESENT: UNABLE TO ASSESS
RECENT_DECLINE_ADL: NO
TOILETING: DEPENDENT
ADL_BEFORE_ADMISSION: NEEDS/REQUIRES ASSISTANCE
ADL_SHORT_OF_BREATH: NO
FEEDING YOURSELF: DEPENDENT
ADL_SCORE: 0
CONTINENCE: DEPENDENT
BATHING: DEPENDENT
TRANSFERRING: DEPENDENT

## 2020-11-04 ASSESSMENT — COGNITIVE AND FUNCTIONAL STATUS - GENERAL
BECAUSE OF A PHYSICAL, MENTAL, OR EMOTIONAL CONDITION, DO YOU HAVE SERIOUS DIFFICULTY CONCENTRATING, REMEMBERING OR MAKING DECISIONS: YES
DO YOU HAVE DIFFICULTY DRESSING OR BATHING: YES
ARE YOU DEAF OR DO YOU HAVE SERIOUS DIFFICULTY  HEARING: NO
ARE YOU BLIND OR DO YOU HAVE SERIOUS DIFFICULTY SEEING, EVEN WHEN WEARING GLASSES: NO
BECAUSE OF A PHYSICAL, MENTAL, OR EMOTIONAL CONDITION, DO YOU HAVE DIFFICULTY DOING ERRANDS ALONE: YES
DO YOU HAVE SERIOUS DIFFICULTY WALKING OR CLIMBING STAIRS: YES

## 2020-11-04 ASSESSMENT — LIFESTYLE VARIABLES
HOW OFTEN DO YOU HAVE 6 OR MORE DRINKS ON ONE OCCASION: NEVER
AUDIT-C TOTAL SCORE: 0
ALCOHOL_USE_STATUS: NO OR LOW RISK WITH VALIDATED TOOL
HOW OFTEN DO YOU HAVE A DRINK CONTAINING ALCOHOL: NEVER
HOW MANY STANDARD DRINKS CONTAINING ALCOHOL DO YOU HAVE ON A TYPICAL DAY: 0,1 OR 2

## 2020-11-04 ASSESSMENT — PATIENT HEALTH QUESTIONNAIRE - PHQ9: IS PATIENT ABLE TO COMPLETE PHQ2 OR PHQ9: NO, PATIENT WILL NEVER BE ABLE TO COMPLETE

## 2020-11-05 LAB
ANION GAP SERPL CALC-SCNC: 7 MMOL/L (ref 10–20)
BACTERIA SPEC RESP CULT: NORMAL
BACTERIA UR CULT: NORMAL
BASOPHILS # BLD: 0 K/MCL (ref 0–0.3)
BASOPHILS NFR BLD: 0 %
BUN SERPL-MCNC: 21 MG/DL (ref 6–20)
BUN/CREAT SERPL: 87 (ref 7–25)
CALCIUM SERPL-MCNC: 7.8 MG/DL (ref 8.4–10.2)
CHLORIDE SERPL-SCNC: 108 MMOL/L (ref 98–107)
CO2 SERPL-SCNC: 30 MMOL/L (ref 21–32)
CREAT SERPL-MCNC: 0.24 MG/DL (ref 0.51–0.95)
DIFFERENTIAL METHOD BLD: ABNORMAL
EOSINOPHIL # BLD: 0.1 K/MCL (ref 0.1–0.5)
EOSINOPHIL NFR BLD: 2 %
ERYTHROCYTE [DISTWIDTH] IN BLOOD: 13.2 % (ref 11–15)
GLUCOSE SERPL-MCNC: 119 MG/DL (ref 65–99)
GRAM STN SPEC: NORMAL
HCT VFR BLD CALC: 23 % (ref 36–46.5)
HGB BLD-MCNC: 7.5 G/DL (ref 12–15.5)
IMM GRANULOCYTES # BLD AUTO: 0 K/MCL (ref 0–0.2)
IMM GRANULOCYTES NFR BLD: 0 %
LYMPHOCYTES # BLD: 1.5 K/MCL (ref 1–4.8)
LYMPHOCYTES NFR BLD: 18 %
MCH RBC QN AUTO: 34.6 PG (ref 26–34)
MCHC RBC AUTO-ENTMCNC: 32.6 G/DL (ref 32–36.5)
MCV RBC AUTO: 106 FL (ref 78–100)
MONOCYTES # BLD: 0.7 K/MCL (ref 0.3–0.9)
MONOCYTES NFR BLD: 9 %
NEUTROPHILS # BLD: 6 K/MCL (ref 1.8–7.7)
NEUTROPHILS NFR BLD: 71 %
NRBC BLD MANUAL-RTO: 0 /100 WBC
PLATELET # BLD: 169 K/MCL (ref 140–450)
POTASSIUM SERPL-SCNC: 3.7 MMOL/L (ref 3.4–5.1)
RBC # BLD: 2.17 MIL/MCL (ref 4–5.2)
REPORT STATUS (RPT): NORMAL
REPORT STATUS (RPT): NORMAL
SODIUM SERPL-SCNC: 141 MMOL/L (ref 135–145)
SPECIMEN SOURCE: NORMAL
SPECIMEN SOURCE: NORMAL
WBC # BLD: 8.4 K/MCL (ref 4.2–11)

## 2020-11-05 PROCEDURE — 10000008 HB ROOM CHARGE ICU OR CCU

## 2020-11-05 PROCEDURE — 10002800 HB RX 250 W HCPCS: Performed by: INTERNAL MEDICINE

## 2020-11-05 PROCEDURE — 10002807 HB RX 258: Performed by: INTERNAL MEDICINE

## 2020-11-05 PROCEDURE — 36415 COLL VENOUS BLD VENIPUNCTURE: CPT

## 2020-11-05 PROCEDURE — 10004281 HB COUNTER-STAFF TIME PER 15 MIN

## 2020-11-05 PROCEDURE — 10002803 HB RX 637: Performed by: INTERNAL MEDICINE

## 2020-11-05 PROCEDURE — 94003 VENT MGMT INPAT SUBQ DAY: CPT

## 2020-11-05 PROCEDURE — 85025 COMPLETE CBC W/AUTO DIFF WBC: CPT

## 2020-11-05 PROCEDURE — 80048 BASIC METABOLIC PNL TOTAL CA: CPT

## 2020-11-05 PROCEDURE — 10004651 HB RX, NO CHARGE ITEM: Performed by: INTERNAL MEDICINE

## 2020-11-05 RX ORDER — LINEZOLID 2 MG/ML
600 INJECTION, SOLUTION INTRAVENOUS EVERY 12 HOURS SCHEDULED
Status: DISCONTINUED | OUTPATIENT
Start: 2020-11-05 | End: 2020-11-12 | Stop reason: HOSPADM

## 2020-11-05 RX ADMIN — LEVETIRACETAM 500 MG: 100 SOLUTION ORAL at 09:13

## 2020-11-05 RX ADMIN — CEFTOLOZANE AND TAZOBACTAM 1500 MG: 1; .5 INJECTION, POWDER, LYOPHILIZED, FOR SOLUTION INTRAVENOUS at 21:17

## 2020-11-05 RX ADMIN — METOPROLOL TARTRATE 25 MG: 25 TABLET, FILM COATED ORAL at 21:03

## 2020-11-05 RX ADMIN — LACOSAMIDE 200 MG: 200 TABLET, FILM COATED ORAL at 09:28

## 2020-11-05 RX ADMIN — LACOSAMIDE 200 MG: 200 TABLET, FILM COATED ORAL at 21:03

## 2020-11-05 RX ADMIN — ACETAMINOPHEN 325 MG: 325 TABLET, FILM COATED ORAL at 05:44

## 2020-11-05 RX ADMIN — VANCOMYCIN HYDROCHLORIDE 250 MG: 500 INJECTION, POWDER, LYOPHILIZED, FOR SOLUTION INTRAVENOUS at 11:00

## 2020-11-05 RX ADMIN — FOLIC ACID 0.5 MG: 1 TABLET ORAL at 09:05

## 2020-11-05 RX ADMIN — MINERAL SUPPLEMENT IRON 300 MG / 5 ML STRENGTH LIQUID 100 PER BOX UNFLAVORED 300 MG: at 09:13

## 2020-11-05 RX ADMIN — ENOXAPARIN SODIUM 10 MG: 300 INJECTION INTRAVENOUS; SUBCUTANEOUS at 21:18

## 2020-11-05 RX ADMIN — OXYCODONE HYDROCHLORIDE 2.5 MG: 5 TABLET ORAL at 05:44

## 2020-11-05 RX ADMIN — SODIUM CHLORIDE TAB 1 GM 1000 MG: 1 TAB at 08:17

## 2020-11-05 RX ADMIN — PANCRELIPASE 1 CAPSULE: 30000; 6000; 19000 CAPSULE, DELAYED RELEASE PELLETS ORAL at 11:57

## 2020-11-05 RX ADMIN — Medication 250 MCG: at 09:13

## 2020-11-05 RX ADMIN — CEFTOLOZANE AND TAZOBACTAM 1500 MG: 1; .5 INJECTION, POWDER, LYOPHILIZED, FOR SOLUTION INTRAVENOUS at 12:04

## 2020-11-05 RX ADMIN — ENOXAPARIN SODIUM 10 MG: 300 INJECTION INTRAVENOUS; SUBCUTANEOUS at 09:08

## 2020-11-05 RX ADMIN — GABAPENTIN 100 MG: 250 SOLUTION ORAL at 09:04

## 2020-11-05 RX ADMIN — SODIUM CHLORIDE TAB 1 GM 1000 MG: 1 TAB at 16:36

## 2020-11-05 RX ADMIN — BACLOFEN 15 MG: 10 TABLET ORAL at 21:03

## 2020-11-05 RX ADMIN — SODIUM CHLORIDE TAB 1 GM 1000 MG: 1 TAB at 11:57

## 2020-11-05 RX ADMIN — OXYCODONE HYDROCHLORIDE AND ACETAMINOPHEN 500 MG: 500 TABLET ORAL at 09:05

## 2020-11-05 RX ADMIN — METOPROLOL TARTRATE 25 MG: 25 TABLET, FILM COATED ORAL at 09:04

## 2020-11-05 RX ADMIN — LEVETIRACETAM 500 MG: 100 SOLUTION ORAL at 21:03

## 2020-11-05 RX ADMIN — BACLOFEN 15 MG: 10 TABLET ORAL at 05:44

## 2020-11-05 RX ADMIN — BACLOFEN 15 MG: 10 TABLET ORAL at 14:44

## 2020-11-05 RX ADMIN — LINEZOLID 600 MG: 600 INJECTION, SOLUTION INTRAVENOUS at 21:14

## 2020-11-05 RX ADMIN — PANCRELIPASE 1 CAPSULE: 30000; 6000; 19000 CAPSULE, DELAYED RELEASE PELLETS ORAL at 16:36

## 2020-11-05 RX ADMIN — PANCRELIPASE 1 CAPSULE: 30000; 6000; 19000 CAPSULE, DELAYED RELEASE PELLETS ORAL at 08:18

## 2020-11-05 RX ADMIN — MEROPENEM 500 MG: 500 INJECTION, POWDER, FOR SOLUTION INTRAVENOUS at 05:50

## 2020-11-05 RX ADMIN — Medication 220 MG: at 09:09

## 2020-11-05 RX ADMIN — MEROPENEM 500 MG: 500 INJECTION, POWDER, FOR SOLUTION INTRAVENOUS at 01:12

## 2020-11-05 RX ADMIN — PANTOPRAZOLE 40 MG: 40 TABLET, DELAYED RELEASE ORAL at 05:44

## 2020-11-05 ASSESSMENT — PAIN SCALES - BEHAVIORAL PAIN SCALE (BPS)
BPS_SCORE: 6
BPS_SCORE: 9
BPS_SCORE: 6
BPS_SCORE: 9
BPS_SCORE: 4
BPS_SCORE: 4
BPS_SCORE: 6

## 2020-11-06 LAB
ANION GAP SERPL CALC-SCNC: 7 MMOL/L (ref 10–20)
BASOPHILS # BLD: 0 K/MCL (ref 0–0.3)
BASOPHILS NFR BLD: 0 %
BUN SERPL-MCNC: 20 MG/DL (ref 6–20)
BUN/CREAT SERPL: 62 (ref 7–25)
CALCIUM SERPL-MCNC: 8.2 MG/DL (ref 8.4–10.2)
CHLORIDE SERPL-SCNC: 104 MMOL/L (ref 98–107)
CO2 SERPL-SCNC: 29 MMOL/L (ref 21–32)
CREAT SERPL-MCNC: 0.32 MG/DL (ref 0.51–0.95)
DIFFERENTIAL METHOD BLD: ABNORMAL
EOSINOPHIL # BLD: 0.2 K/MCL (ref 0.1–0.5)
EOSINOPHIL NFR BLD: 3 %
ERYTHROCYTE [DISTWIDTH] IN BLOOD: 13.2 % (ref 11–15)
GLUCOSE SERPL-MCNC: 145 MG/DL (ref 65–99)
HCT VFR BLD CALC: 23.3 % (ref 36–46.5)
HGB BLD-MCNC: 7.9 G/DL (ref 12–15.5)
IMM GRANULOCYTES # BLD AUTO: 0 K/MCL (ref 0–0.2)
IMM GRANULOCYTES NFR BLD: 0 %
LYMPHOCYTES # BLD: 1.5 K/MCL (ref 1–4.8)
LYMPHOCYTES NFR BLD: 20 %
MCH RBC QN AUTO: 36.4 PG (ref 26–34)
MCHC RBC AUTO-ENTMCNC: 33.9 G/DL (ref 32–36.5)
MCV RBC AUTO: 107.4 FL (ref 78–100)
MONOCYTES # BLD: 0.4 K/MCL (ref 0.3–0.9)
MONOCYTES NFR BLD: 6 %
NEUTROPHILS # BLD: 5.4 K/MCL (ref 1.8–7.7)
NEUTROPHILS NFR BLD: 71 %
NRBC BLD MANUAL-RTO: 0 /100 WBC
PLATELET # BLD: 133 K/MCL (ref 140–450)
POTASSIUM SERPL-SCNC: 4.1 MMOL/L (ref 3.4–5.1)
RBC # BLD: 2.17 MIL/MCL (ref 4–5.2)
SODIUM SERPL-SCNC: 136 MMOL/L (ref 135–145)
WBC # BLD: 7.6 K/MCL (ref 4.2–11)

## 2020-11-06 PROCEDURE — 85025 COMPLETE CBC W/AUTO DIFF WBC: CPT

## 2020-11-06 PROCEDURE — 10000008 HB ROOM CHARGE ICU OR CCU

## 2020-11-06 PROCEDURE — 80048 BASIC METABOLIC PNL TOTAL CA: CPT

## 2020-11-06 PROCEDURE — 10002800 HB RX 250 W HCPCS: Performed by: INTERNAL MEDICINE

## 2020-11-06 PROCEDURE — 10002807 HB RX 258: Performed by: INTERNAL MEDICINE

## 2020-11-06 PROCEDURE — 94003 VENT MGMT INPAT SUBQ DAY: CPT

## 2020-11-06 PROCEDURE — 10002803 HB RX 637: Performed by: INTERNAL MEDICINE

## 2020-11-06 PROCEDURE — 10004651 HB RX, NO CHARGE ITEM: Performed by: INTERNAL MEDICINE

## 2020-11-06 PROCEDURE — 87040 BLOOD CULTURE FOR BACTERIA: CPT

## 2020-11-06 RX ADMIN — BACLOFEN 15 MG: 10 TABLET ORAL at 05:58

## 2020-11-06 RX ADMIN — LEVETIRACETAM 500 MG: 100 SOLUTION ORAL at 21:08

## 2020-11-06 RX ADMIN — CEFTOLOZANE AND TAZOBACTAM 1500 MG: 1; .5 INJECTION, POWDER, LYOPHILIZED, FOR SOLUTION INTRAVENOUS at 22:19

## 2020-11-06 RX ADMIN — FOLIC ACID 0.5 MG: 1 TABLET ORAL at 09:54

## 2020-11-06 RX ADMIN — Medication 220 MG: at 11:22

## 2020-11-06 RX ADMIN — GABAPENTIN 100 MG: 250 SOLUTION ORAL at 00:05

## 2020-11-06 RX ADMIN — MINERAL SUPPLEMENT IRON 300 MG / 5 ML STRENGTH LIQUID 100 PER BOX UNFLAVORED 300 MG: at 09:57

## 2020-11-06 RX ADMIN — BACLOFEN 15 MG: 10 TABLET ORAL at 21:08

## 2020-11-06 RX ADMIN — GABAPENTIN 100 MG: 250 SOLUTION ORAL at 21:13

## 2020-11-06 RX ADMIN — ENOXAPARIN SODIUM 10 MG: 300 INJECTION INTRAVENOUS; SUBCUTANEOUS at 11:21

## 2020-11-06 RX ADMIN — OXYCODONE HYDROCHLORIDE 2.5 MG: 5 TABLET ORAL at 02:34

## 2020-11-06 RX ADMIN — SODIUM CHLORIDE TAB 1 GM 1000 MG: 1 TAB at 18:11

## 2020-11-06 RX ADMIN — METOPROLOL TARTRATE 25 MG: 25 TABLET, FILM COATED ORAL at 09:55

## 2020-11-06 RX ADMIN — OXYCODONE HYDROCHLORIDE 2.5 MG: 5 TABLET ORAL at 21:09

## 2020-11-06 RX ADMIN — Medication 250 MCG: at 11:23

## 2020-11-06 RX ADMIN — LACOSAMIDE 200 MG: 200 TABLET, FILM COATED ORAL at 11:21

## 2020-11-06 RX ADMIN — PANTOPRAZOLE 40 MG: 40 TABLET, DELAYED RELEASE ORAL at 05:50

## 2020-11-06 RX ADMIN — ACETAMINOPHEN 325 MG: 325 TABLET, FILM COATED ORAL at 10:00

## 2020-11-06 RX ADMIN — ENOXAPARIN SODIUM 10 MG: 300 INJECTION INTRAVENOUS; SUBCUTANEOUS at 21:08

## 2020-11-06 RX ADMIN — PANCRELIPASE 1 CAPSULE: 30000; 6000; 19000 CAPSULE, DELAYED RELEASE PELLETS ORAL at 18:11

## 2020-11-06 RX ADMIN — CEFTOLOZANE AND TAZOBACTAM 1500 MG: 1; .5 INJECTION, POWDER, LYOPHILIZED, FOR SOLUTION INTRAVENOUS at 14:26

## 2020-11-06 RX ADMIN — PANCRELIPASE 1 CAPSULE: 30000; 6000; 19000 CAPSULE, DELAYED RELEASE PELLETS ORAL at 13:03

## 2020-11-06 RX ADMIN — BACLOFEN 15 MG: 10 TABLET ORAL at 14:24

## 2020-11-06 RX ADMIN — OXYCODONE HYDROCHLORIDE AND ACETAMINOPHEN 500 MG: 500 TABLET ORAL at 09:55

## 2020-11-06 RX ADMIN — LEVETIRACETAM 500 MG: 100 SOLUTION ORAL at 09:57

## 2020-11-06 RX ADMIN — LINEZOLID 600 MG: 600 INJECTION, SOLUTION INTRAVENOUS at 09:59

## 2020-11-06 RX ADMIN — ACETAMINOPHEN 325 MG: 325 TABLET, FILM COATED ORAL at 01:51

## 2020-11-06 RX ADMIN — LINEZOLID 600 MG: 600 INJECTION, SOLUTION INTRAVENOUS at 21:12

## 2020-11-06 RX ADMIN — SODIUM CHLORIDE TAB 1 GM 1000 MG: 1 TAB at 11:24

## 2020-11-06 RX ADMIN — CEFTOLOZANE AND TAZOBACTAM 1500 MG: 1; .5 INJECTION, POWDER, LYOPHILIZED, FOR SOLUTION INTRAVENOUS at 06:03

## 2020-11-06 RX ADMIN — PANCRELIPASE 1 CAPSULE: 30000; 6000; 19000 CAPSULE, DELAYED RELEASE PELLETS ORAL at 09:56

## 2020-11-06 RX ADMIN — SODIUM CHLORIDE TAB 1 GM 1000 MG: 1 TAB at 09:56

## 2020-11-06 RX ADMIN — METOPROLOL TARTRATE 25 MG: 25 TABLET, FILM COATED ORAL at 21:09

## 2020-11-06 RX ADMIN — GABAPENTIN 100 MG: 250 SOLUTION ORAL at 11:21

## 2020-11-06 RX ADMIN — LACOSAMIDE 200 MG: 200 TABLET, FILM COATED ORAL at 21:16

## 2020-11-06 ASSESSMENT — PATIENT HEALTH QUESTIONNAIRE - PHQ9: IS PATIENT ABLE TO COMPLETE PHQ2 OR PHQ9: NO, PATIENT WILL NEVER BE ABLE TO COMPLETE

## 2020-11-06 ASSESSMENT — PAIN SCALES - BEHAVIORAL PAIN SCALE (BPS)
BPS_SCORE: 5
BPS_SCORE: 3
BPS_SCORE: 7
BPS_SCORE: 7
BPS_SCORE: 5
BPS_SCORE: 5
BPS_SCORE: 3
BPS_SCORE: 6
BPS_SCORE: 5

## 2020-11-07 ENCOUNTER — APPOINTMENT (OUTPATIENT)
Dept: CT IMAGING | Age: 30
DRG: 720 | End: 2020-11-07
Attending: INTERNAL MEDICINE

## 2020-11-07 LAB
B-HCG UR QL: NEGATIVE
BACTERIA BLD CULT: ABNORMAL
GLUCOSE BLDC GLUCOMTR-MCNC: 114 MG/DL (ref 70–99)
GLUCOSE BLDC GLUCOMTR-MCNC: 78 MG/DL (ref 70–99)
GLUCOSE BLDC GLUCOMTR-MCNC: 90 MG/DL (ref 70–99)
GRAM STN SPEC: ABNORMAL
ORGANISM: ABNORMAL
REPORT STATUS (RPT): ABNORMAL
SPECIMEN SOURCE: ABNORMAL

## 2020-11-07 PROCEDURE — 10004281 HB COUNTER-STAFF TIME PER 15 MIN

## 2020-11-07 PROCEDURE — 10002803 HB RX 637: Performed by: INTERNAL MEDICINE

## 2020-11-07 PROCEDURE — 10002807 HB RX 258: Performed by: INTERNAL MEDICINE

## 2020-11-07 PROCEDURE — 13003287

## 2020-11-07 PROCEDURE — 94003 VENT MGMT INPAT SUBQ DAY: CPT

## 2020-11-07 PROCEDURE — 10002800 HB RX 250 W HCPCS: Performed by: INTERNAL MEDICINE

## 2020-11-07 PROCEDURE — XW03396 INTRODUCTION OF CEFTOLOZANE/TAZOBACTAM ANTI-INFECTIVE INTO PERIPHERAL VEIN, PERCUTANEOUS APPROACH, NEW TECHNOLOGY GROUP 6: ICD-10-PCS | Performed by: INTERNAL MEDICINE

## 2020-11-07 PROCEDURE — 94002 VENT MGMT INPAT INIT DAY: CPT

## 2020-11-07 PROCEDURE — 10004651 HB RX, NO CHARGE ITEM: Performed by: INTERNAL MEDICINE

## 2020-11-07 PROCEDURE — 10000002 HB ROOM CHARGE MED SURG

## 2020-11-07 PROCEDURE — 10002805 HB CONTRAST AGENT: Performed by: INTERNAL MEDICINE

## 2020-11-07 PROCEDURE — 81025 URINE PREGNANCY TEST: CPT

## 2020-11-07 PROCEDURE — 74177 CT ABD & PELVIS W/CONTRAST: CPT

## 2020-11-07 RX ORDER — SODIUM CHLORIDE 9 MG/ML
INJECTION, SOLUTION INTRAVENOUS PRN
Status: DISCONTINUED | OUTPATIENT
Start: 2020-11-07 | End: 2020-11-12 | Stop reason: HOSPADM

## 2020-11-07 RX ORDER — 0.9 % SODIUM CHLORIDE 0.9 %
2 VIAL (ML) INJECTION PRN
Status: DISCONTINUED | OUTPATIENT
Start: 2020-11-07 | End: 2020-11-12 | Stop reason: HOSPADM

## 2020-11-07 RX ADMIN — BACLOFEN 15 MG: 10 TABLET ORAL at 22:34

## 2020-11-07 RX ADMIN — LACOSAMIDE 200 MG: 200 TABLET, FILM COATED ORAL at 22:33

## 2020-11-07 RX ADMIN — BACLOFEN 15 MG: 10 TABLET ORAL at 05:37

## 2020-11-07 RX ADMIN — PANCRELIPASE 1 CAPSULE: 30000; 6000; 19000 CAPSULE, DELAYED RELEASE PELLETS ORAL at 22:34

## 2020-11-07 RX ADMIN — LEVETIRACETAM 500 MG: 100 SOLUTION ORAL at 22:35

## 2020-11-07 RX ADMIN — CEFTOLOZANE AND TAZOBACTAM 1500 MG: 1; .5 INJECTION, POWDER, LYOPHILIZED, FOR SOLUTION INTRAVENOUS at 05:41

## 2020-11-07 RX ADMIN — FOLIC ACID 0.5 MG: 1 TABLET ORAL at 09:35

## 2020-11-07 RX ADMIN — OXYCODONE HYDROCHLORIDE 2.5 MG: 5 TABLET ORAL at 09:34

## 2020-11-07 RX ADMIN — ENOXAPARIN SODIUM 10 MG: 300 INJECTION INTRAVENOUS; SUBCUTANEOUS at 11:50

## 2020-11-07 RX ADMIN — SODIUM CHLORIDE TAB 1 GM 1000 MG: 1 TAB at 15:26

## 2020-11-07 RX ADMIN — PANTOPRAZOLE 40 MG: 40 TABLET, DELAYED RELEASE ORAL at 05:37

## 2020-11-07 RX ADMIN — LACOSAMIDE 200 MG: 200 TABLET, FILM COATED ORAL at 09:35

## 2020-11-07 RX ADMIN — MEROPENEM 500 MG: 500 INJECTION, POWDER, FOR SOLUTION INTRAVENOUS at 22:40

## 2020-11-07 RX ADMIN — LINEZOLID 600 MG: 600 INJECTION, SOLUTION INTRAVENOUS at 23:32

## 2020-11-07 RX ADMIN — PANCRELIPASE 1 CAPSULE: 30000; 6000; 19000 CAPSULE, DELAYED RELEASE PELLETS ORAL at 09:35

## 2020-11-07 RX ADMIN — ENOXAPARIN SODIUM 10 MG: 300 INJECTION INTRAVENOUS; SUBCUTANEOUS at 23:36

## 2020-11-07 RX ADMIN — IOHEXOL 100 ML: 350 INJECTION, SOLUTION INTRAVENOUS at 17:45

## 2020-11-07 RX ADMIN — IOHEXOL 12.5 ML: 350 INJECTION, SOLUTION INTRAVENOUS at 16:09

## 2020-11-07 RX ADMIN — SODIUM CHLORIDE TAB 1 GM 1000 MG: 1 TAB at 22:35

## 2020-11-07 RX ADMIN — LORAZEPAM 0.5 MG: 0.5 TABLET ORAL at 16:40

## 2020-11-07 RX ADMIN — LINEZOLID 600 MG: 600 INJECTION, SOLUTION INTRAVENOUS at 11:50

## 2020-11-07 RX ADMIN — OXYCODONE HYDROCHLORIDE AND ACETAMINOPHEN 500 MG: 500 TABLET ORAL at 09:35

## 2020-11-07 RX ADMIN — METOPROLOL TARTRATE 25 MG: 25 TABLET, FILM COATED ORAL at 22:33

## 2020-11-07 RX ADMIN — SODIUM CHLORIDE TAB 1 GM 1000 MG: 1 TAB at 09:35

## 2020-11-07 RX ADMIN — METOPROLOL TARTRATE 25 MG: 25 TABLET, FILM COATED ORAL at 09:34

## 2020-11-07 RX ADMIN — GABAPENTIN 100 MG: 250 SOLUTION ORAL at 11:52

## 2020-11-07 RX ADMIN — SODIUM CHLORIDE: 0.9 INJECTION, SOLUTION INTRAVENOUS at 11:48

## 2020-11-07 RX ADMIN — SODIUM CHLORIDE, PRESERVATIVE FREE 2 ML: 5 INJECTION INTRAVENOUS at 22:44

## 2020-11-07 RX ADMIN — BACLOFEN 15 MG: 10 TABLET ORAL at 15:26

## 2020-11-07 RX ADMIN — MINERAL SUPPLEMENT IRON 300 MG / 5 ML STRENGTH LIQUID 100 PER BOX UNFLAVORED 300 MG: at 09:35

## 2020-11-07 RX ADMIN — CEFTOLOZANE AND TAZOBACTAM 1500 MG: 1; .5 INJECTION, POWDER, LYOPHILIZED, FOR SOLUTION INTRAVENOUS at 15:18

## 2020-11-07 RX ADMIN — PANCRELIPASE 1 CAPSULE: 30000; 6000; 19000 CAPSULE, DELAYED RELEASE PELLETS ORAL at 15:26

## 2020-11-07 RX ADMIN — IOHEXOL 12.5 ML: 350 INJECTION, SOLUTION INTRAVENOUS at 15:16

## 2020-11-07 RX ADMIN — LEVETIRACETAM 500 MG: 100 SOLUTION ORAL at 09:35

## 2020-11-07 RX ADMIN — Medication 250 MCG: at 09:34

## 2020-11-07 ASSESSMENT — PAIN SCALES - BEHAVIORAL PAIN SCALE (BPS)
BPS_SCORE: 4
BPS_SCORE: 4
BPS_SCORE: 5
BPS_SCORE: 5

## 2020-11-08 LAB
BACTERIA BLD CULT: ABNORMAL
GLUCOSE BLDC GLUCOMTR-MCNC: 110 MG/DL (ref 70–99)
GLUCOSE BLDC GLUCOMTR-MCNC: 113 MG/DL (ref 70–99)
GLUCOSE BLDC GLUCOMTR-MCNC: 127 MG/DL (ref 70–99)
GLUCOSE BLDC GLUCOMTR-MCNC: 80 MG/DL (ref 70–99)
GRAM STN SPEC: ABNORMAL
ORGANISM: ABNORMAL
ORGANISM: ABNORMAL
REPORT STATUS (RPT): ABNORMAL
SPECIMEN SOURCE: ABNORMAL

## 2020-11-08 PROCEDURE — 10002800 HB RX 250 W HCPCS: Performed by: INTERNAL MEDICINE

## 2020-11-08 PROCEDURE — 10002807 HB RX 258: Performed by: INTERNAL MEDICINE

## 2020-11-08 PROCEDURE — 10000002 HB ROOM CHARGE MED SURG

## 2020-11-08 PROCEDURE — 10002803 HB RX 637: Performed by: INTERNAL MEDICINE

## 2020-11-08 PROCEDURE — 94003 VENT MGMT INPAT SUBQ DAY: CPT

## 2020-11-08 RX ADMIN — METOPROLOL TARTRATE 25 MG: 25 TABLET, FILM COATED ORAL at 21:12

## 2020-11-08 RX ADMIN — BACLOFEN 15 MG: 10 TABLET ORAL at 05:18

## 2020-11-08 RX ADMIN — MEROPENEM 500 MG: 500 INJECTION, POWDER, FOR SOLUTION INTRAVENOUS at 22:06

## 2020-11-08 RX ADMIN — GABAPENTIN 100 MG: 250 SOLUTION ORAL at 22:00

## 2020-11-08 RX ADMIN — ENOXAPARIN SODIUM 10 MG: 300 INJECTION INTRAVENOUS; SUBCUTANEOUS at 21:12

## 2020-11-08 RX ADMIN — LEVETIRACETAM 500 MG: 100 SOLUTION ORAL at 08:22

## 2020-11-08 RX ADMIN — PANTOPRAZOLE 40 MG: 40 TABLET, DELAYED RELEASE ORAL at 05:18

## 2020-11-08 RX ADMIN — MINERAL SUPPLEMENT IRON 300 MG / 5 ML STRENGTH LIQUID 100 PER BOX UNFLAVORED 300 MG: at 08:24

## 2020-11-08 RX ADMIN — LACOSAMIDE 200 MG: 200 TABLET, FILM COATED ORAL at 08:22

## 2020-11-08 RX ADMIN — PANCRELIPASE 1 CAPSULE: 30000; 6000; 19000 CAPSULE, DELAYED RELEASE PELLETS ORAL at 12:48

## 2020-11-08 RX ADMIN — GABAPENTIN 100 MG: 250 SOLUTION ORAL at 01:20

## 2020-11-08 RX ADMIN — SODIUM CHLORIDE 25 ML/HR: 0.9 INJECTION, SOLUTION INTRAVENOUS at 21:20

## 2020-11-08 RX ADMIN — Medication 220 MG: at 08:24

## 2020-11-08 RX ADMIN — GABAPENTIN 100 MG: 250 SOLUTION ORAL at 08:22

## 2020-11-08 RX ADMIN — LEVETIRACETAM 500 MG: 100 SOLUTION ORAL at 21:12

## 2020-11-08 RX ADMIN — PANCRELIPASE 1 CAPSULE: 30000; 6000; 19000 CAPSULE, DELAYED RELEASE PELLETS ORAL at 08:24

## 2020-11-08 RX ADMIN — LACOSAMIDE 200 MG: 200 TABLET, FILM COATED ORAL at 21:12

## 2020-11-08 RX ADMIN — SODIUM CHLORIDE 25 ML/HR: 0.9 INJECTION, SOLUTION INTRAVENOUS at 22:05

## 2020-11-08 RX ADMIN — MEROPENEM 500 MG: 500 INJECTION, POWDER, FOR SOLUTION INTRAVENOUS at 05:19

## 2020-11-08 RX ADMIN — MEROPENEM 500 MG: 500 INJECTION, POWDER, FOR SOLUTION INTRAVENOUS at 14:36

## 2020-11-08 RX ADMIN — SODIUM CHLORIDE TAB 1 GM 1000 MG: 1 TAB at 12:48

## 2020-11-08 RX ADMIN — ENOXAPARIN SODIUM 10 MG: 300 INJECTION INTRAVENOUS; SUBCUTANEOUS at 08:18

## 2020-11-08 RX ADMIN — LINEZOLID 600 MG: 600 INJECTION, SOLUTION INTRAVENOUS at 08:21

## 2020-11-08 RX ADMIN — SODIUM CHLORIDE TAB 1 GM 1000 MG: 1 TAB at 18:04

## 2020-11-08 RX ADMIN — PANCRELIPASE 1 CAPSULE: 30000; 6000; 19000 CAPSULE, DELAYED RELEASE PELLETS ORAL at 18:04

## 2020-11-08 RX ADMIN — Medication 250 MCG: at 08:23

## 2020-11-08 RX ADMIN — METOPROLOL TARTRATE 25 MG: 25 TABLET, FILM COATED ORAL at 08:24

## 2020-11-08 RX ADMIN — OXYCODONE HYDROCHLORIDE AND ACETAMINOPHEN 500 MG: 500 TABLET ORAL at 08:24

## 2020-11-08 RX ADMIN — BACLOFEN 15 MG: 10 TABLET ORAL at 14:34

## 2020-11-08 RX ADMIN — BACLOFEN 15 MG: 10 TABLET ORAL at 21:59

## 2020-11-08 RX ADMIN — LINEZOLID 600 MG: 600 INJECTION, SOLUTION INTRAVENOUS at 21:21

## 2020-11-08 RX ADMIN — LORAZEPAM 0.5 MG: 0.5 TABLET ORAL at 05:18

## 2020-11-08 RX ADMIN — FOLIC ACID 0.5 MG: 1 TABLET ORAL at 08:23

## 2020-11-08 RX ADMIN — SODIUM CHLORIDE TAB 1 GM 1000 MG: 1 TAB at 08:22

## 2020-11-08 ASSESSMENT — PAIN SCALES - BEHAVIORAL PAIN SCALE (BPS)
BPS_SCORE: 5
BPS_SCORE: 5

## 2020-11-09 LAB
ANION GAP SERPL CALC-SCNC: 7 MMOL/L (ref 10–20)
BASOPHILS # BLD: 0 K/MCL (ref 0–0.3)
BASOPHILS NFR BLD: 0 %
BUN SERPL-MCNC: 14 MG/DL (ref 6–20)
BUN/CREAT SERPL: 61 (ref 7–25)
CALCIUM SERPL-MCNC: 8.4 MG/DL (ref 8.4–10.2)
CHLORIDE SERPL-SCNC: 97 MMOL/L (ref 98–107)
CO2 SERPL-SCNC: 29 MMOL/L (ref 21–32)
CREAT SERPL-MCNC: 0.23 MG/DL (ref 0.51–0.95)
DIFFERENTIAL METHOD BLD: ABNORMAL
EOSINOPHIL # BLD: 0.2 K/MCL (ref 0.1–0.5)
EOSINOPHIL NFR BLD: 3 %
ERYTHROCYTE [DISTWIDTH] IN BLOOD: 12.7 % (ref 11–15)
GLUCOSE BLDC GLUCOMTR-MCNC: 105 MG/DL (ref 70–99)
GLUCOSE BLDC GLUCOMTR-MCNC: 113 MG/DL (ref 70–99)
GLUCOSE BLDC GLUCOMTR-MCNC: 125 MG/DL (ref 70–99)
GLUCOSE BLDC GLUCOMTR-MCNC: 198 MG/DL (ref 70–99)
GLUCOSE SERPL-MCNC: 104 MG/DL (ref 65–99)
HCT VFR BLD CALC: 27.7 % (ref 36–46.5)
HGB BLD-MCNC: 9.3 G/DL (ref 12–15.5)
IMM GRANULOCYTES # BLD AUTO: 0 K/MCL (ref 0–0.2)
IMM GRANULOCYTES NFR BLD: 0 %
LYMPHOCYTES # BLD: 1.3 K/MCL (ref 1–4.8)
LYMPHOCYTES NFR BLD: 16 %
MCH RBC QN AUTO: 34.7 PG (ref 26–34)
MCHC RBC AUTO-ENTMCNC: 33.6 G/DL (ref 32–36.5)
MCV RBC AUTO: 103.4 FL (ref 78–100)
MONOCYTES # BLD: 0.5 K/MCL (ref 0.3–0.9)
MONOCYTES NFR BLD: 6 %
NEUTROPHILS # BLD: 5.7 K/MCL (ref 1.8–7.7)
NEUTROPHILS NFR BLD: 75 %
NRBC BLD MANUAL-RTO: 0 /100 WBC
PLATELET # BLD: 184 K/MCL (ref 140–450)
POTASSIUM SERPL-SCNC: 5 MMOL/L (ref 3.4–5.1)
RBC # BLD: 2.68 MIL/MCL (ref 4–5.2)
SODIUM SERPL-SCNC: 128 MMOL/L (ref 135–145)
WBC # BLD: 7.7 K/MCL (ref 4.2–11)

## 2020-11-09 PROCEDURE — 13003287

## 2020-11-09 PROCEDURE — 10002800 HB RX 250 W HCPCS: Performed by: INTERNAL MEDICINE

## 2020-11-09 PROCEDURE — 10002803 HB RX 637: Performed by: INTERNAL MEDICINE

## 2020-11-09 PROCEDURE — 10000002 HB ROOM CHARGE MED SURG

## 2020-11-09 PROCEDURE — 36415 COLL VENOUS BLD VENIPUNCTURE: CPT

## 2020-11-09 PROCEDURE — 80048 BASIC METABOLIC PNL TOTAL CA: CPT

## 2020-11-09 PROCEDURE — 85025 COMPLETE CBC W/AUTO DIFF WBC: CPT

## 2020-11-09 PROCEDURE — 94003 VENT MGMT INPAT SUBQ DAY: CPT

## 2020-11-09 PROCEDURE — 10002807 HB RX 258: Performed by: INTERNAL MEDICINE

## 2020-11-09 RX ADMIN — LEVETIRACETAM 500 MG: 100 SOLUTION ORAL at 10:40

## 2020-11-09 RX ADMIN — MEROPENEM 500 MG: 500 INJECTION, POWDER, FOR SOLUTION INTRAVENOUS at 22:19

## 2020-11-09 RX ADMIN — MEROPENEM 500 MG: 500 INJECTION, POWDER, FOR SOLUTION INTRAVENOUS at 06:03

## 2020-11-09 RX ADMIN — Medication 220 MG: at 11:08

## 2020-11-09 RX ADMIN — OXYCODONE HYDROCHLORIDE AND ACETAMINOPHEN 500 MG: 500 TABLET ORAL at 10:32

## 2020-11-09 RX ADMIN — BACLOFEN 15 MG: 10 TABLET ORAL at 05:56

## 2020-11-09 RX ADMIN — PANCRELIPASE 1 CAPSULE: 30000; 6000; 19000 CAPSULE, DELAYED RELEASE PELLETS ORAL at 13:01

## 2020-11-09 RX ADMIN — PANTOPRAZOLE 40 MG: 40 TABLET, DELAYED RELEASE ORAL at 05:54

## 2020-11-09 RX ADMIN — METOPROLOL TARTRATE 25 MG: 25 TABLET, FILM COATED ORAL at 22:01

## 2020-11-09 RX ADMIN — SODIUM CHLORIDE TAB 1 GM 1000 MG: 1 TAB at 13:01

## 2020-11-09 RX ADMIN — FOLIC ACID 0.5 MG: 1 TABLET ORAL at 10:31

## 2020-11-09 RX ADMIN — GABAPENTIN 100 MG: 250 SOLUTION ORAL at 22:01

## 2020-11-09 RX ADMIN — GABAPENTIN 100 MG: 250 SOLUTION ORAL at 10:58

## 2020-11-09 RX ADMIN — ENOXAPARIN SODIUM 10 MG: 300 INJECTION INTRAVENOUS; SUBCUTANEOUS at 11:02

## 2020-11-09 RX ADMIN — LACOSAMIDE 200 MG: 200 TABLET, FILM COATED ORAL at 22:01

## 2020-11-09 RX ADMIN — LINEZOLID 600 MG: 600 INJECTION, SOLUTION INTRAVENOUS at 22:09

## 2020-11-09 RX ADMIN — LACOSAMIDE 200 MG: 200 TABLET, FILM COATED ORAL at 10:32

## 2020-11-09 RX ADMIN — MEROPENEM 500 MG: 500 INJECTION, POWDER, FOR SOLUTION INTRAVENOUS at 13:03

## 2020-11-09 RX ADMIN — LORAZEPAM 0.5 MG: 0.5 TABLET ORAL at 18:37

## 2020-11-09 RX ADMIN — METOPROLOL TARTRATE 25 MG: 25 TABLET, FILM COATED ORAL at 10:30

## 2020-11-09 RX ADMIN — Medication 250 MCG: at 10:32

## 2020-11-09 RX ADMIN — LORAZEPAM 0.5 MG: 0.5 TABLET ORAL at 10:29

## 2020-11-09 RX ADMIN — MINERAL SUPPLEMENT IRON 300 MG / 5 ML STRENGTH LIQUID 100 PER BOX UNFLAVORED 300 MG: at 10:40

## 2020-11-09 RX ADMIN — SODIUM CHLORIDE TAB 1 GM 1000 MG: 1 TAB at 17:40

## 2020-11-09 RX ADMIN — BACLOFEN 15 MG: 10 TABLET ORAL at 14:36

## 2020-11-09 RX ADMIN — LINEZOLID 600 MG: 600 INJECTION, SOLUTION INTRAVENOUS at 10:44

## 2020-11-09 RX ADMIN — PANCRELIPASE 1 CAPSULE: 30000; 6000; 19000 CAPSULE, DELAYED RELEASE PELLETS ORAL at 10:34

## 2020-11-09 RX ADMIN — BACLOFEN 15 MG: 10 TABLET ORAL at 22:01

## 2020-11-09 RX ADMIN — SODIUM CHLORIDE TAB 1 GM 1000 MG: 1 TAB at 10:32

## 2020-11-09 RX ADMIN — LEVETIRACETAM 500 MG: 100 SOLUTION ORAL at 22:01

## 2020-11-09 RX ADMIN — OXYCODONE HYDROCHLORIDE 2.5 MG: 5 TABLET ORAL at 07:58

## 2020-11-09 RX ADMIN — ENOXAPARIN SODIUM 10 MG: 300 INJECTION INTRAVENOUS; SUBCUTANEOUS at 22:01

## 2020-11-09 RX ADMIN — PANCRELIPASE 1 CAPSULE: 30000; 6000; 19000 CAPSULE, DELAYED RELEASE PELLETS ORAL at 17:40

## 2020-11-09 ASSESSMENT — PAIN SCALES - BEHAVIORAL PAIN SCALE (BPS)
BPS_SCORE: 3
BPS_SCORE: 7
BPS_SCORE: 4

## 2020-11-10 LAB
GLUCOSE BLDC GLUCOMTR-MCNC: 107 MG/DL (ref 70–99)
GLUCOSE BLDC GLUCOMTR-MCNC: 122 MG/DL (ref 70–99)
GLUCOSE BLDC GLUCOMTR-MCNC: 128 MG/DL (ref 70–99)
GLUCOSE BLDC GLUCOMTR-MCNC: 129 MG/DL (ref 70–99)

## 2020-11-10 PROCEDURE — 10002807 HB RX 258: Performed by: INTERNAL MEDICINE

## 2020-11-10 PROCEDURE — 10002800 HB RX 250 W HCPCS: Performed by: INTERNAL MEDICINE

## 2020-11-10 PROCEDURE — 94003 VENT MGMT INPAT SUBQ DAY: CPT

## 2020-11-10 PROCEDURE — 10002803 HB RX 637: Performed by: INTERNAL MEDICINE

## 2020-11-10 PROCEDURE — 10000002 HB ROOM CHARGE MED SURG

## 2020-11-10 PROCEDURE — 02HV33Z INSERTION OF INFUSION DEVICE INTO SUPERIOR VENA CAVA, PERCUTANEOUS APPROACH: ICD-10-PCS | Performed by: SURGERY

## 2020-11-10 RX ORDER — MEROPENEM 500 MG/1
0.5 INJECTION, POWDER, FOR SOLUTION INTRAVENOUS EVERY 8 HOURS
Qty: 13.5 G | Refills: 0 | Status: SHIPPED
Start: 2020-11-10 | End: 2020-11-19

## 2020-11-10 RX ADMIN — GABAPENTIN 100 MG: 250 SOLUTION ORAL at 23:37

## 2020-11-10 RX ADMIN — PANCRELIPASE 1 CAPSULE: 30000; 6000; 19000 CAPSULE, DELAYED RELEASE PELLETS ORAL at 17:50

## 2020-11-10 RX ADMIN — LINEZOLID 600 MG: 600 INJECTION, SOLUTION INTRAVENOUS at 09:25

## 2020-11-10 RX ADMIN — MEROPENEM 500 MG: 500 INJECTION, POWDER, FOR SOLUTION INTRAVENOUS at 14:41

## 2020-11-10 RX ADMIN — BACLOFEN 15 MG: 10 TABLET ORAL at 22:09

## 2020-11-10 RX ADMIN — PANCRELIPASE 1 CAPSULE: 30000; 6000; 19000 CAPSULE, DELAYED RELEASE PELLETS ORAL at 09:27

## 2020-11-10 RX ADMIN — FOLIC ACID 0.5 MG: 1 TABLET ORAL at 09:30

## 2020-11-10 RX ADMIN — MEROPENEM 500 MG: 500 INJECTION, POWDER, FOR SOLUTION INTRAVENOUS at 22:20

## 2020-11-10 RX ADMIN — BACLOFEN 15 MG: 10 TABLET ORAL at 14:39

## 2020-11-10 RX ADMIN — Medication 220 MG: at 09:30

## 2020-11-10 RX ADMIN — ENOXAPARIN SODIUM 10 MG: 300 INJECTION INTRAVENOUS; SUBCUTANEOUS at 09:30

## 2020-11-10 RX ADMIN — LACOSAMIDE 200 MG: 200 TABLET, FILM COATED ORAL at 22:09

## 2020-11-10 RX ADMIN — MINERAL SUPPLEMENT IRON 300 MG / 5 ML STRENGTH LIQUID 100 PER BOX UNFLAVORED 300 MG: at 09:30

## 2020-11-10 RX ADMIN — LINEZOLID 600 MG: 600 INJECTION, SOLUTION INTRAVENOUS at 22:21

## 2020-11-10 RX ADMIN — SODIUM CHLORIDE TAB 1 GM 1000 MG: 1 TAB at 17:50

## 2020-11-10 RX ADMIN — PANTOPRAZOLE 40 MG: 40 TABLET, DELAYED RELEASE ORAL at 05:35

## 2020-11-10 RX ADMIN — MEROPENEM 500 MG: 500 INJECTION, POWDER, FOR SOLUTION INTRAVENOUS at 05:39

## 2020-11-10 RX ADMIN — LEVETIRACETAM 500 MG: 100 SOLUTION ORAL at 22:09

## 2020-11-10 RX ADMIN — GABAPENTIN 100 MG: 250 SOLUTION ORAL at 09:30

## 2020-11-10 RX ADMIN — LEVETIRACETAM 500 MG: 100 SOLUTION ORAL at 09:30

## 2020-11-10 RX ADMIN — PANCRELIPASE 1 CAPSULE: 30000; 6000; 19000 CAPSULE, DELAYED RELEASE PELLETS ORAL at 12:27

## 2020-11-10 RX ADMIN — Medication 250 MCG: at 09:30

## 2020-11-10 RX ADMIN — BACLOFEN 15 MG: 10 TABLET ORAL at 05:34

## 2020-11-10 RX ADMIN — METOPROLOL TARTRATE 25 MG: 25 TABLET, FILM COATED ORAL at 09:26

## 2020-11-10 RX ADMIN — ENOXAPARIN SODIUM 10 MG: 300 INJECTION INTRAVENOUS; SUBCUTANEOUS at 23:03

## 2020-11-10 RX ADMIN — LORAZEPAM 0.5 MG: 0.5 TABLET ORAL at 22:11

## 2020-11-10 RX ADMIN — LACOSAMIDE 200 MG: 200 TABLET, FILM COATED ORAL at 09:26

## 2020-11-10 RX ADMIN — SODIUM CHLORIDE TAB 1 GM 1000 MG: 1 TAB at 09:26

## 2020-11-10 RX ADMIN — SODIUM CHLORIDE TAB 1 GM 1000 MG: 1 TAB at 12:27

## 2020-11-10 RX ADMIN — OXYCODONE HYDROCHLORIDE AND ACETAMINOPHEN 500 MG: 500 TABLET ORAL at 09:27

## 2020-11-10 ASSESSMENT — PAIN SCALES - BEHAVIORAL PAIN SCALE (BPS)
BPS_SCORE: 3
BPS_SCORE: 3

## 2020-11-11 ENCOUNTER — APPOINTMENT (OUTPATIENT)
Dept: INTERVENTIONAL RADIOLOGY/VASCULAR | Age: 30
DRG: 720 | End: 2020-11-11
Attending: INTERNAL MEDICINE

## 2020-11-11 LAB
BACTERIA BLD CULT: NORMAL
BACTERIA BLD CULT: NORMAL
GLUCOSE BLDC GLUCOMTR-MCNC: 114 MG/DL (ref 70–99)
GLUCOSE BLDC GLUCOMTR-MCNC: 116 MG/DL (ref 70–99)
GLUCOSE BLDC GLUCOMTR-MCNC: 119 MG/DL (ref 70–99)
GLUCOSE BLDC GLUCOMTR-MCNC: 98 MG/DL (ref 70–99)
REPORT STATUS (RPT): NORMAL
REPORT STATUS (RPT): NORMAL
SPECIMEN SOURCE: NORMAL
SPECIMEN SOURCE: NORMAL

## 2020-11-11 PROCEDURE — 10004651 HB RX, NO CHARGE ITEM: Performed by: INTERNAL MEDICINE

## 2020-11-11 PROCEDURE — 36573 INSJ PICC RS&I 5 YR+: CPT

## 2020-11-11 PROCEDURE — 10002800 HB RX 250 W HCPCS: Performed by: INTERNAL MEDICINE

## 2020-11-11 PROCEDURE — 10000002 HB ROOM CHARGE MED SURG

## 2020-11-11 PROCEDURE — 10002807 HB RX 258: Performed by: INTERNAL MEDICINE

## 2020-11-11 PROCEDURE — 10002803 HB RX 637: Performed by: INTERNAL MEDICINE

## 2020-11-11 PROCEDURE — C1751 CATH, INF, PER/CENT/MIDLINE: HCPCS

## 2020-11-11 PROCEDURE — 10004281 HB COUNTER-STAFF TIME PER 15 MIN

## 2020-11-11 PROCEDURE — 10006023 HB SUPPLY 272

## 2020-11-11 PROCEDURE — 94003 VENT MGMT INPAT SUBQ DAY: CPT

## 2020-11-11 RX ORDER — BACLOFEN 10 MG/1
15 TABLET ORAL EVERY 8 HOURS SCHEDULED
Qty: 30 TABLET | Refills: 0 | Status: ON HOLD | OUTPATIENT
Start: 2020-11-11 | End: 2021-05-25 | Stop reason: HOSPADM

## 2020-11-11 RX ORDER — ERGOCALCIFEROL 1.25 MG/1
1.25 CAPSULE ORAL
Qty: 20 CAPSULE | Refills: 0 | Status: SHIPPED | OUTPATIENT
Start: 2020-11-16 | End: 2020-11-25 | Stop reason: ALTCHOICE

## 2020-11-11 RX ORDER — OXYCODONE HYDROCHLORIDE 5 MG/1
2.5 TABLET ORAL EVERY 12 HOURS PRN
Qty: 14 TABLET | Refills: 0 | Status: ON HOLD | OUTPATIENT
Start: 2020-11-11 | End: 2020-11-27 | Stop reason: SDUPTHER

## 2020-11-11 RX ORDER — LINEZOLID 2 MG/ML
600 INJECTION, SOLUTION INTRAVENOUS EVERY 12 HOURS SCHEDULED
Qty: 4800 ML | Refills: 0 | Status: SHIPPED | OUTPATIENT
Start: 2020-11-11 | End: 2020-11-19

## 2020-11-11 RX ORDER — UREA 10 %
220 LOTION (ML) TOPICAL DAILY
Qty: 30 TABLET | Refills: 0 | Status: SHIPPED | OUTPATIENT
Start: 2020-11-11 | End: 2020-12-11

## 2020-11-11 RX ORDER — OMEPRAZOLE 20 MG/1
20 CAPSULE, DELAYED RELEASE ORAL DAILY
Qty: 30 CAPSULE | Refills: 0 | Status: ON HOLD | OUTPATIENT
Start: 2020-11-11 | End: 2020-11-27 | Stop reason: SDUPTHER

## 2020-11-11 RX ORDER — GABAPENTIN 250 MG/5ML
100 SOLUTION ORAL 2 TIMES DAILY
Qty: 120 ML | Refills: 0 | Status: ON HOLD | OUTPATIENT
Start: 2020-11-11 | End: 2021-05-25

## 2020-11-11 RX ORDER — LACOSAMIDE 200 MG/1
200 TABLET ORAL EVERY 12 HOURS SCHEDULED
Qty: 60 TABLET | Refills: 0 | Status: SHIPPED | OUTPATIENT
Start: 2020-11-11 | End: 2021-05-23 | Stop reason: DRUGHIGH

## 2020-11-11 RX ORDER — SODIUM CHLORIDE 1 G/1
1 TABLET ORAL
Qty: 90 TABLET | Refills: 0 | Status: SHIPPED | OUTPATIENT
Start: 2020-11-11 | End: 2020-12-11

## 2020-11-11 RX ORDER — FOLIC ACID 1 MG/1
0.5 TABLET ORAL DAILY
Qty: 30 TABLET | Refills: 0 | Status: SHIPPED | OUTPATIENT
Start: 2020-11-11

## 2020-11-11 RX ORDER — LORAZEPAM 0.5 MG/1
0.5 TABLET ORAL EVERY 8 HOURS PRN
Qty: 20 TABLET | Refills: 0 | Status: ON HOLD | OUTPATIENT
Start: 2020-11-11 | End: 2020-11-27 | Stop reason: SDUPTHER

## 2020-11-11 RX ORDER — MIDODRINE HYDROCHLORIDE 5 MG/1
5 TABLET ORAL EVERY 8 HOURS
Qty: 30 TABLET | Refills: 0 | Status: SHIPPED | OUTPATIENT
Start: 2020-11-11 | End: 2021-05-23 | Stop reason: ALTCHOICE

## 2020-11-11 RX ORDER — LEVETIRACETAM 100 MG/ML
500 SOLUTION ORAL EVERY 12 HOURS SCHEDULED
Qty: 300 ML | Refills: 0 | Status: ON HOLD | OUTPATIENT
Start: 2020-11-11 | End: 2021-05-25 | Stop reason: HOSPADM

## 2020-11-11 RX ADMIN — MEROPENEM 500 MG: 500 INJECTION, POWDER, FOR SOLUTION INTRAVENOUS at 06:05

## 2020-11-11 RX ADMIN — LINEZOLID 600 MG: 600 INJECTION, SOLUTION INTRAVENOUS at 22:28

## 2020-11-11 RX ADMIN — PANCRELIPASE 1 CAPSULE: 30000; 6000; 19000 CAPSULE, DELAYED RELEASE PELLETS ORAL at 10:01

## 2020-11-11 RX ADMIN — MEROPENEM 500 MG: 500 INJECTION, POWDER, FOR SOLUTION INTRAVENOUS at 15:05

## 2020-11-11 RX ADMIN — Medication 220 MG: at 10:03

## 2020-11-11 RX ADMIN — GABAPENTIN 100 MG: 250 SOLUTION ORAL at 22:52

## 2020-11-11 RX ADMIN — BACLOFEN 15 MG: 10 TABLET ORAL at 13:34

## 2020-11-11 RX ADMIN — ENOXAPARIN SODIUM 10 MG: 300 INJECTION INTRAVENOUS; SUBCUTANEOUS at 22:20

## 2020-11-11 RX ADMIN — LEVETIRACETAM 500 MG: 100 SOLUTION ORAL at 10:00

## 2020-11-11 RX ADMIN — FOLIC ACID 0.5 MG: 1 TABLET ORAL at 10:02

## 2020-11-11 RX ADMIN — METOPROLOL TARTRATE 25 MG: 25 TABLET, FILM COATED ORAL at 10:14

## 2020-11-11 RX ADMIN — PANTOPRAZOLE 40 MG: 40 TABLET, DELAYED RELEASE ORAL at 06:00

## 2020-11-11 RX ADMIN — MINERAL SUPPLEMENT IRON 300 MG / 5 ML STRENGTH LIQUID 100 PER BOX UNFLAVORED 300 MG: at 10:02

## 2020-11-11 RX ADMIN — SODIUM CHLORIDE TAB 1 GM 1000 MG: 1 TAB at 17:39

## 2020-11-11 RX ADMIN — PANCRELIPASE 1 CAPSULE: 30000; 6000; 19000 CAPSULE, DELAYED RELEASE PELLETS ORAL at 13:35

## 2020-11-11 RX ADMIN — ENOXAPARIN SODIUM 10 MG: 300 INJECTION INTRAVENOUS; SUBCUTANEOUS at 10:04

## 2020-11-11 RX ADMIN — BACLOFEN 15 MG: 10 TABLET ORAL at 06:00

## 2020-11-11 RX ADMIN — MEROPENEM 500 MG: 500 INJECTION, POWDER, FOR SOLUTION INTRAVENOUS at 22:27

## 2020-11-11 RX ADMIN — ACETAMINOPHEN 325 MG: 325 TABLET, FILM COATED ORAL at 15:01

## 2020-11-11 RX ADMIN — LACOSAMIDE 200 MG: 200 TABLET, FILM COATED ORAL at 10:01

## 2020-11-11 RX ADMIN — SODIUM CHLORIDE TAB 1 GM 1000 MG: 1 TAB at 13:35

## 2020-11-11 RX ADMIN — Medication 250 MCG: at 10:00

## 2020-11-11 RX ADMIN — GABAPENTIN 100 MG: 250 SOLUTION ORAL at 13:34

## 2020-11-11 RX ADMIN — PANCRELIPASE 1 CAPSULE: 30000; 6000; 19000 CAPSULE, DELAYED RELEASE PELLETS ORAL at 17:39

## 2020-11-11 RX ADMIN — LACOSAMIDE 200 MG: 200 TABLET, FILM COATED ORAL at 22:20

## 2020-11-11 RX ADMIN — SODIUM CHLORIDE 25 ML: 0.9 INJECTION, SOLUTION INTRAVENOUS at 15:04

## 2020-11-11 RX ADMIN — BACLOFEN 15 MG: 10 TABLET ORAL at 22:20

## 2020-11-11 RX ADMIN — SODIUM CHLORIDE: 0.9 INJECTION, SOLUTION INTRAVENOUS at 09:58

## 2020-11-11 RX ADMIN — LINEZOLID 600 MG: 600 INJECTION, SOLUTION INTRAVENOUS at 09:59

## 2020-11-11 RX ADMIN — OXYCODONE HYDROCHLORIDE AND ACETAMINOPHEN 500 MG: 500 TABLET ORAL at 10:01

## 2020-11-11 RX ADMIN — SODIUM CHLORIDE TAB 1 GM 1000 MG: 1 TAB at 10:07

## 2020-11-11 RX ADMIN — LEVETIRACETAM 500 MG: 100 SOLUTION ORAL at 22:20

## 2020-11-11 ASSESSMENT — PAIN SCALES - BEHAVIORAL PAIN SCALE (BPS)
BPS_SCORE: 3
BPS_SCORE: 4

## 2020-11-12 VITALS
SYSTOLIC BLOOD PRESSURE: 107 MMHG | WEIGHT: 58 LBS | HEART RATE: 113 BPM | DIASTOLIC BLOOD PRESSURE: 68 MMHG | RESPIRATION RATE: 18 BRPM | OXYGEN SATURATION: 94 % | TEMPERATURE: 99.5 F | BODY MASS INDEX: 13.42 KG/M2 | HEIGHT: 55 IN

## 2020-11-12 LAB
ANION GAP SERPL CALC-SCNC: 8 MMOL/L (ref 10–20)
BUN SERPL-MCNC: 18 MG/DL (ref 6–20)
BUN/CREAT SERPL: 82 (ref 7–25)
CALCIUM SERPL-MCNC: 8.3 MG/DL (ref 8.4–10.2)
CHLORIDE SERPL-SCNC: 98 MMOL/L (ref 98–107)
CO2 SERPL-SCNC: 30 MMOL/L (ref 21–32)
CREAT SERPL-MCNC: 0.22 MG/DL (ref 0.51–0.95)
GLUCOSE BLDC GLUCOMTR-MCNC: 100 MG/DL (ref 70–99)
GLUCOSE BLDC GLUCOMTR-MCNC: 131 MG/DL (ref 70–99)
GLUCOSE BLDC GLUCOMTR-MCNC: 80 MG/DL (ref 70–99)
GLUCOSE SERPL-MCNC: 90 MG/DL (ref 65–99)
POTASSIUM SERPL-SCNC: 4.6 MMOL/L (ref 3.4–5.1)
SODIUM SERPL-SCNC: 131 MMOL/L (ref 135–145)

## 2020-11-12 PROCEDURE — 10002800 HB RX 250 W HCPCS: Performed by: INTERNAL MEDICINE

## 2020-11-12 PROCEDURE — 10002807 HB RX 258: Performed by: INTERNAL MEDICINE

## 2020-11-12 PROCEDURE — 10002803 HB RX 637: Performed by: INTERNAL MEDICINE

## 2020-11-12 PROCEDURE — 36415 COLL VENOUS BLD VENIPUNCTURE: CPT

## 2020-11-12 PROCEDURE — 94003 VENT MGMT INPAT SUBQ DAY: CPT

## 2020-11-12 PROCEDURE — 80048 BASIC METABOLIC PNL TOTAL CA: CPT

## 2020-11-12 RX ADMIN — SODIUM CHLORIDE TAB 1 GM 1000 MG: 1 TAB at 09:06

## 2020-11-12 RX ADMIN — MEROPENEM 500 MG: 500 INJECTION, POWDER, FOR SOLUTION INTRAVENOUS at 05:42

## 2020-11-12 RX ADMIN — GABAPENTIN 100 MG: 250 SOLUTION ORAL at 09:05

## 2020-11-12 RX ADMIN — MINERAL SUPPLEMENT IRON 300 MG / 5 ML STRENGTH LIQUID 100 PER BOX UNFLAVORED 300 MG: at 09:06

## 2020-11-12 RX ADMIN — LORAZEPAM 0.5 MG: 0.5 TABLET ORAL at 05:35

## 2020-11-12 RX ADMIN — OXYCODONE HYDROCHLORIDE AND ACETAMINOPHEN 500 MG: 500 TABLET ORAL at 09:06

## 2020-11-12 RX ADMIN — FOLIC ACID 0.5 MG: 1 TABLET ORAL at 09:06

## 2020-11-12 RX ADMIN — PANTOPRAZOLE 40 MG: 40 TABLET, DELAYED RELEASE ORAL at 05:35

## 2020-11-12 RX ADMIN — Medication 250 MCG: at 09:06

## 2020-11-12 RX ADMIN — Medication 220 MG: at 09:05

## 2020-11-12 RX ADMIN — LACOSAMIDE 200 MG: 200 TABLET, FILM COATED ORAL at 09:05

## 2020-11-12 RX ADMIN — PANCRELIPASE 1 CAPSULE: 30000; 6000; 19000 CAPSULE, DELAYED RELEASE PELLETS ORAL at 08:30

## 2020-11-12 RX ADMIN — LEVETIRACETAM 500 MG: 100 SOLUTION ORAL at 09:06

## 2020-11-12 RX ADMIN — ENOXAPARIN SODIUM 10 MG: 300 INJECTION INTRAVENOUS; SUBCUTANEOUS at 09:05

## 2020-11-12 RX ADMIN — METOPROLOL TARTRATE 25 MG: 25 TABLET, FILM COATED ORAL at 09:06

## 2020-11-12 RX ADMIN — OXYCODONE HYDROCHLORIDE 2.5 MG: 5 TABLET ORAL at 12:43

## 2020-11-12 RX ADMIN — LINEZOLID 600 MG: 600 INJECTION, SOLUTION INTRAVENOUS at 09:08

## 2020-11-12 RX ADMIN — BACLOFEN 15 MG: 10 TABLET ORAL at 05:35

## 2020-11-12 ASSESSMENT — PAIN SCALES - BEHAVIORAL PAIN SCALE (BPS): BPS_SCORE: 4

## 2020-11-25 ENCOUNTER — HOSPITAL ENCOUNTER (INPATIENT)
Age: 30
LOS: 5 days | Discharge: HOME | DRG: 720 | End: 2020-11-30
Attending: EMERGENCY MEDICINE | Admitting: INTERNAL MEDICINE

## 2020-11-25 ENCOUNTER — APPOINTMENT (OUTPATIENT)
Dept: CT IMAGING | Age: 30
DRG: 720 | End: 2020-11-25
Attending: INTERNAL MEDICINE

## 2020-11-25 ENCOUNTER — APPOINTMENT (OUTPATIENT)
Dept: GENERAL RADIOLOGY | Age: 30
DRG: 720 | End: 2020-11-25
Attending: INTERNAL MEDICINE

## 2020-11-25 DIAGNOSIS — R50.9 FEVER, UNSPECIFIED FEVER CAUSE: Primary | ICD-10-CM

## 2020-11-25 DIAGNOSIS — J96.10 CHRONIC RESPIRATORY FAILURE, UNSPECIFIED WHETHER WITH HYPOXIA OR HYPERCAPNIA (CMD): ICD-10-CM

## 2020-11-25 LAB
ALBUMIN SERPL-MCNC: 2.8 G/DL (ref 3.6–5.1)
ALP SERPL-CCNC: 265 UNITS/L (ref 45–117)
ALT SERPL-CCNC: 200 UNITS/L
ANION GAP SERPL CALC-SCNC: 7 MMOL/L (ref 10–20)
APPEARANCE UR: CLEAR
AST SERPL-CCNC: 105 UNITS/L
BACTERIA #/AREA URNS HPF: ABNORMAL /HPF
BASOPHILS # BLD: 0 K/MCL (ref 0–0.3)
BASOPHILS NFR BLD: 1 %
BILIRUB CONJ SERPL-MCNC: 0.2 MG/DL (ref 0–0.2)
BILIRUB SERPL-MCNC: 0.6 MG/DL (ref 0.2–1)
BILIRUB UR QL STRIP: NEGATIVE
BUN SERPL-MCNC: 35 MG/DL (ref 6–20)
BUN/CREAT SERPL: 114 (ref 7–25)
CALCIUM SERPL-MCNC: 8.7 MG/DL (ref 8.4–10.2)
CHLORIDE SERPL-SCNC: 106 MMOL/L (ref 98–107)
CO2 SERPL-SCNC: 30 MMOL/L (ref 21–32)
COLOR UR: YELLOW
CREAT SERPL-MCNC: 0.31 MG/DL (ref 0.51–0.95)
DIFFERENTIAL METHOD BLD: ABNORMAL
EOSINOPHIL # BLD: 0.3 K/MCL (ref 0.1–0.5)
EOSINOPHIL NFR BLD: 4 %
ERYTHROCYTE [DISTWIDTH] IN BLOOD: 16.2 % (ref 11–15)
GLUCOSE BLDC GLUCOMTR-MCNC: 87 MG/DL (ref 70–99)
GLUCOSE SERPL-MCNC: 110 MG/DL (ref 65–99)
GLUCOSE UR STRIP-MCNC: NEGATIVE MG/DL
HCG UR QL: NEGATIVE
HCT VFR BLD CALC: 30.4 % (ref 36–46.5)
HGB BLD-MCNC: 10.5 G/DL (ref 12–15.5)
HGB UR QL STRIP: NEGATIVE
HYALINE CASTS #/AREA URNS LPF: ABNORMAL /LPF (ref 0–5)
IMM GRANULOCYTES # BLD AUTO: 0 K/MCL (ref 0–0.2)
IMM GRANULOCYTES NFR BLD: 0 %
KETONES UR STRIP-MCNC: NEGATIVE MG/DL
LACTATE BLDV-SCNC: 0.8 MMOL/L (ref 0–2)
LEUKOCYTE ESTERASE UR QL STRIP: NEGATIVE
LYMPHOCYTES # BLD: 1.7 K/MCL (ref 1–4.8)
LYMPHOCYTES NFR BLD: 27 %
MAGNESIUM SERPL-MCNC: 2.1 MG/DL (ref 1.7–2.4)
MCH RBC QN AUTO: 38.5 PG (ref 26–34)
MCHC RBC AUTO-ENTMCNC: 34.5 G/DL (ref 32–36.5)
MCV RBC AUTO: 111.4 FL (ref 78–100)
MONOCYTES # BLD: 0.5 K/MCL (ref 0.3–0.9)
MONOCYTES NFR BLD: 7 %
MUCOUS THREADS URNS QL MICRO: PRESENT
NEUTROPHILS # BLD: 3.9 K/MCL (ref 1.8–7.7)
NEUTROPHILS NFR BLD: 61 %
NITRITE UR QL STRIP: NEGATIVE
NRBC BLD MANUAL-RTO: 0 /100 WBC
PH UR STRIP: 5 UNITS (ref 5–7)
PHOSPHATE SERPL-MCNC: 4.2 MG/DL (ref 2.4–4.7)
PLATELET # BLD: 113 K/MCL (ref 140–450)
POTASSIUM SERPL-SCNC: 5.1 MMOL/L (ref 3.4–5.1)
PROT SERPL-MCNC: 10.1 G/DL (ref 6.4–8.2)
PROT UR STRIP-MCNC: 100 MG/DL
RBC # BLD: 2.73 MIL/MCL (ref 4–5.2)
RBC #/AREA URNS HPF: ABNORMAL /HPF (ref 0–2)
SARS-COV-2 RNA RESP QL NAA+PROBE: NOT DETECTED
SERVICE CMNT-IMP: NORMAL
SODIUM SERPL-SCNC: 138 MMOL/L (ref 135–145)
SP GR UR STRIP: 1.02 (ref 1–1.03)
SPECIMEN SOURCE: ABNORMAL
SPECIMEN SOURCE: NORMAL
SQUAMOUS #/AREA URNS HPF: ABNORMAL /HPF (ref 0–5)
UROBILINOGEN UR STRIP-MCNC: 0.2 MG/DL (ref 0–1)
WBC # BLD: 6.4 K/MCL (ref 4.2–11)
WBC #/AREA URNS HPF: ABNORMAL /HPF (ref 0–5)

## 2020-11-25 PROCEDURE — U0003 INFECTIOUS AGENT DETECTION BY NUCLEIC ACID (DNA OR RNA); SEVERE ACUTE RESPIRATORY SYNDROME CORONAVIRUS 2 (SARS-COV-2) (CORONAVIRUS DISEASE [COVID-19]), AMPLIFIED PROBE TECHNIQUE, MAKING USE OF HIGH THROUGHPUT TECHNOLOGIES AS DESCRIBED BY CMS-2020-01-R: HCPCS

## 2020-11-25 PROCEDURE — 10002803 HB RX 637: Performed by: INTERNAL MEDICINE

## 2020-11-25 PROCEDURE — 10002805 HB CONTRAST AGENT: Performed by: INTERNAL MEDICINE

## 2020-11-25 PROCEDURE — 5A1955Z RESPIRATORY VENTILATION, GREATER THAN 96 CONSECUTIVE HOURS: ICD-10-PCS | Performed by: INTERNAL MEDICINE

## 2020-11-25 PROCEDURE — 84100 ASSAY OF PHOSPHORUS: CPT

## 2020-11-25 PROCEDURE — 10002807 HB RX 258

## 2020-11-25 PROCEDURE — 81001 URINALYSIS AUTO W/SCOPE: CPT

## 2020-11-25 PROCEDURE — 3E04328 INTRODUCTION OF OXAZOLIDINONES INTO CENTRAL VEIN, PERCUTANEOUS APPROACH: ICD-10-PCS | Performed by: INTERNAL MEDICINE

## 2020-11-25 PROCEDURE — 83605 ASSAY OF LACTIC ACID: CPT

## 2020-11-25 PROCEDURE — 99285 EMERGENCY DEPT VISIT HI MDM: CPT | Performed by: EMERGENCY MEDICINE

## 2020-11-25 PROCEDURE — 10002807 HB RX 258: Performed by: INTERNAL MEDICINE

## 2020-11-25 PROCEDURE — 96365 THER/PROPH/DIAG IV INF INIT: CPT

## 2020-11-25 PROCEDURE — 71045 X-RAY EXAM CHEST 1 VIEW: CPT

## 2020-11-25 PROCEDURE — 99285 EMERGENCY DEPT VISIT HI MDM: CPT

## 2020-11-25 PROCEDURE — 10002803 HB RX 637: Performed by: EMERGENCY MEDICINE

## 2020-11-25 PROCEDURE — 94002 VENT MGMT INPAT INIT DAY: CPT

## 2020-11-25 PROCEDURE — 87205 SMEAR GRAM STAIN: CPT

## 2020-11-25 PROCEDURE — 10000002 HB ROOM CHARGE MED SURG

## 2020-11-25 PROCEDURE — 10002800 HB RX 250 W HCPCS: Performed by: INTERNAL MEDICINE

## 2020-11-25 PROCEDURE — 10004281 HB COUNTER-STAFF TIME PER 15 MIN

## 2020-11-25 PROCEDURE — 80076 HEPATIC FUNCTION PANEL: CPT

## 2020-11-25 PROCEDURE — 84703 CHORIONIC GONADOTROPIN ASSAY: CPT

## 2020-11-25 PROCEDURE — 94640 AIRWAY INHALATION TREATMENT: CPT

## 2020-11-25 PROCEDURE — 96367 TX/PROPH/DG ADDL SEQ IV INF: CPT

## 2020-11-25 PROCEDURE — 87040 BLOOD CULTURE FOR BACTERIA: CPT

## 2020-11-25 PROCEDURE — 85025 COMPLETE CBC W/AUTO DIFF WBC: CPT

## 2020-11-25 PROCEDURE — 80048 BASIC METABOLIC PNL TOTAL CA: CPT

## 2020-11-25 PROCEDURE — 10002801 HB RX 250 W/O HCPCS: Performed by: INTERNAL MEDICINE

## 2020-11-25 PROCEDURE — 74177 CT ABD & PELVIS W/CONTRAST: CPT

## 2020-11-25 PROCEDURE — 83735 ASSAY OF MAGNESIUM: CPT

## 2020-11-25 RX ORDER — LEVETIRACETAM 100 MG/ML
500 SOLUTION ORAL EVERY 12 HOURS SCHEDULED
Status: DISCONTINUED | OUTPATIENT
Start: 2020-11-25 | End: 2020-11-30 | Stop reason: HOSPADM

## 2020-11-25 RX ORDER — OXYCODONE HYDROCHLORIDE 5 MG/1
5 TABLET ORAL EVERY 4 HOURS PRN
Status: DISCONTINUED | OUTPATIENT
Start: 2020-11-25 | End: 2020-11-25

## 2020-11-25 RX ORDER — LINEZOLID 2 MG/ML
600 INJECTION, SOLUTION INTRAVENOUS ONCE
Status: COMPLETED | OUTPATIENT
Start: 2020-11-25 | End: 2020-11-25

## 2020-11-25 RX ORDER — CHOLECALCIFEROL (VITAMIN D3) 50 MCG
50 TABLET ORAL DAILY
COMMUNITY

## 2020-11-25 RX ORDER — OXYCODONE HYDROCHLORIDE 5 MG/1
2.5 TABLET ORAL EVERY 12 HOURS PRN
Status: DISCONTINUED | OUTPATIENT
Start: 2020-11-25 | End: 2020-11-30 | Stop reason: HOSPADM

## 2020-11-25 RX ORDER — LINEZOLID 2 MG/ML
600 INJECTION, SOLUTION INTRAVENOUS EVERY 12 HOURS SCHEDULED
Status: DISCONTINUED | OUTPATIENT
Start: 2020-11-25 | End: 2020-11-25

## 2020-11-25 RX ORDER — SODIUM CHLORIDE 1 G/1
1 TABLET ORAL
Status: DISCONTINUED | OUTPATIENT
Start: 2020-11-25 | End: 2020-11-30 | Stop reason: HOSPADM

## 2020-11-25 RX ORDER — BACLOFEN 10 MG/1
15 TABLET ORAL EVERY 8 HOURS SCHEDULED
Status: DISCONTINUED | OUTPATIENT
Start: 2020-11-25 | End: 2020-11-30 | Stop reason: HOSPADM

## 2020-11-25 RX ORDER — LACOSAMIDE 200 MG/1
200 TABLET ORAL EVERY 12 HOURS SCHEDULED
Status: DISCONTINUED | OUTPATIENT
Start: 2020-11-25 | End: 2020-11-30 | Stop reason: HOSPADM

## 2020-11-25 RX ORDER — GABAPENTIN 250 MG/5ML
100 SOLUTION ORAL 2 TIMES DAILY
Status: DISCONTINUED | OUTPATIENT
Start: 2020-11-25 | End: 2020-11-30 | Stop reason: HOSPADM

## 2020-11-25 RX ORDER — ZINC SULFATE 50(220)MG
220 CAPSULE ORAL
Status: DISCONTINUED | OUTPATIENT
Start: 2020-11-25 | End: 2020-11-30 | Stop reason: HOSPADM

## 2020-11-25 RX ORDER — LORAZEPAM 0.5 MG/1
0.5 TABLET ORAL ONCE
Status: DISCONTINUED | OUTPATIENT
Start: 2020-11-25 | End: 2020-11-30 | Stop reason: HOSPADM

## 2020-11-25 RX ORDER — SODIUM CHLORIDE 9 MG/ML
INJECTION, SOLUTION INTRAVENOUS
Status: COMPLETED
Start: 2020-11-25 | End: 2020-11-25

## 2020-11-25 RX ORDER — FOLIC ACID 1 MG/1
0.5 TABLET ORAL DAILY
Status: DISCONTINUED | OUTPATIENT
Start: 2020-11-25 | End: 2020-11-30 | Stop reason: HOSPADM

## 2020-11-25 RX ORDER — LINEZOLID 2 MG/ML
600 INJECTION, SOLUTION INTRAVENOUS EVERY 12 HOURS SCHEDULED
Status: DISCONTINUED | OUTPATIENT
Start: 2020-11-25 | End: 2020-11-27

## 2020-11-25 RX ORDER — MIDODRINE HYDROCHLORIDE 5 MG/1
5 TABLET ORAL 3 TIMES DAILY
Status: DISCONTINUED | OUTPATIENT
Start: 2020-11-25 | End: 2020-11-30 | Stop reason: HOSPADM

## 2020-11-25 RX ORDER — OXYCODONE HYDROCHLORIDE 5 MG/1
2.5 TABLET ORAL EVERY 4 HOURS PRN
Status: DISCONTINUED | OUTPATIENT
Start: 2020-11-25 | End: 2020-11-25 | Stop reason: SDUPTHER

## 2020-11-25 RX ORDER — OXYCODONE HYDROCHLORIDE 5 MG/1
2.5 TABLET ORAL EVERY 12 HOURS PRN
Status: DISCONTINUED | OUTPATIENT
Start: 2020-11-25 | End: 2020-11-25 | Stop reason: SDUPTHER

## 2020-11-25 RX ORDER — ACETAMINOPHEN 325 MG/1
325 TABLET ORAL EVERY 6 HOURS PRN
Status: DISCONTINUED | OUTPATIENT
Start: 2020-11-25 | End: 2020-11-30 | Stop reason: HOSPADM

## 2020-11-25 RX ORDER — ASCORBIC ACID 500 MG
500 TABLET ORAL DAILY
Status: DISCONTINUED | OUTPATIENT
Start: 2020-11-25 | End: 2020-11-30 | Stop reason: HOSPADM

## 2020-11-25 RX ADMIN — LACOSAMIDE 200 MG: 200 TABLET, FILM COATED ORAL at 21:10

## 2020-11-25 RX ADMIN — MEROPENEM 1 G: 1 INJECTION, POWDER, FOR SOLUTION INTRAVENOUS at 07:55

## 2020-11-25 RX ADMIN — MEROPENEM 500 MG: 500 INJECTION, POWDER, FOR SOLUTION INTRAVENOUS at 21:26

## 2020-11-25 RX ADMIN — SODIUM CHLORIDE TAB 1 GM 1000 MG: 1 TAB at 21:09

## 2020-11-25 RX ADMIN — LINEZOLID 600 MG: 600 INJECTION, SOLUTION INTRAVENOUS at 22:39

## 2020-11-25 RX ADMIN — BACLOFEN 15 MG: 10 TABLET ORAL at 21:09

## 2020-11-25 RX ADMIN — OXYCODONE HYDROCHLORIDE 2.5 MG: 5 TABLET ORAL at 12:57

## 2020-11-25 RX ADMIN — FOLIC ACID 0.5 MG: 1 TABLET ORAL at 21:08

## 2020-11-25 RX ADMIN — MEROPENEM 500 MG: 500 INJECTION, POWDER, FOR SOLUTION INTRAVENOUS at 13:51

## 2020-11-25 RX ADMIN — LINEZOLID 600 MG: 600 INJECTION, SOLUTION INTRAVENOUS at 09:15

## 2020-11-25 RX ADMIN — ZINC SULFATE 220 MG (50 MG) CAPSULE 220 MG: CAPSULE at 21:10

## 2020-11-25 RX ADMIN — SODIUM CHLORIDE, POTASSIUM CHLORIDE, SODIUM LACTATE AND CALCIUM CHLORIDE 750 ML: 600; 310; 30; 20 INJECTION, SOLUTION INTRAVENOUS at 08:09

## 2020-11-25 RX ADMIN — OXYCODONE HYDROCHLORIDE AND ACETAMINOPHEN 500 MG: 500 TABLET ORAL at 21:08

## 2020-11-25 RX ADMIN — GABAPENTIN 100 MG: 250 SOLUTION ORAL at 21:09

## 2020-11-25 RX ADMIN — IPRATROPIUM BROMIDE 0.5 MG: 0.5 SOLUTION RESPIRATORY (INHALATION) at 18:33

## 2020-11-25 RX ADMIN — IOHEXOL 34 ML: 300 INJECTION, SOLUTION INTRAVENOUS at 09:45

## 2020-11-25 RX ADMIN — LEVETIRACETAM 500 MG: 100 SOLUTION ORAL at 21:10

## 2020-11-25 RX ADMIN — PANCRELIPASE 1 CAPSULE: 30000; 6000; 19000 CAPSULE, DELAYED RELEASE PELLETS ORAL at 21:08

## 2020-11-25 RX ADMIN — SODIUM CHLORIDE 1000 ML: 0.9 INJECTION, SOLUTION INTRAVENOUS at 22:56

## 2020-11-25 SDOH — HEALTH STABILITY: MENTAL HEALTH: HOW OFTEN DO YOU HAVE A DRINK CONTAINING ALCOHOL?: NEVER

## 2020-11-25 ASSESSMENT — ACTIVITIES OF DAILY LIVING (ADL)
MOBILITY_ASSIST_DEVICES: TOTAL LIFT
BATHING: DEPENDENT
FEEDING YOURSELF: DEPENDENT
TOILETING: DEPENDENT
DRESSING YOURSELF: DEPENDENT
TRANSFERRING: DEPENDENT
ADL_SCORE: 0
ADL_BEFORE_ADMISSION: NEEDS/REQUIRES ASSISTANCE
CONTINENCE: DEPENDENT

## 2020-11-25 ASSESSMENT — PAIN SCALES - BEHAVIORAL PAIN SCALE (BPS)
BPS_SCORE: 4

## 2020-11-25 ASSESSMENT — COGNITIVE AND FUNCTIONAL STATUS - GENERAL
ARE YOU BLIND OR DO YOU HAVE SERIOUS DIFFICULTY SEEING, EVEN WHEN WEARING GLASSES: NO
ARE YOU DEAF OR DO YOU HAVE SERIOUS DIFFICULTY  HEARING: NO

## 2020-11-25 ASSESSMENT — PATIENT HEALTH QUESTIONNAIRE - PHQ9: IS PATIENT ABLE TO COMPLETE PHQ2 OR PHQ9: NO, PATIENT WILL NEVER BE ABLE TO COMPLETE

## 2020-11-25 ASSESSMENT — LIFESTYLE VARIABLES: HOW OFTEN DO YOU HAVE A DRINK CONTAINING ALCOHOL: NEVER

## 2020-11-26 LAB
ALBUMIN SERPL-MCNC: 2.7 G/DL (ref 3.6–5.1)
ALBUMIN/GLOB SERPL: 0.4 {RATIO} (ref 1–2.4)
ALP SERPL-CCNC: 235 UNITS/L (ref 45–117)
ALT SERPL-CCNC: 129 UNITS/L
ANION GAP SERPL CALC-SCNC: 10 MMOL/L (ref 10–20)
AST SERPL-CCNC: 41 UNITS/L
BACTERIA SPEC RESP CULT: NORMAL
BASOPHILS # BLD: 0 K/MCL (ref 0–0.3)
BASOPHILS NFR BLD: 1 %
BILIRUB SERPL-MCNC: 0.8 MG/DL (ref 0.2–1)
BUN SERPL-MCNC: 23 MG/DL (ref 6–20)
BUN/CREAT SERPL: 86 (ref 7–25)
CALCIUM SERPL-MCNC: 8.7 MG/DL (ref 8.4–10.2)
CHLORIDE SERPL-SCNC: 102 MMOL/L (ref 98–107)
CO2 SERPL-SCNC: 28 MMOL/L (ref 21–32)
CREAT SERPL-MCNC: 0.27 MG/DL (ref 0.51–0.95)
DIFFERENTIAL METHOD BLD: ABNORMAL
EOSINOPHIL # BLD: 0.3 K/MCL (ref 0.1–0.5)
EOSINOPHIL NFR BLD: 5 %
ERYTHROCYTE [DISTWIDTH] IN BLOOD: 15.1 % (ref 11–15)
GLOBULIN SER-MCNC: 6.6 G/DL (ref 2–4)
GLUCOSE BLDC GLUCOMTR-MCNC: 108 MG/DL (ref 70–99)
GLUCOSE BLDC GLUCOMTR-MCNC: 111 MG/DL (ref 70–99)
GLUCOSE BLDC GLUCOMTR-MCNC: 78 MG/DL (ref 70–99)
GLUCOSE BLDC GLUCOMTR-MCNC: 98 MG/DL (ref 70–99)
GLUCOSE SERPL-MCNC: 123 MG/DL (ref 65–99)
GRAM STN SPEC: NORMAL
GRAM STN SPEC: NORMAL
HCT VFR BLD CALC: 29.9 % (ref 36–46.5)
HGB BLD-MCNC: 9.6 G/DL (ref 12–15.5)
IMM GRANULOCYTES # BLD AUTO: 0 K/MCL (ref 0–0.2)
IMM GRANULOCYTES NFR BLD: 0 %
LYMPHOCYTES # BLD: 1.6 K/MCL (ref 1–4.8)
LYMPHOCYTES NFR BLD: 25 %
MCH RBC QN AUTO: 34.8 PG (ref 26–34)
MCHC RBC AUTO-ENTMCNC: 32.1 G/DL (ref 32–36.5)
MCV RBC AUTO: 108.3 FL (ref 78–100)
MONOCYTES # BLD: 0.7 K/MCL (ref 0.3–0.9)
MONOCYTES NFR BLD: 11 %
NEUTROPHILS # BLD: 3.7 K/MCL (ref 1.8–7.7)
NEUTROPHILS NFR BLD: 58 %
NRBC BLD MANUAL-RTO: 0 /100 WBC
PLATELET # BLD: 117 K/MCL (ref 140–450)
POTASSIUM SERPL-SCNC: 4.6 MMOL/L (ref 3.4–5.1)
PROCALCITONIN SERPL IA-MCNC: <0.05 NG/ML
PROT SERPL-MCNC: 9.3 G/DL (ref 6.4–8.2)
RBC # BLD: 2.76 MIL/MCL (ref 4–5.2)
REPORT STATUS (RPT): NORMAL
SODIUM SERPL-SCNC: 135 MMOL/L (ref 135–145)
SPECIMEN SOURCE: NORMAL
WBC # BLD: 6.5 K/MCL (ref 4.2–11)

## 2020-11-26 PROCEDURE — 10002803 HB RX 637: Performed by: INTERNAL MEDICINE

## 2020-11-26 PROCEDURE — 94640 AIRWAY INHALATION TREATMENT: CPT

## 2020-11-26 PROCEDURE — 80053 COMPREHEN METABOLIC PANEL: CPT

## 2020-11-26 PROCEDURE — 36415 COLL VENOUS BLD VENIPUNCTURE: CPT

## 2020-11-26 PROCEDURE — 85025 COMPLETE CBC W/AUTO DIFF WBC: CPT

## 2020-11-26 PROCEDURE — 10000002 HB ROOM CHARGE MED SURG

## 2020-11-26 PROCEDURE — 94003 VENT MGMT INPAT SUBQ DAY: CPT

## 2020-11-26 PROCEDURE — 84145 PROCALCITONIN (PCT): CPT

## 2020-11-26 PROCEDURE — 10002801 HB RX 250 W/O HCPCS: Performed by: INTERNAL MEDICINE

## 2020-11-26 PROCEDURE — 10002807 HB RX 258: Performed by: INTERNAL MEDICINE

## 2020-11-26 PROCEDURE — 10002800 HB RX 250 W HCPCS: Performed by: INTERNAL MEDICINE

## 2020-11-26 RX ORDER — LORAZEPAM 2 MG/ML
0.5 INJECTION INTRAMUSCULAR EVERY 8 HOURS PRN
Status: DISCONTINUED | OUTPATIENT
Start: 2020-11-26 | End: 2020-11-30 | Stop reason: HOSPADM

## 2020-11-26 RX ADMIN — PANCRELIPASE 1 CAPSULE: 30000; 6000; 19000 CAPSULE, DELAYED RELEASE PELLETS ORAL at 13:47

## 2020-11-26 RX ADMIN — MEROPENEM 500 MG: 500 INJECTION, POWDER, FOR SOLUTION INTRAVENOUS at 00:40

## 2020-11-26 RX ADMIN — MEROPENEM 500 MG: 500 INJECTION, POWDER, FOR SOLUTION INTRAVENOUS at 06:49

## 2020-11-26 RX ADMIN — MIDODRINE HYDROCHLORIDE 5 MG: 5 TABLET ORAL at 06:47

## 2020-11-26 RX ADMIN — LACOSAMIDE 200 MG: 200 TABLET, FILM COATED ORAL at 20:42

## 2020-11-26 RX ADMIN — SODIUM CHLORIDE TAB 1 GM 1000 MG: 1 TAB at 17:47

## 2020-11-26 RX ADMIN — IPRATROPIUM BROMIDE 0.5 MG: 0.5 SOLUTION RESPIRATORY (INHALATION) at 16:05

## 2020-11-26 RX ADMIN — OXYCODONE HYDROCHLORIDE 2.5 MG: 5 TABLET ORAL at 12:38

## 2020-11-26 RX ADMIN — FOLIC ACID 0.5 MG: 1 TABLET ORAL at 08:28

## 2020-11-26 RX ADMIN — LEVETIRACETAM 500 MG: 100 SOLUTION ORAL at 08:28

## 2020-11-26 RX ADMIN — MINERAL SUPPLEMENT IRON 300 MG / 5 ML STRENGTH LIQUID 100 PER BOX UNFLAVORED 300 MG: at 08:28

## 2020-11-26 RX ADMIN — MIDODRINE HYDROCHLORIDE 5 MG: 5 TABLET ORAL at 16:47

## 2020-11-26 RX ADMIN — PANCRELIPASE 1 CAPSULE: 30000; 6000; 19000 CAPSULE, DELAYED RELEASE PELLETS ORAL at 08:28

## 2020-11-26 RX ADMIN — PANCRELIPASE 1 CAPSULE: 30000; 6000; 19000 CAPSULE, DELAYED RELEASE PELLETS ORAL at 20:40

## 2020-11-26 RX ADMIN — LEVETIRACETAM 500 MG: 100 SOLUTION ORAL at 20:40

## 2020-11-26 RX ADMIN — LINEZOLID 600 MG: 600 INJECTION, SOLUTION INTRAVENOUS at 20:40

## 2020-11-26 RX ADMIN — MIDODRINE HYDROCHLORIDE 5 MG: 5 TABLET ORAL at 13:48

## 2020-11-26 RX ADMIN — BACLOFEN 15 MG: 10 TABLET ORAL at 06:47

## 2020-11-26 RX ADMIN — IPRATROPIUM BROMIDE 0.5 MG: 0.5 SOLUTION RESPIRATORY (INHALATION) at 11:28

## 2020-11-26 RX ADMIN — MEROPENEM 500 MG: 500 INJECTION, POWDER, FOR SOLUTION INTRAVENOUS at 17:47

## 2020-11-26 RX ADMIN — BACLOFEN 15 MG: 10 TABLET ORAL at 13:47

## 2020-11-26 RX ADMIN — BACLOFEN 15 MG: 10 TABLET ORAL at 20:40

## 2020-11-26 RX ADMIN — SODIUM CHLORIDE TAB 1 GM 1000 MG: 1 TAB at 08:28

## 2020-11-26 RX ADMIN — IPRATROPIUM BROMIDE 0.5 MG: 0.5 SOLUTION RESPIRATORY (INHALATION) at 18:55

## 2020-11-26 RX ADMIN — SODIUM CHLORIDE TAB 1 GM 1000 MG: 1 TAB at 13:47

## 2020-11-26 RX ADMIN — LINEZOLID 600 MG: 600 INJECTION, SOLUTION INTRAVENOUS at 08:42

## 2020-11-26 RX ADMIN — GABAPENTIN 100 MG: 250 SOLUTION ORAL at 08:28

## 2020-11-26 RX ADMIN — LACOSAMIDE 200 MG: 200 TABLET, FILM COATED ORAL at 13:47

## 2020-11-26 RX ADMIN — OXYCODONE HYDROCHLORIDE AND ACETAMINOPHEN 500 MG: 500 TABLET ORAL at 08:28

## 2020-11-26 RX ADMIN — MEROPENEM 500 MG: 500 INJECTION, POWDER, FOR SOLUTION INTRAVENOUS at 13:46

## 2020-11-26 RX ADMIN — IPRATROPIUM BROMIDE 0.5 MG: 0.5 SOLUTION RESPIRATORY (INHALATION) at 07:27

## 2020-11-26 RX ADMIN — GABAPENTIN 100 MG: 250 SOLUTION ORAL at 20:42

## 2020-11-26 RX ADMIN — ZINC SULFATE 220 MG (50 MG) CAPSULE 220 MG: CAPSULE at 08:28

## 2020-11-26 ASSESSMENT — PAIN SCALES - BEHAVIORAL PAIN SCALE (BPS)
BPS_SCORE: 5

## 2020-11-27 LAB
ANION GAP SERPL CALC-SCNC: 6 MMOL/L (ref 10–20)
BASOPHILS # BLD: 0 K/MCL (ref 0–0.3)
BASOPHILS NFR BLD: 0 %
BUN SERPL-MCNC: 20 MG/DL (ref 6–20)
BUN/CREAT SERPL: 93 (ref 7–25)
CALCIUM SERPL-MCNC: 8.2 MG/DL (ref 8.4–10.2)
CHLORIDE SERPL-SCNC: 103 MMOL/L (ref 98–107)
CK SERPL-CCNC: 92 UNITS/L (ref 26–192)
CO2 SERPL-SCNC: 29 MMOL/L (ref 21–32)
CREAT SERPL-MCNC: 0.22 MG/DL (ref 0.51–0.95)
DIFFERENTIAL METHOD BLD: ABNORMAL
EOSINOPHIL # BLD: 0.3 K/MCL (ref 0.1–0.5)
EOSINOPHIL NFR BLD: 5 %
ERYTHROCYTE [DISTWIDTH] IN BLOOD: 14.7 % (ref 11–15)
GLUCOSE BLDC GLUCOMTR-MCNC: 103 MG/DL (ref 70–99)
GLUCOSE BLDC GLUCOMTR-MCNC: 116 MG/DL (ref 70–99)
GLUCOSE BLDC GLUCOMTR-MCNC: 119 MG/DL (ref 70–99)
GLUCOSE BLDC GLUCOMTR-MCNC: 96 MG/DL (ref 70–99)
GLUCOSE SERPL-MCNC: 97 MG/DL (ref 65–99)
HCT VFR BLD CALC: 23.1 % (ref 36–46.5)
HGB BLD-MCNC: 7.7 G/DL (ref 12–15.5)
IMM GRANULOCYTES # BLD AUTO: 0 K/MCL (ref 0–0.2)
IMM GRANULOCYTES NFR BLD: 0 %
LYMPHOCYTES # BLD: 1.4 K/MCL (ref 1–4.8)
LYMPHOCYTES NFR BLD: 26 %
MCH RBC QN AUTO: 36.3 PG (ref 26–34)
MCHC RBC AUTO-ENTMCNC: 33.3 G/DL (ref 32–36.5)
MCV RBC AUTO: 109 FL (ref 78–100)
MONOCYTES # BLD: 0.9 K/MCL (ref 0.3–0.9)
MONOCYTES NFR BLD: 16 %
NEUTROPHILS # BLD: 2.8 K/MCL (ref 1.8–7.7)
NEUTROPHILS NFR BLD: 53 %
NRBC BLD MANUAL-RTO: 0 /100 WBC
PLATELET # BLD: 87 K/MCL (ref 140–450)
POTASSIUM SERPL-SCNC: 4.2 MMOL/L (ref 3.4–5.1)
RBC # BLD: 2.12 MIL/MCL (ref 4–5.2)
SODIUM SERPL-SCNC: 134 MMOL/L (ref 135–145)
WBC # BLD: 5.4 K/MCL (ref 4.2–11)

## 2020-11-27 PROCEDURE — 10002801 HB RX 250 W/O HCPCS: Performed by: INTERNAL MEDICINE

## 2020-11-27 PROCEDURE — 10002807 HB RX 258: Performed by: INTERNAL MEDICINE

## 2020-11-27 PROCEDURE — 10002800 HB RX 250 W HCPCS: Performed by: INTERNAL MEDICINE

## 2020-11-27 PROCEDURE — 36415 COLL VENOUS BLD VENIPUNCTURE: CPT

## 2020-11-27 PROCEDURE — 10002803 HB RX 637: Performed by: INTERNAL MEDICINE

## 2020-11-27 PROCEDURE — 94003 VENT MGMT INPAT SUBQ DAY: CPT

## 2020-11-27 PROCEDURE — 94640 AIRWAY INHALATION TREATMENT: CPT

## 2020-11-27 PROCEDURE — 80048 BASIC METABOLIC PNL TOTAL CA: CPT

## 2020-11-27 PROCEDURE — 82550 ASSAY OF CK (CPK): CPT

## 2020-11-27 PROCEDURE — 13003348 HB PATIENT LVL 3 SUPPLIES IP WND

## 2020-11-27 PROCEDURE — 10000002 HB ROOM CHARGE MED SURG

## 2020-11-27 PROCEDURE — 85025 COMPLETE CBC W/AUTO DIFF WBC: CPT

## 2020-11-27 PROCEDURE — 10004651 HB RX, NO CHARGE ITEM: Performed by: INTERNAL MEDICINE

## 2020-11-27 RX ORDER — OMEPRAZOLE 20 MG/1
20 CAPSULE, DELAYED RELEASE ORAL DAILY
Qty: 30 CAPSULE | Refills: 0 | Status: SHIPPED | OUTPATIENT
Start: 2020-11-27 | End: 2021-05-23 | Stop reason: CLARIF

## 2020-11-27 RX ORDER — LORAZEPAM 0.5 MG/1
0.5 TABLET ORAL EVERY 8 HOURS PRN
Qty: 20 TABLET | Refills: 0 | Status: ON HOLD | OUTPATIENT
Start: 2020-11-27 | End: 2021-05-25

## 2020-11-27 RX ORDER — OXYCODONE HYDROCHLORIDE 5 MG/1
2.5 TABLET ORAL EVERY 12 HOURS PRN
Qty: 14 TABLET | Refills: 0 | Status: ON HOLD | OUTPATIENT
Start: 2020-11-27 | End: 2021-05-25 | Stop reason: SDUPTHER

## 2020-11-27 RX ORDER — 0.9 % SODIUM CHLORIDE 0.9 %
2 VIAL (ML) INJECTION EVERY 12 HOURS SCHEDULED
Status: DISCONTINUED | OUTPATIENT
Start: 2020-11-27 | End: 2020-11-30 | Stop reason: HOSPADM

## 2020-11-27 RX ADMIN — MIDODRINE HYDROCHLORIDE 5 MG: 5 TABLET ORAL at 06:37

## 2020-11-27 RX ADMIN — PANCRELIPASE 1 CAPSULE: 30000; 6000; 19000 CAPSULE, DELAYED RELEASE PELLETS ORAL at 21:19

## 2020-11-27 RX ADMIN — SODIUM CHLORIDE TAB 1 GM 1000 MG: 1 TAB at 18:47

## 2020-11-27 RX ADMIN — DAPTOMYCIN 150 MG: 500 INJECTION, POWDER, LYOPHILIZED, FOR SOLUTION INTRAVENOUS at 16:06

## 2020-11-27 RX ADMIN — MEROPENEM 500 MG: 500 INJECTION, POWDER, FOR SOLUTION INTRAVENOUS at 01:04

## 2020-11-27 RX ADMIN — LORAZEPAM 0.5 MG: 2 INJECTION INTRAMUSCULAR; INTRAVENOUS at 06:11

## 2020-11-27 RX ADMIN — SODIUM CHLORIDE 10 ML: 9 INJECTION, SOLUTION INTRAVENOUS at 23:42

## 2020-11-27 RX ADMIN — MEROPENEM 500 MG: 500 INJECTION, POWDER, FOR SOLUTION INTRAVENOUS at 23:34

## 2020-11-27 RX ADMIN — GABAPENTIN 100 MG: 250 SOLUTION ORAL at 21:20

## 2020-11-27 RX ADMIN — GABAPENTIN 100 MG: 250 SOLUTION ORAL at 11:21

## 2020-11-27 RX ADMIN — IPRATROPIUM BROMIDE 0.5 MG: 0.5 SOLUTION RESPIRATORY (INHALATION) at 07:17

## 2020-11-27 RX ADMIN — LACOSAMIDE 200 MG: 200 TABLET, FILM COATED ORAL at 11:21

## 2020-11-27 RX ADMIN — SODIUM CHLORIDE TAB 1 GM 1000 MG: 1 TAB at 14:33

## 2020-11-27 RX ADMIN — MEROPENEM 500 MG: 500 INJECTION, POWDER, FOR SOLUTION INTRAVENOUS at 14:30

## 2020-11-27 RX ADMIN — MEROPENEM 500 MG: 500 INJECTION, POWDER, FOR SOLUTION INTRAVENOUS at 06:10

## 2020-11-27 RX ADMIN — SODIUM CHLORIDE 10 ML: 9 INJECTION, SOLUTION INTRAVENOUS at 14:38

## 2020-11-27 RX ADMIN — ZINC SULFATE 220 MG (50 MG) CAPSULE 220 MG: CAPSULE at 11:21

## 2020-11-27 RX ADMIN — SODIUM CHLORIDE TAB 1 GM 1000 MG: 1 TAB at 11:20

## 2020-11-27 RX ADMIN — MINERAL SUPPLEMENT IRON 300 MG / 5 ML STRENGTH LIQUID 100 PER BOX UNFLAVORED 300 MG: at 11:20

## 2020-11-27 RX ADMIN — PANCRELIPASE 1 CAPSULE: 30000; 6000; 19000 CAPSULE, DELAYED RELEASE PELLETS ORAL at 11:21

## 2020-11-27 RX ADMIN — LINEZOLID 600 MG: 600 INJECTION, SOLUTION INTRAVENOUS at 11:31

## 2020-11-27 RX ADMIN — SODIUM CHLORIDE, PRESERVATIVE FREE 2 ML: 5 INJECTION INTRAVENOUS at 21:21

## 2020-11-27 RX ADMIN — BACLOFEN 15 MG: 10 TABLET ORAL at 14:31

## 2020-11-27 RX ADMIN — OXYCODONE HYDROCHLORIDE AND ACETAMINOPHEN 500 MG: 500 TABLET ORAL at 11:21

## 2020-11-27 RX ADMIN — SODIUM CHLORIDE, PRESERVATIVE FREE 2 ML: 5 INJECTION INTRAVENOUS at 14:37

## 2020-11-27 RX ADMIN — BACLOFEN 15 MG: 10 TABLET ORAL at 06:10

## 2020-11-27 RX ADMIN — IPRATROPIUM BROMIDE 0.5 MG: 0.5 SOLUTION RESPIRATORY (INHALATION) at 11:16

## 2020-11-27 RX ADMIN — LEVETIRACETAM 500 MG: 100 SOLUTION ORAL at 11:20

## 2020-11-27 RX ADMIN — FOLIC ACID 0.5 MG: 1 TABLET ORAL at 11:21

## 2020-11-27 RX ADMIN — MIDODRINE HYDROCHLORIDE 5 MG: 5 TABLET ORAL at 18:46

## 2020-11-27 RX ADMIN — PANCRELIPASE 1 CAPSULE: 30000; 6000; 19000 CAPSULE, DELAYED RELEASE PELLETS ORAL at 14:31

## 2020-11-27 RX ADMIN — BACLOFEN 15 MG: 10 TABLET ORAL at 21:19

## 2020-11-27 RX ADMIN — LEVETIRACETAM 500 MG: 100 SOLUTION ORAL at 21:19

## 2020-11-27 RX ADMIN — IPRATROPIUM BROMIDE 0.5 MG: 0.5 SOLUTION RESPIRATORY (INHALATION) at 20:05

## 2020-11-27 RX ADMIN — MEROPENEM 500 MG: 500 INJECTION, POWDER, FOR SOLUTION INTRAVENOUS at 18:47

## 2020-11-27 RX ADMIN — IPRATROPIUM BROMIDE 0.5 MG: 0.5 SOLUTION RESPIRATORY (INHALATION) at 15:45

## 2020-11-27 RX ADMIN — MIDODRINE HYDROCHLORIDE 5 MG: 5 TABLET ORAL at 14:32

## 2020-11-27 RX ADMIN — LACOSAMIDE 200 MG: 200 TABLET, FILM COATED ORAL at 21:19

## 2020-11-27 ASSESSMENT — PAIN SCALES - BEHAVIORAL PAIN SCALE (BPS)
BPS_SCORE: 4

## 2020-11-27 ASSESSMENT — COGNITIVE AND FUNCTIONAL STATUS - GENERAL
DO YOU HAVE SERIOUS DIFFICULTY WALKING OR CLIMBING STAIRS: YES
DO YOU HAVE DIFFICULTY DRESSING OR BATHING: YES
BECAUSE OF A PHYSICAL, MENTAL, OR EMOTIONAL CONDITION, DO YOU HAVE SERIOUS DIFFICULTY CONCENTRATING, REMEMBERING OR MAKING DECISIONS: YES
BECAUSE OF A PHYSICAL, MENTAL, OR EMOTIONAL CONDITION, DO YOU HAVE DIFFICULTY DOING ERRANDS ALONE: YES

## 2020-11-28 LAB
ANION GAP SERPL CALC-SCNC: 6 MMOL/L (ref 10–20)
BASOPHILS # BLD: 0 K/MCL (ref 0–0.3)
BASOPHILS NFR BLD: 0 %
BUN SERPL-MCNC: 19 MG/DL (ref 6–20)
BUN/CREAT SERPL: 93 (ref 7–25)
CALCIUM SERPL-MCNC: 8 MG/DL (ref 8.4–10.2)
CHLORIDE SERPL-SCNC: 102 MMOL/L (ref 98–107)
CO2 SERPL-SCNC: 29 MMOL/L (ref 21–32)
CREAT SERPL-MCNC: 0.2 MG/DL (ref 0.51–0.95)
DIFFERENTIAL METHOD BLD: ABNORMAL
EOSINOPHIL # BLD: 0.3 K/MCL (ref 0.1–0.5)
EOSINOPHIL NFR BLD: 5 %
ERYTHROCYTE [DISTWIDTH] IN BLOOD: 14.9 % (ref 11–15)
GLUCOSE BLDC GLUCOMTR-MCNC: 108 MG/DL (ref 70–99)
GLUCOSE BLDC GLUCOMTR-MCNC: 111 MG/DL (ref 70–99)
GLUCOSE BLDC GLUCOMTR-MCNC: 124 MG/DL (ref 70–99)
GLUCOSE BLDC GLUCOMTR-MCNC: 93 MG/DL (ref 70–99)
GLUCOSE SERPL-MCNC: 108 MG/DL (ref 65–99)
HCT VFR BLD CALC: 22.6 % (ref 36–46.5)
HGB BLD-MCNC: 7.5 G/DL (ref 12–15.5)
IMM GRANULOCYTES # BLD AUTO: 0 K/MCL (ref 0–0.2)
IMM GRANULOCYTES NFR BLD: 0 %
LYMPHOCYTES # BLD: 1.4 K/MCL (ref 1–4.8)
LYMPHOCYTES NFR BLD: 24 %
MCH RBC QN AUTO: 35 PG (ref 26–34)
MCHC RBC AUTO-ENTMCNC: 33.2 G/DL (ref 32–36.5)
MCV RBC AUTO: 105.6 FL (ref 78–100)
MONOCYTES # BLD: 0.6 K/MCL (ref 0.3–0.9)
MONOCYTES NFR BLD: 10 %
NEUTROPHILS # BLD: 3.5 K/MCL (ref 1.8–7.7)
NEUTROPHILS NFR BLD: 61 %
NRBC BLD MANUAL-RTO: 0 /100 WBC
PLATELET # BLD: 104 K/MCL (ref 140–450)
POTASSIUM SERPL-SCNC: 4.5 MMOL/L (ref 3.4–5.1)
RBC # BLD: 2.14 MIL/MCL (ref 4–5.2)
SODIUM SERPL-SCNC: 133 MMOL/L (ref 135–145)
WBC # BLD: 5.8 K/MCL (ref 4.2–11)

## 2020-11-28 PROCEDURE — 80048 BASIC METABOLIC PNL TOTAL CA: CPT

## 2020-11-28 PROCEDURE — 10002800 HB RX 250 W HCPCS: Performed by: INTERNAL MEDICINE

## 2020-11-28 PROCEDURE — 10000002 HB ROOM CHARGE MED SURG

## 2020-11-28 PROCEDURE — 85025 COMPLETE CBC W/AUTO DIFF WBC: CPT

## 2020-11-28 PROCEDURE — 10002807 HB RX 258: Performed by: INTERNAL MEDICINE

## 2020-11-28 PROCEDURE — 10002803 HB RX 637: Performed by: INTERNAL MEDICINE

## 2020-11-28 PROCEDURE — 10004651 HB RX, NO CHARGE ITEM: Performed by: INTERNAL MEDICINE

## 2020-11-28 PROCEDURE — 10002801 HB RX 250 W/O HCPCS: Performed by: INTERNAL MEDICINE

## 2020-11-28 PROCEDURE — 94003 VENT MGMT INPAT SUBQ DAY: CPT

## 2020-11-28 PROCEDURE — 94640 AIRWAY INHALATION TREATMENT: CPT

## 2020-11-28 RX ADMIN — PANCRELIPASE 1 CAPSULE: 30000; 6000; 19000 CAPSULE, DELAYED RELEASE PELLETS ORAL at 18:04

## 2020-11-28 RX ADMIN — MIDODRINE HYDROCHLORIDE 5 MG: 5 TABLET ORAL at 05:52

## 2020-11-28 RX ADMIN — MEROPENEM 500 MG: 500 INJECTION, POWDER, FOR SOLUTION INTRAVENOUS at 12:43

## 2020-11-28 RX ADMIN — IPRATROPIUM BROMIDE 0.5 MG: 0.5 SOLUTION RESPIRATORY (INHALATION) at 19:52

## 2020-11-28 RX ADMIN — BACLOFEN 15 MG: 10 TABLET ORAL at 05:51

## 2020-11-28 RX ADMIN — OXYCODONE HYDROCHLORIDE AND ACETAMINOPHEN 500 MG: 500 TABLET ORAL at 08:55

## 2020-11-28 RX ADMIN — LACOSAMIDE 200 MG: 200 TABLET, FILM COATED ORAL at 08:55

## 2020-11-28 RX ADMIN — GABAPENTIN 100 MG: 250 SOLUTION ORAL at 22:42

## 2020-11-28 RX ADMIN — SODIUM CHLORIDE TAB 1 GM 1000 MG: 1 TAB at 08:56

## 2020-11-28 RX ADMIN — SODIUM CHLORIDE, PRESERVATIVE FREE 2 ML: 5 INJECTION INTRAVENOUS at 08:56

## 2020-11-28 RX ADMIN — BACLOFEN 15 MG: 10 TABLET ORAL at 22:43

## 2020-11-28 RX ADMIN — MIDODRINE HYDROCHLORIDE 5 MG: 5 TABLET ORAL at 12:44

## 2020-11-28 RX ADMIN — MINERAL SUPPLEMENT IRON 300 MG / 5 ML STRENGTH LIQUID 100 PER BOX UNFLAVORED 300 MG: at 08:57

## 2020-11-28 RX ADMIN — FOLIC ACID 0.5 MG: 1 TABLET ORAL at 08:56

## 2020-11-28 RX ADMIN — LACOSAMIDE 200 MG: 200 TABLET, FILM COATED ORAL at 20:00

## 2020-11-28 RX ADMIN — MEROPENEM 500 MG: 500 INJECTION, POWDER, FOR SOLUTION INTRAVENOUS at 18:08

## 2020-11-28 RX ADMIN — IPRATROPIUM BROMIDE 0.5 MG: 0.5 SOLUTION RESPIRATORY (INHALATION) at 07:17

## 2020-11-28 RX ADMIN — DAPTOMYCIN 150 MG: 500 INJECTION, POWDER, LYOPHILIZED, FOR SOLUTION INTRAVENOUS at 15:54

## 2020-11-28 RX ADMIN — PANCRELIPASE 1 CAPSULE: 30000; 6000; 19000 CAPSULE, DELAYED RELEASE PELLETS ORAL at 08:56

## 2020-11-28 RX ADMIN — MEROPENEM 500 MG: 500 INJECTION, POWDER, FOR SOLUTION INTRAVENOUS at 05:51

## 2020-11-28 RX ADMIN — IPRATROPIUM BROMIDE 0.5 MG: 0.5 SOLUTION RESPIRATORY (INHALATION) at 15:50

## 2020-11-28 RX ADMIN — IPRATROPIUM BROMIDE 0.5 MG: 0.5 SOLUTION RESPIRATORY (INHALATION) at 11:10

## 2020-11-28 RX ADMIN — GABAPENTIN 100 MG: 250 SOLUTION ORAL at 08:55

## 2020-11-28 RX ADMIN — LEVETIRACETAM 500 MG: 100 SOLUTION ORAL at 20:00

## 2020-11-28 RX ADMIN — ZINC SULFATE 220 MG (50 MG) CAPSULE 220 MG: CAPSULE at 08:55

## 2020-11-28 RX ADMIN — MIDODRINE HYDROCHLORIDE 5 MG: 5 TABLET ORAL at 18:04

## 2020-11-28 RX ADMIN — SODIUM CHLORIDE TAB 1 GM 1000 MG: 1 TAB at 12:44

## 2020-11-28 RX ADMIN — BACLOFEN 15 MG: 10 TABLET ORAL at 13:42

## 2020-11-28 RX ADMIN — LEVETIRACETAM 500 MG: 100 SOLUTION ORAL at 08:57

## 2020-11-28 RX ADMIN — SODIUM CHLORIDE, PRESERVATIVE FREE 2 ML: 5 INJECTION INTRAVENOUS at 19:57

## 2020-11-28 RX ADMIN — PANCRELIPASE 1 CAPSULE: 30000; 6000; 19000 CAPSULE, DELAYED RELEASE PELLETS ORAL at 13:42

## 2020-11-28 RX ADMIN — LORAZEPAM 0.5 MG: 2 INJECTION INTRAMUSCULAR; INTRAVENOUS at 05:51

## 2020-11-28 RX ADMIN — SODIUM CHLORIDE TAB 1 GM 1000 MG: 1 TAB at 18:04

## 2020-11-28 RX ADMIN — LORAZEPAM 0.5 MG: 2 INJECTION INTRAMUSCULAR; INTRAVENOUS at 19:56

## 2020-11-28 ASSESSMENT — PAIN SCALES - BEHAVIORAL PAIN SCALE (BPS)
BPS_SCORE: 3
BPS_SCORE: 4
BPS_SCORE: 3

## 2020-11-29 LAB
ANION GAP SERPL CALC-SCNC: 7 MMOL/L (ref 10–20)
BASOPHILS # BLD: 0 K/MCL (ref 0–0.3)
BASOPHILS NFR BLD: 1 %
BUN SERPL-MCNC: 19 MG/DL (ref 6–20)
BUN/CREAT SERPL: 91 (ref 7–25)
CALCIUM SERPL-MCNC: 8.1 MG/DL (ref 8.4–10.2)
CHLORIDE SERPL-SCNC: 104 MMOL/L (ref 98–107)
CO2 SERPL-SCNC: 29 MMOL/L (ref 21–32)
CREAT SERPL-MCNC: 0.21 MG/DL (ref 0.51–0.95)
DIFFERENTIAL METHOD BLD: ABNORMAL
EOSINOPHIL # BLD: 0.2 K/MCL (ref 0.1–0.5)
EOSINOPHIL NFR BLD: 4 %
ERYTHROCYTE [DISTWIDTH] IN BLOOD: 15.2 % (ref 11–15)
GLUCOSE BLDC GLUCOMTR-MCNC: 119 MG/DL (ref 70–99)
GLUCOSE BLDC GLUCOMTR-MCNC: 121 MG/DL (ref 70–99)
GLUCOSE BLDC GLUCOMTR-MCNC: 124 MG/DL (ref 70–99)
GLUCOSE BLDC GLUCOMTR-MCNC: 136 MG/DL (ref 70–99)
GLUCOSE SERPL-MCNC: 117 MG/DL (ref 65–99)
HCT VFR BLD CALC: 22.1 % (ref 36–46.5)
HGB BLD-MCNC: 7.5 G/DL (ref 12–15.5)
IMM GRANULOCYTES # BLD AUTO: 0 K/MCL (ref 0–0.2)
IMM GRANULOCYTES NFR BLD: 0 %
LYMPHOCYTES # BLD: 1.1 K/MCL (ref 1–4.8)
LYMPHOCYTES NFR BLD: 19 %
MCH RBC QN AUTO: 36.2 PG (ref 26–34)
MCHC RBC AUTO-ENTMCNC: 33.9 G/DL (ref 32–36.5)
MCV RBC AUTO: 106.8 FL (ref 78–100)
MONOCYTES # BLD: 0.6 K/MCL (ref 0.3–0.9)
MONOCYTES NFR BLD: 11 %
NEUTROPHILS # BLD: 4 K/MCL (ref 1.8–7.7)
NEUTROPHILS NFR BLD: 65 %
NRBC BLD MANUAL-RTO: 0 /100 WBC
PLATELET # BLD: 114 K/MCL (ref 140–450)
POTASSIUM SERPL-SCNC: 4.6 MMOL/L (ref 3.4–5.1)
RBC # BLD: 2.07 MIL/MCL (ref 4–5.2)
SODIUM SERPL-SCNC: 135 MMOL/L (ref 135–145)
WBC # BLD: 6.1 K/MCL (ref 4.2–11)

## 2020-11-29 PROCEDURE — 10002807 HB RX 258: Performed by: INTERNAL MEDICINE

## 2020-11-29 PROCEDURE — 10002801 HB RX 250 W/O HCPCS: Performed by: INTERNAL MEDICINE

## 2020-11-29 PROCEDURE — 10002800 HB RX 250 W HCPCS: Performed by: INTERNAL MEDICINE

## 2020-11-29 PROCEDURE — 10000002 HB ROOM CHARGE MED SURG

## 2020-11-29 PROCEDURE — 85025 COMPLETE CBC W/AUTO DIFF WBC: CPT

## 2020-11-29 PROCEDURE — 94003 VENT MGMT INPAT SUBQ DAY: CPT

## 2020-11-29 PROCEDURE — 10004651 HB RX, NO CHARGE ITEM: Performed by: INTERNAL MEDICINE

## 2020-11-29 PROCEDURE — 80048 BASIC METABOLIC PNL TOTAL CA: CPT

## 2020-11-29 PROCEDURE — 10002803 HB RX 637: Performed by: INTERNAL MEDICINE

## 2020-11-29 PROCEDURE — 94640 AIRWAY INHALATION TREATMENT: CPT

## 2020-11-29 RX ADMIN — SODIUM CHLORIDE, PRESERVATIVE FREE 2 ML: 5 INJECTION INTRAVENOUS at 21:19

## 2020-11-29 RX ADMIN — METOPROLOL TARTRATE 25 MG: 25 TABLET, FILM COATED ORAL at 21:14

## 2020-11-29 RX ADMIN — BACLOFEN 15 MG: 10 TABLET ORAL at 14:10

## 2020-11-29 RX ADMIN — MEROPENEM 500 MG: 500 INJECTION, POWDER, FOR SOLUTION INTRAVENOUS at 00:00

## 2020-11-29 RX ADMIN — MIDODRINE HYDROCHLORIDE 5 MG: 5 TABLET ORAL at 12:56

## 2020-11-29 RX ADMIN — SODIUM CHLORIDE TAB 1 GM 1000 MG: 1 TAB at 17:50

## 2020-11-29 RX ADMIN — IPRATROPIUM BROMIDE 0.5 MG: 0.5 SOLUTION RESPIRATORY (INHALATION) at 07:17

## 2020-11-29 RX ADMIN — LACOSAMIDE 200 MG: 200 TABLET, FILM COATED ORAL at 08:25

## 2020-11-29 RX ADMIN — LEVETIRACETAM 500 MG: 100 SOLUTION ORAL at 21:17

## 2020-11-29 RX ADMIN — GABAPENTIN 100 MG: 250 SOLUTION ORAL at 08:25

## 2020-11-29 RX ADMIN — SODIUM CHLORIDE TAB 1 GM 1000 MG: 1 TAB at 12:49

## 2020-11-29 RX ADMIN — MIDODRINE HYDROCHLORIDE 5 MG: 5 TABLET ORAL at 17:51

## 2020-11-29 RX ADMIN — SODIUM CHLORIDE 10 ML: 9 INJECTION, SOLUTION INTRAVENOUS at 00:08

## 2020-11-29 RX ADMIN — LORAZEPAM 0.5 MG: 2 INJECTION INTRAMUSCULAR; INTRAVENOUS at 05:45

## 2020-11-29 RX ADMIN — MEROPENEM 500 MG: 500 INJECTION, POWDER, FOR SOLUTION INTRAVENOUS at 12:55

## 2020-11-29 RX ADMIN — LACOSAMIDE 200 MG: 200 TABLET, FILM COATED ORAL at 21:14

## 2020-11-29 RX ADMIN — MEROPENEM 500 MG: 500 INJECTION, POWDER, FOR SOLUTION INTRAVENOUS at 17:56

## 2020-11-29 RX ADMIN — DAPTOMYCIN 150 MG: 500 INJECTION, POWDER, LYOPHILIZED, FOR SOLUTION INTRAVENOUS at 14:17

## 2020-11-29 RX ADMIN — SODIUM CHLORIDE TAB 1 GM 1000 MG: 1 TAB at 08:25

## 2020-11-29 RX ADMIN — PANCRELIPASE 1 CAPSULE: 30000; 6000; 19000 CAPSULE, DELAYED RELEASE PELLETS ORAL at 14:21

## 2020-11-29 RX ADMIN — MEROPENEM 500 MG: 500 INJECTION, POWDER, FOR SOLUTION INTRAVENOUS at 05:47

## 2020-11-29 RX ADMIN — SODIUM CHLORIDE, PRESERVATIVE FREE 2 ML: 5 INJECTION INTRAVENOUS at 08:26

## 2020-11-29 RX ADMIN — PANCRELIPASE 1 CAPSULE: 30000; 6000; 19000 CAPSULE, DELAYED RELEASE PELLETS ORAL at 21:14

## 2020-11-29 RX ADMIN — OXYCODONE HYDROCHLORIDE AND ACETAMINOPHEN 500 MG: 500 TABLET ORAL at 08:25

## 2020-11-29 RX ADMIN — BACLOFEN 15 MG: 10 TABLET ORAL at 21:14

## 2020-11-29 RX ADMIN — IPRATROPIUM BROMIDE 0.5 MG: 0.5 SOLUTION RESPIRATORY (INHALATION) at 11:06

## 2020-11-29 RX ADMIN — LEVETIRACETAM 500 MG: 100 SOLUTION ORAL at 08:25

## 2020-11-29 RX ADMIN — METOPROLOL TARTRATE 25 MG: 25 TABLET, FILM COATED ORAL at 14:11

## 2020-11-29 RX ADMIN — FOLIC ACID 0.5 MG: 1 TABLET ORAL at 08:25

## 2020-11-29 RX ADMIN — ZINC SULFATE 220 MG (50 MG) CAPSULE 220 MG: CAPSULE at 08:25

## 2020-11-29 RX ADMIN — BACLOFEN 15 MG: 10 TABLET ORAL at 05:47

## 2020-11-29 RX ADMIN — IPRATROPIUM BROMIDE 0.5 MG: 0.5 SOLUTION RESPIRATORY (INHALATION) at 20:12

## 2020-11-29 RX ADMIN — GABAPENTIN 100 MG: 250 SOLUTION ORAL at 22:22

## 2020-11-29 RX ADMIN — PANCRELIPASE 1 CAPSULE: 30000; 6000; 19000 CAPSULE, DELAYED RELEASE PELLETS ORAL at 08:25

## 2020-11-29 RX ADMIN — IPRATROPIUM BROMIDE 0.5 MG: 0.5 SOLUTION RESPIRATORY (INHALATION) at 15:38

## 2020-11-29 RX ADMIN — MINERAL SUPPLEMENT IRON 300 MG / 5 ML STRENGTH LIQUID 100 PER BOX UNFLAVORED 300 MG: at 08:24

## 2020-11-29 RX ADMIN — MIDODRINE HYDROCHLORIDE 5 MG: 5 TABLET ORAL at 05:47

## 2020-11-29 RX ADMIN — LORAZEPAM 0.5 MG: 2 INJECTION INTRAMUSCULAR; INTRAVENOUS at 21:11

## 2020-11-29 RX ADMIN — OXYCODONE HYDROCHLORIDE 2.5 MG: 5 TABLET ORAL at 13:07

## 2020-11-29 ASSESSMENT — PAIN SCALES - BEHAVIORAL PAIN SCALE (BPS)
BPS_SCORE: 3
BPS_SCORE: 3
BPS_SCORE: 5
BPS_SCORE: 3

## 2020-11-30 ENCOUNTER — APPOINTMENT (OUTPATIENT)
Dept: INTERVENTIONAL RADIOLOGY/VASCULAR | Age: 30
DRG: 720 | End: 2020-11-30
Attending: INTERNAL MEDICINE

## 2020-11-30 VITALS
OXYGEN SATURATION: 99 % | SYSTOLIC BLOOD PRESSURE: 94 MMHG | HEART RATE: 101 BPM | RESPIRATION RATE: 20 BRPM | WEIGHT: 53.35 LBS | BODY MASS INDEX: 12.4 KG/M2 | DIASTOLIC BLOOD PRESSURE: 56 MMHG | TEMPERATURE: 99.7 F

## 2020-11-30 LAB
BACTERIA BLD CULT: NORMAL
BACTERIA BLD CULT: NORMAL
GLUCOSE BLDC GLUCOMTR-MCNC: 112 MG/DL (ref 70–99)
GLUCOSE BLDC GLUCOMTR-MCNC: 122 MG/DL (ref 70–99)
GLUCOSE BLDC GLUCOMTR-MCNC: 134 MG/DL (ref 70–99)
GLUCOSE BLDC GLUCOMTR-MCNC: 96 MG/DL (ref 70–99)
REPORT STATUS (RPT): NORMAL
REPORT STATUS (RPT): NORMAL
SPECIMEN SOURCE: NORMAL
SPECIMEN SOURCE: NORMAL

## 2020-11-30 PROCEDURE — 36589 REMOVAL TUNNELED CV CATH: CPT

## 2020-11-30 PROCEDURE — 10002801 HB RX 250 W/O HCPCS: Performed by: INTERNAL MEDICINE

## 2020-11-30 PROCEDURE — 10004281 HB COUNTER-STAFF TIME PER 15 MIN

## 2020-11-30 PROCEDURE — 0JPT3XZ REMOVAL OF TUNNELED VASCULAR ACCESS DEVICE FROM TRUNK SUBCUTANEOUS TISSUE AND FASCIA, PERCUTANEOUS APPROACH: ICD-10-PCS | Performed by: RADIOLOGY

## 2020-11-30 PROCEDURE — 10002803 HB RX 637: Performed by: INTERNAL MEDICINE

## 2020-11-30 PROCEDURE — 94640 AIRWAY INHALATION TREATMENT: CPT

## 2020-11-30 PROCEDURE — 10002807 HB RX 258: Performed by: INTERNAL MEDICINE

## 2020-11-30 PROCEDURE — 10002800 HB RX 250 W HCPCS: Performed by: INTERNAL MEDICINE

## 2020-11-30 PROCEDURE — 94003 VENT MGMT INPAT SUBQ DAY: CPT

## 2020-11-30 PROCEDURE — 10004651 HB RX, NO CHARGE ITEM: Performed by: INTERNAL MEDICINE

## 2020-11-30 RX ADMIN — MIDODRINE HYDROCHLORIDE 5 MG: 5 TABLET ORAL at 12:57

## 2020-11-30 RX ADMIN — LORAZEPAM 0.5 MG: 2 INJECTION INTRAMUSCULAR; INTRAVENOUS at 18:53

## 2020-11-30 RX ADMIN — FOLIC ACID 0.5 MG: 1 TABLET ORAL at 09:24

## 2020-11-30 RX ADMIN — PANCRELIPASE 1 CAPSULE: 30000; 6000; 19000 CAPSULE, DELAYED RELEASE PELLETS ORAL at 14:48

## 2020-11-30 RX ADMIN — ACETAMINOPHEN 325 MG: 325 TABLET ORAL at 18:54

## 2020-11-30 RX ADMIN — SODIUM CHLORIDE TAB 1 GM 1000 MG: 1 TAB at 17:49

## 2020-11-30 RX ADMIN — MINERAL SUPPLEMENT IRON 300 MG / 5 ML STRENGTH LIQUID 100 PER BOX UNFLAVORED 300 MG: at 09:22

## 2020-11-30 RX ADMIN — LACOSAMIDE 200 MG: 200 TABLET, FILM COATED ORAL at 09:22

## 2020-11-30 RX ADMIN — SODIUM CHLORIDE 10 ML: 9 INJECTION, SOLUTION INTRAVENOUS at 12:59

## 2020-11-30 RX ADMIN — GABAPENTIN 100 MG: 250 SOLUTION ORAL at 09:22

## 2020-11-30 RX ADMIN — IPRATROPIUM BROMIDE 0.5 MG: 0.5 SOLUTION RESPIRATORY (INHALATION) at 11:12

## 2020-11-30 RX ADMIN — SODIUM CHLORIDE, PRESERVATIVE FREE 2 ML: 5 INJECTION INTRAVENOUS at 09:16

## 2020-11-30 RX ADMIN — MEROPENEM 500 MG: 500 INJECTION, POWDER, FOR SOLUTION INTRAVENOUS at 00:45

## 2020-11-30 RX ADMIN — SODIUM CHLORIDE TAB 1 GM 1000 MG: 1 TAB at 12:57

## 2020-11-30 RX ADMIN — BACLOFEN 15 MG: 10 TABLET ORAL at 06:13

## 2020-11-30 RX ADMIN — ZINC SULFATE 220 MG (50 MG) CAPSULE 220 MG: CAPSULE at 09:23

## 2020-11-30 RX ADMIN — IPRATROPIUM BROMIDE 0.5 MG: 0.5 SOLUTION RESPIRATORY (INHALATION) at 15:55

## 2020-11-30 RX ADMIN — SODIUM CHLORIDE TAB 1 GM 1000 MG: 1 TAB at 09:23

## 2020-11-30 RX ADMIN — IPRATROPIUM BROMIDE 0.5 MG: 0.5 SOLUTION RESPIRATORY (INHALATION) at 07:23

## 2020-11-30 RX ADMIN — SODIUM CHLORIDE 10 ML: 9 INJECTION, SOLUTION INTRAVENOUS at 00:30

## 2020-11-30 RX ADMIN — MEROPENEM 500 MG: 500 INJECTION, POWDER, FOR SOLUTION INTRAVENOUS at 06:15

## 2020-11-30 RX ADMIN — LORAZEPAM 0.5 MG: 2 INJECTION INTRAMUSCULAR; INTRAVENOUS at 06:12

## 2020-11-30 RX ADMIN — BACLOFEN 15 MG: 10 TABLET ORAL at 14:48

## 2020-11-30 RX ADMIN — PANCRELIPASE 1 CAPSULE: 30000; 6000; 19000 CAPSULE, DELAYED RELEASE PELLETS ORAL at 09:22

## 2020-11-30 RX ADMIN — MIDODRINE HYDROCHLORIDE 5 MG: 5 TABLET ORAL at 06:13

## 2020-11-30 RX ADMIN — OXYCODONE HYDROCHLORIDE AND ACETAMINOPHEN 500 MG: 500 TABLET ORAL at 09:22

## 2020-11-30 RX ADMIN — LEVETIRACETAM 500 MG: 100 SOLUTION ORAL at 09:22

## 2020-12-18 ENCOUNTER — HOSPITAL ENCOUNTER (INPATIENT)
Age: 30
LOS: 4 days | Discharge: HOME HEALTH | DRG: 053 | End: 2020-12-23
Attending: EMERGENCY MEDICINE | Admitting: INTERNAL MEDICINE

## 2020-12-18 DIAGNOSIS — G51.4 FACIAL TWITCHING: Primary | ICD-10-CM

## 2020-12-18 LAB — GLUCOSE BLDC GLUCOMTR-MCNC: 133 MG/DL (ref 70–99)

## 2020-12-18 PROCEDURE — 10002800 HB RX 250 W HCPCS: Performed by: FAMILY MEDICINE

## 2020-12-18 PROCEDURE — 36415 COLL VENOUS BLD VENIPUNCTURE: CPT

## 2020-12-18 PROCEDURE — 99285 EMERGENCY DEPT VISIT HI MDM: CPT | Performed by: EMERGENCY MEDICINE

## 2020-12-18 PROCEDURE — 5A1955Z RESPIRATORY VENTILATION, GREATER THAN 96 CONSECUTIVE HOURS: ICD-10-PCS | Performed by: INTERNAL MEDICINE

## 2020-12-18 PROCEDURE — C9803 HOPD COVID-19 SPEC COLLECT: HCPCS

## 2020-12-18 PROCEDURE — 99285 EMERGENCY DEPT VISIT HI MDM: CPT

## 2020-12-18 PROCEDURE — 10006027 HB SUPPLY 278

## 2020-12-18 PROCEDURE — 80053 COMPREHEN METABOLIC PANEL: CPT | Performed by: EMERGENCY MEDICINE

## 2020-12-18 PROCEDURE — 82962 GLUCOSE BLOOD TEST: CPT

## 2020-12-18 PROCEDURE — 85025 COMPLETE CBC W/AUTO DIFF WBC: CPT | Performed by: EMERGENCY MEDICINE

## 2020-12-18 PROCEDURE — 94002 VENT MGMT INPAT INIT DAY: CPT

## 2020-12-18 PROCEDURE — 83735 ASSAY OF MAGNESIUM: CPT | Performed by: EMERGENCY MEDICINE

## 2020-12-18 RX ORDER — MIDAZOLAM HYDROCHLORIDE 10 MG/2ML
4 INJECTION, SOLUTION INTRAMUSCULAR; INTRAVENOUS ONCE
Status: DISCONTINUED | OUTPATIENT
Start: 2020-12-19 | End: 2020-12-19

## 2020-12-18 RX ORDER — MIDAZOLAM HYDROCHLORIDE 10 MG/2ML
4 INJECTION, SOLUTION INTRAMUSCULAR; INTRAVENOUS ONCE
Status: COMPLETED | OUTPATIENT
Start: 2020-12-18 | End: 2020-12-18

## 2020-12-18 RX ADMIN — MIDAZOLAM HYDROCHLORIDE 4 MG: 5 INJECTION, SOLUTION INTRAMUSCULAR; INTRAVENOUS at 23:38

## 2020-12-18 ASSESSMENT — PAIN SCALES - BEHAVIORAL PAIN SCALE (BPS): BPS_SCORE: 3

## 2020-12-19 ENCOUNTER — APPOINTMENT (OUTPATIENT)
Dept: GENERAL RADIOLOGY | Age: 30
DRG: 053 | End: 2020-12-19
Attending: INTERNAL MEDICINE

## 2020-12-19 LAB
ALBUMIN SERPL-MCNC: 2.6 G/DL (ref 3.6–5.1)
ALBUMIN/GLOB SERPL: 0.4 {RATIO} (ref 1–2.4)
ALP SERPL-CCNC: 169 UNITS/L (ref 45–117)
ALT SERPL-CCNC: 80 UNITS/L
ANION GAP SERPL CALC-SCNC: 7 MMOL/L (ref 10–20)
AST SERPL-CCNC: 70 UNITS/L
ATRIAL RATE (BPM): 103
BASOPHILS # BLD: 0 K/MCL (ref 0–0.3)
BASOPHILS NFR BLD: 1 %
BILIRUB SERPL-MCNC: 0.3 MG/DL (ref 0.2–1)
BUN SERPL-MCNC: 32 MG/DL (ref 6–20)
BUN/CREAT SERPL: 128 (ref 7–25)
CALCIUM SERPL-MCNC: 8.5 MG/DL (ref 8.4–10.2)
CHLORIDE SERPL-SCNC: 110 MMOL/L (ref 98–107)
CO2 SERPL-SCNC: 24 MMOL/L (ref 21–32)
CREAT SERPL-MCNC: 0.25 MG/DL (ref 0.51–0.95)
DEPRECATED RDW RBC: 65.8 FL (ref 39–50)
EOSINOPHIL # BLD: 0.2 K/MCL (ref 0–0.5)
EOSINOPHIL NFR BLD: 3 %
ERYTHROCYTE [DISTWIDTH] IN BLOOD: 15.5 % (ref 11–15)
FASTING DURATION TIME PATIENT: ABNORMAL H
GFR SERPLBLD BASED ON 1.73 SQ M-ARVRAT: >90 ML/MIN/1.73M2
GLOBULIN SER-MCNC: 6.8 G/DL (ref 2–4)
GLUCOSE BLDC GLUCOMTR-MCNC: 101 MG/DL (ref 70–99)
GLUCOSE BLDC GLUCOMTR-MCNC: 104 MG/DL (ref 70–99)
GLUCOSE BLDC GLUCOMTR-MCNC: 114 MG/DL (ref 70–99)
GLUCOSE SERPL-MCNC: 122 MG/DL (ref 65–99)
HCT VFR BLD CALC: 36.2 % (ref 36–46.5)
HGB BLD-MCNC: 11.3 G/DL (ref 12–15.5)
IMM GRANULOCYTES # BLD AUTO: 0 K/MCL (ref 0–0.2)
IMM GRANULOCYTES # BLD: 0 %
LYMPHOCYTES # BLD: 2.3 K/MCL (ref 1–4.8)
LYMPHOCYTES NFR BLD: 29 %
MAGNESIUM SERPL-MCNC: 1.9 MG/DL (ref 1.7–2.4)
MCH RBC QN AUTO: 35.9 PG (ref 26–34)
MCHC RBC AUTO-ENTMCNC: 31.2 G/DL (ref 32–36.5)
MCV RBC AUTO: 114.9 FL (ref 78–100)
MONOCYTES # BLD: 0.5 K/MCL (ref 0.3–0.9)
MONOCYTES NFR BLD: 6 %
NEUTROPHILS # BLD: 4.9 K/MCL (ref 1.8–7.7)
NEUTROPHILS NFR BLD: 61 %
NRBC BLD MANUAL-RTO: 0 /100 WBC
P AXIS (DEGREES): 77
PLATELET # BLD AUTO: 174 K/MCL (ref 140–450)
POTASSIUM SERPL-SCNC: 4.9 MMOL/L (ref 3.4–5.1)
PR-INTERVAL (MSEC): 187
PROT SERPL-MCNC: 9.4 G/DL (ref 6.4–8.2)
QRS-INTERVAL (MSEC): 80
QT-INTERVAL (MSEC): 337
QTC: 441
R AXIS (DEGREES): 77
RAINBOW EXTRA TUBES HOLD SPECIMEN: NORMAL
RBC # BLD: 3.15 MIL/MCL (ref 4–5.2)
REPORT TEXT: NORMAL
SARS-COV-2 RNA RESP QL NAA+PROBE: NOT DETECTED
SERVICE CMNT-IMP: NORMAL
SERVICE CMNT-IMP: NORMAL
SODIUM SERPL-SCNC: 136 MMOL/L (ref 135–145)
T AXIS (DEGREES): 70
VENTRICULAR RATE EKG/MIN (BPM): 103
WBC # BLD: 8 K/MCL (ref 4.2–11)

## 2020-12-19 PROCEDURE — 10002803 HB RX 637

## 2020-12-19 PROCEDURE — 82962 GLUCOSE BLOOD TEST: CPT

## 2020-12-19 PROCEDURE — 10002801 HB RX 250 W/O HCPCS

## 2020-12-19 PROCEDURE — 10002803 HB RX 637: Performed by: INTERNAL MEDICINE

## 2020-12-19 PROCEDURE — 10002801 HB RX 250 W/O HCPCS: Performed by: INTERNAL MEDICINE

## 2020-12-19 PROCEDURE — 10000002 HB ROOM CHARGE MED SURG

## 2020-12-19 PROCEDURE — 71045 X-RAY EXAM CHEST 1 VIEW: CPT

## 2020-12-19 PROCEDURE — U0003 INFECTIOUS AGENT DETECTION BY NUCLEIC ACID (DNA OR RNA); SEVERE ACUTE RESPIRATORY SYNDROME CORONAVIRUS 2 (SARS-COV-2) (CORONAVIRUS DISEASE [COVID-19]), AMPLIFIED PROBE TECHNIQUE, MAKING USE OF HIGH THROUGHPUT TECHNOLOGIES AS DESCRIBED BY CMS-2020-01-R: HCPCS | Performed by: EMERGENCY MEDICINE

## 2020-12-19 PROCEDURE — 93005 ELECTROCARDIOGRAM TRACING: CPT | Performed by: FAMILY MEDICINE

## 2020-12-19 PROCEDURE — 94640 AIRWAY INHALATION TREATMENT: CPT

## 2020-12-19 PROCEDURE — 10003056 HB DISPOSABLE INSTRUMENT/SUPPLY 1

## 2020-12-19 PROCEDURE — 10004281 HB COUNTER-STAFF TIME PER 15 MIN

## 2020-12-19 PROCEDURE — 99254 IP/OBS CNSLTJ NEW/EST MOD 60: CPT | Performed by: SPECIALIST

## 2020-12-19 PROCEDURE — 94003 VENT MGMT INPAT SUBQ DAY: CPT

## 2020-12-19 PROCEDURE — 10002807 HB RX 258: Performed by: FAMILY MEDICINE

## 2020-12-19 RX ORDER — UREA 10 %
220 LOTION (ML) TOPICAL DAILY
COMMUNITY

## 2020-12-19 RX ORDER — BACLOFEN 10 MG/1
15 TABLET ORAL EVERY 8 HOURS SCHEDULED
Status: DISCONTINUED | OUTPATIENT
Start: 2020-12-19 | End: 2020-12-23 | Stop reason: HOSPADM

## 2020-12-19 RX ORDER — LEVETIRACETAM 100 MG/ML
500 SOLUTION ORAL EVERY 12 HOURS SCHEDULED
Status: DISCONTINUED | OUTPATIENT
Start: 2020-12-19 | End: 2020-12-23 | Stop reason: HOSPADM

## 2020-12-19 RX ORDER — ACETAMINOPHEN 160 MG/5ML
LIQUID ORAL
Status: COMPLETED
Start: 2020-12-19 | End: 2020-12-19

## 2020-12-19 RX ORDER — LORAZEPAM 0.5 MG/1
0.5 TABLET ORAL ONCE
Status: COMPLETED | OUTPATIENT
Start: 2020-12-19 | End: 2020-12-19

## 2020-12-19 RX ORDER — MIDODRINE HYDROCHLORIDE 5 MG/1
5 TABLET ORAL EVERY 8 HOURS SCHEDULED
Status: DISCONTINUED | OUTPATIENT
Start: 2020-12-19 | End: 2020-12-23 | Stop reason: HOSPADM

## 2020-12-19 RX ORDER — LORAZEPAM 0.5 MG/1
0.5 TABLET ORAL EVERY 8 HOURS PRN
Status: DISCONTINUED | OUTPATIENT
Start: 2020-12-19 | End: 2020-12-23 | Stop reason: HOSPADM

## 2020-12-19 RX ORDER — CHLORHEXIDINE GLUCONATE 4 G/100ML
2 SOLUTION TOPICAL 2 TIMES DAILY
COMMUNITY

## 2020-12-19 RX ORDER — SODIUM CHLORIDE 1 G/1
1 TABLET ORAL 3 TIMES DAILY
COMMUNITY

## 2020-12-19 RX ORDER — ACETAMINOPHEN 160 MG/5ML
650 LIQUID ORAL EVERY 4 HOURS PRN
Status: DISCONTINUED | OUTPATIENT
Start: 2020-12-19 | End: 2020-12-23 | Stop reason: HOSPADM

## 2020-12-19 RX ORDER — FOLIC ACID 1 MG/1
0.5 TABLET ORAL DAILY
Status: DISCONTINUED | OUTPATIENT
Start: 2020-12-19 | End: 2020-12-23 | Stop reason: HOSPADM

## 2020-12-19 RX ORDER — OXYCODONE HYDROCHLORIDE 5 MG/1
2.5 TABLET ORAL EVERY 12 HOURS PRN
Status: DISCONTINUED | OUTPATIENT
Start: 2020-12-19 | End: 2020-12-23 | Stop reason: HOSPADM

## 2020-12-19 RX ORDER — GABAPENTIN 250 MG/5ML
100 SOLUTION ORAL 2 TIMES DAILY
Status: DISCONTINUED | OUTPATIENT
Start: 2020-12-19 | End: 2020-12-23 | Stop reason: HOSPADM

## 2020-12-19 RX ORDER — LACOSAMIDE 200 MG/1
200 TABLET ORAL EVERY 12 HOURS SCHEDULED
Status: DISCONTINUED | OUTPATIENT
Start: 2020-12-19 | End: 2020-12-23 | Stop reason: HOSPADM

## 2020-12-19 RX ADMIN — GABAPENTIN 100 MG: 250 SOLUTION ORAL at 10:20

## 2020-12-19 RX ADMIN — ACETAMINOPHEN 650 MG: 650 SOLUTION ORAL at 06:47

## 2020-12-19 RX ADMIN — ACETAMINOPHEN 649.6 MG: 650 SOLUTION ORAL at 11:09

## 2020-12-19 RX ADMIN — METOPROLOL TARTRATE 25 MG: 25 TABLET, FILM COATED ORAL at 10:19

## 2020-12-19 RX ADMIN — IPRATROPIUM BROMIDE 0.5 MG: 0.5 SOLUTION RESPIRATORY (INHALATION) at 20:11

## 2020-12-19 RX ADMIN — IPRATROPIUM BROMIDE 0.5 MG: 0.5 SOLUTION RESPIRATORY (INHALATION) at 13:22

## 2020-12-19 RX ADMIN — IPRATROPIUM BROMIDE 0.5 MG: 0.5 SOLUTION RESPIRATORY (INHALATION) at 08:45

## 2020-12-19 RX ADMIN — MINERAL SUPPLEMENT IRON 300 MG / 5 ML STRENGTH LIQUID 100 PER BOX UNFLAVORED 300 MG: at 18:46

## 2020-12-19 RX ADMIN — PANTOPRAZOLE 40 MG: 40 TABLET, DELAYED RELEASE ORAL at 10:19

## 2020-12-19 RX ADMIN — SODIUM CHLORIDE 500 ML: 0.9 INJECTION, SOLUTION INTRAVENOUS at 01:25

## 2020-12-19 RX ADMIN — MINERAL SUPPLEMENT IRON 300 MG / 5 ML STRENGTH LIQUID 100 PER BOX UNFLAVORED 300 MG: at 10:19

## 2020-12-19 RX ADMIN — IPRATROPIUM BROMIDE 0.5 MG: 0.5 SOLUTION RESPIRATORY (INHALATION) at 17:05

## 2020-12-19 RX ADMIN — MIDODRINE HYDROCHLORIDE 5 MG: 5 TABLET ORAL at 13:30

## 2020-12-19 RX ADMIN — OXYCODONE HYDROCHLORIDE 2.5 MG: 5 TABLET ORAL at 11:09

## 2020-12-19 RX ADMIN — LORAZEPAM 0.5 MG: 0.5 TABLET ORAL at 07:40

## 2020-12-19 RX ADMIN — LACOSAMIDE 200 MG: 200 TABLET, FILM COATED ORAL at 10:19

## 2020-12-19 RX ADMIN — FOLIC ACID 0.5 MG: 1 TABLET ORAL at 10:19

## 2020-12-19 RX ADMIN — LEVETIRACETAM 500 MG: 100 SOLUTION ORAL at 10:19

## 2020-12-19 RX ADMIN — BACLOFEN 15 MG: 10 TABLET ORAL at 13:00

## 2020-12-19 ASSESSMENT — LIFESTYLE VARIABLES
HOW OFTEN DO YOU HAVE A DRINK CONTAINING ALCOHOL: NEVER
HOW OFTEN DO YOU HAVE 6 OR MORE DRINKS ON ONE OCCASION: NEVER
ALCOHOL_USE_STATUS: NO OR LOW RISK WITH VALIDATED TOOL
ALCOHOL_USE_STATUS: UNABLE TO SCREEN COGNITIVE IMPAIRMENT
HOW MANY STANDARD DRINKS CONTAINING ALCOHOL DO YOU HAVE ON A TYPICAL DAY: 0,1 OR 2
AUDIT-C TOTAL SCORE: 0

## 2020-12-19 ASSESSMENT — ACTIVITIES OF DAILY LIVING (ADL)
ADL_SHORT_OF_BREATH: NO
RECENT_DECLINE_ADL: NO
DRESSING YOURSELF: DEPENDENT
FEEDING YOURSELF: DEPENDENT
ADL_BEFORE_ADMISSION: NEEDS/REQUIRES ASSISTANCE
CHRONIC_PAIN_PRESENT: NO
CONTINENCE: DEPENDENT
ADL_SCORE: 0
TOILETING: DEPENDENT
TRANSFERRING: DEPENDENT
BATHING: DEPENDENT

## 2020-12-19 ASSESSMENT — PAIN SCALES - BEHAVIORAL PAIN SCALE (BPS)
BPS_SCORE: 5

## 2020-12-19 ASSESSMENT — PATIENT HEALTH QUESTIONNAIRE - PHQ9: IS PATIENT ABLE TO COMPLETE PHQ2 OR PHQ9: NO, PATIENT WILL NEVER BE ABLE TO COMPLETE

## 2020-12-19 ASSESSMENT — COGNITIVE AND FUNCTIONAL STATUS - GENERAL
BECAUSE OF A PHYSICAL, MENTAL, OR EMOTIONAL CONDITION, DO YOU HAVE SERIOUS DIFFICULTY CONCENTRATING, REMEMBERING OR MAKING DECISIONS: YES
BECAUSE OF A PHYSICAL, MENTAL, OR EMOTIONAL CONDITION, DO YOU HAVE DIFFICULTY DOING ERRANDS ALONE: YES
ARE YOU BLIND OR DO YOU HAVE SERIOUS DIFFICULTY SEEING, EVEN WHEN WEARING GLASSES: NO
DO YOU HAVE SERIOUS DIFFICULTY WALKING OR CLIMBING STAIRS: NO
ARE YOU DEAF OR DO YOU HAVE SERIOUS DIFFICULTY  HEARING: NO
DO YOU HAVE DIFFICULTY DRESSING OR BATHING: YES

## 2020-12-20 LAB
ANION GAP SERPL CALC-SCNC: 8 MMOL/L (ref 10–20)
BASOPHILS # BLD: 0.1 K/MCL (ref 0–0.3)
BASOPHILS NFR BLD: 1 %
BUN SERPL-MCNC: 27 MG/DL (ref 6–20)
BUN/CREAT SERPL: 75 (ref 7–25)
CALCIUM SERPL-MCNC: 8.4 MG/DL (ref 8.4–10.2)
CHLORIDE SERPL-SCNC: 108 MMOL/L (ref 98–107)
CO2 SERPL-SCNC: 27 MMOL/L (ref 21–32)
CREAT SERPL-MCNC: 0.36 MG/DL (ref 0.51–0.95)
DEPRECATED RDW RBC: 65.3 FL (ref 39–50)
EOSINOPHIL # BLD: 0.1 K/MCL (ref 0–0.5)
EOSINOPHIL NFR BLD: 2 %
ERYTHROCYTE [DISTWIDTH] IN BLOOD: 15.8 % (ref 11–15)
FASTING DURATION TIME PATIENT: ABNORMAL H
GFR SERPLBLD BASED ON 1.73 SQ M-ARVRAT: >90 ML/MIN/1.73M2
GLUCOSE BLDC GLUCOMTR-MCNC: 106 MG/DL (ref 70–99)
GLUCOSE BLDC GLUCOMTR-MCNC: 111 MG/DL (ref 70–99)
GLUCOSE BLDC GLUCOMTR-MCNC: 142 MG/DL (ref 70–99)
GLUCOSE BLDC GLUCOMTR-MCNC: 83 MG/DL (ref 70–99)
GLUCOSE SERPL-MCNC: 100 MG/DL (ref 65–99)
HCT VFR BLD CALC: 29 % (ref 36–46.5)
HGB BLD-MCNC: 10 G/DL (ref 12–15.5)
IMM GRANULOCYTES # BLD AUTO: 0 K/MCL (ref 0–0.2)
IMM GRANULOCYTES # BLD: 0 %
LYMPHOCYTES # BLD: 2.2 K/MCL (ref 1–4.8)
LYMPHOCYTES NFR BLD: 24 %
MCH RBC QN AUTO: 39.5 PG (ref 26–34)
MCHC RBC AUTO-ENTMCNC: 34.5 G/DL (ref 32–36.5)
MCV RBC AUTO: 114.6 FL (ref 78–100)
MONOCYTES # BLD: 0.5 K/MCL (ref 0.3–0.9)
MONOCYTES NFR BLD: 5 %
NEUTROPHILS # BLD: 6.2 K/MCL (ref 1.8–7.7)
NEUTROPHILS NFR BLD: 68 %
NRBC BLD MANUAL-RTO: 0 /100 WBC
PLATELET # BLD AUTO: 172 K/MCL (ref 140–450)
POTASSIUM SERPL-SCNC: 4.1 MMOL/L (ref 3.4–5.1)
RBC # BLD: 2.53 MIL/MCL (ref 4–5.2)
SODIUM SERPL-SCNC: 139 MMOL/L (ref 135–145)
WBC # BLD: 9.1 K/MCL (ref 4.2–11)

## 2020-12-20 PROCEDURE — 80048 BASIC METABOLIC PNL TOTAL CA: CPT | Performed by: INTERNAL MEDICINE

## 2020-12-20 PROCEDURE — 10002803 HB RX 637: Performed by: INTERNAL MEDICINE

## 2020-12-20 PROCEDURE — 82962 GLUCOSE BLOOD TEST: CPT

## 2020-12-20 PROCEDURE — 94003 VENT MGMT INPAT SUBQ DAY: CPT

## 2020-12-20 PROCEDURE — 36415 COLL VENOUS BLD VENIPUNCTURE: CPT | Performed by: INTERNAL MEDICINE

## 2020-12-20 PROCEDURE — 10002800 HB RX 250 W HCPCS: Performed by: INTERNAL MEDICINE

## 2020-12-20 PROCEDURE — 85025 COMPLETE CBC W/AUTO DIFF WBC: CPT | Performed by: INTERNAL MEDICINE

## 2020-12-20 PROCEDURE — 10000002 HB ROOM CHARGE MED SURG

## 2020-12-20 PROCEDURE — 87040 BLOOD CULTURE FOR BACTERIA: CPT | Performed by: INTERNAL MEDICINE

## 2020-12-20 PROCEDURE — 10002801 HB RX 250 W/O HCPCS: Performed by: INTERNAL MEDICINE

## 2020-12-20 PROCEDURE — 10002807 HB RX 258: Performed by: INTERNAL MEDICINE

## 2020-12-20 PROCEDURE — 94640 AIRWAY INHALATION TREATMENT: CPT

## 2020-12-20 PROCEDURE — 87070 CULTURE OTHR SPECIMN AEROBIC: CPT | Performed by: INTERNAL MEDICINE

## 2020-12-20 RX ORDER — SODIUM CHLORIDE 9 MG/ML
INJECTION, SOLUTION INTRAVENOUS
Status: COMPLETED
Start: 2020-12-20 | End: 2020-12-21

## 2020-12-20 RX ADMIN — PANTOPRAZOLE 40 MG: 40 TABLET, DELAYED RELEASE ORAL at 09:39

## 2020-12-20 RX ADMIN — METOPROLOL TARTRATE 25 MG: 25 TABLET, FILM COATED ORAL at 01:10

## 2020-12-20 RX ADMIN — BACLOFEN 15 MG: 10 TABLET ORAL at 15:13

## 2020-12-20 RX ADMIN — LEVETIRACETAM 500 MG: 100 SOLUTION ORAL at 09:38

## 2020-12-20 RX ADMIN — BACLOFEN 15 MG: 10 TABLET ORAL at 07:02

## 2020-12-20 RX ADMIN — OXYCODONE HYDROCHLORIDE 2.5 MG: 5 TABLET ORAL at 10:22

## 2020-12-20 RX ADMIN — FOLIC ACID 0.5 MG: 1 TABLET ORAL at 09:37

## 2020-12-20 RX ADMIN — LACOSAMIDE 200 MG: 200 TABLET, FILM COATED ORAL at 20:20

## 2020-12-20 RX ADMIN — IPRATROPIUM BROMIDE 0.5 MG: 0.5 SOLUTION RESPIRATORY (INHALATION) at 15:51

## 2020-12-20 RX ADMIN — METOPROLOL TARTRATE 25 MG: 25 TABLET, FILM COATED ORAL at 20:21

## 2020-12-20 RX ADMIN — SODIUM CHLORIDE 25 ML: 9 INJECTION, SOLUTION INTRAVENOUS at 20:34

## 2020-12-20 RX ADMIN — METOPROLOL TARTRATE 25 MG: 25 TABLET, FILM COATED ORAL at 09:37

## 2020-12-20 RX ADMIN — BACLOFEN 15 MG: 10 TABLET ORAL at 01:10

## 2020-12-20 RX ADMIN — ACETAMINOPHEN 650 MG: 650 SOLUTION ORAL at 01:07

## 2020-12-20 RX ADMIN — PIPERACILLIN AND TAZOBACTAM 3.38 G: 3; .375 INJECTION, POWDER, LYOPHILIZED, FOR SOLUTION INTRAVENOUS at 21:00

## 2020-12-20 RX ADMIN — LACOSAMIDE 200 MG: 200 TABLET, FILM COATED ORAL at 09:39

## 2020-12-20 RX ADMIN — IPRATROPIUM BROMIDE 0.5 MG: 0.5 SOLUTION RESPIRATORY (INHALATION) at 07:32

## 2020-12-20 RX ADMIN — IPRATROPIUM BROMIDE 0.5 MG: 0.5 SOLUTION RESPIRATORY (INHALATION) at 20:18

## 2020-12-20 RX ADMIN — GABAPENTIN 100 MG: 250 SOLUTION ORAL at 20:28

## 2020-12-20 RX ADMIN — LEVETIRACETAM 500 MG: 100 SOLUTION ORAL at 01:07

## 2020-12-20 RX ADMIN — PIPERACILLIN AND TAZOBACTAM 3.38 G: 3; .375 INJECTION, POWDER, LYOPHILIZED, FOR SOLUTION INTRAVENOUS at 09:36

## 2020-12-20 RX ADMIN — LACOSAMIDE 200 MG: 200 TABLET, FILM COATED ORAL at 01:10

## 2020-12-20 RX ADMIN — LORAZEPAM 0.5 MG: 0.5 TABLET ORAL at 09:43

## 2020-12-20 RX ADMIN — PIPERACILLIN AND TAZOBACTAM 3.38 G: 3; .375 INJECTION, POWDER, LYOPHILIZED, FOR SOLUTION INTRAVENOUS at 15:24

## 2020-12-20 RX ADMIN — MINERAL SUPPLEMENT IRON 300 MG / 5 ML STRENGTH LIQUID 100 PER BOX UNFLAVORED 300 MG: at 17:42

## 2020-12-20 RX ADMIN — MINERAL SUPPLEMENT IRON 300 MG / 5 ML STRENGTH LIQUID 100 PER BOX UNFLAVORED 300 MG: at 09:39

## 2020-12-20 RX ADMIN — LEVETIRACETAM 500 MG: 100 SOLUTION ORAL at 20:20

## 2020-12-20 RX ADMIN — BACLOFEN 15 MG: 10 TABLET ORAL at 22:00

## 2020-12-20 RX ADMIN — MIDODRINE HYDROCHLORIDE 5 MG: 5 TABLET ORAL at 07:01

## 2020-12-20 RX ADMIN — MIDODRINE HYDROCHLORIDE 5 MG: 5 TABLET ORAL at 22:00

## 2020-12-20 RX ADMIN — MIDODRINE HYDROCHLORIDE 5 MG: 5 TABLET ORAL at 01:09

## 2020-12-20 RX ADMIN — MIDODRINE HYDROCHLORIDE 5 MG: 5 TABLET ORAL at 15:12

## 2020-12-20 RX ADMIN — PIPERACILLIN AND TAZOBACTAM 3.38 G: 3; .375 INJECTION, POWDER, LYOPHILIZED, FOR SOLUTION INTRAVENOUS at 05:06

## 2020-12-20 RX ADMIN — IPRATROPIUM BROMIDE 0.5 MG: 0.5 SOLUTION RESPIRATORY (INHALATION) at 11:17

## 2020-12-20 RX ADMIN — GABAPENTIN 100 MG: 250 SOLUTION ORAL at 09:43

## 2020-12-20 ASSESSMENT — PAIN SCALES - GENERAL: PAINLEVEL_OUTOF10: 0

## 2020-12-20 ASSESSMENT — PAIN SCALES - BEHAVIORAL PAIN SCALE (BPS): BPS_SCORE: 5

## 2020-12-21 ENCOUNTER — APPOINTMENT (OUTPATIENT)
Dept: GENERAL RADIOLOGY | Age: 30
DRG: 053 | End: 2020-12-21
Attending: INTERNAL MEDICINE

## 2020-12-21 ENCOUNTER — APPOINTMENT (OUTPATIENT)
Dept: NEUROLOGY | Age: 30
DRG: 053 | End: 2020-12-21
Attending: SPECIALIST

## 2020-12-21 LAB
ANION GAP SERPL CALC-SCNC: 9 MMOL/L (ref 10–20)
BACTERIA SPT AEROBE CULT: NORMAL
BASOPHILS # BLD: 0 K/MCL (ref 0–0.3)
BASOPHILS NFR BLD: 1 %
BUN SERPL-MCNC: 21 MG/DL (ref 6–20)
BUN/CREAT SERPL: 78 (ref 7–25)
CALCIUM SERPL-MCNC: 8.4 MG/DL (ref 8.4–10.2)
CHLORIDE SERPL-SCNC: 107 MMOL/L (ref 98–107)
CO2 SERPL-SCNC: 26 MMOL/L (ref 21–32)
CREAT SERPL-MCNC: 0.27 MG/DL (ref 0.51–0.95)
DEPRECATED RDW RBC: 61.5 FL (ref 39–50)
EOSINOPHIL # BLD: 0.4 K/MCL (ref 0–0.5)
EOSINOPHIL NFR BLD: 4 %
ERYTHROCYTE [DISTWIDTH] IN BLOOD: 15 % (ref 11–15)
FASTING DURATION TIME PATIENT: ABNORMAL H
GFR SERPLBLD BASED ON 1.73 SQ M-ARVRAT: >90 ML/MIN/1.73M2
GLUCOSE BLDC GLUCOMTR-MCNC: 101 MG/DL (ref 70–99)
GLUCOSE BLDC GLUCOMTR-MCNC: 120 MG/DL (ref 70–99)
GLUCOSE BLDC GLUCOMTR-MCNC: 120 MG/DL (ref 70–99)
GLUCOSE BLDC GLUCOMTR-MCNC: 133 MG/DL (ref 70–99)
GLUCOSE BLDC GLUCOMTR-MCNC: 96 MG/DL (ref 70–99)
GLUCOSE SERPL-MCNC: 125 MG/DL (ref 65–99)
GRAM STN SPEC: NORMAL
HCT VFR BLD CALC: 30.5 % (ref 36–46.5)
HGB BLD-MCNC: 10 G/DL (ref 12–15.5)
IMM GRANULOCYTES # BLD AUTO: 0 K/MCL (ref 0–0.2)
IMM GRANULOCYTES # BLD: 0 %
LYMPHOCYTES # BLD: 1.4 K/MCL (ref 1–4.8)
LYMPHOCYTES NFR BLD: 16 %
MCH RBC QN AUTO: 36.5 PG (ref 26–34)
MCHC RBC AUTO-ENTMCNC: 32.8 G/DL (ref 32–36.5)
MCV RBC AUTO: 111.3 FL (ref 78–100)
MONOCYTES # BLD: 0.5 K/MCL (ref 0.3–0.9)
MONOCYTES NFR BLD: 6 %
NEUTROPHILS # BLD: 6.5 K/MCL (ref 1.8–7.7)
NEUTROPHILS NFR BLD: 73 %
NRBC BLD MANUAL-RTO: 0 /100 WBC
PLATELET # BLD AUTO: 156 K/MCL (ref 140–450)
POTASSIUM SERPL-SCNC: 4.2 MMOL/L (ref 3.4–5.1)
PROCALCITONIN SERPL IA-MCNC: <0.05 NG/ML
RBC # BLD: 2.74 MIL/MCL (ref 4–5.2)
SODIUM SERPL-SCNC: 138 MMOL/L (ref 135–145)
WBC # BLD: 8.9 K/MCL (ref 4.2–11)

## 2020-12-21 PROCEDURE — 10002803 HB RX 637: Performed by: INTERNAL MEDICINE

## 2020-12-21 PROCEDURE — 95816 EEG AWAKE AND DROWSY: CPT | Performed by: PSYCHIATRY & NEUROLOGY

## 2020-12-21 PROCEDURE — 13003349 HB PATIENT LVL 4 SUPPLIES IP WND

## 2020-12-21 PROCEDURE — 80048 BASIC METABOLIC PNL TOTAL CA: CPT | Performed by: INTERNAL MEDICINE

## 2020-12-21 PROCEDURE — 94003 VENT MGMT INPAT SUBQ DAY: CPT

## 2020-12-21 PROCEDURE — 82962 GLUCOSE BLOOD TEST: CPT

## 2020-12-21 PROCEDURE — 94640 AIRWAY INHALATION TREATMENT: CPT

## 2020-12-21 PROCEDURE — 71045 X-RAY EXAM CHEST 1 VIEW: CPT

## 2020-12-21 PROCEDURE — 10002801 HB RX 250 W/O HCPCS: Performed by: INTERNAL MEDICINE

## 2020-12-21 PROCEDURE — 85025 COMPLETE CBC W/AUTO DIFF WBC: CPT | Performed by: INTERNAL MEDICINE

## 2020-12-21 PROCEDURE — 10002807 HB RX 258: Performed by: INTERNAL MEDICINE

## 2020-12-21 PROCEDURE — 10000002 HB ROOM CHARGE MED SURG

## 2020-12-21 PROCEDURE — 36415 COLL VENOUS BLD VENIPUNCTURE: CPT | Performed by: INTERNAL MEDICINE

## 2020-12-21 PROCEDURE — 10002800 HB RX 250 W HCPCS: Performed by: INTERNAL MEDICINE

## 2020-12-21 PROCEDURE — 87077 CULTURE AEROBIC IDENTIFY: CPT | Performed by: INTERNAL MEDICINE

## 2020-12-21 PROCEDURE — 84145 PROCALCITONIN (PCT): CPT | Performed by: INTERNAL MEDICINE

## 2020-12-21 PROCEDURE — 99233 SBSQ HOSP IP/OBS HIGH 50: CPT | Performed by: SPECIALIST

## 2020-12-21 PROCEDURE — 95816 EEG AWAKE AND DROWSY: CPT

## 2020-12-21 RX ADMIN — SODIUM CHLORIDE 25 ML: 9 INJECTION, SOLUTION INTRAVENOUS at 15:10

## 2020-12-21 RX ADMIN — PIPERACILLIN AND TAZOBACTAM 3.38 G: 3; .375 INJECTION, POWDER, LYOPHILIZED, FOR SOLUTION INTRAVENOUS at 15:12

## 2020-12-21 RX ADMIN — FOLIC ACID 0.5 MG: 1 TABLET ORAL at 09:12

## 2020-12-21 RX ADMIN — GABAPENTIN 100 MG: 250 SOLUTION ORAL at 12:25

## 2020-12-21 RX ADMIN — IPRATROPIUM BROMIDE 0.5 MG: 0.5 SOLUTION RESPIRATORY (INHALATION) at 11:21

## 2020-12-21 RX ADMIN — PANTOPRAZOLE 40 MG: 40 TABLET, DELAYED RELEASE ORAL at 05:09

## 2020-12-21 RX ADMIN — MINERAL SUPPLEMENT IRON 300 MG / 5 ML STRENGTH LIQUID 100 PER BOX UNFLAVORED 300 MG: at 17:58

## 2020-12-21 RX ADMIN — PIPERACILLIN AND TAZOBACTAM 3.38 G: 3; .375 INJECTION, POWDER, LYOPHILIZED, FOR SOLUTION INTRAVENOUS at 21:00

## 2020-12-21 RX ADMIN — LACOSAMIDE 200 MG: 200 TABLET, FILM COATED ORAL at 20:51

## 2020-12-21 RX ADMIN — MINERAL SUPPLEMENT IRON 300 MG / 5 ML STRENGTH LIQUID 100 PER BOX UNFLAVORED 300 MG: at 09:12

## 2020-12-21 RX ADMIN — BACLOFEN 15 MG: 10 TABLET ORAL at 15:04

## 2020-12-21 RX ADMIN — IPRATROPIUM BROMIDE 0.5 MG: 0.5 SOLUTION RESPIRATORY (INHALATION) at 07:29

## 2020-12-21 RX ADMIN — BACLOFEN 15 MG: 10 TABLET ORAL at 05:05

## 2020-12-21 RX ADMIN — PIPERACILLIN AND TAZOBACTAM 3.38 G: 3; .375 INJECTION, POWDER, LYOPHILIZED, FOR SOLUTION INTRAVENOUS at 05:12

## 2020-12-21 RX ADMIN — ACETAMINOPHEN 650 MG: 650 SOLUTION ORAL at 09:12

## 2020-12-21 RX ADMIN — MIDODRINE HYDROCHLORIDE 5 MG: 5 TABLET ORAL at 15:00

## 2020-12-21 RX ADMIN — OXYCODONE HYDROCHLORIDE 2.5 MG: 5 TABLET ORAL at 15:02

## 2020-12-21 RX ADMIN — LACOSAMIDE 200 MG: 200 TABLET, FILM COATED ORAL at 09:11

## 2020-12-21 RX ADMIN — GABAPENTIN 100 MG: 250 SOLUTION ORAL at 20:55

## 2020-12-21 RX ADMIN — MIDODRINE HYDROCHLORIDE 5 MG: 5 TABLET ORAL at 20:50

## 2020-12-21 RX ADMIN — LEVETIRACETAM 500 MG: 100 SOLUTION ORAL at 09:11

## 2020-12-21 RX ADMIN — ACETAMINOPHEN 650 MG: 650 SOLUTION ORAL at 17:57

## 2020-12-21 RX ADMIN — METOPROLOL TARTRATE 25 MG: 25 TABLET, FILM COATED ORAL at 09:12

## 2020-12-21 RX ADMIN — IPRATROPIUM BROMIDE 0.5 MG: 0.5 SOLUTION RESPIRATORY (INHALATION) at 15:50

## 2020-12-21 RX ADMIN — LORAZEPAM 0.5 MG: 0.5 TABLET ORAL at 16:34

## 2020-12-21 RX ADMIN — IPRATROPIUM BROMIDE 0.5 MG: 0.5 SOLUTION RESPIRATORY (INHALATION) at 23:21

## 2020-12-21 RX ADMIN — LEVETIRACETAM 500 MG: 100 SOLUTION ORAL at 20:55

## 2020-12-21 RX ADMIN — BACLOFEN 15 MG: 10 TABLET ORAL at 20:49

## 2020-12-21 RX ADMIN — MIDODRINE HYDROCHLORIDE 5 MG: 5 TABLET ORAL at 06:30

## 2020-12-21 RX ADMIN — METOPROLOL TARTRATE 25 MG: 25 TABLET, FILM COATED ORAL at 20:51

## 2020-12-21 ASSESSMENT — PAIN SCALES - BEHAVIORAL PAIN SCALE (BPS)
BPS_SCORE: 6
BPS_SCORE: 3
BPS_SCORE: 6
BPS_SCORE: 4
BPS_SCORE: 4

## 2020-12-22 LAB
GLUCOSE BLDC GLUCOMTR-MCNC: 137 MG/DL (ref 70–99)
GLUCOSE BLDC GLUCOMTR-MCNC: 147 MG/DL (ref 70–99)
GLUCOSE BLDC GLUCOMTR-MCNC: 153 MG/DL (ref 70–99)

## 2020-12-22 PROCEDURE — 10002807 HB RX 258: Performed by: INTERNAL MEDICINE

## 2020-12-22 PROCEDURE — 99233 SBSQ HOSP IP/OBS HIGH 50: CPT | Performed by: SPECIALIST

## 2020-12-22 PROCEDURE — 10002800 HB RX 250 W HCPCS: Performed by: INTERNAL MEDICINE

## 2020-12-22 PROCEDURE — 10002803 HB RX 637: Performed by: INTERNAL MEDICINE

## 2020-12-22 PROCEDURE — 10000002 HB ROOM CHARGE MED SURG

## 2020-12-22 PROCEDURE — 94003 VENT MGMT INPAT SUBQ DAY: CPT

## 2020-12-22 PROCEDURE — 94640 AIRWAY INHALATION TREATMENT: CPT

## 2020-12-22 PROCEDURE — 82962 GLUCOSE BLOOD TEST: CPT

## 2020-12-22 PROCEDURE — 10002801 HB RX 250 W/O HCPCS: Performed by: INTERNAL MEDICINE

## 2020-12-22 RX ORDER — MIDAZOLAM 5 MG/.1ML
SPRAY NASAL
Qty: 2 INHALER | Refills: 1 | Status: SHIPPED | OUTPATIENT
Start: 2020-12-22

## 2020-12-22 RX ADMIN — LACOSAMIDE 200 MG: 200 TABLET, FILM COATED ORAL at 09:34

## 2020-12-22 RX ADMIN — MIDODRINE HYDROCHLORIDE 5 MG: 5 TABLET ORAL at 21:48

## 2020-12-22 RX ADMIN — LORAZEPAM 0.5 MG: 0.5 TABLET ORAL at 14:08

## 2020-12-22 RX ADMIN — BACLOFEN 15 MG: 10 TABLET ORAL at 06:42

## 2020-12-22 RX ADMIN — LEVETIRACETAM 500 MG: 100 SOLUTION ORAL at 21:48

## 2020-12-22 RX ADMIN — LACOSAMIDE 200 MG: 200 TABLET, FILM COATED ORAL at 21:48

## 2020-12-22 RX ADMIN — GABAPENTIN 100 MG: 250 SOLUTION ORAL at 11:42

## 2020-12-22 RX ADMIN — LEVETIRACETAM 500 MG: 100 SOLUTION ORAL at 09:35

## 2020-12-22 RX ADMIN — PANTOPRAZOLE 40 MG: 40 TABLET, DELAYED RELEASE ORAL at 06:45

## 2020-12-22 RX ADMIN — IPRATROPIUM BROMIDE 0.5 MG: 0.5 SOLUTION RESPIRATORY (INHALATION) at 07:26

## 2020-12-22 RX ADMIN — MIDODRINE HYDROCHLORIDE 5 MG: 5 TABLET ORAL at 15:16

## 2020-12-22 RX ADMIN — BACLOFEN 15 MG: 10 TABLET ORAL at 21:48

## 2020-12-22 RX ADMIN — PIPERACILLIN AND TAZOBACTAM 3.38 G: 3; .375 INJECTION, POWDER, LYOPHILIZED, FOR SOLUTION INTRAVENOUS at 14:26

## 2020-12-22 RX ADMIN — LORAZEPAM 0.5 MG: 0.5 TABLET ORAL at 06:42

## 2020-12-22 RX ADMIN — METOPROLOL TARTRATE 25 MG: 25 TABLET, FILM COATED ORAL at 21:48

## 2020-12-22 RX ADMIN — IPRATROPIUM BROMIDE 0.5 MG: 0.5 SOLUTION RESPIRATORY (INHALATION) at 15:37

## 2020-12-22 RX ADMIN — MINERAL SUPPLEMENT IRON 300 MG / 5 ML STRENGTH LIQUID 100 PER BOX UNFLAVORED 300 MG: at 18:33

## 2020-12-22 RX ADMIN — MIDODRINE HYDROCHLORIDE 5 MG: 5 TABLET ORAL at 06:44

## 2020-12-22 RX ADMIN — METOPROLOL TARTRATE 25 MG: 25 TABLET, FILM COATED ORAL at 09:35

## 2020-12-22 RX ADMIN — PIPERACILLIN AND TAZOBACTAM 3.38 G: 3; .375 INJECTION, POWDER, LYOPHILIZED, FOR SOLUTION INTRAVENOUS at 06:49

## 2020-12-22 RX ADMIN — GABAPENTIN 100 MG: 250 SOLUTION ORAL at 21:48

## 2020-12-22 RX ADMIN — PIPERACILLIN AND TAZOBACTAM 3.38 G: 3; .375 INJECTION, POWDER, LYOPHILIZED, FOR SOLUTION INTRAVENOUS at 21:55

## 2020-12-22 RX ADMIN — MINERAL SUPPLEMENT IRON 300 MG / 5 ML STRENGTH LIQUID 100 PER BOX UNFLAVORED 300 MG: at 09:35

## 2020-12-22 RX ADMIN — IPRATROPIUM BROMIDE 0.5 MG: 0.5 SOLUTION RESPIRATORY (INHALATION) at 19:34

## 2020-12-22 RX ADMIN — IPRATROPIUM BROMIDE 0.5 MG: 0.5 SOLUTION RESPIRATORY (INHALATION) at 11:35

## 2020-12-22 RX ADMIN — FOLIC ACID 0.5 MG: 1 TABLET ORAL at 09:35

## 2020-12-22 RX ADMIN — BACLOFEN 15 MG: 10 TABLET ORAL at 14:08

## 2020-12-22 ASSESSMENT — PAIN SCALES - BEHAVIORAL PAIN SCALE (BPS)
BPS_SCORE: 4
BPS_SCORE: 4

## 2020-12-22 ASSESSMENT — PAIN SCALES - GENERAL: PAINLEVEL_OUTOF10: 0

## 2020-12-23 VITALS
WEIGHT: 46.96 LBS | TEMPERATURE: 98.1 F | DIASTOLIC BLOOD PRESSURE: 64 MMHG | SYSTOLIC BLOOD PRESSURE: 110 MMHG | BODY MASS INDEX: 10.13 KG/M2 | RESPIRATION RATE: 13 BRPM | HEART RATE: 77 BPM | HEIGHT: 57 IN | OXYGEN SATURATION: 99 %

## 2020-12-23 LAB
GLUCOSE BLDC GLUCOMTR-MCNC: 106 MG/DL (ref 70–99)
GLUCOSE BLDC GLUCOMTR-MCNC: 109 MG/DL (ref 70–99)
GLUCOSE BLDC GLUCOMTR-MCNC: 146 MG/DL (ref 70–99)

## 2020-12-23 PROCEDURE — 10002807 HB RX 258: Performed by: INTERNAL MEDICINE

## 2020-12-23 PROCEDURE — 82962 GLUCOSE BLOOD TEST: CPT

## 2020-12-23 PROCEDURE — 94003 VENT MGMT INPAT SUBQ DAY: CPT

## 2020-12-23 PROCEDURE — 10002801 HB RX 250 W/O HCPCS: Performed by: INTERNAL MEDICINE

## 2020-12-23 PROCEDURE — 94640 AIRWAY INHALATION TREATMENT: CPT

## 2020-12-23 PROCEDURE — 10002803 HB RX 637: Performed by: INTERNAL MEDICINE

## 2020-12-23 PROCEDURE — 10002800 HB RX 250 W HCPCS: Performed by: INTERNAL MEDICINE

## 2020-12-23 RX ADMIN — METOPROLOL TARTRATE 25 MG: 25 TABLET, FILM COATED ORAL at 11:39

## 2020-12-23 RX ADMIN — BACLOFEN 15 MG: 10 TABLET ORAL at 06:42

## 2020-12-23 RX ADMIN — MINERAL SUPPLEMENT IRON 300 MG / 5 ML STRENGTH LIQUID 100 PER BOX UNFLAVORED 300 MG: at 11:40

## 2020-12-23 RX ADMIN — FOLIC ACID 0.5 MG: 1 TABLET ORAL at 11:40

## 2020-12-23 RX ADMIN — IPRATROPIUM BROMIDE 0.5 MG: 0.5 SOLUTION RESPIRATORY (INHALATION) at 07:25

## 2020-12-23 RX ADMIN — LORAZEPAM 0.5 MG: 0.5 TABLET ORAL at 11:39

## 2020-12-23 RX ADMIN — LACOSAMIDE 200 MG: 200 TABLET, FILM COATED ORAL at 11:39

## 2020-12-23 RX ADMIN — BACLOFEN 15 MG: 10 TABLET ORAL at 15:00

## 2020-12-23 RX ADMIN — IPRATROPIUM BROMIDE 0.5 MG: 0.5 SOLUTION RESPIRATORY (INHALATION) at 11:22

## 2020-12-23 RX ADMIN — MIDODRINE HYDROCHLORIDE 5 MG: 5 TABLET ORAL at 06:43

## 2020-12-23 RX ADMIN — OXYCODONE HYDROCHLORIDE 2.5 MG: 5 TABLET ORAL at 15:01

## 2020-12-23 RX ADMIN — PANTOPRAZOLE 40 MG: 40 TABLET, DELAYED RELEASE ORAL at 06:43

## 2020-12-23 RX ADMIN — PIPERACILLIN AND TAZOBACTAM 3.38 G: 3; .375 INJECTION, POWDER, LYOPHILIZED, FOR SOLUTION INTRAVENOUS at 06:51

## 2020-12-23 RX ADMIN — GABAPENTIN 100 MG: 250 SOLUTION ORAL at 11:41

## 2020-12-23 RX ADMIN — MIDODRINE HYDROCHLORIDE 5 MG: 5 TABLET ORAL at 15:00

## 2020-12-23 RX ADMIN — LEVETIRACETAM 500 MG: 100 SOLUTION ORAL at 11:40

## 2020-12-23 ASSESSMENT — PAIN SCALES - BEHAVIORAL PAIN SCALE (BPS)
BPS_SCORE: 3
BPS_SCORE: 5

## 2020-12-24 LAB
BACTERIA BLD CULT: NORMAL
BACTERIA BLD CULT: NORMAL

## 2020-12-28 LAB
BACTERIA SPT AEROBE CULT: ABNORMAL
GRAM STN SPEC: ABNORMAL

## 2021-05-23 ENCOUNTER — APPOINTMENT (OUTPATIENT)
Dept: GENERAL RADIOLOGY | Age: 31
DRG: 053 | End: 2021-05-23
Attending: EMERGENCY MEDICINE

## 2021-05-23 ENCOUNTER — HOSPITAL ENCOUNTER (OUTPATIENT)
Age: 31
Setting detail: OBSERVATION
Discharge: HOME | DRG: 053 | End: 2021-05-25
Attending: EMERGENCY MEDICINE | Admitting: INTERNAL MEDICINE

## 2021-05-23 ENCOUNTER — APPOINTMENT (OUTPATIENT)
Dept: CT IMAGING | Age: 31
DRG: 053 | End: 2021-05-23
Attending: EMERGENCY MEDICINE

## 2021-05-23 DIAGNOSIS — R56.9 SEIZURE (CMD): Primary | ICD-10-CM

## 2021-05-23 LAB
ALBUMIN SERPL-MCNC: 3.2 G/DL (ref 3.6–5.1)
ALBUMIN/GLOB SERPL: 0.6 {RATIO} (ref 1–2.4)
ALP SERPL-CCNC: 129 UNITS/L (ref 45–117)
ALT SERPL-CCNC: 68 UNITS/L
ANION GAP SERPL CALC-SCNC: 8 MMOL/L (ref 10–20)
APPEARANCE UR: CLEAR
AST SERPL-CCNC: 22 UNITS/L
BACTERIA #/AREA URNS HPF: ABNORMAL /HPF
BASE DEFICIT BLDV-SCNC: 3 MMOL/L (ref -2–2)
BASOPHILS # BLD: 0 K/MCL (ref 0–0.3)
BASOPHILS NFR BLD: 0 %
BILIRUB SERPL-MCNC: 0.3 MG/DL (ref 0.2–1)
BILIRUB UR QL STRIP: NEGATIVE
BUN SERPL-MCNC: 27 MG/DL (ref 6–20)
BUN/CREAT SERPL: 82 (ref 7–25)
CALCIUM SERPL-MCNC: 8.9 MG/DL (ref 8.4–10.2)
CHLORIDE SERPL-SCNC: 106 MMOL/L (ref 98–107)
CO2 SERPL-SCNC: 30 MMOL/L (ref 21–32)
COLOR UR: YELLOW
CREAT SERPL-MCNC: 0.33 MG/DL (ref 0.51–0.95)
DEPRECATED RDW RBC: 55.4 FL (ref 39–50)
EOSINOPHIL # BLD: 0.1 K/MCL (ref 0–0.5)
EOSINOPHIL NFR BLD: 1 %
ERYTHROCYTE [DISTWIDTH] IN BLOOD: 12.8 % (ref 11–15)
FASTING DURATION TIME PATIENT: ABNORMAL H
GFR SERPLBLD BASED ON 1.73 SQ M-ARVRAT: >90 ML/MIN/1.73M2
GLOBULIN SER-MCNC: 5.6 G/DL (ref 2–4)
GLUCOSE BLDC GLUCOMTR-MCNC: 103 MG/DL (ref 70–99)
GLUCOSE BLDC GLUCOMTR-MCNC: 113 MG/DL (ref 70–99)
GLUCOSE BLDC GLUCOMTR-MCNC: 116 MG/DL (ref 70–99)
GLUCOSE SERPL-MCNC: 115 MG/DL (ref 65–99)
GLUCOSE UR STRIP-MCNC: NEGATIVE MG/DL
HCO3 BLDV-SCNC: 30 MMOL/L (ref 22–28)
HCT VFR BLD CALC: 38.1 % (ref 36–46.5)
HGB BLD-MCNC: 12.6 G/DL (ref 12–15.5)
HGB BLDA-MCNC: 12.9 G/DL (ref 12–15.5)
HGB UR QL STRIP: NEGATIVE
HYALINE CASTS #/AREA URNS LPF: ABNORMAL /LPF
IMM GRANULOCYTES # BLD AUTO: 0 K/MCL (ref 0–0.2)
IMM GRANULOCYTES # BLD: 0 %
KETONES UR STRIP-MCNC: NEGATIVE MG/DL
LACTATE BLDV-SCNC: 1.6 MMOL/L (ref 0–2)
LEUKOCYTE ESTERASE UR QL STRIP: NEGATIVE
LEVETIRACETAM SERPL-MCNC: 51.5 MCG/ML (ref 6–46)
LYMPHOCYTES # BLD: 1 K/MCL (ref 1–4.8)
LYMPHOCYTES NFR BLD: 22 %
MCH RBC QN AUTO: 38.8 PG (ref 26–34)
MCHC RBC AUTO-ENTMCNC: 33.1 G/DL (ref 32–36.5)
MCV RBC AUTO: 117.2 FL (ref 78–100)
MONOCYTES # BLD: 0.5 K/MCL (ref 0.3–0.9)
MONOCYTES NFR BLD: 11 %
NEUTROPHILS # BLD: 3.1 K/MCL (ref 1.8–7.7)
NEUTROPHILS NFR BLD: 66 %
NITRITE UR QL STRIP: NEGATIVE
NRBC BLD MANUAL-RTO: 0 /100 WBC
OXYHGB MFR BLDV: 56 % (ref 60–80)
PCO2 BLDV: 61 MM HG (ref 38–51)
PH BLDV: 7.31 UNITS (ref 7.35–7.45)
PH UR STRIP: 5 [PH] (ref 5–7)
PLATELET # BLD AUTO: 125 K/MCL (ref 140–450)
PO2 BLDV: 37 MM HG (ref 35–42)
POTASSIUM SERPL-SCNC: 4.2 MMOL/L (ref 3.4–5.1)
PROCALCITONIN SERPL IA-MCNC: 0.14 NG/ML
PROT SERPL-MCNC: 8.8 G/DL (ref 6.4–8.2)
PROT UR STRIP-MCNC: >=500 MG/DL
RAINBOW EXTRA TUBES HOLD SPECIMEN: NORMAL
RBC # BLD: 3.25 MIL/MCL (ref 4–5.2)
RBC #/AREA URNS HPF: ABNORMAL /HPF
SAO2 % BLDV: 57 % (ref 60–80)
SARS-COV-2 RNA RESP QL NAA+PROBE: NOT DETECTED
SERVICE CMNT-IMP: NORMAL
SERVICE CMNT-IMP: NORMAL
SODIUM SERPL-SCNC: 140 MMOL/L (ref 135–145)
SP GR UR STRIP: 1.03 (ref 1–1.03)
SQUAMOUS #/AREA URNS HPF: ABNORMAL /HPF
UROBILINOGEN UR STRIP-MCNC: 0.2 MG/DL
WBC # BLD: 4.7 K/MCL (ref 4.2–11)
WBC #/AREA URNS HPF: ABNORMAL /HPF

## 2021-05-23 PROCEDURE — 10002807 HB RX 258: Performed by: EMERGENCY MEDICINE

## 2021-05-23 PROCEDURE — 96361 HYDRATE IV INFUSION ADD-ON: CPT

## 2021-05-23 PROCEDURE — 85025 COMPLETE CBC W/AUTO DIFF WBC: CPT | Performed by: EMERGENCY MEDICINE

## 2021-05-23 PROCEDURE — 87635 SARS-COV-2 COVID-19 AMP PRB: CPT | Performed by: EMERGENCY MEDICINE

## 2021-05-23 PROCEDURE — 71045 X-RAY EXAM CHEST 1 VIEW: CPT

## 2021-05-23 PROCEDURE — 96375 TX/PRO/DX INJ NEW DRUG ADDON: CPT

## 2021-05-23 PROCEDURE — 10004281 HB COUNTER-STAFF TIME PER 15 MIN

## 2021-05-23 PROCEDURE — 10002807 HB RX 258: Performed by: INTERNAL MEDICINE

## 2021-05-23 PROCEDURE — 83605 ASSAY OF LACTIC ACID: CPT | Performed by: EMERGENCY MEDICINE

## 2021-05-23 PROCEDURE — 99285 EMERGENCY DEPT VISIT HI MDM: CPT

## 2021-05-23 PROCEDURE — 94002 VENT MGMT INPAT INIT DAY: CPT

## 2021-05-23 PROCEDURE — 82803 BLOOD GASES ANY COMBINATION: CPT | Performed by: EMERGENCY MEDICINE

## 2021-05-23 PROCEDURE — 10002801 HB RX 250 W/O HCPCS: Performed by: INTERNAL MEDICINE

## 2021-05-23 PROCEDURE — 80053 COMPREHEN METABOLIC PANEL: CPT | Performed by: EMERGENCY MEDICINE

## 2021-05-23 PROCEDURE — 10002800 HB RX 250 W HCPCS: Performed by: EMERGENCY MEDICINE

## 2021-05-23 PROCEDURE — 82962 GLUCOSE BLOOD TEST: CPT

## 2021-05-23 PROCEDURE — 70450 CT HEAD/BRAIN W/O DYE: CPT

## 2021-05-23 PROCEDURE — 87040 BLOOD CULTURE FOR BACTERIA: CPT | Performed by: EMERGENCY MEDICINE

## 2021-05-23 PROCEDURE — G0378 HOSPITAL OBSERVATION PER HR: HCPCS

## 2021-05-23 PROCEDURE — 99285 EMERGENCY DEPT VISIT HI MDM: CPT | Performed by: EMERGENCY MEDICINE

## 2021-05-23 PROCEDURE — C9803 HOPD COVID-19 SPEC COLLECT: HCPCS

## 2021-05-23 PROCEDURE — 96374 THER/PROPH/DIAG INJ IV PUSH: CPT

## 2021-05-23 PROCEDURE — 84145 PROCALCITONIN (PCT): CPT | Performed by: EMERGENCY MEDICINE

## 2021-05-23 PROCEDURE — 99220 INITIAL OBSERVATION CARE,LEVL III: CPT | Performed by: SPECIALIST

## 2021-05-23 PROCEDURE — 80235 DRUG ASSAY LACOSAMIDE: CPT | Performed by: EMERGENCY MEDICINE

## 2021-05-23 PROCEDURE — 36415 COLL VENOUS BLD VENIPUNCTURE: CPT

## 2021-05-23 PROCEDURE — 81001 URINALYSIS AUTO W/SCOPE: CPT | Performed by: EMERGENCY MEDICINE

## 2021-05-23 PROCEDURE — 10002803 HB RX 637: Performed by: INTERNAL MEDICINE

## 2021-05-23 PROCEDURE — 80177 DRUG SCRN QUAN LEVETIRACETAM: CPT | Performed by: EMERGENCY MEDICINE

## 2021-05-23 PROCEDURE — 10006027 HB SUPPLY 278

## 2021-05-23 RX ORDER — LACOSAMIDE 150 MG/1
150 TABLET ORAL EVERY 12 HOURS
Status: ON HOLD | COMMUNITY
End: 2021-05-25 | Stop reason: HOSPADM

## 2021-05-23 RX ORDER — FOLIC ACID 1 MG/1
0.5 TABLET ORAL DAILY
Status: DISCONTINUED | OUTPATIENT
Start: 2021-05-24 | End: 2021-05-25 | Stop reason: HOSPADM

## 2021-05-23 RX ORDER — ACETAMINOPHEN 325 MG/1
325 TABLET ORAL EVERY 6 HOURS PRN
Status: DISCONTINUED | OUTPATIENT
Start: 2021-05-23 | End: 2021-05-25 | Stop reason: HOSPADM

## 2021-05-23 RX ORDER — LEVETIRACETAM 100 MG/ML
500 SOLUTION ORAL EVERY 12 HOURS SCHEDULED
Status: DISCONTINUED | OUTPATIENT
Start: 2021-05-23 | End: 2021-05-24

## 2021-05-23 RX ORDER — BACLOFEN 10 MG/1
5 TABLET ORAL 2 TIMES DAILY
Status: DISCONTINUED | OUTPATIENT
Start: 2021-05-23 | End: 2021-05-24

## 2021-05-23 RX ORDER — DEXTROSE MONOHYDRATE 50 MG/ML
INJECTION, SOLUTION INTRAVENOUS CONTINUOUS
Status: DISCONTINUED | OUTPATIENT
Start: 2021-05-23 | End: 2021-05-24

## 2021-05-23 RX ORDER — DEXTROSE AND SODIUM CHLORIDE 5; .9 G/100ML; G/100ML
INJECTION, SOLUTION INTRAVENOUS CONTINUOUS
Status: DISCONTINUED | OUTPATIENT
Start: 2021-05-23 | End: 2021-05-23 | Stop reason: ALTCHOICE

## 2021-05-23 RX ORDER — LACOSAMIDE 10 MG/ML
200 SOLUTION ORAL EVERY 12 HOURS SCHEDULED
Status: DISCONTINUED | OUTPATIENT
Start: 2021-05-23 | End: 2021-05-23

## 2021-05-23 RX ORDER — MIDAZOLAM HYDROCHLORIDE 1 MG/ML
2 INJECTION, SOLUTION INTRAMUSCULAR; INTRAVENOUS ONCE
Status: COMPLETED | OUTPATIENT
Start: 2021-05-23 | End: 2021-05-23

## 2021-05-23 RX ORDER — GABAPENTIN 250 MG/5ML
100 SOLUTION ORAL 2 TIMES DAILY
Status: DISCONTINUED | OUTPATIENT
Start: 2021-05-23 | End: 2021-05-25 | Stop reason: HOSPADM

## 2021-05-23 RX ORDER — LEVETIRACETAM 500 MG/5ML
1000 INJECTION, SOLUTION, CONCENTRATE INTRAVENOUS ONCE
Status: COMPLETED | OUTPATIENT
Start: 2021-05-23 | End: 2021-05-23

## 2021-05-23 RX ORDER — SODIUM CHLORIDE 1 G/1
1 TABLET ORAL 3 TIMES DAILY
Status: DISCONTINUED | OUTPATIENT
Start: 2021-05-23 | End: 2021-05-25 | Stop reason: HOSPADM

## 2021-05-23 RX ORDER — ZINC SULFATE 50(220)MG
220 CAPSULE ORAL
Status: DISCONTINUED | OUTPATIENT
Start: 2021-05-24 | End: 2021-05-25 | Stop reason: HOSPADM

## 2021-05-23 RX ORDER — CEFAZOLIN SODIUM/WATER 1 G/10 ML
1000 SYRINGE (ML) INTRAVENOUS ONCE
Status: COMPLETED | OUTPATIENT
Start: 2021-05-23 | End: 2021-05-23

## 2021-05-23 RX ORDER — LORAZEPAM 2 MG/ML
2 INJECTION INTRAMUSCULAR ONCE
Status: COMPLETED | OUTPATIENT
Start: 2021-05-23 | End: 2021-05-23

## 2021-05-23 RX ORDER — OXYCODONE HYDROCHLORIDE 5 MG/1
2.5 TABLET ORAL EVERY 6 HOURS PRN
Status: DISCONTINUED | OUTPATIENT
Start: 2021-05-23 | End: 2021-05-25 | Stop reason: HOSPADM

## 2021-05-23 RX ORDER — LACOSAMIDE 10 MG/ML
200 SOLUTION ORAL EVERY 12 HOURS SCHEDULED
Status: DISCONTINUED | OUTPATIENT
Start: 2021-05-23 | End: 2021-05-25 | Stop reason: HOSPADM

## 2021-05-23 RX ORDER — LORAZEPAM 0.5 MG/1
0.5 TABLET ORAL EVERY 8 HOURS PRN
Status: DISCONTINUED | OUTPATIENT
Start: 2021-05-23 | End: 2021-05-24

## 2021-05-23 RX ADMIN — LACOSAMIDE 200 MG: 10 SOLUTION ORAL at 22:30

## 2021-05-23 RX ADMIN — LORAZEPAM 2 MG: 2 INJECTION INTRAMUSCULAR; INTRAVENOUS at 08:36

## 2021-05-23 RX ADMIN — LEVETIRACETAM 500 MG: 100 SOLUTION ORAL at 22:15

## 2021-05-23 RX ADMIN — BACLOFEN 5 MG: 10 TABLET ORAL at 22:07

## 2021-05-23 RX ADMIN — PANCRELIPASE 1 CAPSULE: 30000; 6000; 19000 CAPSULE, DELAYED RELEASE PELLETS ORAL at 22:15

## 2021-05-23 RX ADMIN — Medication 1000 MG: at 10:21

## 2021-05-23 RX ADMIN — GABAPENTIN 100 MG: 250 SOLUTION ORAL at 22:15

## 2021-05-23 RX ADMIN — MIDAZOLAM HYDROCHLORIDE 2 MG: 1 INJECTION, SOLUTION INTRAMUSCULAR; INTRAVENOUS at 10:20

## 2021-05-23 RX ADMIN — DEXTROSE AND SODIUM CHLORIDE: 5; 900 INJECTION, SOLUTION INTRAVENOUS at 16:02

## 2021-05-23 RX ADMIN — SODIUM CHLORIDE TAB 1 GM 1000 MG: 1 TAB at 22:15

## 2021-05-23 RX ADMIN — LEVETIRACETAM 1000 MG: 100 INJECTION, SOLUTION INTRAVENOUS at 08:58

## 2021-05-23 RX ADMIN — SODIUM CHLORIDE 1000 ML: 9 INJECTION, SOLUTION INTRAVENOUS at 09:01

## 2021-05-23 RX ADMIN — DEXTROSE: 5 SOLUTION INTRAVENOUS at 23:28

## 2021-05-23 ASSESSMENT — PAIN SCALES - GENERAL: PAINLEVEL_OUTOF10: 0

## 2021-05-23 ASSESSMENT — PAIN SCALES - BEHAVIORAL PAIN SCALE (BPS): BPS_SCORE: 5

## 2021-05-24 ENCOUNTER — APPOINTMENT (OUTPATIENT)
Dept: NEUROLOGY | Age: 31
DRG: 053 | End: 2021-05-24
Attending: PSYCHIATRY & NEUROLOGY

## 2021-05-24 LAB
ANION GAP SERPL CALC-SCNC: 9 MMOL/L (ref 10–20)
BASOPHILS # BLD: 0 K/MCL (ref 0–0.3)
BASOPHILS NFR BLD: 1 %
BUN SERPL-MCNC: 20 MG/DL (ref 6–20)
BUN/CREAT SERPL: 65 (ref 7–25)
CALCIUM SERPL-MCNC: 8.8 MG/DL (ref 8.4–10.2)
CHLORIDE SERPL-SCNC: 107 MMOL/L (ref 98–107)
CO2 SERPL-SCNC: 25 MMOL/L (ref 21–32)
CREAT SERPL-MCNC: 0.31 MG/DL (ref 0.51–0.95)
DEPRECATED RDW RBC: 53.1 FL (ref 39–50)
EOSINOPHIL # BLD: 0.3 K/MCL (ref 0–0.5)
EOSINOPHIL NFR BLD: 6 %
ERYTHROCYTE [DISTWIDTH] IN BLOOD: 12.6 % (ref 11–15)
FASTING DURATION TIME PATIENT: ABNORMAL H
GFR SERPLBLD BASED ON 1.73 SQ M-ARVRAT: >90 ML/MIN/1.73M2
GLUCOSE BLDC GLUCOMTR-MCNC: 122 MG/DL (ref 70–99)
GLUCOSE BLDC GLUCOMTR-MCNC: 132 MG/DL (ref 70–99)
GLUCOSE SERPL-MCNC: 120 MG/DL (ref 65–99)
HCT VFR BLD CALC: 37.1 % (ref 36–46.5)
HGB BLD-MCNC: 12 G/DL (ref 12–15.5)
IMM GRANULOCYTES # BLD AUTO: 0 K/MCL (ref 0–0.2)
IMM GRANULOCYTES # BLD: 0 %
LYMPHOCYTES # BLD: 1.4 K/MCL (ref 1–4.8)
LYMPHOCYTES NFR BLD: 32 %
MCH RBC QN AUTO: 36.9 PG (ref 26–34)
MCHC RBC AUTO-ENTMCNC: 32.3 G/DL (ref 32–36.5)
MCV RBC AUTO: 114.2 FL (ref 78–100)
MONOCYTES # BLD: 0.5 K/MCL (ref 0.3–0.9)
MONOCYTES NFR BLD: 12 %
NEUTROPHILS # BLD: 2.2 K/MCL (ref 1.8–7.7)
NEUTROPHILS NFR BLD: 49 %
NRBC BLD MANUAL-RTO: 0 /100 WBC
PLATELET # BLD AUTO: 104 K/MCL (ref 140–450)
POTASSIUM SERPL-SCNC: 3.9 MMOL/L (ref 3.4–5.1)
RBC # BLD: 3.25 MIL/MCL (ref 4–5.2)
SODIUM SERPL-SCNC: 137 MMOL/L (ref 135–145)
WBC # BLD: 4.5 K/MCL (ref 4.2–11)

## 2021-05-24 PROCEDURE — 95816 EEG AWAKE AND DROWSY: CPT

## 2021-05-24 PROCEDURE — 10002800 HB RX 250 W HCPCS

## 2021-05-24 PROCEDURE — 10000008 HB ROOM CHARGE ICU OR CCU

## 2021-05-24 PROCEDURE — G0378 HOSPITAL OBSERVATION PER HR: HCPCS

## 2021-05-24 PROCEDURE — 5A1935Z RESPIRATORY VENTILATION, LESS THAN 24 CONSECUTIVE HOURS: ICD-10-PCS | Performed by: INTERNAL MEDICINE

## 2021-05-24 PROCEDURE — 80048 BASIC METABOLIC PNL TOTAL CA: CPT | Performed by: INTERNAL MEDICINE

## 2021-05-24 PROCEDURE — 85025 COMPLETE CBC W/AUTO DIFF WBC: CPT | Performed by: INTERNAL MEDICINE

## 2021-05-24 PROCEDURE — 10002803 HB RX 637: Performed by: INTERNAL MEDICINE

## 2021-05-24 PROCEDURE — 10002801 HB RX 250 W/O HCPCS: Performed by: INTERNAL MEDICINE

## 2021-05-24 PROCEDURE — 10002803 HB RX 637: Performed by: PSYCHIATRY & NEUROLOGY

## 2021-05-24 PROCEDURE — 10002800 HB RX 250 W HCPCS: Performed by: SPECIALIST

## 2021-05-24 PROCEDURE — 82962 GLUCOSE BLOOD TEST: CPT

## 2021-05-24 PROCEDURE — 36415 COLL VENOUS BLD VENIPUNCTURE: CPT | Performed by: INTERNAL MEDICINE

## 2021-05-24 PROCEDURE — 10002807 HB RX 258: Performed by: INTERNAL MEDICINE

## 2021-05-24 PROCEDURE — 94640 AIRWAY INHALATION TREATMENT: CPT

## 2021-05-24 PROCEDURE — 99215 OFFICE O/P EST HI 40 MIN: CPT | Performed by: PSYCHIATRY & NEUROLOGY

## 2021-05-24 PROCEDURE — 94003 VENT MGMT INPAT SUBQ DAY: CPT

## 2021-05-24 PROCEDURE — 96361 HYDRATE IV INFUSION ADD-ON: CPT

## 2021-05-24 PROCEDURE — 96376 TX/PRO/DX INJ SAME DRUG ADON: CPT

## 2021-05-24 RX ORDER — LORAZEPAM 2 MG/ML
0.5 INJECTION INTRAMUSCULAR ONCE
Status: COMPLETED | OUTPATIENT
Start: 2021-05-24 | End: 2021-05-24

## 2021-05-24 RX ORDER — DEXTROSE AND SODIUM CHLORIDE 5; .9 G/100ML; G/100ML
INJECTION, SOLUTION INTRAVENOUS CONTINUOUS
Status: DISCONTINUED | OUTPATIENT
Start: 2021-05-24 | End: 2021-05-24 | Stop reason: ALTCHOICE

## 2021-05-24 RX ORDER — LEVETIRACETAM 100 MG/ML
1000 SOLUTION ORAL EVERY 12 HOURS SCHEDULED
Status: DISCONTINUED | OUTPATIENT
Start: 2021-05-24 | End: 2021-05-25 | Stop reason: HOSPADM

## 2021-05-24 RX ORDER — LORAZEPAM 2 MG/ML
INJECTION INTRAMUSCULAR
Status: COMPLETED
Start: 2021-05-24 | End: 2021-05-24

## 2021-05-24 RX ORDER — LORAZEPAM 0.5 MG/1
0.5 TABLET ORAL EVERY 8 HOURS PRN
Status: DISCONTINUED | OUTPATIENT
Start: 2021-05-24 | End: 2021-05-25 | Stop reason: HOSPADM

## 2021-05-24 RX ORDER — BACLOFEN 10 MG/1
10 TABLET ORAL 2 TIMES DAILY
Status: DISCONTINUED | OUTPATIENT
Start: 2021-05-24 | End: 2021-05-25 | Stop reason: HOSPADM

## 2021-05-24 RX ADMIN — LORAZEPAM 0.5 MG: 2 INJECTION INTRAMUSCULAR; INTRAVENOUS at 10:52

## 2021-05-24 RX ADMIN — LACOSAMIDE 200 MG: 10 SOLUTION ORAL at 21:57

## 2021-05-24 RX ADMIN — LACOSAMIDE 200 MG: 10 SOLUTION ORAL at 10:09

## 2021-05-24 RX ADMIN — SODIUM CHLORIDE TAB 1 GM 1000 MG: 1 TAB at 13:14

## 2021-05-24 RX ADMIN — LORAZEPAM 0.5 MG: 2 INJECTION INTRAMUSCULAR; INTRAVENOUS at 10:53

## 2021-05-24 RX ADMIN — GABAPENTIN 100 MG: 250 SOLUTION ORAL at 21:43

## 2021-05-24 RX ADMIN — BACLOFEN 10 MG: 10 TABLET ORAL at 21:43

## 2021-05-24 RX ADMIN — SODIUM CHLORIDE TAB 1 GM 1000 MG: 1 TAB at 08:17

## 2021-05-24 RX ADMIN — LEVETIRACETAM 1000 MG: 100 SOLUTION ORAL at 21:43

## 2021-05-24 RX ADMIN — IPRATROPIUM BROMIDE 0.5 MG: 0.5 SOLUTION RESPIRATORY (INHALATION) at 15:39

## 2021-05-24 RX ADMIN — MINERAL SUPPLEMENT IRON 300 MG / 5 ML STRENGTH LIQUID 100 PER BOX UNFLAVORED 300 MG: at 08:16

## 2021-05-24 RX ADMIN — DEXTROSE AND SODIUM CHLORIDE: 5; .9 INJECTION, SOLUTION INTRAVENOUS at 15:29

## 2021-05-24 RX ADMIN — IPRATROPIUM BROMIDE 0.5 MG: 0.5 SOLUTION RESPIRATORY (INHALATION) at 20:05

## 2021-05-24 RX ADMIN — PANCRELIPASE 1 CAPSULE: 30000; 6000; 19000 CAPSULE, DELAYED RELEASE PELLETS ORAL at 08:17

## 2021-05-24 RX ADMIN — PANCRELIPASE 1 CAPSULE: 30000; 6000; 19000 CAPSULE, DELAYED RELEASE PELLETS ORAL at 21:44

## 2021-05-24 RX ADMIN — PANCRELIPASE 1 CAPSULE: 30000; 6000; 19000 CAPSULE, DELAYED RELEASE PELLETS ORAL at 13:13

## 2021-05-24 RX ADMIN — MINERAL SUPPLEMENT IRON 300 MG / 5 ML STRENGTH LIQUID 100 PER BOX UNFLAVORED 300 MG: at 18:40

## 2021-05-24 RX ADMIN — SODIUM CHLORIDE TAB 1 GM 1000 MG: 1 TAB at 21:44

## 2021-05-24 RX ADMIN — IPRATROPIUM BROMIDE 0.5 MG: 0.5 SOLUTION RESPIRATORY (INHALATION) at 09:06

## 2021-05-24 RX ADMIN — ZINC SULFATE 220 MG (50 MG) CAPSULE 220 MG: CAPSULE at 08:16

## 2021-05-24 RX ADMIN — BACLOFEN 5 MG: 10 TABLET ORAL at 08:16

## 2021-05-24 RX ADMIN — LEVETIRACETAM 500 MG: 100 SOLUTION ORAL at 08:16

## 2021-05-24 RX ADMIN — GABAPENTIN 100 MG: 250 SOLUTION ORAL at 08:16

## 2021-05-24 RX ADMIN — IPRATROPIUM BROMIDE 0.5 MG: 0.5 SOLUTION RESPIRATORY (INHALATION) at 11:38

## 2021-05-24 RX ADMIN — FOLIC ACID 0.5 MG: 1 TABLET ORAL at 08:16

## 2021-05-24 ASSESSMENT — COGNITIVE AND FUNCTIONAL STATUS - GENERAL
BECAUSE OF A PHYSICAL, MENTAL, OR EMOTIONAL CONDITION, DO YOU HAVE DIFFICULTY DOING ERRANDS ALONE: YES
BECAUSE OF A PHYSICAL, MENTAL, OR EMOTIONAL CONDITION, DO YOU HAVE SERIOUS DIFFICULTY CONCENTRATING, REMEMBERING OR MAKING DECISIONS: YES
DO YOU HAVE DIFFICULTY DRESSING OR BATHING: YES
ARE YOU BLIND OR DO YOU HAVE SERIOUS DIFFICULTY SEEING, EVEN WHEN WEARING GLASSES: NO
DO YOU HAVE SERIOUS DIFFICULTY WALKING OR CLIMBING STAIRS: YES
ARE YOU DEAF OR DO YOU HAVE SERIOUS DIFFICULTY  HEARING: NO

## 2021-05-24 ASSESSMENT — ACTIVITIES OF DAILY LIVING (ADL)
CHRONIC_PAIN_PRESENT: NO
ADL_SCORE: 0
TRANSFERRING: DEPENDENT
CONTINENCE: DEPENDENT
DRESSING YOURSELF: DEPENDENT
FEEDING YOURSELF: DEPENDENT
RECENT_DECLINE_ADL: NO
ADL_SHORT_OF_BREATH: NO
ADL_BEFORE_ADMISSION: NEEDS/REQUIRES ASSISTANCE
BATHING: DEPENDENT
TOILETING: DEPENDENT

## 2021-05-24 ASSESSMENT — PAIN SCALES - BEHAVIORAL PAIN SCALE (BPS)
BPS_SCORE: 3
BPS_SCORE: 4
BPS_SCORE: 3
BPS_SCORE: 3
BPS_SCORE: 5

## 2021-05-24 ASSESSMENT — LIFESTYLE VARIABLES
HOW OFTEN DO YOU HAVE 6 OR MORE DRINKS ON ONE OCCASION: NEVER
ALCOHOL_USE_STATUS: NO OR LOW RISK WITH VALIDATED TOOL
HOW MANY STANDARD DRINKS CONTAINING ALCOHOL DO YOU HAVE ON A TYPICAL DAY: 0,1 OR 2
HOW OFTEN DO YOU HAVE A DRINK CONTAINING ALCOHOL: NEVER
AUDIT-C TOTAL SCORE: 0

## 2021-05-25 VITALS
TEMPERATURE: 98.1 F | DIASTOLIC BLOOD PRESSURE: 72 MMHG | SYSTOLIC BLOOD PRESSURE: 109 MMHG | RESPIRATION RATE: 23 BRPM | HEIGHT: 57 IN | HEART RATE: 102 BPM | BODY MASS INDEX: 12.7 KG/M2 | WEIGHT: 58.86 LBS | OXYGEN SATURATION: 97 %

## 2021-05-25 LAB
ANION GAP SERPL CALC-SCNC: 6 MMOL/L (ref 10–20)
BASOPHILS # BLD: 0 K/MCL (ref 0–0.3)
BASOPHILS NFR BLD: 0 %
BUN SERPL-MCNC: 20 MG/DL (ref 6–20)
BUN/CREAT SERPL: 69 (ref 7–25)
CALCIUM SERPL-MCNC: 8.1 MG/DL (ref 8.4–10.2)
CHLORIDE SERPL-SCNC: 108 MMOL/L (ref 98–107)
CO2 SERPL-SCNC: 25 MMOL/L (ref 21–32)
CREAT SERPL-MCNC: 0.29 MG/DL (ref 0.51–0.95)
DEPRECATED RDW RBC: 50.2 FL (ref 39–50)
EOSINOPHIL # BLD: 0.2 K/MCL (ref 0–0.5)
EOSINOPHIL NFR BLD: 3 %
ERYTHROCYTE [DISTWIDTH] IN BLOOD: 12.2 % (ref 11–15)
FASTING DURATION TIME PATIENT: ABNORMAL H
GFR SERPLBLD BASED ON 1.73 SQ M-ARVRAT: >90 ML/MIN/1.73M2
GLUCOSE BLDC GLUCOMTR-MCNC: 125 MG/DL (ref 70–99)
GLUCOSE SERPL-MCNC: 147 MG/DL (ref 65–99)
HCT VFR BLD CALC: 34.2 % (ref 36–46.5)
HGB BLD-MCNC: 11.5 G/DL (ref 12–15.5)
IMM GRANULOCYTES # BLD AUTO: 0 K/MCL (ref 0–0.2)
IMM GRANULOCYTES # BLD: 1 %
LYMPHOCYTES # BLD: 0.9 K/MCL (ref 1–4.8)
LYMPHOCYTES NFR BLD: 16 %
MCH RBC QN AUTO: 37.6 PG (ref 26–34)
MCHC RBC AUTO-ENTMCNC: 33.6 G/DL (ref 32–36.5)
MCV RBC AUTO: 111.8 FL (ref 78–100)
MONOCYTES # BLD: 0.4 K/MCL (ref 0.3–0.9)
MONOCYTES NFR BLD: 7 %
NEUTROPHILS # BLD: 4.4 K/MCL (ref 1.8–7.7)
NEUTROPHILS NFR BLD: 73 %
NRBC BLD MANUAL-RTO: 0 /100 WBC
PLATELET # BLD AUTO: 95 K/MCL (ref 140–450)
POTASSIUM SERPL-SCNC: 4 MMOL/L (ref 3.4–5.1)
RBC # BLD: 3.06 MIL/MCL (ref 4–5.2)
SODIUM SERPL-SCNC: 135 MMOL/L (ref 135–145)
WBC # BLD: 5.9 K/MCL (ref 4.2–11)

## 2021-05-25 PROCEDURE — 94640 AIRWAY INHALATION TREATMENT: CPT

## 2021-05-25 PROCEDURE — 10002803 HB RX 637: Performed by: PSYCHIATRY & NEUROLOGY

## 2021-05-25 PROCEDURE — 13003349 HB PATIENT LVL 4 SUPPLIES IP WND

## 2021-05-25 PROCEDURE — 10002803 HB RX 637: Performed by: SPECIALIST

## 2021-05-25 PROCEDURE — 10002803 HB RX 637: Performed by: INTERNAL MEDICINE

## 2021-05-25 PROCEDURE — 85025 COMPLETE CBC W/AUTO DIFF WBC: CPT | Performed by: INTERNAL MEDICINE

## 2021-05-25 PROCEDURE — 10002801 HB RX 250 W/O HCPCS: Performed by: INTERNAL MEDICINE

## 2021-05-25 PROCEDURE — 94003 VENT MGMT INPAT SUBQ DAY: CPT

## 2021-05-25 PROCEDURE — 80048 BASIC METABOLIC PNL TOTAL CA: CPT | Performed by: INTERNAL MEDICINE

## 2021-05-25 PROCEDURE — 95819 EEG AWAKE AND ASLEEP: CPT | Performed by: PSYCHIATRY & NEUROLOGY

## 2021-05-25 PROCEDURE — G0378 HOSPITAL OBSERVATION PER HR: HCPCS

## 2021-05-25 PROCEDURE — 99233 SBSQ HOSP IP/OBS HIGH 50: CPT | Performed by: SPECIALIST

## 2021-05-25 RX ORDER — LORAZEPAM 0.5 MG/1
0.5 TABLET ORAL EVERY 8 HOURS PRN
Qty: 20 TABLET | Refills: 0 | Status: SHIPPED | OUTPATIENT
Start: 2021-05-25 | End: 2021-06-04

## 2021-05-25 RX ORDER — LACOSAMIDE 10 MG/ML
200 SOLUTION ORAL EVERY 12 HOURS SCHEDULED
Qty: 600 ML | Refills: 1 | Status: SHIPPED | OUTPATIENT
Start: 2021-05-25

## 2021-05-25 RX ORDER — BACLOFEN 10 MG/1
10 TABLET ORAL 2 TIMES DAILY
Qty: 60 TABLET | Refills: 0 | Status: SHIPPED | OUTPATIENT
Start: 2021-05-25

## 2021-05-25 RX ORDER — LEVETIRACETAM 100 MG/ML
1000 SOLUTION ORAL EVERY 12 HOURS SCHEDULED
Qty: 600 ML | Refills: 3 | Status: SHIPPED | OUTPATIENT
Start: 2021-05-25

## 2021-05-25 RX ORDER — OXYCODONE HYDROCHLORIDE 5 MG/1
2.5 TABLET ORAL EVERY 12 HOURS PRN
Qty: 14 TABLET | Refills: 0 | Status: SHIPPED | OUTPATIENT
Start: 2021-05-25

## 2021-05-25 RX ORDER — GABAPENTIN 250 MG/5ML
100 SOLUTION ORAL 2 TIMES DAILY
Qty: 120 ML | Refills: 0 | Status: SHIPPED | OUTPATIENT
Start: 2021-05-25 | End: 2021-06-24

## 2021-05-25 RX ADMIN — IPRATROPIUM BROMIDE 0.5 MG: 0.5 SOLUTION RESPIRATORY (INHALATION) at 08:38

## 2021-05-25 RX ADMIN — LACOSAMIDE 200 MG: 10 SOLUTION ORAL at 09:15

## 2021-05-25 RX ADMIN — SODIUM CHLORIDE TAB 1 GM 1000 MG: 1 TAB at 14:07

## 2021-05-25 RX ADMIN — PANCRELIPASE 1 CAPSULE: 30000; 6000; 19000 CAPSULE, DELAYED RELEASE PELLETS ORAL at 09:16

## 2021-05-25 RX ADMIN — BACLOFEN 10 MG: 10 TABLET ORAL at 09:15

## 2021-05-25 RX ADMIN — MINERAL SUPPLEMENT IRON 300 MG / 5 ML STRENGTH LIQUID 100 PER BOX UNFLAVORED 300 MG: at 09:15

## 2021-05-25 RX ADMIN — GABAPENTIN 100 MG: 250 SOLUTION ORAL at 09:15

## 2021-05-25 RX ADMIN — FOLIC ACID 0.5 MG: 1 TABLET ORAL at 09:16

## 2021-05-25 RX ADMIN — SODIUM CHLORIDE TAB 1 GM 1000 MG: 1 TAB at 08:25

## 2021-05-25 RX ADMIN — PANCRELIPASE 1 CAPSULE: 30000; 6000; 19000 CAPSULE, DELAYED RELEASE PELLETS ORAL at 14:08

## 2021-05-25 RX ADMIN — LEVETIRACETAM 1000 MG: 100 SOLUTION ORAL at 09:15

## 2021-05-25 RX ADMIN — LORAZEPAM 0.5 MG: 0.5 TABLET ORAL at 06:37

## 2021-05-25 RX ADMIN — ZINC SULFATE 220 MG (50 MG) CAPSULE 220 MG: CAPSULE at 09:15

## 2021-05-25 RX ADMIN — IPRATROPIUM BROMIDE 0.5 MG: 0.5 SOLUTION RESPIRATORY (INHALATION) at 11:55

## 2021-05-25 RX ADMIN — LORAZEPAM 0.5 MG: 0.5 TABLET ORAL at 14:13

## 2021-05-25 ASSESSMENT — PAIN SCALES - BEHAVIORAL PAIN SCALE (BPS)
BPS_SCORE: 4
BPS_SCORE: 4
BPS_SCORE: 5

## 2021-05-26 LAB
LACOSAMIDE SERPL-MCNC: 1 UG/ML
LACOSAMIDE SERPL-MCNC: 28.9 UG/ML (ref 2.2–19.8)

## 2021-05-27 LAB
BACTERIA BLD CULT: NORMAL
BACTERIA BLD CULT: NORMAL

## 2022-04-07 ENCOUNTER — APPOINTMENT (OUTPATIENT)
Dept: GENERAL RADIOLOGY | Age: 32
End: 2022-04-07
Attending: EMERGENCY MEDICINE

## 2022-04-07 ENCOUNTER — HOSPITAL ENCOUNTER (EMERGENCY)
Age: 32
Discharge: HOME | End: 2022-04-07
Attending: EMERGENCY MEDICINE

## 2022-04-07 VITALS
TEMPERATURE: 97.5 F | HEART RATE: 76 BPM | RESPIRATION RATE: 12 BRPM | DIASTOLIC BLOOD PRESSURE: 99 MMHG | SYSTOLIC BLOOD PRESSURE: 114 MMHG | OXYGEN SATURATION: 96 %

## 2022-04-07 DIAGNOSIS — T85.528A DISLODGED GASTROSTOMY TUBE: Primary | ICD-10-CM

## 2022-04-07 LAB — GLUCOSE BLDC GLUCOMTR-MCNC: 109 MG/DL (ref 70–99)

## 2022-04-07 PROCEDURE — 10004281 HB COUNTER-STAFF TIME PER 15 MIN

## 2022-04-07 PROCEDURE — 99284 EMERGENCY DEPT VISIT MOD MDM: CPT

## 2022-04-07 PROCEDURE — 74018 RADEX ABDOMEN 1 VIEW: CPT

## 2022-04-07 PROCEDURE — 94002 VENT MGMT INPAT INIT DAY: CPT

## 2022-04-07 PROCEDURE — 99283 EMERGENCY DEPT VISIT LOW MDM: CPT | Performed by: EMERGENCY MEDICINE

## 2022-04-07 PROCEDURE — 43762 RPLC GTUBE NO REVJ TRC: CPT | Performed by: EMERGENCY MEDICINE

## 2022-04-07 PROCEDURE — 82962 GLUCOSE BLOOD TEST: CPT

## 2022-04-07 PROCEDURE — 43762 RPLC GTUBE NO REVJ TRC: CPT

## 2022-04-07 PROCEDURE — A7521 TRACH/LARYN TUBE CUFFED: HCPCS

## 2022-04-07 PROCEDURE — 10006027 HB SUPPLY 278

## 2022-04-07 PROCEDURE — 10002805 HB CONTRAST AGENT: Performed by: EMERGENCY MEDICINE

## 2022-04-07 RX ADMIN — IOHEXOL 50 ML: 300 INJECTION, SOLUTION INTRAVENOUS at 12:29

## 2022-04-07 ASSESSMENT — PAIN SCALES - WONG BAKER: WONGBAKER_NUMERICALRESPONSE: 0

## 2022-05-22 ENCOUNTER — LAB REQUISITION (OUTPATIENT)
Dept: LAB | Age: 32
End: 2022-05-22

## 2022-05-22 DIAGNOSIS — R50.9 FEVER, UNSPECIFIED: ICD-10-CM

## 2022-05-22 LAB
ALBUMIN SERPL-MCNC: 3.4 G/DL (ref 3.6–5.1)
ALBUMIN/GLOB SERPL: 0.7 {RATIO} (ref 1–2.4)
ALP SERPL-CCNC: 123 UNITS/L (ref 45–117)
ALT SERPL-CCNC: 55 UNITS/L
ANION GAP SERPL CALC-SCNC: 9 MMOL/L (ref 7–19)
AST SERPL-CCNC: 23 UNITS/L
BASOPHILS # BLD: 0 K/MCL (ref 0–0.3)
BASOPHILS NFR BLD: 1 %
BILIRUB SERPL-MCNC: 0.3 MG/DL (ref 0.2–1)
BUN SERPL-MCNC: 20 MG/DL (ref 6–20)
BUN/CREAT SERPL: 65 (ref 7–25)
CALCIUM SERPL-MCNC: 9.3 MG/DL (ref 8.4–10.2)
CHLORIDE SERPL-SCNC: 103 MMOL/L (ref 97–110)
CO2 SERPL-SCNC: 30 MMOL/L (ref 21–32)
CREAT SERPL-MCNC: 0.31 MG/DL (ref 0.51–0.95)
DEPRECATED RDW RBC: 41.8 FL (ref 39–50)
EOSINOPHIL # BLD: 0.5 K/MCL (ref 0–0.5)
EOSINOPHIL NFR BLD: 7 %
ERYTHROCYTE [DISTWIDTH] IN BLOOD: 11.6 % (ref 11–15)
FASTING DURATION TIME PATIENT: ABNORMAL H
GFR SERPLBLD BASED ON 1.73 SQ M-ARVRAT: >90 ML/MIN
GLOBULIN SER-MCNC: 4.7 G/DL (ref 2–4)
GLUCOSE SERPL-MCNC: 104 MG/DL (ref 70–99)
HCT VFR BLD CALC: 35 % (ref 36–46.5)
HGB BLD-MCNC: 12.2 G/DL (ref 12–15.5)
IMM GRANULOCYTES # BLD AUTO: 0 K/MCL (ref 0–0.2)
IMM GRANULOCYTES # BLD: 0 %
LYMPHOCYTES # BLD: 2.1 K/MCL (ref 1–4.8)
LYMPHOCYTES NFR BLD: 28 %
MCH RBC QN AUTO: 34.9 PG (ref 26–34)
MCHC RBC AUTO-ENTMCNC: 34.9 G/DL (ref 32–36.5)
MCV RBC AUTO: 100 FL (ref 78–100)
MONOCYTES # BLD: 0.7 K/MCL (ref 0.3–0.9)
MONOCYTES NFR BLD: 9 %
NEUTROPHILS # BLD: 4.1 K/MCL (ref 1.8–7.7)
NEUTROPHILS NFR BLD: 55 %
NRBC BLD MANUAL-RTO: 0 /100 WBC
PLATELET # BLD AUTO: 128 K/MCL (ref 140–450)
POTASSIUM SERPL-SCNC: 4 MMOL/L (ref 3.4–5.1)
PROCALCITONIN SERPL IA-MCNC: 0.28 NG/ML
PROT SERPL-MCNC: 8.1 G/DL (ref 6.4–8.2)
RBC # BLD: 3.5 MIL/MCL (ref 4–5.2)
SODIUM SERPL-SCNC: 138 MMOL/L (ref 135–145)
WBC # BLD: 7.4 K/MCL (ref 4.2–11)

## 2022-05-22 PROCEDURE — PSEU8250 COMPREHENSIVE METABOLIC PANEL: Performed by: CLINICAL MEDICAL LABORATORY

## 2022-05-22 PROCEDURE — 80053 COMPREHEN METABOLIC PANEL: CPT | Performed by: CLINICAL MEDICAL LABORATORY

## 2022-05-22 PROCEDURE — 85025 COMPLETE CBC W/AUTO DIFF WBC: CPT | Performed by: CLINICAL MEDICAL LABORATORY

## 2022-05-22 PROCEDURE — 84145 PROCALCITONIN (PCT): CPT | Performed by: CLINICAL MEDICAL LABORATORY

## 2022-05-22 PROCEDURE — PSEU8153 PROCALCITONIN: Performed by: CLINICAL MEDICAL LABORATORY

## 2022-07-20 ENCOUNTER — APPOINTMENT (OUTPATIENT)
Dept: MRI IMAGING | Age: 32
End: 2022-07-20
Attending: NURSE PRACTITIONER

## 2022-07-26 ENCOUNTER — HOSPITAL ENCOUNTER (OUTPATIENT)
Dept: MRI IMAGING | Age: 32
Discharge: HOME | End: 2022-07-26
Attending: NURSE PRACTITIONER

## 2022-07-26 DIAGNOSIS — G93.1 ANOXIC BRAIN DAMAGE (CMD): ICD-10-CM

## 2022-07-26 PROCEDURE — 70551 MRI BRAIN STEM W/O DYE: CPT

## 2022-07-26 PROCEDURE — 10004180 HB COUNTER-TRANSPORT

## 2022-07-26 PROCEDURE — 94002 VENT MGMT INPAT INIT DAY: CPT

## 2022-07-26 PROCEDURE — 10004281 HB COUNTER-STAFF TIME PER 15 MIN

## 2022-10-28 ENCOUNTER — EXTERNAL LAB (OUTPATIENT)
Dept: OTHER | Age: 32
End: 2022-10-28

## 2022-10-28 LAB
ALBUMIN SERPL-MCNC: 4 GM/DL (ref 3.2–4.9)
ALP SERPL-CCNC: 109 U/L (ref 34–143)
ALT SERPL-CCNC: 66 U/L (ref 0–42)
AST SERPL-CCNC: 44 U/L (ref 9–35)
BILIRUB SERPL-MCNC: 0.3 MG/DL (ref 0.2–1.3)
BUN SERPL-MCNC: 24 MG/DL (ref 5–20)
CALCIUM SERPL-MCNC: 8.9 MG/DL (ref 8.7–10.5)
CHLORIDE SERPL-SCNC: 103 MEQ/L (ref 99–110)
CO2 SERPL-SCNC: 25 MM/L (ref 20–30)
CREAT SERPL-MCNC: 0.32 MG/DL (ref 0.44–1.32)
GLUCOSE SERPL-MCNC: 99 MG/DL (ref 70–110)
LENGTH OF FAST TIME PATIENT: ABNORMAL H
POTASSIUM SERPL-SCNC: 5.1 MEQ/L (ref 3.6–5)
PROT SERPL-MCNC: 8.1 GM/DL (ref 6–8.2)
SODIUM SERPL-SCNC: 139 MEQ/L (ref 138–147)

## 2022-11-11 ENCOUNTER — EXTERNAL LAB (OUTPATIENT)
Dept: OTHER | Age: 32
End: 2022-11-11

## 2022-11-11 LAB
ALBUMIN SERPL-MCNC: 3.6 GM/DL (ref 3.2–4.9)
ALP SERPL-CCNC: 93 U/L (ref 34–143)
ALT SERPL-CCNC: 48 U/L (ref 0–42)
AST SERPL-CCNC: 30 U/L (ref 9–35)
BILIRUB SERPL-MCNC: 0.34 MG/DL (ref 0.2–1.3)
BUN SERPL-MCNC: 23 MG/DL (ref 5–20)
CALCIUM SERPL-MCNC: 8.8 MG/DL (ref 8.7–10.5)
CHLORIDE SERPL-SCNC: 102 MEQ/L (ref 99–110)
CO2 SERPL-SCNC: 32 MM/L (ref 20–30)
CREAT SERPL-MCNC: 0.37 MG/DL (ref 0.44–1.32)
GLUCOSE SERPL-MCNC: 107 MG/DL (ref 70–110)
LAB RESULT: NORMAL
LENGTH OF FAST TIME PATIENT: ABNORMAL H
POTASSIUM SERPL-SCNC: 4.7 MEQ/L (ref 3.6–5)
PROT SERPL-MCNC: 7.4 GM/DL (ref 6–8.2)
SODIUM SERPL-SCNC: 138 MEQ/L (ref 138–147)

## 2022-11-18 ENCOUNTER — APPOINTMENT (OUTPATIENT)
Dept: GENERAL RADIOLOGY | Age: 32
End: 2022-11-18
Attending: EMERGENCY MEDICINE

## 2022-11-18 ENCOUNTER — HOSPITAL ENCOUNTER (EMERGENCY)
Age: 32
Discharge: HOME | End: 2022-11-18
Attending: EMERGENCY MEDICINE

## 2022-11-18 VITALS
OXYGEN SATURATION: 100 % | WEIGHT: 81.13 LBS | RESPIRATION RATE: 16 BRPM | SYSTOLIC BLOOD PRESSURE: 104 MMHG | HEIGHT: 59 IN | HEART RATE: 77 BPM | DIASTOLIC BLOOD PRESSURE: 63 MMHG | TEMPERATURE: 98.8 F | BODY MASS INDEX: 16.36 KG/M2

## 2022-11-18 DIAGNOSIS — J95.01 TRACHEOSTOMY HEMORRHAGE (CMD): ICD-10-CM

## 2022-11-18 DIAGNOSIS — F13.930 BENZODIAZEPINE WITHDRAWAL WITHOUT COMPLICATION (CMD): Primary | ICD-10-CM

## 2022-11-18 PROBLEM — Z93.0 TRACHEOSTOMY IN PLACE (CMD): Status: ACTIVE | Noted: 2022-11-18

## 2022-11-18 LAB
ALBUMIN SERPL-MCNC: 3.6 G/DL (ref 3.6–5.1)
ALBUMIN/GLOB SERPL: 0.6 {RATIO} (ref 1–2.4)
ALP SERPL-CCNC: 106 UNITS/L (ref 45–117)
ALT SERPL-CCNC: 58 UNITS/L
ANION GAP SERPL CALC-SCNC: 8 MMOL/L (ref 7–19)
APPEARANCE UR: CLEAR
AST SERPL-CCNC: 59 UNITS/L
BACTERIA #/AREA URNS HPF: ABNORMAL /HPF
BASOPHILS # BLD: 0.1 K/MCL (ref 0–0.3)
BASOPHILS NFR BLD: 1 %
BILIRUB SERPL-MCNC: 0.4 MG/DL (ref 0.2–1)
BILIRUB UR QL STRIP: NEGATIVE
BUN SERPL-MCNC: 20 MG/DL (ref 6–20)
BUN/CREAT SERPL: 87 (ref 7–25)
CALCIUM SERPL-MCNC: 9.5 MG/DL (ref 8.4–10.2)
CHLORIDE SERPL-SCNC: 104 MMOL/L (ref 97–110)
CO2 SERPL-SCNC: 29 MMOL/L (ref 21–32)
COLOR UR: YELLOW
CREAT SERPL-MCNC: 0.23 MG/DL (ref 0.51–0.95)
DEPRECATED RDW RBC: 41.1 FL (ref 39–50)
EOSINOPHIL # BLD: 0.2 K/MCL (ref 0–0.5)
EOSINOPHIL NFR BLD: 2 %
ERYTHROCYTE [DISTWIDTH] IN BLOOD: 11.4 % (ref 11–15)
FASTING DURATION TIME PATIENT: ABNORMAL H
FLUAV RNA RESP QL NAA+PROBE: NOT DETECTED
FLUBV RNA RESP QL NAA+PROBE: NOT DETECTED
GFR SERPLBLD BASED ON 1.73 SQ M-ARVRAT: >90 ML/MIN
GLOBULIN SER-MCNC: 5.7 G/DL (ref 2–4)
GLUCOSE BLDC GLUCOMTR-MCNC: 127 MG/DL (ref 70–99)
GLUCOSE SERPL-MCNC: 111 MG/DL (ref 70–99)
GLUCOSE UR STRIP-MCNC: NEGATIVE MG/DL
HCT VFR BLD CALC: 40.8 % (ref 36–46.5)
HGB BLD-MCNC: 14.3 G/DL (ref 12–15.5)
HGB UR QL STRIP: NEGATIVE
HYALINE CASTS #/AREA URNS LPF: ABNORMAL /LPF
IMM GRANULOCYTES # BLD AUTO: 0 K/MCL (ref 0–0.2)
IMM GRANULOCYTES # BLD: 0 %
KETONES UR STRIP-MCNC: NEGATIVE MG/DL
LACTATE BLDV-SCNC: 0.7 MMOL/L (ref 0–2)
LEUKOCYTE ESTERASE UR QL STRIP: NEGATIVE
LIPASE SERPL-CCNC: <50 UNITS/L (ref 73–393)
LYMPHOCYTES # BLD: 1.8 K/MCL (ref 1–4.8)
LYMPHOCYTES NFR BLD: 16 %
MAGNESIUM SERPL-MCNC: 2.2 MG/DL (ref 1.7–2.4)
MCH RBC QN AUTO: 34.3 PG (ref 26–34)
MCHC RBC AUTO-ENTMCNC: 35 G/DL (ref 32–36.5)
MCV RBC AUTO: 97.8 FL (ref 78–100)
MONOCYTES # BLD: 0.8 K/MCL (ref 0.3–0.9)
MONOCYTES NFR BLD: 7 %
NEUTROPHILS # BLD: 8.2 K/MCL (ref 1.8–7.7)
NEUTROPHILS NFR BLD: 74 %
NITRITE UR QL STRIP: NEGATIVE
NRBC BLD MANUAL-RTO: 0 /100 WBC
PH UR STRIP: 6 [PH] (ref 5–7)
PLATELET # BLD AUTO: 170 K/MCL (ref 140–450)
POTASSIUM SERPL-SCNC: 5.2 MMOL/L (ref 3.4–5.1)
PROCALCITONIN SERPL IA-MCNC: 0.06 NG/ML
PROT SERPL-MCNC: 9.3 G/DL (ref 6.4–8.2)
PROT UR STRIP-MCNC: 100 MG/DL
RAINBOW EXTRA TUBES HOLD SPECIMEN: NORMAL
RBC # BLD: 4.17 MIL/MCL (ref 4–5.2)
RBC #/AREA URNS HPF: ABNORMAL /HPF
RSV AG NPH QL IA.RAPID: NOT DETECTED
SARS-COV-2 RNA RESP QL NAA+PROBE: NOT DETECTED
SERVICE CMNT-IMP: NORMAL
SERVICE CMNT-IMP: NORMAL
SODIUM SERPL-SCNC: 136 MMOL/L (ref 135–145)
SP GR UR STRIP: 1.01 (ref 1–1.03)
SQUAMOUS #/AREA URNS HPF: ABNORMAL /HPF
TROPONIN I SERPL DL<=0.01 NG/ML-MCNC: 8 NG/L
UROBILINOGEN UR STRIP-MCNC: 0.2 MG/DL
WBC # BLD: 11 K/MCL (ref 4.2–11)
WBC #/AREA URNS HPF: ABNORMAL /HPF

## 2022-11-18 PROCEDURE — 96374 THER/PROPH/DIAG INJ IV PUSH: CPT

## 2022-11-18 PROCEDURE — 84145 PROCALCITONIN (PCT): CPT | Performed by: EMERGENCY MEDICINE

## 2022-11-18 PROCEDURE — 84484 ASSAY OF TROPONIN QUANT: CPT | Performed by: EMERGENCY MEDICINE

## 2022-11-18 PROCEDURE — 85025 COMPLETE CBC W/AUTO DIFF WBC: CPT | Performed by: EMERGENCY MEDICINE

## 2022-11-18 PROCEDURE — P9612 CATHETERIZE FOR URINE SPEC: HCPCS

## 2022-11-18 PROCEDURE — 83605 ASSAY OF LACTIC ACID: CPT | Performed by: EMERGENCY MEDICINE

## 2022-11-18 PROCEDURE — C9803 HOPD COVID-19 SPEC COLLECT: HCPCS

## 2022-11-18 PROCEDURE — 0241U COVID/FLU/RSV PANEL: CPT | Performed by: EMERGENCY MEDICINE

## 2022-11-18 PROCEDURE — 96361 HYDRATE IV INFUSION ADD-ON: CPT

## 2022-11-18 PROCEDURE — 71045 X-RAY EXAM CHEST 1 VIEW: CPT

## 2022-11-18 PROCEDURE — 83735 ASSAY OF MAGNESIUM: CPT | Performed by: EMERGENCY MEDICINE

## 2022-11-18 PROCEDURE — 80053 COMPREHEN METABOLIC PANEL: CPT | Performed by: EMERGENCY MEDICINE

## 2022-11-18 PROCEDURE — 99284 EMERGENCY DEPT VISIT MOD MDM: CPT

## 2022-11-18 PROCEDURE — 36415 COLL VENOUS BLD VENIPUNCTURE: CPT | Performed by: EMERGENCY MEDICINE

## 2022-11-18 PROCEDURE — 94002 VENT MGMT INPAT INIT DAY: CPT

## 2022-11-18 PROCEDURE — 82962 GLUCOSE BLOOD TEST: CPT

## 2022-11-18 PROCEDURE — 10002800 HB RX 250 W HCPCS: Performed by: EMERGENCY MEDICINE

## 2022-11-18 PROCEDURE — 87040 BLOOD CULTURE FOR BACTERIA: CPT | Performed by: EMERGENCY MEDICINE

## 2022-11-18 PROCEDURE — 81001 URINALYSIS AUTO W/SCOPE: CPT | Performed by: EMERGENCY MEDICINE

## 2022-11-18 PROCEDURE — 83690 ASSAY OF LIPASE: CPT | Performed by: EMERGENCY MEDICINE

## 2022-11-18 PROCEDURE — 99285 EMERGENCY DEPT VISIT HI MDM: CPT | Performed by: STUDENT IN AN ORGANIZED HEALTH CARE EDUCATION/TRAINING PROGRAM

## 2022-11-18 PROCEDURE — 96376 TX/PRO/DX INJ SAME DRUG ADON: CPT

## 2022-11-18 PROCEDURE — 10002800 HB RX 250 W HCPCS: Performed by: STUDENT IN AN ORGANIZED HEALTH CARE EDUCATION/TRAINING PROGRAM

## 2022-11-18 PROCEDURE — 10002807 HB RX 258: Performed by: EMERGENCY MEDICINE

## 2022-11-18 PROCEDURE — 10002803 HB RX 637: Performed by: EMERGENCY MEDICINE

## 2022-11-18 RX ORDER — LORAZEPAM 2 MG/ML
1 INJECTION INTRAMUSCULAR ONCE
Status: COMPLETED | OUTPATIENT
Start: 2022-11-18 | End: 2022-11-18

## 2022-11-18 RX ORDER — LORAZEPAM 2 MG/ML
0.5 INJECTION INTRAMUSCULAR ONCE
Status: COMPLETED | OUTPATIENT
Start: 2022-11-18 | End: 2022-11-18

## 2022-11-18 RX ORDER — LORAZEPAM 1 MG/1
1 TABLET ORAL ONCE
Status: DISCONTINUED | OUTPATIENT
Start: 2022-11-18 | End: 2022-11-18

## 2022-11-18 RX ADMIN — ACETAMINOPHEN 325 MG: 325 SUPPOSITORY RECTAL at 12:33

## 2022-11-18 RX ADMIN — SODIUM CHLORIDE 1000 ML: 9 INJECTION, SOLUTION INTRAVENOUS at 09:51

## 2022-11-18 RX ADMIN — LORAZEPAM 0.5 MG: 2 INJECTION INTRAMUSCULAR at 11:54

## 2022-11-18 RX ADMIN — LORAZEPAM 1 MG: 2 INJECTION INTRAMUSCULAR at 09:54

## 2022-11-23 LAB
BACTERIA BLD CULT: NORMAL
BACTERIA BLD CULT: NORMAL

## 2022-12-26 ENCOUNTER — HOSPITAL ENCOUNTER (EMERGENCY)
Age: 32
Discharge: HOME | End: 2022-12-26
Attending: EMERGENCY MEDICINE

## 2022-12-26 ENCOUNTER — APPOINTMENT (OUTPATIENT)
Dept: GENERAL RADIOLOGY | Age: 32
End: 2022-12-26
Attending: EMERGENCY MEDICINE

## 2022-12-26 VITALS
DIASTOLIC BLOOD PRESSURE: 79 MMHG | RESPIRATION RATE: 18 BRPM | SYSTOLIC BLOOD PRESSURE: 120 MMHG | TEMPERATURE: 97.5 F | HEART RATE: 90 BPM | OXYGEN SATURATION: 99 %

## 2022-12-26 DIAGNOSIS — R45.1 RESTLESSNESS AND AGITATION: Primary | ICD-10-CM

## 2022-12-26 LAB
ALBUMIN SERPL-MCNC: 3.3 G/DL (ref 3.6–5.1)
ALBUMIN/GLOB SERPL: 0.7 {RATIO} (ref 1–2.4)
ALP SERPL-CCNC: 91 UNITS/L (ref 45–117)
ALT SERPL-CCNC: 44 UNITS/L
ANION GAP SERPL CALC-SCNC: 10 MMOL/L (ref 7–19)
AST SERPL-CCNC: 32 UNITS/L
ATRIAL RATE (BPM): 102
BASOPHILS # BLD: 0 K/MCL (ref 0–0.3)
BASOPHILS NFR BLD: 1 %
BILIRUB SERPL-MCNC: 0.3 MG/DL (ref 0.2–1)
BUN SERPL-MCNC: 21 MG/DL (ref 6–20)
BUN/CREAT SERPL: 88 (ref 7–25)
CALCIUM SERPL-MCNC: 8.9 MG/DL (ref 8.4–10.2)
CHLORIDE SERPL-SCNC: 105 MMOL/L (ref 97–110)
CO2 SERPL-SCNC: 24 MMOL/L (ref 21–32)
CREAT SERPL-MCNC: 0.24 MG/DL (ref 0.51–0.95)
DEPRECATED RDW RBC: 43.4 FL (ref 39–50)
EOSINOPHIL # BLD: 0.3 K/MCL (ref 0–0.5)
EOSINOPHIL NFR BLD: 4 %
ERYTHROCYTE [DISTWIDTH] IN BLOOD: 11.9 % (ref 11–15)
FASTING DURATION TIME PATIENT: ABNORMAL H
GFR SERPLBLD BASED ON 1.73 SQ M-ARVRAT: >90 ML/MIN
GLOBULIN SER-MCNC: 4.9 G/DL (ref 2–4)
GLUCOSE SERPL-MCNC: 108 MG/DL (ref 70–99)
HCT VFR BLD CALC: 36.2 % (ref 36–46.5)
HGB BLD-MCNC: 12.7 G/DL (ref 12–15.5)
IMM GRANULOCYTES # BLD AUTO: 0 K/MCL (ref 0–0.2)
IMM GRANULOCYTES # BLD: 0 %
LIPASE SERPL-CCNC: <50 UNITS/L (ref 73–393)
LYMPHOCYTES # BLD: 1.5 K/MCL (ref 1–4.8)
LYMPHOCYTES NFR BLD: 26 %
MAGNESIUM SERPL-MCNC: 2 MG/DL (ref 1.7–2.4)
MCH RBC QN AUTO: 34.4 PG (ref 26–34)
MCHC RBC AUTO-ENTMCNC: 35.1 G/DL (ref 32–36.5)
MCV RBC AUTO: 98.1 FL (ref 78–100)
MONOCYTES # BLD: 0.5 K/MCL (ref 0.3–0.9)
MONOCYTES NFR BLD: 8 %
NEUTROPHILS # BLD: 3.6 K/MCL (ref 1.8–7.7)
NEUTROPHILS NFR BLD: 61 %
NRBC BLD MANUAL-RTO: 0 /100 WBC
NT-PROBNP SERPL-MCNC: 35 PG/ML
P AXIS (DEGREES): 65
PLATELET # BLD AUTO: 131 K/MCL (ref 140–450)
POTASSIUM SERPL-SCNC: 4.8 MMOL/L (ref 3.4–5.1)
PR-INTERVAL (MSEC): 163
PROT SERPL-MCNC: 8.2 G/DL (ref 6.4–8.2)
QRS-INTERVAL (MSEC): 81
QT-INTERVAL (MSEC): 337
QTC: 439
R AXIS (DEGREES): 76
RBC # BLD: 3.69 MIL/MCL (ref 4–5.2)
REPORT TEXT: NORMAL
SODIUM SERPL-SCNC: 134 MMOL/L (ref 135–145)
T AXIS (DEGREES): 53
TROPONIN I SERPL DL<=0.01 NG/ML-MCNC: 6 NG/L
VENTRICULAR RATE EKG/MIN (BPM): 102
WBC # BLD: 5.9 K/MCL (ref 4.2–11)

## 2022-12-26 PROCEDURE — 99284 EMERGENCY DEPT VISIT MOD MDM: CPT

## 2022-12-26 PROCEDURE — 10002803 HB RX 637: Performed by: EMERGENCY MEDICINE

## 2022-12-26 PROCEDURE — 83735 ASSAY OF MAGNESIUM: CPT | Performed by: EMERGENCY MEDICINE

## 2022-12-26 PROCEDURE — 93005 ELECTROCARDIOGRAM TRACING: CPT | Performed by: EMERGENCY MEDICINE

## 2022-12-26 PROCEDURE — 83690 ASSAY OF LIPASE: CPT | Performed by: EMERGENCY MEDICINE

## 2022-12-26 PROCEDURE — 99283 EMERGENCY DEPT VISIT LOW MDM: CPT | Performed by: EMERGENCY MEDICINE

## 2022-12-26 PROCEDURE — 80053 COMPREHEN METABOLIC PANEL: CPT | Performed by: EMERGENCY MEDICINE

## 2022-12-26 PROCEDURE — 71045 X-RAY EXAM CHEST 1 VIEW: CPT

## 2022-12-26 PROCEDURE — 36415 COLL VENOUS BLD VENIPUNCTURE: CPT | Performed by: EMERGENCY MEDICINE

## 2022-12-26 PROCEDURE — 83880 ASSAY OF NATRIURETIC PEPTIDE: CPT | Performed by: EMERGENCY MEDICINE

## 2022-12-26 PROCEDURE — 10004180 HB COUNTER-TRANSPORT

## 2022-12-26 PROCEDURE — 84484 ASSAY OF TROPONIN QUANT: CPT | Performed by: EMERGENCY MEDICINE

## 2022-12-26 PROCEDURE — 93010 ELECTROCARDIOGRAM REPORT: CPT | Performed by: INTERNAL MEDICINE

## 2022-12-26 PROCEDURE — 85025 COMPLETE CBC W/AUTO DIFF WBC: CPT | Performed by: EMERGENCY MEDICINE

## 2022-12-26 RX ORDER — LORAZEPAM 1 MG/1
1 TABLET ORAL ONCE
Status: COMPLETED | OUTPATIENT
Start: 2022-12-26 | End: 2022-12-26

## 2022-12-26 RX ADMIN — LORAZEPAM 1 MG: 1 TABLET ORAL at 08:17

## 2022-12-26 ASSESSMENT — PAIN SCALES - WONG BAKER: WONGBAKER_NUMERICALRESPONSE: 0

## 2023-03-13 ENCOUNTER — APPOINTMENT (OUTPATIENT)
Dept: GENERAL RADIOLOGY | Age: 33
End: 2023-03-13
Attending: EMERGENCY MEDICINE

## 2023-03-13 ENCOUNTER — HOSPITAL ENCOUNTER (EMERGENCY)
Age: 33
Discharge: HOME | End: 2023-03-13
Attending: INTERNAL MEDICINE | Admitting: INTERNAL MEDICINE

## 2023-03-13 VITALS
WEIGHT: 71.87 LBS | TEMPERATURE: 97.3 F | DIASTOLIC BLOOD PRESSURE: 84 MMHG | OXYGEN SATURATION: 99 % | HEART RATE: 78 BPM | RESPIRATION RATE: 16 BRPM | SYSTOLIC BLOOD PRESSURE: 111 MMHG | BODY MASS INDEX: 14.52 KG/M2

## 2023-03-13 DIAGNOSIS — Z43.1 ATTENTION TO GASTROSTOMY TUBE (CMD): Primary | ICD-10-CM

## 2023-03-13 PROCEDURE — 10006027 HB SUPPLY 278

## 2023-03-13 PROCEDURE — 43762 RPLC GTUBE NO REVJ TRC: CPT | Performed by: STUDENT IN AN ORGANIZED HEALTH CARE EDUCATION/TRAINING PROGRAM

## 2023-03-13 PROCEDURE — 43762 RPLC GTUBE NO REVJ TRC: CPT | Performed by: EMERGENCY MEDICINE

## 2023-03-13 PROCEDURE — 99282 EMERGENCY DEPT VISIT SF MDM: CPT | Performed by: STUDENT IN AN ORGANIZED HEALTH CARE EDUCATION/TRAINING PROGRAM

## 2023-03-13 PROCEDURE — 94002 VENT MGMT INPAT INIT DAY: CPT

## 2023-03-13 PROCEDURE — 74018 RADEX ABDOMEN 1 VIEW: CPT

## 2023-03-13 PROCEDURE — 10002805 HB CONTRAST AGENT: Performed by: EMERGENCY MEDICINE

## 2023-03-13 PROCEDURE — 10004180 HB COUNTER-TRANSPORT

## 2023-03-13 PROCEDURE — 99283 EMERGENCY DEPT VISIT LOW MDM: CPT

## 2023-03-13 RX ADMIN — IOHEXOL 50 ML: 300 INJECTION, SOLUTION INTRAVENOUS at 13:35

## 2023-03-13 ASSESSMENT — PAIN SCALES - WONG BAKER: WONGBAKER_NUMERICALRESPONSE: 0

## 2023-06-28 ENCOUNTER — APPOINTMENT (OUTPATIENT)
Dept: GENERAL RADIOLOGY | Age: 33
End: 2023-06-28
Attending: EMERGENCY MEDICINE

## 2023-06-28 ENCOUNTER — HOSPITAL ENCOUNTER (EMERGENCY)
Age: 33
Discharge: NURSING, REHAB OR LONG-TERM CARE | End: 2023-06-28
Attending: EMERGENCY MEDICINE

## 2023-06-28 VITALS
DIASTOLIC BLOOD PRESSURE: 79 MMHG | TEMPERATURE: 99.3 F | WEIGHT: 70.55 LBS | BODY MASS INDEX: 13.85 KG/M2 | HEIGHT: 60 IN | RESPIRATION RATE: 18 BRPM | OXYGEN SATURATION: 99 % | HEART RATE: 81 BPM | SYSTOLIC BLOOD PRESSURE: 122 MMHG

## 2023-06-28 DIAGNOSIS — R00.0 TACHYCARDIA: Primary | ICD-10-CM

## 2023-06-28 DIAGNOSIS — F41.9 ANXIETY: ICD-10-CM

## 2023-06-28 DIAGNOSIS — R09.02 HYPOXIA: ICD-10-CM

## 2023-06-28 LAB
ALBUMIN SERPL-MCNC: 3.6 G/DL (ref 3.6–5.1)
ALP SERPL-CCNC: 101 UNITS/L (ref 45–117)
ALT SERPL-CCNC: 46 UNITS/L
ANION GAP SERPL CALC-SCNC: 5 MMOL/L (ref 7–19)
APPEARANCE UR: CLEAR
APTT PPP: 23 SEC (ref 22–30)
AST SERPL-CCNC: 30 UNITS/L
BACTERIA #/AREA URNS HPF: ABNORMAL /HPF
BASOPHILS # BLD: 0.1 K/MCL (ref 0–0.3)
BASOPHILS NFR BLD: 1 %
BILIRUB CONJ SERPL-MCNC: <0.1 MG/DL (ref 0–0.2)
BILIRUB SERPL-MCNC: 0.4 MG/DL (ref 0.2–1)
BILIRUB UR QL STRIP: NEGATIVE
BUN SERPL-MCNC: 24 MG/DL (ref 6–20)
BUN/CREAT SERPL: 80 (ref 7–25)
CALCIUM SERPL-MCNC: 9.2 MG/DL (ref 8.4–10.2)
CHLORIDE SERPL-SCNC: 105 MMOL/L (ref 97–110)
CO2 SERPL-SCNC: 30 MMOL/L (ref 21–32)
COLOR UR: YELLOW
CREAT SERPL-MCNC: 0.3 MG/DL (ref 0.51–0.95)
DEPRECATED RDW RBC: 41 FL (ref 39–50)
EOSINOPHIL # BLD: 0.4 K/MCL (ref 0–0.5)
EOSINOPHIL NFR BLD: 5 %
ERYTHROCYTE [DISTWIDTH] IN BLOOD: 11.2 % (ref 11–15)
FASTING DURATION TIME PATIENT: ABNORMAL H
GFR SERPLBLD BASED ON 1.73 SQ M-ARVRAT: >90 ML/MIN
GLUCOSE SERPL-MCNC: 106 MG/DL (ref 70–99)
GLUCOSE UR STRIP-MCNC: NEGATIVE MG/DL
HCT VFR BLD CALC: 36.4 % (ref 36–46.5)
HGB BLD-MCNC: 12.5 G/DL (ref 12–15.5)
HGB UR QL STRIP: NEGATIVE
HYALINE CASTS #/AREA URNS LPF: ABNORMAL /LPF
IMM GRANULOCYTES # BLD AUTO: 0 K/MCL (ref 0–0.2)
IMM GRANULOCYTES # BLD: 0 %
INR PPP: 1
KETONES UR STRIP-MCNC: NEGATIVE MG/DL
LACTATE BLDV-SCNC: 0.7 MMOL/L (ref 0–2)
LEUKOCYTE ESTERASE UR QL STRIP: NEGATIVE
LIPASE SERPL-CCNC: <50 UNITS/L (ref 73–393)
LYMPHOCYTES # BLD: 2 K/MCL (ref 1–4.8)
LYMPHOCYTES NFR BLD: 24 %
MAGNESIUM SERPL-MCNC: 2.1 MG/DL (ref 1.7–2.4)
MCH RBC QN AUTO: 34.4 PG (ref 26–34)
MCHC RBC AUTO-ENTMCNC: 34.3 G/DL (ref 32–36.5)
MCV RBC AUTO: 100.3 FL (ref 78–100)
MONOCYTES # BLD: 1 K/MCL (ref 0.3–0.9)
MONOCYTES NFR BLD: 11 %
MUCOUS THREADS URNS QL MICRO: PRESENT
NEUTROPHILS # BLD: 5 K/MCL (ref 1.8–7.7)
NEUTROPHILS NFR BLD: 59 %
NITRITE UR QL STRIP: NEGATIVE
NRBC BLD MANUAL-RTO: 0 /100 WBC
NT-PROBNP SERPL-MCNC: 45 PG/ML
PH UR STRIP: 6.5 [PH] (ref 5–7)
PLATELET # BLD AUTO: 143 K/MCL (ref 140–450)
POTASSIUM SERPL-SCNC: 4.5 MMOL/L (ref 3.4–5.1)
PROCALCITONIN SERPL IA-MCNC: 0.17 NG/ML
PROT SERPL-MCNC: 8.9 G/DL (ref 6.4–8.2)
PROT UR STRIP-MCNC: 300 MG/DL
PROTHROMBIN TIME: 10.3 SEC (ref 9.7–11.8)
RBC # BLD: 3.63 MIL/MCL (ref 4–5.2)
RBC #/AREA URNS HPF: ABNORMAL /HPF
SODIUM SERPL-SCNC: 135 MMOL/L (ref 135–145)
SP GR UR STRIP: 1.03 (ref 1–1.03)
SQUAMOUS #/AREA URNS HPF: ABNORMAL /HPF
TROPONIN I SERPL DL<=0.01 NG/ML-MCNC: 6 NG/L
UROBILINOGEN UR STRIP-MCNC: 0.2 MG/DL
WBC # BLD: 8.5 K/MCL (ref 4.2–11)
WBC #/AREA URNS HPF: ABNORMAL /HPF

## 2023-06-28 PROCEDURE — 83880 ASSAY OF NATRIURETIC PEPTIDE: CPT | Performed by: EMERGENCY MEDICINE

## 2023-06-28 PROCEDURE — 84484 ASSAY OF TROPONIN QUANT: CPT | Performed by: EMERGENCY MEDICINE

## 2023-06-28 PROCEDURE — 10006027 HB SUPPLY 278

## 2023-06-28 PROCEDURE — 80076 HEPATIC FUNCTION PANEL: CPT | Performed by: EMERGENCY MEDICINE

## 2023-06-28 PROCEDURE — 96374 THER/PROPH/DIAG INJ IV PUSH: CPT

## 2023-06-28 PROCEDURE — 85730 THROMBOPLASTIN TIME PARTIAL: CPT | Performed by: EMERGENCY MEDICINE

## 2023-06-28 PROCEDURE — 80048 BASIC METABOLIC PNL TOTAL CA: CPT | Performed by: EMERGENCY MEDICINE

## 2023-06-28 PROCEDURE — 94002 VENT MGMT INPAT INIT DAY: CPT

## 2023-06-28 PROCEDURE — 81001 URINALYSIS AUTO W/SCOPE: CPT | Performed by: EMERGENCY MEDICINE

## 2023-06-28 PROCEDURE — 10002800 HB RX 250 W HCPCS: Performed by: EMERGENCY MEDICINE

## 2023-06-28 PROCEDURE — 93005 ELECTROCARDIOGRAM TRACING: CPT | Performed by: EMERGENCY MEDICINE

## 2023-06-28 PROCEDURE — 99285 EMERGENCY DEPT VISIT HI MDM: CPT

## 2023-06-28 PROCEDURE — 83735 ASSAY OF MAGNESIUM: CPT | Performed by: EMERGENCY MEDICINE

## 2023-06-28 PROCEDURE — 85025 COMPLETE CBC W/AUTO DIFF WBC: CPT | Performed by: EMERGENCY MEDICINE

## 2023-06-28 PROCEDURE — 99285 EMERGENCY DEPT VISIT HI MDM: CPT | Performed by: EMERGENCY MEDICINE

## 2023-06-28 PROCEDURE — 84145 PROCALCITONIN (PCT): CPT | Performed by: EMERGENCY MEDICINE

## 2023-06-28 PROCEDURE — 10002801 HB RX 250 W/O HCPCS: Performed by: EMERGENCY MEDICINE

## 2023-06-28 PROCEDURE — 13003377

## 2023-06-28 PROCEDURE — 87040 BLOOD CULTURE FOR BACTERIA: CPT | Performed by: EMERGENCY MEDICINE

## 2023-06-28 PROCEDURE — 71045 X-RAY EXAM CHEST 1 VIEW: CPT

## 2023-06-28 PROCEDURE — 10002803 HB RX 637: Performed by: EMERGENCY MEDICINE

## 2023-06-28 PROCEDURE — 96376 TX/PRO/DX INJ SAME DRUG ADON: CPT

## 2023-06-28 PROCEDURE — 36415 COLL VENOUS BLD VENIPUNCTURE: CPT | Performed by: EMERGENCY MEDICINE

## 2023-06-28 PROCEDURE — 85610 PROTHROMBIN TIME: CPT | Performed by: EMERGENCY MEDICINE

## 2023-06-28 PROCEDURE — 83605 ASSAY OF LACTIC ACID: CPT | Performed by: EMERGENCY MEDICINE

## 2023-06-28 PROCEDURE — 93010 ELECTROCARDIOGRAM REPORT: CPT | Performed by: INTERNAL MEDICINE

## 2023-06-28 PROCEDURE — 83690 ASSAY OF LIPASE: CPT | Performed by: EMERGENCY MEDICINE

## 2023-06-28 RX ORDER — LORAZEPAM 2 MG/ML
0.5 INJECTION INTRAMUSCULAR ONCE
Status: COMPLETED | OUTPATIENT
Start: 2023-06-28 | End: 2023-06-28

## 2023-06-28 RX ORDER — LACOSAMIDE 10 MG/ML
150 SOLUTION ORAL EVERY 12 HOURS SCHEDULED
Status: COMPLETED | OUTPATIENT
Start: 2023-06-28 | End: 2023-06-28

## 2023-06-28 RX ORDER — LEVETIRACETAM 100 MG/ML
1000 SOLUTION ORAL EVERY 12 HOURS SCHEDULED
Status: COMPLETED | OUTPATIENT
Start: 2023-06-28 | End: 2023-06-28

## 2023-06-28 RX ADMIN — LEVETIRACETAM 1000 MG: 100 SOLUTION ORAL at 21:45

## 2023-06-28 RX ADMIN — LORAZEPAM 0.5 MG: 2 INJECTION INTRAMUSCULAR; INTRAVENOUS at 20:54

## 2023-06-28 RX ADMIN — LORAZEPAM 0.5 MG: 2 INJECTION INTRAMUSCULAR; INTRAVENOUS at 17:49

## 2023-06-28 RX ADMIN — LACOSAMIDE 150 MG: 10 SOLUTION ORAL at 21:22

## 2023-06-28 ASSESSMENT — MOVEMENT AND STRENGTH ASSESSMENTS: ALL_EXTREMITIES: SPASTICITY;RIGIDITY

## 2023-06-28 ASSESSMENT — PAIN SCALES - BEHAVIORAL PAIN SCALE (BPS)
BPS_SCORE: 6
BPS_SCORE: 9

## 2023-06-29 LAB — RAINBOW EXTRA TUBES HOLD SPECIMEN: NORMAL

## 2023-06-30 LAB
ATRIAL RATE (BPM): 115
P AXIS (DEGREES): 76
PR-INTERVAL (MSEC): 131
QRS-INTERVAL (MSEC): 76
QT-INTERVAL (MSEC): 295
QTC: 407
R AXIS (DEGREES): 81
REPORT TEXT: NORMAL
T AXIS (DEGREES): 31
VENTRICULAR RATE EKG/MIN (BPM): 114

## 2023-07-03 LAB
BACTERIA BLD CULT: NORMAL
BACTERIA BLD CULT: NORMAL

## 2023-08-23 ENCOUNTER — EXTERNAL LAB (OUTPATIENT)
Dept: OTHER | Age: 33
End: 2023-08-23

## 2023-08-23 LAB
ABSOLUTE IMMATURE GRANULOCYTES (OFFPRE24): 0.02 THO/MM3 (ref 0–0.1)
BASOPHILS # BLD: 0.05 THO/MM3 (ref 0–0.1)
BASOPHILS NFR BLD: 0.5 %
EOSINOPHIL # BLD: 0.38 THO/MM3 (ref 0–0.5)
EOSINOPHIL NFR BLD: 4.2 %
ERYTHROCYTE [DISTWIDTH] IN BLOOD: 11.7 % (ref 11.5–15.2)
HCT VFR BLD CALC: 37.7 % (ref 37–47)
HGB BLD-MCNC: 12.5 GM/DL (ref 12–16)
IMMATURE GRANULOCYTES (OFFPRE25): 0.2 %
LAB RESULT: NORMAL
LYMPHOCYTES # BLD: 2.3 THO/MM3 (ref 0.8–3)
LYMPHOCYTES NFR BLD: 25.3 %
MCH RBC QN AUTO: 34.4 UUG (ref 27–33)
MCHC RBC AUTO-ENTMCNC: 33.2 GM/DL (ref 32–36)
MCV RBC AUTO: 103.9 UM3 (ref 81–99)
MONOCYTES # BLD: 0.77 THO/MM3 (ref 0.2–1)
MONOCYTES NFR BLD: 8.5 %
MPV (OFFPRE2): 12.2 UM3 (ref 9.5–13.1)
NEUTROPHILS # BLD: 5.58 THO/MM3 (ref 1.4–6.8)
NEUTROPHILS NFR BLD: 61.3 %
PLATELET # BLD: 137 THO/MM3 (ref 150–400)
RBC # BLD: 3.63 MIL/MM3 (ref 4.2–5.4)
VALPROATE SERPL-MCNC: 78.3 UG/ML (ref 50–100)
WBC # BLD: 9.1 THO/MM3 (ref 4.8–10.8)

## 2023-09-09 ENCOUNTER — HOSPITAL ENCOUNTER (EMERGENCY)
Age: 33
Discharge: HOME | End: 2023-09-09
Attending: EMERGENCY MEDICINE

## 2023-09-09 ENCOUNTER — APPOINTMENT (OUTPATIENT)
Dept: GENERAL RADIOLOGY | Age: 33
End: 2023-09-09

## 2023-09-09 VITALS
TEMPERATURE: 97 F | DIASTOLIC BLOOD PRESSURE: 73 MMHG | HEART RATE: 91 BPM | BODY MASS INDEX: 14.77 KG/M2 | WEIGHT: 75.62 LBS | SYSTOLIC BLOOD PRESSURE: 132 MMHG | OXYGEN SATURATION: 98 % | RESPIRATION RATE: 23 BRPM

## 2023-09-09 DIAGNOSIS — Z00.00 ROUTINE GENERAL MEDICAL EXAMINATION AT A HEALTH CARE FACILITY: Primary | ICD-10-CM

## 2023-09-09 LAB
ALBUMIN SERPL-MCNC: 3.2 G/DL (ref 3.6–5.1)
ALBUMIN/GLOB SERPL: 0.6 {RATIO} (ref 1–2.4)
ALP SERPL-CCNC: 82 UNITS/L (ref 45–117)
ALT SERPL-CCNC: 29 UNITS/L
ANION GAP SERPL CALC-SCNC: 8 MMOL/L (ref 7–19)
AST SERPL-CCNC: 20 UNITS/L
BASOPHILS # BLD: 0.1 K/MCL (ref 0–0.3)
BASOPHILS NFR BLD: 1 %
BILIRUB SERPL-MCNC: 0.4 MG/DL (ref 0.2–1)
BUN SERPL-MCNC: 21 MG/DL (ref 6–20)
BUN/CREAT SERPL: 81 (ref 7–25)
CALCIUM SERPL-MCNC: 8.6 MG/DL (ref 8.4–10.2)
CHLORIDE SERPL-SCNC: 103 MMOL/L (ref 97–110)
CO2 SERPL-SCNC: 29 MMOL/L (ref 21–32)
CREAT SERPL-MCNC: 0.26 MG/DL (ref 0.51–0.95)
DEPRECATED RDW RBC: 40.7 FL (ref 39–50)
EGFRCR SERPLBLD CKD-EPI 2021: >90 ML/MIN/{1.73_M2}
EOSINOPHIL # BLD: 0.3 K/MCL (ref 0–0.5)
EOSINOPHIL NFR BLD: 4 %
ERYTHROCYTE [DISTWIDTH] IN BLOOD: 10.9 % (ref 11–15)
FASTING DURATION TIME PATIENT: ABNORMAL H
GLOBULIN SER-MCNC: 5.4 G/DL (ref 2–4)
GLUCOSE SERPL-MCNC: 100 MG/DL (ref 70–99)
HCT VFR BLD CALC: 35.8 % (ref 36–46.5)
HGB BLD-MCNC: 11.8 G/DL (ref 12–15.5)
IMM GRANULOCYTES # BLD AUTO: 0 K/MCL (ref 0–0.2)
IMM GRANULOCYTES # BLD: 0 %
LYMPHOCYTES # BLD: 2 K/MCL (ref 1–4.8)
LYMPHOCYTES NFR BLD: 30 %
MCH RBC QN AUTO: 33.5 PG (ref 26–34)
MCHC RBC AUTO-ENTMCNC: 33 G/DL (ref 32–36.5)
MCV RBC AUTO: 101.7 FL (ref 78–100)
MONOCYTES # BLD: 0.7 K/MCL (ref 0.3–0.9)
MONOCYTES NFR BLD: 10 %
NEUTROPHILS # BLD: 3.7 K/MCL (ref 1.8–7.7)
NEUTROPHILS NFR BLD: 55 %
NRBC BLD MANUAL-RTO: 0 /100 WBC
PLATELET # BLD AUTO: 126 K/MCL (ref 140–450)
POTASSIUM SERPL-SCNC: 4.5 MMOL/L (ref 3.4–5.1)
PROT SERPL-MCNC: 8.6 G/DL (ref 6.4–8.2)
RBC # BLD: 3.52 MIL/MCL (ref 4–5.2)
SODIUM SERPL-SCNC: 135 MMOL/L (ref 135–145)
WBC # BLD: 6.8 K/MCL (ref 4.2–11)

## 2023-09-09 PROCEDURE — 10006027 HB SUPPLY 278

## 2023-09-09 PROCEDURE — 10002803 HB RX 637

## 2023-09-09 PROCEDURE — 99284 EMERGENCY DEPT VISIT MOD MDM: CPT | Performed by: EMERGENCY MEDICINE

## 2023-09-09 PROCEDURE — 85025 COMPLETE CBC W/AUTO DIFF WBC: CPT

## 2023-09-09 PROCEDURE — 10004180 HB COUNTER-TRANSPORT

## 2023-09-09 PROCEDURE — 99283 EMERGENCY DEPT VISIT LOW MDM: CPT

## 2023-09-09 PROCEDURE — 94002 VENT MGMT INPAT INIT DAY: CPT

## 2023-09-09 PROCEDURE — 36415 COLL VENOUS BLD VENIPUNCTURE: CPT

## 2023-09-09 PROCEDURE — 71045 X-RAY EXAM CHEST 1 VIEW: CPT

## 2023-09-09 PROCEDURE — 80053 COMPREHEN METABOLIC PANEL: CPT

## 2023-09-09 RX ORDER — LORAZEPAM 1 MG/1
1 TABLET ORAL ONCE
Status: COMPLETED | OUTPATIENT
Start: 2023-09-09 | End: 2023-09-09

## 2023-09-09 RX ADMIN — LORAZEPAM 1 MG: 1 TABLET ORAL at 18:48

## 2023-11-13 ENCOUNTER — HOSPITAL ENCOUNTER (EMERGENCY)
Age: 33
Discharge: NURSING, REHAB OR LONG-TERM CARE | End: 2023-11-13
Attending: EMERGENCY MEDICINE

## 2023-11-13 ENCOUNTER — APPOINTMENT (OUTPATIENT)
Dept: GENERAL RADIOLOGY | Age: 33
End: 2023-11-13
Attending: STUDENT IN AN ORGANIZED HEALTH CARE EDUCATION/TRAINING PROGRAM

## 2023-11-13 VITALS
SYSTOLIC BLOOD PRESSURE: 96 MMHG | WEIGHT: 77.16 LBS | RESPIRATION RATE: 18 BRPM | TEMPERATURE: 98.4 F | DIASTOLIC BLOOD PRESSURE: 63 MMHG | HEIGHT: 61 IN | OXYGEN SATURATION: 97 % | HEART RATE: 110 BPM | BODY MASS INDEX: 14.57 KG/M2

## 2023-11-13 DIAGNOSIS — T85.528A GASTROJEJUNOSTOMY TUBE DISLODGEMENT: Primary | ICD-10-CM

## 2023-11-13 PROCEDURE — 10002805 HB CONTRAST AGENT: Performed by: STUDENT IN AN ORGANIZED HEALTH CARE EDUCATION/TRAINING PROGRAM

## 2023-11-13 PROCEDURE — 99283 EMERGENCY DEPT VISIT LOW MDM: CPT | Performed by: EMERGENCY MEDICINE

## 2023-11-13 PROCEDURE — 10002803 HB RX 637: Performed by: EMERGENCY MEDICINE

## 2023-11-13 PROCEDURE — 94002 VENT MGMT INPAT INIT DAY: CPT

## 2023-11-13 PROCEDURE — 10002803 HB RX 637: Performed by: STUDENT IN AN ORGANIZED HEALTH CARE EDUCATION/TRAINING PROGRAM

## 2023-11-13 PROCEDURE — 49465 FLUORO EXAM OF G/COLON TUBE: CPT

## 2023-11-13 RX ORDER — ACETAMINOPHEN 160 MG/5ML
325 LIQUID ORAL ONCE
Status: COMPLETED | OUTPATIENT
Start: 2023-11-13 | End: 2023-11-13

## 2023-11-13 RX ORDER — LORAZEPAM 1 MG/1
1 TABLET ORAL ONCE
Status: COMPLETED | OUTPATIENT
Start: 2023-11-13 | End: 2023-11-13

## 2023-11-13 RX ADMIN — ACETAMINOPHEN 325 MG: 650 SOLUTION ORAL at 15:22

## 2023-11-13 RX ADMIN — LORAZEPAM 1 MG: 1 TABLET ORAL at 14:09

## 2023-11-13 RX ADMIN — IOHEXOL 25 ML: 350 INJECTION, SOLUTION INTRAVENOUS at 13:27

## 2023-11-13 RX ADMIN — LORAZEPAM 1 MG: 1 TABLET ORAL at 15:22

## 2023-11-13 RX ADMIN — VALPROIC ACID 250 MG: 250 SOLUTION ORAL at 15:22

## 2023-11-13 ASSESSMENT — PAIN SCALES - BEHAVIORAL PAIN SCALE (BPS): BPS_SCORE: 3

## 2024-07-30 ENCOUNTER — EXTERNAL LAB (OUTPATIENT)
Dept: HEALTH INFORMATION MANAGEMENT | Facility: OTHER | Age: 34
End: 2024-07-30

## 2024-07-30 LAB
ALBUMIN SERPL-MCNC: 3.2 G/DL (ref 3.2–4.9)
ALP SERPL-CCNC: 82 U/L (ref 34–143)
ALT SERPL-CCNC: 24 U/L (ref 0–42)
AST SERPL-CCNC: 17 U/L (ref 9–35)
BILIRUB SERPL-MCNC: 0.23 MG/DL (ref 0.2–1.3)
BUN SERPL-MCNC: 24 MG/DL (ref 5–20)
CALCIUM SERPL-MCNC: 8.5 MG/DL (ref 8.7–10.5)
CHLORIDE SERPL-SCNC: 99 MEQ/L (ref 99–110)
CO2 SERPL-SCNC: 29 MMOL/L (ref 20–30)
CREAT SERPL-MCNC: 0.4 MG/DL (ref 0.44–1.32)
EGFRCR SERPLBLD CKD-EPI 2021: >60 ML/M/1.73M2
EGFRCR SERPLBLD CKD-EPI 2021: >60 ML/M/1.73M2
GLUCOSE SERPL-MCNC: 200 MG/DL (ref 70–110)
LAB RESULT: NORMAL
LENGTH OF FAST TIME PATIENT: ABNORMAL H
POTASSIUM SERPL-SCNC: 4.5 MEQ/L (ref 3.6–5)
PROT SERPL-MCNC: 6.9 G/DL (ref 6–8.2)
SODIUM SERPL-SCNC: 137 MEQ/L (ref 138–147)

## 2024-09-26 ENCOUNTER — EXTERNAL LAB (OUTPATIENT)
Dept: HEALTH INFORMATION MANAGEMENT | Facility: OTHER | Age: 34
End: 2024-09-26

## 2024-09-26 LAB
ALBUMIN SERPL-MCNC: 3.4 G/DL (ref 3.2–4.9)
ALP SERPL-CCNC: 83 U/L (ref 34–143)
ALT SERPL-CCNC: 11 U/L (ref 0–42)
AST SERPL-CCNC: 10 U/L (ref 9–35)
BASOPHILS # BLD: 0.04 THO/MM3 (ref 0–0.1)
BASOPHILS NFR BLD: 0.4 %
BILIRUB SERPL-MCNC: 0.33 MG/DL (ref 0.2–1.3)
BUN SERPL-MCNC: 31 MG/DL (ref 5–20)
CALCIUM SERPL-MCNC: 8.6 MG/DL (ref 8.7–10.5)
CHLORIDE SERPL-SCNC: 105 MEQ/L (ref 99–110)
CO2 SERPL-SCNC: 29 MMOL/L (ref 20–30)
CREAT SERPL-MCNC: 0.68 MG/DL (ref 0.44–1.32)
EGFRCR SERPLBLD CKD-EPI 2021: >60 ML/M/1.73M2
EGFRCR SERPLBLD CKD-EPI 2021: >60 ML/M/1.73M2
EOSINOPHIL # BLD: 0 THO/MM3 (ref 0–0.5)
EOSINOPHIL NFR BLD: 0 %
ERYTHROCYTE [DISTWIDTH] IN BLOOD: 11.9 % (ref 11.5–15.2)
GLUCOSE SERPL-MCNC: 461 MG/DL (ref 70–110)
HCT VFR BLD CALC: 38.6 % (ref 37–47)
HGB BLD-MCNC: 12.4 G/DL (ref 12–16)
IMM GRANULOCYTES # BLD: 0.04 THO/MM3 (ref 0–0.1)
IMM GRANULOCYTES NFR BLD: 0.4 %
LENGTH OF FAST TIME PATIENT: ABNORMAL H
LYMPHOCYTES # BLD: 1.72 THO/MM3 (ref 0.8–3)
LYMPHOCYTES NFR BLD: 17.3 %
MCH RBC QN AUTO: 33.8 PG (ref 27–33)
MCHC RBC AUTO-ENTMCNC: 32.1 G/DL (ref 32–36)
MCV RBC AUTO: 105.2 FL (ref 81–99)
MONOCYTES # BLD: 0.82 THO/MM3 (ref 0.2–1)
MONOCYTES NFR BLD: 8.2 %
NEUTROPHILS # BLD: 7.34 THO/MM3 (ref 1.4–6.8)
NEUTROPHILS NFR BLD: 73.7 %
PLATELET # BLD: 171 THO/MM3 (ref 150–400)
PMV BLD AUTO: 11.9 FL (ref 9.5–13.1)
POTASSIUM SERPL-SCNC: 3.9 MEQ/L (ref 3.6–5)
PROT SERPL-MCNC: 8 G/DL (ref 6–8.2)
RBC # BLD: 3.67 MIL/MM3 (ref 4.2–5.4)
SODIUM SERPL-SCNC: 147 MEQ/L (ref 138–147)
VALPROATE SERPL-MCNC: 66.6 UG/ML (ref 50–100)
WBC # BLD: 9.96 THO/MM3 (ref 4.8–10.8)

## 2024-09-27 ENCOUNTER — APPOINTMENT (OUTPATIENT)
Dept: CT IMAGING | Age: 34
DRG: 720 | End: 2024-09-27
Attending: STUDENT IN AN ORGANIZED HEALTH CARE EDUCATION/TRAINING PROGRAM

## 2024-09-27 ENCOUNTER — HOSPITAL ENCOUNTER (INPATIENT)
Age: 34
DRG: 720 | End: 2024-09-27
Attending: STUDENT IN AN ORGANIZED HEALTH CARE EDUCATION/TRAINING PROGRAM | Admitting: EMERGENCY MEDICINE

## 2024-09-27 DIAGNOSIS — R82.71 BACTERIURIA: ICD-10-CM

## 2024-09-27 DIAGNOSIS — J95.851 VENTILATOR ASSOCIATED PNEUMONIA  (CMD): Primary | ICD-10-CM

## 2024-09-27 PROBLEM — R41.82 AMS (ALTERED MENTAL STATUS): Status: ACTIVE | Noted: 2024-09-27

## 2024-09-27 LAB
ALBUMIN SERPL-MCNC: 1.7 G/DL (ref 3.4–5)
ALBUMIN SERPL-MCNC: 2.6 G/DL (ref 3.4–5)
ALBUMIN/GLOB SERPL: 0.4 {RATIO} (ref 1–2.4)
ALBUMIN/GLOB SERPL: 0.4 {RATIO} (ref 1–2.4)
ALP SERPL-CCNC: 54 UNITS/L (ref 45–117)
ALP SERPL-CCNC: 88 UNITS/L (ref 45–117)
ALT SERPL-CCNC: 18 UNITS/L
ALT SERPL-CCNC: 32 UNITS/L
AMMONIA PLAS-SCNC: 54 MCMOL/L
ANION GAP SERPL CALC-SCNC: 7 MMOL/L (ref 7–19)
ANION GAP SERPL CALC-SCNC: 8 MMOL/L (ref 7–19)
APPEARANCE UR: ABNORMAL
AST SERPL-CCNC: 18 UNITS/L
AST SERPL-CCNC: 36 UNITS/L
ATRIAL RATE (BPM): 149
B-OH-BUTYR SERPL-SCNC: 0.1 MMOL/L (ref 0–0.3)
BACTERIA #/AREA URNS HPF: ABNORMAL /HPF
BASOPHILS # BLD: 0 K/MCL (ref 0–0.3)
BASOPHILS NFR BLD: 0 %
BILIRUB SERPL-MCNC: 0.3 MG/DL (ref 0.2–1)
BILIRUB SERPL-MCNC: 0.4 MG/DL (ref 0.2–1)
BILIRUB UR QL STRIP: NEGATIVE
BUN SERPL-MCNC: 30 MG/DL (ref 6–20)
BUN SERPL-MCNC: 43 MG/DL (ref 6–20)
BUN/CREAT SERPL: 70 (ref 7–25)
BUN/CREAT SERPL: 78 (ref 7–25)
CALCIUM SERPL-MCNC: 7.3 MG/DL (ref 8.4–10.2)
CALCIUM SERPL-MCNC: 8.7 MG/DL (ref 8.4–10.2)
CHLORIDE SERPL-SCNC: 114 MMOL/L (ref 97–110)
CHLORIDE SERPL-SCNC: 119 MMOL/L (ref 97–110)
CK SERPL-CCNC: 25 UNITS/L (ref 26–192)
CO2 SERPL-SCNC: 28 MMOL/L (ref 21–32)
CO2 SERPL-SCNC: 30 MMOL/L (ref 21–32)
COLOR UR: YELLOW
CREAT SERPL-MCNC: 0.43 MG/DL (ref 0.51–0.95)
CREAT SERPL-MCNC: 0.55 MG/DL (ref 0.51–0.95)
DEPRECATED RDW RBC: 46.2 FL (ref 39–50)
EGFRCR SERPLBLD CKD-EPI 2021: >90 ML/MIN/{1.73_M2}
EGFRCR SERPLBLD CKD-EPI 2021: >90 ML/MIN/{1.73_M2}
EOSINOPHIL # BLD: 0 K/MCL (ref 0–0.5)
EOSINOPHIL NFR BLD: 0 %
ERYTHROCYTE [DISTWIDTH] IN BLOOD: 11.9 % (ref 11–15)
FASTING DURATION TIME PATIENT: ABNORMAL H
FASTING DURATION TIME PATIENT: ABNORMAL H
FERRITIN SERPL-MCNC: 956 NG/ML (ref 8–252)
FLUAV RNA RESP QL NAA+PROBE: NOT DETECTED
FLUBV RNA RESP QL NAA+PROBE: NOT DETECTED
GLOBULIN SER-MCNC: 3.9 G/DL (ref 2–4)
GLOBULIN SER-MCNC: 6.2 G/DL (ref 2–4)
GLUCOSE BLDC GLUCOMTR-MCNC: 265 MG/DL (ref 70–99)
GLUCOSE BLDC GLUCOMTR-MCNC: 305 MG/DL (ref 70–99)
GLUCOSE SERPL-MCNC: 284 MG/DL (ref 70–99)
GLUCOSE SERPL-MCNC: 595 MG/DL (ref 70–99)
GLUCOSE UR STRIP-MCNC: >1000 MG/DL
HBA1C MFR BLD: 8 % (ref 4.5–5.6)
HCG SERPL-ACNC: <2 MUNITS/ML
HCT VFR BLD CALC: 45.1 % (ref 36–46.5)
HGB BLD-MCNC: 14.6 G/DL (ref 12–15.5)
HGB UR QL STRIP: ABNORMAL
HYALINE CASTS #/AREA URNS LPF: ABNORMAL /LPF
IMM GRANULOCYTES # BLD AUTO: 0.1 K/MCL (ref 0–0.2)
IMM GRANULOCYTES # BLD: 0 %
KETONES UR STRIP-MCNC: ABNORMAL MG/DL
LACTATE BLDV-SCNC: 2.2 MMOL/L (ref 0–2)
LACTATE BLDV-SCNC: 2.5 MMOL/L (ref 0–2)
LACTATE BLDV-SCNC: 2.7 MMOL/L (ref 0–2)
LEUKOCYTE ESTERASE UR QL STRIP: NEGATIVE
LIPASE SERPL-CCNC: <6 UNITS/L (ref 15–77)
LYMPHOCYTES # BLD: 2.1 K/MCL (ref 1–4.8)
LYMPHOCYTES NFR BLD: 13 %
MAGNESIUM SERPL-MCNC: 2.5 MG/DL (ref 1.7–2.4)
MCH RBC QN AUTO: 34 PG (ref 26–34)
MCHC RBC AUTO-ENTMCNC: 32.4 G/DL (ref 32–36.5)
MCV RBC AUTO: 105.1 FL (ref 78–100)
MONOCYTES # BLD: 1.1 K/MCL (ref 0.3–0.9)
MONOCYTES NFR BLD: 7 %
MUCOUS THREADS URNS QL MICRO: PRESENT
NEUTROPHILS # BLD: 12.8 K/MCL (ref 1.8–7.7)
NEUTROPHILS NFR BLD: 80 %
NITRITE UR QL STRIP: NEGATIVE
NRBC BLD MANUAL-RTO: 0 /100 WBC
OSMOLALITY SERPL: 374 MOSM/KG (ref 275–300)
PH UR STRIP: 6 [PH] (ref 5–7)
PLATELET # BLD AUTO: 163 K/MCL (ref 140–450)
POTASSIUM SERPL-SCNC: 3 MMOL/L (ref 3.4–5.1)
POTASSIUM SERPL-SCNC: 4 MMOL/L (ref 3.4–5.1)
PROCALCITONIN SERPL IA-MCNC: 3.87 NG/ML
PROT SERPL-MCNC: 5.6 G/DL (ref 6.4–8.2)
PROT SERPL-MCNC: 8.8 G/DL (ref 6.4–8.2)
PROT UR STRIP-MCNC: 300 MG/DL
QRS-INTERVAL (MSEC): 66
QT-INTERVAL (MSEC): 336
QTC: 529
R AXIS (DEGREES): 79
RBC # BLD: 4.29 MIL/MCL (ref 4–5.2)
RBC #/AREA URNS HPF: ABNORMAL /HPF
REPORT TEXT: NORMAL
RSV AG NPH QL IA.RAPID: NOT DETECTED
SALICYLATES SERPL-MCNC: <2.8 MG/DL
SARS-COV-2 RNA RESP QL NAA+PROBE: NOT DETECTED
SERVICE CMNT-IMP: NORMAL
SERVICE CMNT-IMP: NORMAL
SODIUM SERPL-SCNC: 147 MMOL/L (ref 135–145)
SODIUM SERPL-SCNC: 152 MMOL/L (ref 135–145)
SP GR UR STRIP: >1.03 (ref 1–1.03)
SQUAMOUS #/AREA URNS HPF: ABNORMAL /HPF
T AXIS (DEGREES): 66
T3FREE SERPL-MCNC: 1 PG/ML (ref 2.2–4)
T4 FREE SERPL-MCNC: 0.9 NG/DL (ref 0.8–1.5)
TROPONIN I SERPL DL<=0.01 NG/ML-MCNC: 120 NG/L
TROPONIN I SERPL DL<=0.01 NG/ML-MCNC: 133 NG/L
TSH SERPL-ACNC: 0.13 MCUNITS/ML (ref 0.35–5)
UROBILINOGEN UR STRIP-MCNC: 0.2 MG/DL
VALPROATE SERPL-MCNC: 26 MCG/ML (ref 50–125)
VENTRICULAR RATE EKG/MIN (BPM): 149
WBC # BLD: 16.1 K/MCL (ref 4.2–11)
WBC #/AREA URNS HPF: ABNORMAL /HPF

## 2024-09-27 PROCEDURE — 10002807 HB RX 258

## 2024-09-27 PROCEDURE — 13003377

## 2024-09-27 PROCEDURE — 84702 CHORIONIC GONADOTROPIN TEST: CPT | Performed by: STUDENT IN AN ORGANIZED HEALTH CARE EDUCATION/TRAINING PROGRAM

## 2024-09-27 PROCEDURE — 80053 COMPREHEN METABOLIC PANEL: CPT

## 2024-09-27 PROCEDURE — 10002801 HB RX 250 W/O HCPCS

## 2024-09-27 PROCEDURE — 0241U COVID/FLU/RSV PANEL: CPT | Performed by: STUDENT IN AN ORGANIZED HEALTH CARE EDUCATION/TRAINING PROGRAM

## 2024-09-27 PROCEDURE — 83605 ASSAY OF LACTIC ACID: CPT | Performed by: STUDENT IN AN ORGANIZED HEALTH CARE EDUCATION/TRAINING PROGRAM

## 2024-09-27 PROCEDURE — 80164 ASSAY DIPROPYLACETIC ACD TOT: CPT

## 2024-09-27 PROCEDURE — 10004180 HB COUNTER-TRANSPORT

## 2024-09-27 PROCEDURE — 80053 COMPREHEN METABOLIC PANEL: CPT | Performed by: STUDENT IN AN ORGANIZED HEALTH CARE EDUCATION/TRAINING PROGRAM

## 2024-09-27 PROCEDURE — 70450 CT HEAD/BRAIN W/O DYE: CPT

## 2024-09-27 PROCEDURE — 62270 DX LMBR SPI PNXR: CPT | Performed by: STUDENT IN AN ORGANIZED HEALTH CARE EDUCATION/TRAINING PROGRAM

## 2024-09-27 PROCEDURE — 82962 GLUCOSE BLOOD TEST: CPT

## 2024-09-27 PROCEDURE — 10002800 HB RX 250 W HCPCS

## 2024-09-27 PROCEDURE — 82728 ASSAY OF FERRITIN: CPT | Performed by: INTERNAL MEDICINE

## 2024-09-27 PROCEDURE — 85025 COMPLETE CBC W/AUTO DIFF WBC: CPT | Performed by: STUDENT IN AN ORGANIZED HEALTH CARE EDUCATION/TRAINING PROGRAM

## 2024-09-27 PROCEDURE — 84439 ASSAY OF FREE THYROXINE: CPT | Performed by: STUDENT IN AN ORGANIZED HEALTH CARE EDUCATION/TRAINING PROGRAM

## 2024-09-27 PROCEDURE — 84443 ASSAY THYROID STIM HORMONE: CPT | Performed by: STUDENT IN AN ORGANIZED HEALTH CARE EDUCATION/TRAINING PROGRAM

## 2024-09-27 PROCEDURE — 96375 TX/PRO/DX INJ NEW DRUG ADDON: CPT

## 2024-09-27 PROCEDURE — 83690 ASSAY OF LIPASE: CPT | Performed by: STUDENT IN AN ORGANIZED HEALTH CARE EDUCATION/TRAINING PROGRAM

## 2024-09-27 PROCEDURE — 84484 ASSAY OF TROPONIN QUANT: CPT | Performed by: STUDENT IN AN ORGANIZED HEALTH CARE EDUCATION/TRAINING PROGRAM

## 2024-09-27 PROCEDURE — 10000008 HB ROOM CHARGE ICU OR CCU

## 2024-09-27 PROCEDURE — 82550 ASSAY OF CK (CPK): CPT | Performed by: STUDENT IN AN ORGANIZED HEALTH CARE EDUCATION/TRAINING PROGRAM

## 2024-09-27 PROCEDURE — 10002800 HB RX 250 W HCPCS: Performed by: STUDENT IN AN ORGANIZED HEALTH CARE EDUCATION/TRAINING PROGRAM

## 2024-09-27 PROCEDURE — 10004651 HB RX, NO CHARGE ITEM

## 2024-09-27 PROCEDURE — 96376 TX/PRO/DX INJ SAME DRUG ADON: CPT

## 2024-09-27 PROCEDURE — 81001 URINALYSIS AUTO W/SCOPE: CPT | Performed by: STUDENT IN AN ORGANIZED HEALTH CARE EDUCATION/TRAINING PROGRAM

## 2024-09-27 PROCEDURE — 80179 DRUG ASSAY SALICYLATE: CPT

## 2024-09-27 PROCEDURE — 10002803 HB RX 637

## 2024-09-27 PROCEDURE — 93010 ELECTROCARDIOGRAM REPORT: CPT | Performed by: INTERNAL MEDICINE

## 2024-09-27 PROCEDURE — 84145 PROCALCITONIN (PCT): CPT

## 2024-09-27 PROCEDURE — 99292 CRITICAL CARE ADDL 30 MIN: CPT | Performed by: STUDENT IN AN ORGANIZED HEALTH CARE EDUCATION/TRAINING PROGRAM

## 2024-09-27 PROCEDURE — 99291 CRITICAL CARE FIRST HOUR: CPT

## 2024-09-27 PROCEDURE — 96365 THER/PROPH/DIAG IV INF INIT: CPT

## 2024-09-27 PROCEDURE — 99292 CRITICAL CARE ADDL 30 MIN: CPT | Performed by: EMERGENCY MEDICINE

## 2024-09-27 PROCEDURE — 84481 FREE ASSAY (FT-3): CPT | Performed by: STUDENT IN AN ORGANIZED HEALTH CARE EDUCATION/TRAINING PROGRAM

## 2024-09-27 PROCEDURE — 87040 BLOOD CULTURE FOR BACTERIA: CPT | Performed by: STUDENT IN AN ORGANIZED HEALTH CARE EDUCATION/TRAINING PROGRAM

## 2024-09-27 PROCEDURE — 74177 CT ABD & PELVIS W/CONTRAST: CPT

## 2024-09-27 PROCEDURE — 36415 COLL VENOUS BLD VENIPUNCTURE: CPT

## 2024-09-27 PROCEDURE — 51702 INSERT TEMP BLADDER CATH: CPT

## 2024-09-27 PROCEDURE — 10002805 HB CONTRAST AGENT: Performed by: STUDENT IN AN ORGANIZED HEALTH CARE EDUCATION/TRAINING PROGRAM

## 2024-09-27 PROCEDURE — 99291 CRITICAL CARE FIRST HOUR: CPT | Performed by: STUDENT IN AN ORGANIZED HEALTH CARE EDUCATION/TRAINING PROGRAM

## 2024-09-27 PROCEDURE — 83735 ASSAY OF MAGNESIUM: CPT | Performed by: STUDENT IN AN ORGANIZED HEALTH CARE EDUCATION/TRAINING PROGRAM

## 2024-09-27 PROCEDURE — 83930 ASSAY OF BLOOD OSMOLALITY: CPT | Performed by: STUDENT IN AN ORGANIZED HEALTH CARE EDUCATION/TRAINING PROGRAM

## 2024-09-27 PROCEDURE — 10002807 HB RX 258: Performed by: STUDENT IN AN ORGANIZED HEALTH CARE EDUCATION/TRAINING PROGRAM

## 2024-09-27 PROCEDURE — 93005 ELECTROCARDIOGRAM TRACING: CPT | Performed by: STUDENT IN AN ORGANIZED HEALTH CARE EDUCATION/TRAINING PROGRAM

## 2024-09-27 PROCEDURE — 96367 TX/PROPH/DG ADDL SEQ IV INF: CPT

## 2024-09-27 PROCEDURE — 94002 VENT MGMT INPAT INIT DAY: CPT

## 2024-09-27 PROCEDURE — 71260 CT THORAX DX C+: CPT

## 2024-09-27 PROCEDURE — 5A1955Z RESPIRATORY VENTILATION, GREATER THAN 96 CONSECUTIVE HOURS: ICD-10-PCS | Performed by: STUDENT IN AN ORGANIZED HEALTH CARE EDUCATION/TRAINING PROGRAM

## 2024-09-27 PROCEDURE — 10006027 HB SUPPLY 278

## 2024-09-27 PROCEDURE — 82010 KETONE BODYS QUAN: CPT | Performed by: STUDENT IN AN ORGANIZED HEALTH CARE EDUCATION/TRAINING PROGRAM

## 2024-09-27 PROCEDURE — 83036 HEMOGLOBIN GLYCOSYLATED A1C: CPT

## 2024-09-27 PROCEDURE — 82140 ASSAY OF AMMONIA: CPT

## 2024-09-27 RX ORDER — DEXTROSE MONOHYDRATE 25 G/50ML
25 INJECTION, SOLUTION INTRAVENOUS PRN
Status: DISCONTINUED | OUTPATIENT
Start: 2024-09-27 | End: 2024-10-11 | Stop reason: HOSPADM

## 2024-09-27 RX ORDER — IBUPROFEN 600 MG/1
600 TABLET, FILM COATED ORAL 3 TIMES DAILY
Status: ON HOLD | COMMUNITY
End: 2024-10-09 | Stop reason: HOSPADM

## 2024-09-27 RX ORDER — SODIUM CHLORIDE 9 MG/ML
INJECTION, SOLUTION INTRAVENOUS CONTINUOUS PRN
Status: DISCONTINUED | OUTPATIENT
Start: 2024-09-27 | End: 2024-10-11 | Stop reason: HOSPADM

## 2024-09-27 RX ORDER — IBUPROFEN 600 MG/1
600 TABLET, FILM COATED ORAL EVERY 6 HOURS PRN
Status: ON HOLD | COMMUNITY
Start: 2024-09-27 | End: 2024-10-09

## 2024-09-27 RX ORDER — FAMOTIDINE 20 MG/1
10 TABLET, FILM COATED ORAL 2 TIMES DAILY
Status: DISCONTINUED | OUTPATIENT
Start: 2024-09-27 | End: 2024-10-11 | Stop reason: HOSPADM

## 2024-09-27 RX ORDER — OXYCODONE HYDROCHLORIDE 5 MG/1
5 TABLET ORAL EVERY 12 HOURS SCHEDULED
Status: ON HOLD | COMMUNITY
End: 2024-10-09 | Stop reason: HOSPADM

## 2024-09-27 RX ORDER — NICOTINE POLACRILEX 4 MG
30 LOZENGE BUCCAL PRN
Status: DISCONTINUED | OUTPATIENT
Start: 2024-09-27 | End: 2024-10-11 | Stop reason: HOSPADM

## 2024-09-27 RX ORDER — FAMOTIDINE 10 MG
10 TABLET ORAL 2 TIMES DAILY
COMMUNITY

## 2024-09-27 RX ORDER — AMPICILLIN TRIHYDRATE 250 MG
200 CAPSULE ORAL DAILY
COMMUNITY

## 2024-09-27 RX ORDER — DEXTROSE MONOHYDRATE 25 G/50ML
12.5 INJECTION, SOLUTION INTRAVENOUS PRN
Status: DISCONTINUED | OUTPATIENT
Start: 2024-09-27 | End: 2024-10-11 | Stop reason: HOSPADM

## 2024-09-27 RX ORDER — BACLOFEN 10 MG/1
15 TABLET ORAL ONCE
Status: DISCONTINUED | OUTPATIENT
Start: 2024-09-27 | End: 2024-09-27

## 2024-09-27 RX ORDER — LACOSAMIDE 10 MG/ML
150 SOLUTION ORAL EVERY 12 HOURS SCHEDULED
Status: DISCONTINUED | OUTPATIENT
Start: 2024-09-27 | End: 2024-10-11 | Stop reason: HOSPADM

## 2024-09-27 RX ORDER — 0.9 % SODIUM CHLORIDE 0.9 %
2 VIAL (ML) INJECTION EVERY 12 HOURS SCHEDULED
Status: DISCONTINUED | OUTPATIENT
Start: 2024-09-27 | End: 2024-10-11 | Stop reason: HOSPADM

## 2024-09-27 RX ORDER — ACETAMINOPHEN 650 MG/1
650 SUPPOSITORY RECTAL ONCE
Status: COMPLETED | OUTPATIENT
Start: 2024-09-27 | End: 2024-09-27

## 2024-09-27 RX ORDER — ACETAMINOPHEN 650 MG/1
SUPPOSITORY RECTAL
Status: COMPLETED
Start: 2024-09-27 | End: 2024-09-27

## 2024-09-27 RX ORDER — POLYETHYLENE GLYCOL 3350 17 G/17G
17 POWDER, FOR SOLUTION ORAL DAILY PRN
Status: DISCONTINUED | OUTPATIENT
Start: 2024-09-27 | End: 2024-10-11 | Stop reason: HOSPADM

## 2024-09-27 RX ORDER — SULFAMETHOXAZOLE AND TRIMETHOPRIM 400; 80 MG/1; MG/1
1 TABLET ORAL 2 TIMES DAILY
Status: ON HOLD | COMMUNITY
Start: 2024-09-27 | End: 2024-10-09 | Stop reason: HOSPADM

## 2024-09-27 RX ORDER — SODIUM CHLORIDE 9 MG/ML
100 INJECTION, SOLUTION INTRAVENOUS CONTINUOUS
Status: ON HOLD | COMMUNITY
Start: 2024-09-27 | End: 2024-10-09 | Stop reason: HOSPADM

## 2024-09-27 RX ORDER — MAGNESIUM GLYCINATE 100 MG
100 TABLET ORAL DAILY
COMMUNITY

## 2024-09-27 RX ORDER — OFLOXACIN 3 MG/ML
3 SOLUTION/ DROPS OPHTHALMIC 3 TIMES DAILY
Status: ON HOLD | COMMUNITY
Start: 2024-09-24 | End: 2024-10-09

## 2024-09-27 RX ORDER — LEVETIRACETAM 100 MG/ML
750 SOLUTION ORAL EVERY 12 HOURS SCHEDULED
Status: DISCONTINUED | OUTPATIENT
Start: 2024-09-27 | End: 2024-10-11 | Stop reason: HOSPADM

## 2024-09-27 RX ORDER — NICOTINE POLACRILEX 4 MG
15 LOZENGE BUCCAL PRN
Status: DISCONTINUED | OUTPATIENT
Start: 2024-09-27 | End: 2024-10-11 | Stop reason: HOSPADM

## 2024-09-27 RX ORDER — CALCIUM CARB/VITAMIN D3/VIT K1 500-500-40
5 TABLET,CHEWABLE ORAL DAILY
COMMUNITY

## 2024-09-27 RX ORDER — SACCHAROMYCES BOULARDII 250 MG
250 CAPSULE ORAL DAILY
COMMUNITY

## 2024-09-27 RX ORDER — LOPERAMIDE HYDROCHLORIDE 2 MG/1
2 TABLET ORAL EVERY 8 HOURS PRN
COMMUNITY

## 2024-09-27 RX ORDER — AMOXICILLIN 250 MG
1 CAPSULE ORAL NIGHTLY PRN
Status: DISCONTINUED | OUTPATIENT
Start: 2024-09-27 | End: 2024-10-11 | Stop reason: HOSPADM

## 2024-09-27 RX ORDER — B-COMPLEX WITH VITAMIN C
1 CAPSULE ORAL DAILY
COMMUNITY

## 2024-09-27 RX ORDER — CEFAZOLIN SODIUM/WATER 2 G/20 ML
2000 SYRINGE (ML) INTRAVENOUS ONCE
Status: COMPLETED | OUTPATIENT
Start: 2024-09-27 | End: 2024-09-27

## 2024-09-27 RX ORDER — VALPROIC ACID 250 MG/5ML
250 SOLUTION ORAL EVERY 12 HOURS
Status: ON HOLD | COMMUNITY
End: 2024-10-09 | Stop reason: HOSPADM

## 2024-09-27 RX ORDER — LORAZEPAM 1 MG/1
1 TABLET ORAL ONCE
Status: DISCONTINUED | OUTPATIENT
Start: 2024-09-27 | End: 2024-09-28

## 2024-09-27 RX ORDER — LORAZEPAM 1 MG/1
1 TABLET ORAL 4 TIMES DAILY
COMMUNITY

## 2024-09-27 RX ADMIN — FAMOTIDINE 10 MG: 20 TABLET ORAL at 22:19

## 2024-09-27 RX ADMIN — SODIUM CHLORIDE, PRESERVATIVE FREE 2 ML: 5 INJECTION INTRAVENOUS at 23:00

## 2024-09-27 RX ADMIN — IOHEXOL 60 ML: 350 INJECTION, SOLUTION INTRAVENOUS at 17:06

## 2024-09-27 RX ADMIN — PIPERACILLIN SODIUM AND TAZOBACTAM SODIUM 3.38 G: 3; .375 INJECTION, POWDER, FOR SOLUTION INTRAVENOUS at 14:49

## 2024-09-27 RX ADMIN — ACETAMINOPHEN 650 MG: 650 SUPPOSITORY RECTAL at 13:46

## 2024-09-27 RX ADMIN — MORPHINE SULFATE 4 MG: 4 INJECTION INTRAVENOUS at 14:06

## 2024-09-27 RX ADMIN — LACOSAMIDE 150 MG: 100 SOLUTION ORAL at 22:12

## 2024-09-27 RX ADMIN — ACYCLOVIR SODIUM 350 MG: 50 INJECTION, SOLUTION INTRAVENOUS at 17:04

## 2024-09-27 RX ADMIN — VANCOMYCIN HYDROCHLORIDE 750 MG: 750 INJECTION, POWDER, LYOPHILIZED, FOR SOLUTION INTRAVENOUS at 15:40

## 2024-09-27 RX ADMIN — LEVETIRACETAM 750 MG: 100 SOLUTION ORAL at 22:12

## 2024-09-27 RX ADMIN — SODIUM CHLORIDE, POTASSIUM CHLORIDE, SODIUM LACTATE AND CALCIUM CHLORIDE 1000 ML: 600; 310; 30; 20 INJECTION, SOLUTION INTRAVENOUS at 13:44

## 2024-09-27 RX ADMIN — MORPHINE SULFATE 4 MG: 4 INJECTION INTRAVENOUS at 15:09

## 2024-09-27 RX ADMIN — MEROPENEM 2 G: 1 INJECTION, POWDER, FOR SOLUTION INTRAVENOUS at 23:59

## 2024-09-27 RX ADMIN — CEFTRIAXONE SODIUM 2000 MG: 10 INJECTION, POWDER, FOR SOLUTION INTRAVENOUS at 16:09

## 2024-09-27 RX ADMIN — SODIUM CHLORIDE, POTASSIUM CHLORIDE, SODIUM LACTATE AND CALCIUM CHLORIDE 1000 ML: 600; 310; 30; 20 INJECTION, SOLUTION INTRAVENOUS at 15:00

## 2024-09-27 ASSESSMENT — PAIN SCALES - BEHAVIORAL PAIN SCALE (BPS)
BPS_SCORE: 4
BPS_SCORE: 3

## 2024-09-28 VITALS
SYSTOLIC BLOOD PRESSURE: 135 MMHG | HEIGHT: 63 IN | RESPIRATION RATE: 17 BRPM | DIASTOLIC BLOOD PRESSURE: 93 MMHG | TEMPERATURE: 99.7 F | WEIGHT: 71.87 LBS | OXYGEN SATURATION: 99 % | HEART RATE: 81 BPM | BODY MASS INDEX: 12.73 KG/M2

## 2024-09-28 LAB
# SPEC OBTAINED: 4
ALBUMIN SERPL-MCNC: 2.3 G/DL (ref 3.4–5)
ALBUMIN/GLOB SERPL: 0.5 {RATIO} (ref 1–2.4)
ALP SERPL-CCNC: 69 UNITS/L (ref 45–117)
ALT SERPL-CCNC: 24 UNITS/L
ANION GAP SERPL CALC-SCNC: 6 MMOL/L (ref 7–19)
APTT PPP: 26 SEC (ref 22–32)
AST SERPL-CCNC: 22 UNITS/L
B PARAPERT DNA SPEC QL NAA+PROBE: NOT DETECTED
B PERT.PT PRMT NPH QL NAA+NON-PROBE: NOT DETECTED
BACTERIA BLD CULT: NORMAL
BACTERIA BLD CULT: NORMAL
BACTERIA CSF CULT: NORMAL
BASOPHILS # BLD: 0 K/MCL (ref 0–0.3)
BASOPHILS NFR BLD: 0 %
BILIRUB SERPL-MCNC: 0.4 MG/DL (ref 0.2–1)
BUN SERPL-MCNC: 20 MG/DL (ref 6–20)
BUN/CREAT SERPL: 59 (ref 7–25)
C DIFF TOX A+B STL QL IA: NOT DETECTED
C DIFF TOX B TCDB STL QL NAA+PROBE: DETECTED
C GATTII+NEOFOR DNA CSF QL NAA+NON-PROBE: NOT DETECTED
C PNEUM DNA NPH QL NAA+NON-PROBE: NOT DETECTED
CALCIUM SERPL-MCNC: 8.6 MG/DL (ref 8.4–10.2)
CHLORIDE SERPL-SCNC: 116 MMOL/L (ref 97–110)
CHOLEST SERPL-MCNC: 105 MG/DL
CHOLEST/HDLC SERPL: 2.4 {RATIO}
CLARITY CSF: CLEAR
CMV DNA CSF QL NAA+NON-PROBE: NOT DETECTED
CO2 SERPL-SCNC: 31 MMOL/L (ref 21–32)
COLOR CSF: COLORLESS
CREAT SERPL-MCNC: 0.34 MG/DL (ref 0.51–0.95)
DEPRECATED RDW RBC: 45.1 FL (ref 39–50)
E COLI K1 DNA CSF QL NAA+NON-PROBE: NOT DETECTED
EGFRCR SERPLBLD CKD-EPI 2021: >90 ML/MIN/{1.73_M2}
EOSINOPHIL # BLD: 0 K/MCL (ref 0–0.5)
EOSINOPHIL NFR BLD: 0 %
ERYTHROCYTE [DISTWIDTH] IN BLOOD: 11.8 % (ref 11–15)
EV RNA CSF QL NAA+NON-PROBE: NOT DETECTED
FASTING DURATION TIME PATIENT: ABNORMAL H
FIBRINOGEN PPP-MCNC: 312 MG/DL (ref 190–425)
FLUAV RNA NPH QL NAA+NON-PROBE: NOT DETECTED
FLUBV RNA NPH QL NAA+NON-PROBE: NOT DETECTED
GLOBULIN SER-MCNC: 4.9 G/DL (ref 2–4)
GLUCOSE BLDC GLUCOMTR-MCNC: 128 MG/DL (ref 70–99)
GLUCOSE BLDC GLUCOMTR-MCNC: 162 MG/DL (ref 70–99)
GLUCOSE BLDC GLUCOMTR-MCNC: 219 MG/DL (ref 70–99)
GLUCOSE BLDC GLUCOMTR-MCNC: 227 MG/DL (ref 70–99)
GLUCOSE BLDC GLUCOMTR-MCNC: 341 MG/DL (ref 70–99)
GLUCOSE CSF-MCNC: 139 MG/DL (ref 40–70)
GLUCOSE SERPL-MCNC: 248 MG/DL (ref 70–99)
GP B STREP DNA CSF QL NAA+NON-PROBE: NOT DETECTED
GRAM STN SPEC: NORMAL
HADV DNA NPH QL NAA+NON-PROBE: NOT DETECTED
HAEM INFLU DNA CSF QL NAA+NON-PROBE: NOT DETECTED
HCOV 229E RNA NPH QL NAA+NON-PROBE: NOT DETECTED
HCOV HKU1 RNA NPH QL NAA+NON-PROBE: NOT DETECTED
HCOV NL63 RNA NPH QL NAA+NON-PROBE: NOT DETECTED
HCOV OC43 RNA NPH QL NAA+NON-PROBE: NOT DETECTED
HCT VFR BLD CALC: 37 % (ref 36–46.5)
HDLC SERPL-MCNC: 43 MG/DL
HGB BLD-MCNC: 12 G/DL (ref 12–15.5)
HHV6 DNA CSF QL NAA+NON-PROBE: NOT DETECTED
HMPV RNA NPH QL NAA+NON-PROBE: NOT DETECTED
HPIV1 RNA NPH QL NAA+NON-PROBE: NOT DETECTED
HPIV2 RNA NPH QL NAA+NON-PROBE: NOT DETECTED
HPIV3 RNA NPH QL NAA+NON-PROBE: NOT DETECTED
HPIV4 RNA NPH QL NAA+NON-PROBE: NOT DETECTED
HSV1 DNA CSF QL NAA+NON-PROBE: NOT DETECTED
HSV2 DNA CSF QL NAA+NON-PROBE: NOT DETECTED
IMM GRANULOCYTES # BLD AUTO: 0.1 K/MCL (ref 0–0.2)
IMM GRANULOCYTES # BLD: 0 %
INR PPP: 1.6
L MONOCYTOG DNA CSF QL NAA+NON-PROBE: NOT DETECTED
LDLC SERPL CALC-MCNC: 38 MG/DL
LYMPHOCYTES # BLD: 1.8 K/MCL (ref 1–4.8)
LYMPHOCYTES NFR BLD: 14 %
M PNEUMO DNA NPH QL NAA+NON-PROBE: NOT DETECTED
MAGNESIUM SERPL-MCNC: 2.3 MG/DL (ref 1.7–2.4)
MCH RBC QN AUTO: 33.6 PG (ref 26–34)
MCHC RBC AUTO-ENTMCNC: 32.4 G/DL (ref 32–36.5)
MCV RBC AUTO: 103.6 FL (ref 78–100)
MONOCYTES # BLD: 0.5 K/MCL (ref 0.3–0.9)
MONOCYTES NFR BLD: 4 %
N MEN DNA CSF QL NAA+NON-PROBE: NOT DETECTED
NEUTROPHILS # BLD: 10.7 K/MCL (ref 1.8–7.7)
NEUTROPHILS NFR BLD: 82 %
NONHDLC SERPL-MCNC: 62 MG/DL
NRBC BLD MANUAL-RTO: 0 /100 WBC
NUC CELL # CSF: 1 /MCL (ref 0–10)
PARECHOVIRUS A RNA CSF QL NAA+NON-PROBE: NOT DETECTED
PHOSPHATE SERPL-MCNC: 1.5 MG/DL (ref 2.4–4.7)
PLATELET # BLD AUTO: 103 K/MCL (ref 140–450)
POTASSIUM SERPL-SCNC: 3.1 MMOL/L (ref 3.4–5.1)
PROT CSF-MCNC: 42 MG/DL (ref 15–45)
PROT SERPL-MCNC: 7.2 G/DL (ref 6.4–8.2)
PROTHROMBIN TIME: 17 SEC (ref 9.7–11.8)
RBC # BLD: 3.57 MIL/MCL (ref 4–5.2)
RBC # CSF: 47 /MCL
RSV RNA NPH QL NAA+NON-PROBE: NOT DETECTED
RV+EV RNA NPH QL NAA+NON-PROBE: NOT DETECTED
S PNEUM DNA CSF QL NAA+NON-PROBE: NOT DETECTED
SARS-COV-2 RNA RESP QL NAA+PROBE: NOT DETECTED
SERVICE CMNT-IMP: NORMAL
SODIUM SERPL-SCNC: 150 MMOL/L (ref 135–145)
TOTAL CELLS COUNTED BLD: NORMAL
TRIGL SERPL-MCNC: 119 MG/DL
TUBE # CSF: 4
VZV DNA SPEC QL NAA+PROBE: NOT DETECTED
WBC # BLD: 13.1 K/MCL (ref 4.2–11)
XANTHOCHROMIA CSF QL: ABNORMAL

## 2024-09-28 PROCEDURE — 85384 FIBRINOGEN ACTIVITY: CPT

## 2024-09-28 PROCEDURE — 96372 THER/PROPH/DIAG INJ SC/IM: CPT

## 2024-09-28 PROCEDURE — 10002800 HB RX 250 W HCPCS

## 2024-09-28 PROCEDURE — 85730 THROMBOPLASTIN TIME PARTIAL: CPT

## 2024-09-28 PROCEDURE — 009U3ZX DRAINAGE OF SPINAL CANAL, PERCUTANEOUS APPROACH, DIAGNOSTIC: ICD-10-PCS | Performed by: EMERGENCY MEDICINE

## 2024-09-28 PROCEDURE — 10002801 HB RX 250 W/O HCPCS

## 2024-09-28 PROCEDURE — 10002803 HB RX 637

## 2024-09-28 PROCEDURE — 87493 C DIFF AMPLIFIED PROBE: CPT

## 2024-09-28 PROCEDURE — 85230 CLOT FACTOR VII PROCONVERTIN: CPT

## 2024-09-28 PROCEDURE — 85610 PROTHROMBIN TIME: CPT

## 2024-09-28 PROCEDURE — 80053 COMPREHEN METABOLIC PANEL: CPT

## 2024-09-28 PROCEDURE — 10002800 HB RX 250 W HCPCS: Performed by: STUDENT IN AN ORGANIZED HEALTH CARE EDUCATION/TRAINING PROGRAM

## 2024-09-28 PROCEDURE — 85025 COMPLETE CBC W/AUTO DIFF WBC: CPT

## 2024-09-28 PROCEDURE — 94640 AIRWAY INHALATION TREATMENT: CPT

## 2024-09-28 PROCEDURE — 87483 CNS DNA AMP PROBE TYPE 12-25: CPT | Performed by: STUDENT IN AN ORGANIZED HEALTH CARE EDUCATION/TRAINING PROGRAM

## 2024-09-28 PROCEDURE — 85210 CLOT FACTOR II PROTHROM SPEC: CPT

## 2024-09-28 PROCEDURE — 80061 LIPID PANEL: CPT

## 2024-09-28 PROCEDURE — 94003 VENT MGMT INPAT SUBQ DAY: CPT

## 2024-09-28 PROCEDURE — 84157 ASSAY OF PROTEIN OTHER: CPT | Performed by: STUDENT IN AN ORGANIZED HEALTH CARE EDUCATION/TRAINING PROGRAM

## 2024-09-28 PROCEDURE — 10002807 HB RX 258

## 2024-09-28 PROCEDURE — 10004651 HB RX, NO CHARGE ITEM

## 2024-09-28 PROCEDURE — 36415 COLL VENOUS BLD VENIPUNCTURE: CPT

## 2024-09-28 PROCEDURE — 83520 IMMUNOASSAY QUANT NOS NONAB: CPT

## 2024-09-28 PROCEDURE — 83735 ASSAY OF MAGNESIUM: CPT

## 2024-09-28 PROCEDURE — 3E0G76Z INTRODUCTION OF NUTRITIONAL SUBSTANCE INTO UPPER GI, VIA NATURAL OR ARTIFICIAL OPENING: ICD-10-PCS | Performed by: EMERGENCY MEDICINE

## 2024-09-28 PROCEDURE — 85220 BLOOC CLOT FACTOR V TEST: CPT

## 2024-09-28 PROCEDURE — 10002801 HB RX 250 W/O HCPCS: Performed by: STUDENT IN AN ORGANIZED HEALTH CARE EDUCATION/TRAINING PROGRAM

## 2024-09-28 PROCEDURE — 85240 CLOT FACTOR VIII AHG 1 STAGE: CPT

## 2024-09-28 PROCEDURE — 10002807 HB RX 258: Performed by: STUDENT IN AN ORGANIZED HEALTH CARE EDUCATION/TRAINING PROGRAM

## 2024-09-28 PROCEDURE — 84100 ASSAY OF PHOSPHORUS: CPT

## 2024-09-28 PROCEDURE — 87070 CULTURE OTHR SPECIMN AEROBIC: CPT | Performed by: STUDENT IN AN ORGANIZED HEALTH CARE EDUCATION/TRAINING PROGRAM

## 2024-09-28 PROCEDURE — 87070 CULTURE OTHR SPECIMN AEROBIC: CPT

## 2024-09-28 PROCEDURE — 0202U NFCT DS 22 TRGT SARS-COV-2: CPT

## 2024-09-28 PROCEDURE — 89051 BODY FLUID CELL COUNT: CPT | Performed by: STUDENT IN AN ORGANIZED HEALTH CARE EDUCATION/TRAINING PROGRAM

## 2024-09-28 PROCEDURE — 99254 IP/OBS CNSLTJ NEW/EST MOD 60: CPT | Performed by: INTERNAL MEDICINE

## 2024-09-28 PROCEDURE — 10002803 HB RX 637: Performed by: STUDENT IN AN ORGANIZED HEALTH CARE EDUCATION/TRAINING PROGRAM

## 2024-09-28 PROCEDURE — 10006031 HB ROOM CHARGE TELEMETRY

## 2024-09-28 PROCEDURE — 82945 GLUCOSE OTHER FLUID: CPT | Performed by: STUDENT IN AN ORGANIZED HEALTH CARE EDUCATION/TRAINING PROGRAM

## 2024-09-28 RX ORDER — VANCOMYCIN HYDROCHLORIDE 250 MG/5ML
125 POWDER, FOR SOLUTION ORAL 4 TIMES DAILY
Status: DISCONTINUED | OUTPATIENT
Start: 2024-09-28 | End: 2024-09-28

## 2024-09-28 RX ORDER — LEVETIRACETAM 100 MG/ML
750 SOLUTION ORAL 2 TIMES DAILY
COMMUNITY

## 2024-09-28 RX ORDER — ACETAMINOPHEN 325 MG/1
650 TABLET ORAL EVERY 4 HOURS PRN
Status: DISCONTINUED | OUTPATIENT
Start: 2024-09-28 | End: 2024-10-11 | Stop reason: HOSPADM

## 2024-09-28 RX ORDER — SODIUM CHLORIDE, SODIUM LACTATE, POTASSIUM CHLORIDE, CALCIUM CHLORIDE 600; 310; 30; 20 MG/100ML; MG/100ML; MG/100ML; MG/100ML
INJECTION, SOLUTION INTRAVENOUS CONTINUOUS
Status: DISPENSED | OUTPATIENT
Start: 2024-09-28 | End: 2024-09-29

## 2024-09-28 RX ORDER — LACOSAMIDE 10 MG/ML
150 SOLUTION ORAL EVERY 12 HOURS SCHEDULED
COMMUNITY

## 2024-09-28 RX ORDER — OXYCODONE HYDROCHLORIDE 5 MG/1
2.5 TABLET ORAL EVERY 12 HOURS PRN
Status: DISCONTINUED | OUTPATIENT
Start: 2024-09-28 | End: 2024-10-11 | Stop reason: HOSPADM

## 2024-09-28 RX ORDER — VANCOMYCIN HYDROCHLORIDE 250 MG/5ML
500 POWDER, FOR SOLUTION ORAL 4 TIMES DAILY
Status: COMPLETED | OUTPATIENT
Start: 2024-09-28 | End: 2024-10-03

## 2024-09-28 RX ORDER — OXYCODONE HYDROCHLORIDE 5 MG/1
2.5 TABLET ORAL EVERY 12 HOURS SCHEDULED
Status: DISCONTINUED | OUTPATIENT
Start: 2024-09-28 | End: 2024-10-11 | Stop reason: HOSPADM

## 2024-09-28 RX ORDER — LORAZEPAM 1 MG/1
1 TABLET ORAL 4 TIMES DAILY
Status: DISCONTINUED | OUTPATIENT
Start: 2024-09-28 | End: 2024-10-02

## 2024-09-28 RX ORDER — FONDAPARINUX SODIUM 2.5 MG/.5ML
2.5 INJECTION SUBCUTANEOUS DAILY
Status: DISCONTINUED | OUTPATIENT
Start: 2024-09-28 | End: 2024-09-28

## 2024-09-28 RX ORDER — OXYCODONE HYDROCHLORIDE 5 MG/1
5 TABLET ORAL EVERY 12 HOURS SCHEDULED
Status: DISCONTINUED | OUTPATIENT
Start: 2024-09-28 | End: 2024-09-28

## 2024-09-28 RX ADMIN — FAMOTIDINE 10 MG: 20 TABLET ORAL at 08:42

## 2024-09-28 RX ADMIN — LEVETIRACETAM 750 MG: 100 SOLUTION ORAL at 08:43

## 2024-09-28 RX ADMIN — ACETAMINOPHEN 650 MG: 325 TABLET ORAL at 11:22

## 2024-09-28 RX ADMIN — FAMOTIDINE 10 MG: 20 TABLET ORAL at 21:13

## 2024-09-28 RX ADMIN — ACYCLOVIR SODIUM 350 MG: 50 INJECTION, SOLUTION INTRAVENOUS at 01:00

## 2024-09-28 RX ADMIN — VALPROIC ACID 250 MG: 250 SOLUTION ORAL at 13:04

## 2024-09-28 RX ADMIN — MEROPENEM 2 G: 1 INJECTION INTRAVENOUS at 06:25

## 2024-09-28 RX ADMIN — LORAZEPAM 1 MG: 1 TABLET ORAL at 13:05

## 2024-09-28 RX ADMIN — PANCRELIPASE 1 CAPSULE: 30000; 6000; 19000 CAPSULE, DELAYED RELEASE PELLETS ORAL at 08:43

## 2024-09-28 RX ADMIN — IPRATROPIUM BROMIDE 0.5 MG: 0.5 SOLUTION RESPIRATORY (INHALATION) at 19:43

## 2024-09-28 RX ADMIN — IPRATROPIUM BROMIDE 0.5 MG: 0.5 SOLUTION RESPIRATORY (INHALATION) at 15:58

## 2024-09-28 RX ADMIN — LORAZEPAM 1 MG: 1 TABLET ORAL at 21:13

## 2024-09-28 RX ADMIN — INSULIN LISPRO 4 UNITS: 100 INJECTION, SOLUTION INTRAVENOUS; SUBCUTANEOUS at 01:08

## 2024-09-28 RX ADMIN — LORAZEPAM 1 MG: 1 TABLET ORAL at 17:43

## 2024-09-28 RX ADMIN — ACYCLOVIR SODIUM 350 MG: 50 INJECTION, SOLUTION INTRAVENOUS at 13:11

## 2024-09-28 RX ADMIN — LACOSAMIDE 150 MG: 100 SOLUTION ORAL at 08:43

## 2024-09-28 RX ADMIN — OXYCODONE HYDROCHLORIDE 5 MG: 5 TABLET ORAL at 13:04

## 2024-09-28 RX ADMIN — APIXABAN 2.5 MG: 2.5 TABLET, FILM COATED ORAL at 21:13

## 2024-09-28 RX ADMIN — METRONIDAZOLE 500 MG: 5 INJECTION, SOLUTION INTRAVENOUS at 21:39

## 2024-09-28 RX ADMIN — INSULIN LISPRO 2 UNITS: 100 INJECTION, SOLUTION INTRAVENOUS; SUBCUTANEOUS at 06:28

## 2024-09-28 RX ADMIN — MEROPENEM 2 G: 1 INJECTION INTRAVENOUS at 14:06

## 2024-09-28 RX ADMIN — ACYCLOVIR SODIUM 350 MG: 50 INJECTION, SOLUTION INTRAVENOUS at 21:43

## 2024-09-28 RX ADMIN — OXYCODONE HYDROCHLORIDE 2.5 MG: 5 TABLET ORAL at 21:13

## 2024-09-28 RX ADMIN — ACYCLOVIR SODIUM 350 MG: 50 INJECTION, SOLUTION INTRAVENOUS at 06:54

## 2024-09-28 RX ADMIN — VALPROIC ACID 250 MG: 250 SOLUTION ORAL at 21:14

## 2024-09-28 RX ADMIN — VANCOMYCIN HYDROCHLORIDE 500 MG: 500 INJECTION, POWDER, LYOPHILIZED, FOR SOLUTION INTRAVENOUS at 06:57

## 2024-09-28 RX ADMIN — INSULIN LISPRO 2 UNITS: 100 INJECTION, SOLUTION INTRAVENOUS; SUBCUTANEOUS at 13:05

## 2024-09-28 RX ADMIN — APIXABAN 2.5 MG: 2.5 TABLET, FILM COATED ORAL at 13:00

## 2024-09-28 RX ADMIN — SODIUM CHLORIDE, PRESERVATIVE FREE 2 ML: 5 INJECTION INTRAVENOUS at 21:13

## 2024-09-28 RX ADMIN — VANCOMYCIN HYDROCHLORIDE 500 MG: KIT at 21:12

## 2024-09-28 RX ADMIN — SODIUM CHLORIDE, POTASSIUM CHLORIDE, SODIUM LACTATE AND CALCIUM CHLORIDE: 600; 310; 30; 20 INJECTION, SOLUTION INTRAVENOUS at 22:01

## 2024-09-28 RX ADMIN — PANCRELIPASE 1 CAPSULE: 30000; 6000; 19000 CAPSULE, DELAYED RELEASE PELLETS ORAL at 13:05

## 2024-09-28 RX ADMIN — VALPROIC ACID 250 MG: 250 SOLUTION ORAL at 01:00

## 2024-09-28 RX ADMIN — IPRATROPIUM BROMIDE 0.5 MG: 0.5 SOLUTION RESPIRATORY (INHALATION) at 12:10

## 2024-09-28 RX ADMIN — VANCOMYCIN HYDROCHLORIDE 500 MG: 500 INJECTION, POWDER, LYOPHILIZED, FOR SOLUTION INTRAVENOUS at 17:48

## 2024-09-28 RX ADMIN — IPRATROPIUM BROMIDE 0.5 MG: 0.5 SOLUTION RESPIRATORY (INHALATION) at 09:20

## 2024-09-28 RX ADMIN — POTASSIUM PHOSPHATE, MONOBASIC AND POTASSIUM PHOSPHATE, DIBASIC 40 MMOL: 224; 236 INJECTION, SOLUTION, CONCENTRATE INTRAVENOUS at 08:53

## 2024-09-28 RX ADMIN — LEVETIRACETAM 750 MG: 100 SOLUTION ORAL at 21:12

## 2024-09-28 RX ADMIN — VALPROIC ACID 250 MG: 250 SOLUTION ORAL at 06:59

## 2024-09-28 RX ADMIN — PANCRELIPASE 1 CAPSULE: 30000; 6000; 19000 CAPSULE, DELAYED RELEASE PELLETS ORAL at 21:26

## 2024-09-28 RX ADMIN — LACOSAMIDE 150 MG: 100 SOLUTION ORAL at 21:14

## 2024-09-28 RX ADMIN — MEROPENEM 500 MG: 500 INJECTION INTRAVENOUS at 21:40

## 2024-09-28 RX ADMIN — SODIUM CHLORIDE, PRESERVATIVE FREE 2 ML: 5 INJECTION INTRAVENOUS at 08:44

## 2024-09-28 ASSESSMENT — PAIN SCALES - BEHAVIORAL PAIN SCALE (BPS)
BPS_SCORE: 4

## 2024-09-28 ASSESSMENT — PATIENT HEALTH QUESTIONNAIRE - PHQ9: IS PATIENT ABLE TO COMPLETE PHQ2 OR PHQ9: NO, PATIENT WILL NEVER BE ABLE TO COMPLETE

## 2024-09-29 LAB
ALBUMIN SERPL-MCNC: 2.1 G/DL (ref 3.4–5)
ALBUMIN/GLOB SERPL: 0.5 {RATIO} (ref 1–2.4)
ALP SERPL-CCNC: 60 UNITS/L (ref 45–117)
ALT SERPL-CCNC: 26 UNITS/L
ANION GAP SERPL CALC-SCNC: 6 MMOL/L (ref 7–19)
APTT 2H NP PPP: 34 SEC
APTT IMM NP PPP: 31 SEC
APTT PPP: 32 SEC
AST SERPL-CCNC: 24 UNITS/L
BACTERIA SPT AEROBE CULT: ABNORMAL
BACTERIA SPT AEROBE CULT: ABNORMAL
BASOPHILS # BLD: 0 K/MCL (ref 0–0.3)
BASOPHILS # BLD: 0 K/MCL (ref 0–0.3)
BASOPHILS NFR BLD: 0 %
BASOPHILS NFR BLD: 0 %
BILIRUB SERPL-MCNC: 0.3 MG/DL (ref 0.2–1)
BUN SERPL-MCNC: 16 MG/DL (ref 6–20)
BUN/CREAT SERPL: 50 (ref 7–25)
CALCIUM SERPL-MCNC: 8 MG/DL (ref 8.4–10.2)
CHLORIDE SERPL-SCNC: 117 MMOL/L (ref 97–110)
CO2 SERPL-SCNC: 27 MMOL/L (ref 21–32)
CREAT SERPL-MCNC: 0.32 MG/DL (ref 0.51–0.95)
DEPRECATED RDW RBC: 45.2 FL (ref 39–50)
DEPRECATED RDW RBC: 45.6 FL (ref 39–50)
EGFRCR SERPLBLD CKD-EPI 2021: >90 ML/MIN/{1.73_M2}
EOSINOPHIL # BLD: 0.1 K/MCL (ref 0–0.5)
EOSINOPHIL # BLD: 0.1 K/MCL (ref 0–0.5)
EOSINOPHIL NFR BLD: 1 %
EOSINOPHIL NFR BLD: 1 %
ERYTHROCYTE [DISTWIDTH] IN BLOOD: 11.9 % (ref 11–15)
ERYTHROCYTE [DISTWIDTH] IN BLOOD: 12 % (ref 11–15)
FACT V ACT/NOR PPP: 104 %
FACT VII ACT/NOR PPP: 13 %
FACT VIII ACT/NOR PPP: 216 %
FASTING DURATION TIME PATIENT: ABNORMAL H
FERRITIN SERPL-MCNC: 1118 NG/ML (ref 8–252)
GLOBULIN SER-MCNC: 4.3 G/DL (ref 2–4)
GLUCOSE BLDC GLUCOMTR-MCNC: 177 MG/DL (ref 70–99)
GLUCOSE BLDC GLUCOMTR-MCNC: 196 MG/DL (ref 70–99)
GLUCOSE BLDC GLUCOMTR-MCNC: 256 MG/DL (ref 70–99)
GLUCOSE BLDC GLUCOMTR-MCNC: 270 MG/DL (ref 70–99)
GLUCOSE SERPL-MCNC: 262 MG/DL (ref 70–99)
GRAM STN SPEC: ABNORMAL
HCT VFR BLD CALC: 28.2 % (ref 36–46.5)
HCT VFR BLD CALC: 31 % (ref 36–46.5)
HGB BLD-MCNC: 10 G/DL (ref 12–15.5)
HGB BLD-MCNC: 9 G/DL (ref 12–15.5)
IMM GRANULOCYTES # BLD AUTO: 0 K/MCL (ref 0–0.2)
IMM GRANULOCYTES # BLD AUTO: 0 K/MCL (ref 0–0.2)
IMM GRANULOCYTES # BLD: 0 %
IMM GRANULOCYTES # BLD: 0 %
LYMPHOCYTES # BLD: 1.2 K/MCL (ref 1–4.8)
LYMPHOCYTES # BLD: 2.2 K/MCL (ref 1–4.8)
LYMPHOCYTES NFR BLD: 14 %
LYMPHOCYTES NFR BLD: 32 %
MAGNESIUM SERPL-MCNC: 2.1 MG/DL (ref 1.7–2.4)
MCH RBC QN AUTO: 33.2 PG (ref 26–34)
MCH RBC QN AUTO: 33.6 PG (ref 26–34)
MCHC RBC AUTO-ENTMCNC: 31.9 G/DL (ref 32–36.5)
MCHC RBC AUTO-ENTMCNC: 32.3 G/DL (ref 32–36.5)
MCV RBC AUTO: 104 FL (ref 78–100)
MCV RBC AUTO: 104.1 FL (ref 78–100)
MONOCYTES # BLD: 0.4 K/MCL (ref 0.3–0.9)
MONOCYTES # BLD: 0.5 K/MCL (ref 0.3–0.9)
MONOCYTES NFR BLD: 5 %
MONOCYTES NFR BLD: 7 %
NEUTROPHILS # BLD: 4.2 K/MCL (ref 1.8–7.7)
NEUTROPHILS # BLD: 6.7 K/MCL (ref 1.8–7.7)
NEUTROPHILS NFR BLD: 60 %
NEUTROPHILS NFR BLD: 80 %
NRBC BLD MANUAL-RTO: 0 /100 WBC
NRBC BLD MANUAL-RTO: 0 /100 WBC
PHOSPHATE SERPL-MCNC: 1.5 MG/DL (ref 2.4–4.7)
PLATELET # BLD AUTO: 54 K/MCL (ref 140–450)
PLATELET # BLD AUTO: 66 K/MCL (ref 140–450)
POTASSIUM SERPL-SCNC: 3.7 MMOL/L (ref 3.4–5.1)
PROT SERPL-MCNC: 6.4 G/DL (ref 6.4–8.2)
PROTHROM ACT/NOR PPP: 42 %
PROTHROMBIN TIME: 26.3 SEC
PT 2H NP PPP: 14.5 SEC
PT IMM NP PPP: 13.3 SEC
RAINBOW EXTRA TUBES HOLD SPECIMEN: NORMAL
RBC # BLD: 2.71 MIL/MCL (ref 4–5.2)
RBC # BLD: 2.98 MIL/MCL (ref 4–5.2)
SERVICE CMNT-IMP: ABNORMAL
SODIUM SERPL-SCNC: 146 MMOL/L (ref 135–145)
THROMBIN TIME: 17.5 SEC (ref 12–17)
VALPROATE SERPL-MCNC: 25 MCG/ML (ref 50–125)
VANCOMYCIN TROUGH SERPL-MCNC: 6.4 MCG/ML (ref 10–20)
WBC # BLD: 7.1 K/MCL (ref 4.2–11)
WBC # BLD: 8.4 K/MCL (ref 4.2–11)

## 2024-09-29 PROCEDURE — 10002801 HB RX 250 W/O HCPCS: Performed by: STUDENT IN AN ORGANIZED HEALTH CARE EDUCATION/TRAINING PROGRAM

## 2024-09-29 PROCEDURE — 10002800 HB RX 250 W HCPCS

## 2024-09-29 PROCEDURE — 80202 ASSAY OF VANCOMYCIN: CPT

## 2024-09-29 PROCEDURE — 99233 SBSQ HOSP IP/OBS HIGH 50: CPT | Performed by: INTERNAL MEDICINE

## 2024-09-29 PROCEDURE — 10002807 HB RX 258: Performed by: STUDENT IN AN ORGANIZED HEALTH CARE EDUCATION/TRAINING PROGRAM

## 2024-09-29 PROCEDURE — 81267 CHIMERISM ANAL NO CELL SELEC: CPT | Performed by: INTERNAL MEDICINE

## 2024-09-29 PROCEDURE — 10002800 HB RX 250 W HCPCS: Performed by: STUDENT IN AN ORGANIZED HEALTH CARE EDUCATION/TRAINING PROGRAM

## 2024-09-29 PROCEDURE — 84100 ASSAY OF PHOSPHORUS: CPT

## 2024-09-29 PROCEDURE — 85025 COMPLETE CBC W/AUTO DIFF WBC: CPT

## 2024-09-29 PROCEDURE — 85025 COMPLETE CBC W/AUTO DIFF WBC: CPT | Performed by: INTERNAL MEDICINE

## 2024-09-29 PROCEDURE — 83735 ASSAY OF MAGNESIUM: CPT

## 2024-09-29 PROCEDURE — 10002801 HB RX 250 W/O HCPCS: Performed by: INTERNAL MEDICINE

## 2024-09-29 PROCEDURE — 94003 VENT MGMT INPAT SUBQ DAY: CPT

## 2024-09-29 PROCEDURE — 82728 ASSAY OF FERRITIN: CPT

## 2024-09-29 PROCEDURE — 94640 AIRWAY INHALATION TREATMENT: CPT

## 2024-09-29 PROCEDURE — 10002807 HB RX 258

## 2024-09-29 PROCEDURE — 10006031 HB ROOM CHARGE TELEMETRY

## 2024-09-29 PROCEDURE — 10004651 HB RX, NO CHARGE ITEM

## 2024-09-29 PROCEDURE — 96372 THER/PROPH/DIAG INJ SC/IM: CPT

## 2024-09-29 PROCEDURE — 10002803 HB RX 637

## 2024-09-29 PROCEDURE — 80164 ASSAY DIPROPYLACETIC ACD TOT: CPT

## 2024-09-29 PROCEDURE — 10002801 HB RX 250 W/O HCPCS

## 2024-09-29 PROCEDURE — 36415 COLL VENOUS BLD VENIPUNCTURE: CPT

## 2024-09-29 PROCEDURE — 80053 COMPREHEN METABOLIC PANEL: CPT

## 2024-09-29 PROCEDURE — 10002803 HB RX 637: Performed by: STUDENT IN AN ORGANIZED HEALTH CARE EDUCATION/TRAINING PROGRAM

## 2024-09-29 RX ADMIN — INSULIN LISPRO 6 UNITS: 100 INJECTION, SOLUTION INTRAVENOUS; SUBCUTANEOUS at 06:51

## 2024-09-29 RX ADMIN — VANCOMYCIN HYDROCHLORIDE 500 MG: KIT at 22:19

## 2024-09-29 RX ADMIN — METRONIDAZOLE 500 MG: 5 INJECTION, SOLUTION INTRAVENOUS at 13:24

## 2024-09-29 RX ADMIN — FAMOTIDINE 10 MG: 20 TABLET ORAL at 22:04

## 2024-09-29 RX ADMIN — LEVETIRACETAM 750 MG: 100 SOLUTION ORAL at 08:48

## 2024-09-29 RX ADMIN — VANCOMYCIN HYDROCHLORIDE 500 MG: KIT at 12:35

## 2024-09-29 RX ADMIN — INSULIN LISPRO 2 UNITS: 100 INJECTION, SOLUTION INTRAVENOUS; SUBCUTANEOUS at 17:20

## 2024-09-29 RX ADMIN — VANCOMYCIN HYDROCHLORIDE 500 MG: KIT at 17:20

## 2024-09-29 RX ADMIN — IPRATROPIUM BROMIDE 0.5 MG: 0.5 SOLUTION RESPIRATORY (INHALATION) at 15:45

## 2024-09-29 RX ADMIN — VALPROIC ACID 250 MG: 250 SOLUTION ORAL at 22:03

## 2024-09-29 RX ADMIN — VALPROIC ACID 250 MG: 250 SOLUTION ORAL at 13:21

## 2024-09-29 RX ADMIN — ACYCLOVIR SODIUM 350 MG: 50 INJECTION, SOLUTION INTRAVENOUS at 05:16

## 2024-09-29 RX ADMIN — PANCRELIPASE 1 CAPSULE: 30000; 6000; 19000 CAPSULE, DELAYED RELEASE PELLETS ORAL at 09:01

## 2024-09-29 RX ADMIN — METRONIDAZOLE 500 MG: 5 INJECTION, SOLUTION INTRAVENOUS at 22:24

## 2024-09-29 RX ADMIN — LORAZEPAM 1 MG: 1 TABLET ORAL at 17:20

## 2024-09-29 RX ADMIN — INSULIN LISPRO 6 UNITS: 100 INJECTION, SOLUTION INTRAVENOUS; SUBCUTANEOUS at 00:40

## 2024-09-29 RX ADMIN — VALPROIC ACID 250 MG: 250 SOLUTION ORAL at 05:53

## 2024-09-29 RX ADMIN — SODIUM CHLORIDE, PRESERVATIVE FREE 2 ML: 5 INJECTION INTRAVENOUS at 22:04

## 2024-09-29 RX ADMIN — PANCRELIPASE 1 CAPSULE: 30000; 6000; 19000 CAPSULE, DELAYED RELEASE PELLETS ORAL at 22:14

## 2024-09-29 RX ADMIN — APIXABAN 2.5 MG: 2.5 TABLET, FILM COATED ORAL at 08:47

## 2024-09-29 RX ADMIN — LACOSAMIDE 150 MG: 100 SOLUTION ORAL at 22:04

## 2024-09-29 RX ADMIN — IPRATROPIUM BROMIDE 0.5 MG: 0.5 SOLUTION RESPIRATORY (INHALATION) at 07:37

## 2024-09-29 RX ADMIN — LEVETIRACETAM 750 MG: 100 SOLUTION ORAL at 22:03

## 2024-09-29 RX ADMIN — VANCOMYCIN HYDROCHLORIDE 500 MG: 500 INJECTION, POWDER, LYOPHILIZED, FOR SOLUTION INTRAVENOUS at 07:01

## 2024-09-29 RX ADMIN — LORAZEPAM 1 MG: 1 TABLET ORAL at 08:47

## 2024-09-29 RX ADMIN — ACETAMINOPHEN 650 MG: 325 TABLET ORAL at 08:47

## 2024-09-29 RX ADMIN — LORAZEPAM 1 MG: 1 TABLET ORAL at 22:03

## 2024-09-29 RX ADMIN — VANCOMYCIN HYDROCHLORIDE 500 MG: KIT at 08:47

## 2024-09-29 RX ADMIN — OXYCODONE HYDROCHLORIDE 2.5 MG: 5 TABLET ORAL at 22:03

## 2024-09-29 RX ADMIN — SODIUM CHLORIDE, PRESERVATIVE FREE 2 ML: 5 INJECTION INTRAVENOUS at 08:50

## 2024-09-29 RX ADMIN — LACOSAMIDE 150 MG: 100 SOLUTION ORAL at 08:47

## 2024-09-29 RX ADMIN — INSULIN LISPRO 2 UNITS: 100 INJECTION, SOLUTION INTRAVENOUS; SUBCUTANEOUS at 12:35

## 2024-09-29 RX ADMIN — FAMOTIDINE 10 MG: 20 TABLET ORAL at 08:47

## 2024-09-29 RX ADMIN — OXYCODONE HYDROCHLORIDE 2.5 MG: 5 TABLET ORAL at 08:47

## 2024-09-29 RX ADMIN — METRONIDAZOLE 500 MG: 5 INJECTION, SOLUTION INTRAVENOUS at 05:15

## 2024-09-29 RX ADMIN — LORAZEPAM 1 MG: 1 TABLET ORAL at 12:35

## 2024-09-29 RX ADMIN — MEROPENEM 500 MG: 500 INJECTION INTRAVENOUS at 05:14

## 2024-09-29 RX ADMIN — IPRATROPIUM BROMIDE 0.5 MG: 0.5 SOLUTION RESPIRATORY (INHALATION) at 19:56

## 2024-09-29 RX ADMIN — ACETAMINOPHEN 650 MG: 325 TABLET ORAL at 13:09

## 2024-09-29 RX ADMIN — PANCRELIPASE 1 CAPSULE: 30000; 6000; 19000 CAPSULE, DELAYED RELEASE PELLETS ORAL at 15:31

## 2024-09-29 ASSESSMENT — PAIN SCALES - BEHAVIORAL PAIN SCALE (BPS)
BPS_SCORE: 5
BPS_SCORE: 3
BPS_SCORE: 5
BPS_SCORE: 4
BPS_SCORE: 4
BPS_SCORE: 5

## 2024-09-30 LAB
ALBUMIN SERPL-MCNC: 1.9 G/DL (ref 3.4–5)
ALBUMIN/GLOB SERPL: 0.5 {RATIO} (ref 1–2.4)
ALP SERPL-CCNC: 51 UNITS/L (ref 45–117)
ALT SERPL-CCNC: 28 UNITS/L
ANION GAP SERPL CALC-SCNC: 7 MMOL/L (ref 7–19)
AST SERPL-CCNC: 40 UNITS/L
ATRIAL RATE (BPM): 149
BACTERIA CSF CULT: NORMAL
BASOPHILS # BLD: 0 K/MCL (ref 0–0.3)
BASOPHILS NFR BLD: 0 %
BILIRUB SERPL-MCNC: 0.4 MG/DL (ref 0.2–1)
BUN SERPL-MCNC: 14 MG/DL (ref 6–20)
BUN/CREAT SERPL: 47 (ref 7–25)
CALCIUM SERPL-MCNC: 7.8 MG/DL (ref 8.4–10.2)
CHLORIDE SERPL-SCNC: 111 MMOL/L (ref 97–110)
CO2 SERPL-SCNC: 28 MMOL/L (ref 21–32)
CREAT SERPL-MCNC: 0.3 MG/DL (ref 0.51–0.95)
DEPRECATED RDW RBC: 43.9 FL (ref 39–50)
EGFRCR SERPLBLD CKD-EPI 2021: >90 ML/MIN/{1.73_M2}
EOSINOPHIL # BLD: 0.1 K/MCL (ref 0–0.5)
EOSINOPHIL NFR BLD: 1 %
ERYTHROCYTE [DISTWIDTH] IN BLOOD: 11.7 % (ref 11–15)
FASTING DURATION TIME PATIENT: ABNORMAL H
FERRITIN SERPL-MCNC: 1118 NG/ML (ref 8–252)
GLOBULIN SER-MCNC: 3.9 G/DL (ref 2–4)
GLUCOSE BLDC GLUCOMTR-MCNC: 243 MG/DL (ref 70–99)
GLUCOSE BLDC GLUCOMTR-MCNC: 269 MG/DL (ref 70–99)
GLUCOSE BLDC GLUCOMTR-MCNC: 282 MG/DL (ref 70–99)
GLUCOSE BLDC GLUCOMTR-MCNC: 286 MG/DL (ref 70–99)
GLUCOSE BLDC GLUCOMTR-MCNC: 323 MG/DL (ref 70–99)
GLUCOSE SERPL-MCNC: 294 MG/DL (ref 70–99)
GRAM STN SPEC: NORMAL
HCT VFR BLD CALC: 30 % (ref 36–46.5)
HGB BLD-MCNC: 9.7 G/DL (ref 12–15.5)
IMM GRANULOCYTES # BLD AUTO: 0 K/MCL (ref 0–0.2)
IMM GRANULOCYTES # BLD: 0 %
LYMPHOCYTES # BLD: 1.2 K/MCL (ref 1–4.8)
LYMPHOCYTES NFR BLD: 22 %
MAGNESIUM SERPL-MCNC: 1.9 MG/DL (ref 1.7–2.4)
MCH RBC QN AUTO: 33.4 PG (ref 26–34)
MCHC RBC AUTO-ENTMCNC: 32.3 G/DL (ref 32–36.5)
MCV RBC AUTO: 103.4 FL (ref 78–100)
MONOCYTES # BLD: 0.4 K/MCL (ref 0.3–0.9)
MONOCYTES NFR BLD: 7 %
NEUTROPHILS # BLD: 4 K/MCL (ref 1.8–7.7)
NEUTROPHILS NFR BLD: 70 %
NRBC BLD MANUAL-RTO: 0 /100 WBC
PATH REV BLD -IMP: YES
PATH REV: ABNORMAL
PHOSPHATE SERPL-MCNC: 1.2 MG/DL (ref 2.4–4.7)
PLATELET # BLD AUTO: 53 K/MCL (ref 140–450)
POTASSIUM SERPL-SCNC: 3.3 MMOL/L (ref 3.4–5.1)
PROT SERPL-MCNC: 5.8 G/DL (ref 6.4–8.2)
QRS-INTERVAL (MSEC): 66
QT-INTERVAL (MSEC): 336
QTC: 529
R AXIS (DEGREES): 79
RBC # BLD: 2.9 MIL/MCL (ref 4–5.2)
REPORT TEXT: NORMAL
SODIUM SERPL-SCNC: 143 MMOL/L (ref 135–145)
T AXIS (DEGREES): 66
VENTRICULAR RATE EKG/MIN (BPM): 149
WBC # BLD: 5.7 K/MCL (ref 4.2–11)

## 2024-09-30 PROCEDURE — 94640 AIRWAY INHALATION TREATMENT: CPT

## 2024-09-30 PROCEDURE — 10004180 HB COUNTER-TRANSPORT

## 2024-09-30 PROCEDURE — 80053 COMPREHEN METABOLIC PANEL: CPT

## 2024-09-30 PROCEDURE — 96372 THER/PROPH/DIAG INJ SC/IM: CPT

## 2024-09-30 PROCEDURE — 97161 PT EVAL LOW COMPLEX 20 MIN: CPT

## 2024-09-30 PROCEDURE — 10002803 HB RX 637

## 2024-09-30 PROCEDURE — 10004651 HB RX, NO CHARGE ITEM

## 2024-09-30 PROCEDURE — 36415 COLL VENOUS BLD VENIPUNCTURE: CPT

## 2024-09-30 PROCEDURE — 10002800 HB RX 250 W HCPCS

## 2024-09-30 PROCEDURE — 85025 COMPLETE CBC W/AUTO DIFF WBC: CPT

## 2024-09-30 PROCEDURE — 10002801 HB RX 250 W/O HCPCS: Performed by: INTERNAL MEDICINE

## 2024-09-30 PROCEDURE — 10002803 HB RX 637: Performed by: STUDENT IN AN ORGANIZED HEALTH CARE EDUCATION/TRAINING PROGRAM

## 2024-09-30 PROCEDURE — 83735 ASSAY OF MAGNESIUM: CPT

## 2024-09-30 PROCEDURE — 99233 SBSQ HOSP IP/OBS HIGH 50: CPT

## 2024-09-30 PROCEDURE — 10002801 HB RX 250 W/O HCPCS

## 2024-09-30 PROCEDURE — 10006031 HB ROOM CHARGE TELEMETRY

## 2024-09-30 PROCEDURE — 94003 VENT MGMT INPAT SUBQ DAY: CPT

## 2024-09-30 PROCEDURE — 10002801 HB RX 250 W/O HCPCS: Performed by: STUDENT IN AN ORGANIZED HEALTH CARE EDUCATION/TRAINING PROGRAM

## 2024-09-30 PROCEDURE — 82728 ASSAY OF FERRITIN: CPT

## 2024-09-30 PROCEDURE — 82306 VITAMIN D 25 HYDROXY: CPT

## 2024-09-30 PROCEDURE — 84100 ASSAY OF PHOSPHORUS: CPT

## 2024-09-30 RX ADMIN — METRONIDAZOLE 500 MG: 5 INJECTION, SOLUTION INTRAVENOUS at 21:00

## 2024-09-30 RX ADMIN — VALPROIC ACID 250 MG: 250 SOLUTION ORAL at 20:53

## 2024-09-30 RX ADMIN — LORAZEPAM 1 MG: 1 TABLET ORAL at 17:45

## 2024-09-30 RX ADMIN — SODIUM CHLORIDE, PRESERVATIVE FREE 2 ML: 5 INJECTION INTRAVENOUS at 20:58

## 2024-09-30 RX ADMIN — METRONIDAZOLE 500 MG: 5 INJECTION, SOLUTION INTRAVENOUS at 05:18

## 2024-09-30 RX ADMIN — LORAZEPAM 1 MG: 1 TABLET ORAL at 09:38

## 2024-09-30 RX ADMIN — LORAZEPAM 1 MG: 1 TABLET ORAL at 12:00

## 2024-09-30 RX ADMIN — VANCOMYCIN HYDROCHLORIDE 500 MG: KIT at 17:45

## 2024-09-30 RX ADMIN — IPRATROPIUM BROMIDE 0.5 MG: 0.5 SOLUTION RESPIRATORY (INHALATION) at 15:50

## 2024-09-30 RX ADMIN — OXYCODONE HYDROCHLORIDE 2.5 MG: 5 TABLET ORAL at 20:54

## 2024-09-30 RX ADMIN — IPRATROPIUM BROMIDE 0.5 MG: 0.5 SOLUTION RESPIRATORY (INHALATION) at 08:02

## 2024-09-30 RX ADMIN — IPRATROPIUM BROMIDE 0.5 MG: 0.5 SOLUTION RESPIRATORY (INHALATION) at 19:35

## 2024-09-30 RX ADMIN — LEVETIRACETAM 750 MG: 100 SOLUTION ORAL at 20:52

## 2024-09-30 RX ADMIN — LEVETIRACETAM 750 MG: 100 SOLUTION ORAL at 09:37

## 2024-09-30 RX ADMIN — FAMOTIDINE 10 MG: 20 TABLET ORAL at 20:56

## 2024-09-30 RX ADMIN — INSULIN LISPRO 6 UNITS: 100 INJECTION, SOLUTION INTRAVENOUS; SUBCUTANEOUS at 17:45

## 2024-09-30 RX ADMIN — FAMOTIDINE 10 MG: 20 TABLET ORAL at 09:38

## 2024-09-30 RX ADMIN — VALPROIC ACID 250 MG: 250 SOLUTION ORAL at 05:20

## 2024-09-30 RX ADMIN — VANCOMYCIN HYDROCHLORIDE 500 MG: KIT at 12:01

## 2024-09-30 RX ADMIN — PANCRELIPASE 1 CAPSULE: 30000; 6000; 19000 CAPSULE, DELAYED RELEASE PELLETS ORAL at 10:21

## 2024-09-30 RX ADMIN — VANCOMYCIN HYDROCHLORIDE 500 MG: KIT at 10:21

## 2024-09-30 RX ADMIN — LACOSAMIDE 150 MG: 100 SOLUTION ORAL at 09:37

## 2024-09-30 RX ADMIN — INSULIN LISPRO 6 UNITS: 100 INJECTION, SOLUTION INTRAVENOUS; SUBCUTANEOUS at 00:35

## 2024-09-30 RX ADMIN — SODIUM CHLORIDE, PRESERVATIVE FREE 2 ML: 5 INJECTION INTRAVENOUS at 09:43

## 2024-09-30 RX ADMIN — INSULIN LISPRO 4 UNITS: 100 INJECTION, SOLUTION INTRAVENOUS; SUBCUTANEOUS at 05:27

## 2024-09-30 RX ADMIN — ACETAMINOPHEN 650 MG: 325 TABLET ORAL at 12:01

## 2024-09-30 RX ADMIN — VALPROIC ACID 250 MG: 250 SOLUTION ORAL at 13:26

## 2024-09-30 RX ADMIN — IPRATROPIUM BROMIDE 0.5 MG: 0.5 SOLUTION RESPIRATORY (INHALATION) at 11:10

## 2024-09-30 RX ADMIN — METRONIDAZOLE 500 MG: 5 INJECTION, SOLUTION INTRAVENOUS at 13:26

## 2024-09-30 RX ADMIN — LORAZEPAM 1 MG: 1 TABLET ORAL at 20:56

## 2024-09-30 RX ADMIN — PANCRELIPASE 1 CAPSULE: 30000; 6000; 19000 CAPSULE, DELAYED RELEASE PELLETS ORAL at 13:26

## 2024-09-30 RX ADMIN — PANCRELIPASE 1 CAPSULE: 30000; 6000; 19000 CAPSULE, DELAYED RELEASE PELLETS ORAL at 21:06

## 2024-09-30 RX ADMIN — VANCOMYCIN HYDROCHLORIDE 500 MG: KIT at 20:51

## 2024-09-30 RX ADMIN — LACOSAMIDE 150 MG: 100 SOLUTION ORAL at 20:57

## 2024-09-30 RX ADMIN — OXYCODONE HYDROCHLORIDE 2.5 MG: 5 TABLET ORAL at 09:37

## 2024-09-30 RX ADMIN — INSULIN LISPRO 6 UNITS: 100 INJECTION, SOLUTION INTRAVENOUS; SUBCUTANEOUS at 12:01

## 2024-09-30 SDOH — ECONOMIC STABILITY: HOUSING INSECURITY: WHAT IS YOUR LIVING SITUATION TODAY?: I HAVE A STEADY PLACE TO LIVE

## 2024-09-30 ASSESSMENT — PAIN SCALES - BEHAVIORAL PAIN SCALE (BPS)
BPS_SCORE: 3
BPS_SCORE: 4
BPS_SCORE: 7

## 2024-10-01 ENCOUNTER — APPOINTMENT (OUTPATIENT)
Dept: GENERAL RADIOLOGY | Age: 34
DRG: 720 | End: 2024-10-01
Attending: NURSE PRACTITIONER

## 2024-10-01 LAB
25(OH)D3+25(OH)D2 SERPL-MCNC: 46.4 NG/ML (ref 30–100)
ALBUMIN SERPL-MCNC: 2.2 G/DL (ref 3.4–5)
ALBUMIN/GLOB SERPL: 0.5 {RATIO} (ref 1–2.4)
ALP SERPL-CCNC: 55 UNITS/L (ref 45–117)
ALT SERPL-CCNC: 35 UNITS/L
ANION GAP SERPL CALC-SCNC: 6 MMOL/L (ref 7–19)
AST SERPL-CCNC: 33 UNITS/L
BASOPHILS # BLD: 0 K/MCL (ref 0–0.3)
BASOPHILS NFR BLD: 0 %
BILIRUB SERPL-MCNC: 0.4 MG/DL (ref 0.2–1)
BUN SERPL-MCNC: 14 MG/DL (ref 6–20)
BUN/CREAT SERPL: 52 (ref 7–25)
CALCIUM SERPL-MCNC: 8.1 MG/DL (ref 8.4–10.2)
CHLORIDE SERPL-SCNC: 109 MMOL/L (ref 97–110)
CO2 SERPL-SCNC: 30 MMOL/L (ref 21–32)
CREAT SERPL-MCNC: 0.27 MG/DL (ref 0.51–0.95)
DEPRECATED RDW RBC: 42.7 FL (ref 39–50)
EGFRCR SERPLBLD CKD-EPI 2021: >90 ML/MIN/{1.73_M2}
EOSINOPHIL # BLD: 0.1 K/MCL (ref 0–0.5)
EOSINOPHIL NFR BLD: 1 %
ERYTHROCYTE [DISTWIDTH] IN BLOOD: 11.6 % (ref 11–15)
FASTING DURATION TIME PATIENT: ABNORMAL H
FERRITIN SERPL-MCNC: 1177 NG/ML (ref 8–252)
FOLATE SERPL-MCNC: 18.8 NG/ML
GLOBULIN SER-MCNC: 4.1 G/DL (ref 2–4)
GLUCOSE BLDC GLUCOMTR-MCNC: 248 MG/DL (ref 70–99)
GLUCOSE BLDC GLUCOMTR-MCNC: 293 MG/DL (ref 70–99)
GLUCOSE BLDC GLUCOMTR-MCNC: 296 MG/DL (ref 70–99)
GLUCOSE SERPL-MCNC: 285 MG/DL (ref 70–99)
HCT VFR BLD CALC: 28.3 % (ref 36–46.5)
HGB BLD-MCNC: 9.4 G/DL (ref 12–15.5)
HGB RETIC QN AUTO: 38.5 PG (ref 28.6–36.3)
IMM GRANULOCYTES # BLD AUTO: 0 K/MCL (ref 0–0.2)
IMM GRANULOCYTES # BLD: 1 %
IMM RETICS NFR: 5.2 % (ref 1.5–16)
INR PPP: 1.3
LYMPHOCYTES # BLD: 1 K/MCL (ref 1–4.8)
LYMPHOCYTES NFR BLD: 18 %
MAGNESIUM SERPL-MCNC: 2 MG/DL (ref 1.7–2.4)
MCH RBC QN AUTO: 33.6 PG (ref 26–34)
MCHC RBC AUTO-ENTMCNC: 33.2 G/DL (ref 32–36.5)
MCV RBC AUTO: 101.1 FL (ref 78–100)
MONOCYTES # BLD: 0.3 K/MCL (ref 0.3–0.9)
MONOCYTES NFR BLD: 5 %
NEUTROPHILS # BLD: 4.4 K/MCL (ref 1.8–7.7)
NEUTROPHILS NFR BLD: 75 %
NRBC BLD MANUAL-RTO: 0 /100 WBC
PHOSPHATE SERPL-MCNC: 1.2 MG/DL (ref 2.4–4.7)
PLATELET # BLD AUTO: 57 K/MCL (ref 140–450)
POTASSIUM SERPL-SCNC: 3 MMOL/L (ref 3.4–5.1)
PROCALCITONIN SERPL IA-MCNC: 1.1 NG/ML
PROT SERPL-MCNC: 6.3 G/DL (ref 6.4–8.2)
PROTHROMBIN TIME: 14.1 SEC (ref 9.7–11.8)
RBC # BLD: 2.8 MIL/MCL (ref 4–5.2)
RETICS #: 36 K/MCL (ref 10–120)
RETICS/RBC NFR: 1.3 % (ref 0.3–2.5)
SODIUM SERPL-SCNC: 142 MMOL/L (ref 135–145)
SOL IL2 RECEP SERPL-MCNC: 421.2 PG/ML (ref 175.3–858.2)
VIT B12 SERPL-MCNC: 926 PG/ML (ref 211–911)
WBC # BLD: 5.8 K/MCL (ref 4.2–11)

## 2024-10-01 PROCEDURE — 10002803 HB RX 637

## 2024-10-01 PROCEDURE — 10004651 HB RX, NO CHARGE ITEM

## 2024-10-01 PROCEDURE — 84100 ASSAY OF PHOSPHORUS: CPT

## 2024-10-01 PROCEDURE — 94003 VENT MGMT INPAT SUBQ DAY: CPT

## 2024-10-01 PROCEDURE — 94640 AIRWAY INHALATION TREATMENT: CPT

## 2024-10-01 PROCEDURE — 85610 PROTHROMBIN TIME: CPT

## 2024-10-01 PROCEDURE — 10002800 HB RX 250 W HCPCS

## 2024-10-01 PROCEDURE — 85025 COMPLETE CBC W/AUTO DIFF WBC: CPT

## 2024-10-01 PROCEDURE — 85046 RETICYTE/HGB CONCENTRATE: CPT

## 2024-10-01 PROCEDURE — 96372 THER/PROPH/DIAG INJ SC/IM: CPT

## 2024-10-01 PROCEDURE — 13003377

## 2024-10-01 PROCEDURE — 10002801 HB RX 250 W/O HCPCS: Performed by: INTERNAL MEDICINE

## 2024-10-01 PROCEDURE — 10006031 HB ROOM CHARGE TELEMETRY

## 2024-10-01 PROCEDURE — 83735 ASSAY OF MAGNESIUM: CPT

## 2024-10-01 PROCEDURE — 36415 COLL VENOUS BLD VENIPUNCTURE: CPT

## 2024-10-01 PROCEDURE — 84145 PROCALCITONIN (PCT): CPT | Performed by: NURSE PRACTITIONER

## 2024-10-01 PROCEDURE — 10002801 HB RX 250 W/O HCPCS

## 2024-10-01 PROCEDURE — 10002801 HB RX 250 W/O HCPCS: Performed by: STUDENT IN AN ORGANIZED HEALTH CARE EDUCATION/TRAINING PROGRAM

## 2024-10-01 PROCEDURE — 71045 X-RAY EXAM CHEST 1 VIEW: CPT

## 2024-10-01 PROCEDURE — 10002803 HB RX 637: Performed by: STUDENT IN AN ORGANIZED HEALTH CARE EDUCATION/TRAINING PROGRAM

## 2024-10-01 PROCEDURE — 87040 BLOOD CULTURE FOR BACTERIA: CPT | Performed by: NURSE PRACTITIONER

## 2024-10-01 PROCEDURE — 82746 ASSAY OF FOLIC ACID SERUM: CPT

## 2024-10-01 PROCEDURE — 82728 ASSAY OF FERRITIN: CPT

## 2024-10-01 PROCEDURE — 80053 COMPREHEN METABOLIC PANEL: CPT

## 2024-10-01 RX ADMIN — OXYCODONE HYDROCHLORIDE 2.5 MG: 5 TABLET ORAL at 10:00

## 2024-10-01 RX ADMIN — VANCOMYCIN HYDROCHLORIDE 500 MG: KIT at 22:25

## 2024-10-01 RX ADMIN — LORAZEPAM 1 MG: 1 TABLET ORAL at 12:56

## 2024-10-01 RX ADMIN — VANCOMYCIN HYDROCHLORIDE 500 MG: KIT at 10:00

## 2024-10-01 RX ADMIN — LORAZEPAM 1 MG: 1 TABLET ORAL at 10:00

## 2024-10-01 RX ADMIN — METRONIDAZOLE 500 MG: 5 INJECTION, SOLUTION INTRAVENOUS at 21:46

## 2024-10-01 RX ADMIN — IPRATROPIUM BROMIDE 0.5 MG: 0.5 SOLUTION RESPIRATORY (INHALATION) at 07:34

## 2024-10-01 RX ADMIN — METRONIDAZOLE 500 MG: 5 INJECTION, SOLUTION INTRAVENOUS at 13:33

## 2024-10-01 RX ADMIN — LEVETIRACETAM 750 MG: 100 SOLUTION ORAL at 10:00

## 2024-10-01 RX ADMIN — VALPROIC ACID 250 MG: 250 SOLUTION ORAL at 21:38

## 2024-10-01 RX ADMIN — LEVETIRACETAM 750 MG: 100 SOLUTION ORAL at 21:38

## 2024-10-01 RX ADMIN — METRONIDAZOLE 500 MG: 5 INJECTION, SOLUTION INTRAVENOUS at 05:21

## 2024-10-01 RX ADMIN — PANCRELIPASE 1 CAPSULE: 30000; 6000; 19000 CAPSULE, DELAYED RELEASE PELLETS ORAL at 10:11

## 2024-10-01 RX ADMIN — FAMOTIDINE 10 MG: 20 TABLET ORAL at 10:00

## 2024-10-01 RX ADMIN — VALPROIC ACID 250 MG: 250 SOLUTION ORAL at 05:23

## 2024-10-01 RX ADMIN — SODIUM CHLORIDE, PRESERVATIVE FREE 2 ML: 5 INJECTION INTRAVENOUS at 21:45

## 2024-10-01 RX ADMIN — VANCOMYCIN HYDROCHLORIDE 500 MG: KIT at 17:56

## 2024-10-01 RX ADMIN — PANCRELIPASE 1 CAPSULE: 30000; 6000; 19000 CAPSULE, DELAYED RELEASE PELLETS ORAL at 21:38

## 2024-10-01 RX ADMIN — VALPROIC ACID 250 MG: 250 SOLUTION ORAL at 13:30

## 2024-10-01 RX ADMIN — FAMOTIDINE 10 MG: 20 TABLET ORAL at 21:39

## 2024-10-01 RX ADMIN — IPRATROPIUM BROMIDE 0.5 MG: 0.5 SOLUTION RESPIRATORY (INHALATION) at 11:02

## 2024-10-01 RX ADMIN — PANCRELIPASE 1 CAPSULE: 30000; 6000; 19000 CAPSULE, DELAYED RELEASE PELLETS ORAL at 13:30

## 2024-10-01 RX ADMIN — VANCOMYCIN HYDROCHLORIDE 500 MG: KIT at 13:30

## 2024-10-01 RX ADMIN — IPRATROPIUM BROMIDE 0.5 MG: 0.5 SOLUTION RESPIRATORY (INHALATION) at 15:52

## 2024-10-01 RX ADMIN — LORAZEPAM 1 MG: 1 TABLET ORAL at 17:57

## 2024-10-01 RX ADMIN — LACOSAMIDE 150 MG: 100 SOLUTION ORAL at 21:38

## 2024-10-01 RX ADMIN — ACETAMINOPHEN 650 MG: 325 TABLET ORAL at 10:01

## 2024-10-01 RX ADMIN — INSULIN LISPRO 6 UNITS: 100 INJECTION, SOLUTION INTRAVENOUS; SUBCUTANEOUS at 12:56

## 2024-10-01 RX ADMIN — IPRATROPIUM BROMIDE 0.5 MG: 0.5 SOLUTION RESPIRATORY (INHALATION) at 19:52

## 2024-10-01 RX ADMIN — OXYCODONE HYDROCHLORIDE 2.5 MG: 5 TABLET ORAL at 21:38

## 2024-10-01 RX ADMIN — LORAZEPAM 1 MG: 1 TABLET ORAL at 21:38

## 2024-10-01 RX ADMIN — INSULIN LISPRO 4 UNITS: 100 INJECTION, SOLUTION INTRAVENOUS; SUBCUTANEOUS at 05:55

## 2024-10-01 RX ADMIN — INSULIN LISPRO 6 UNITS: 100 INJECTION, SOLUTION INTRAVENOUS; SUBCUTANEOUS at 18:18

## 2024-10-01 RX ADMIN — LACOSAMIDE 150 MG: 100 SOLUTION ORAL at 10:02

## 2024-10-01 RX ADMIN — INSULIN LISPRO 8 UNITS: 100 INJECTION, SOLUTION INTRAVENOUS; SUBCUTANEOUS at 00:24

## 2024-10-01 SDOH — HEALTH STABILITY: GENERAL: BECAUSE OF A PHYSICAL, MENTAL, OR EMOTIONAL CONDITION, DO YOU HAVE DIFFICULTY DOING ERRANDS ALONE?: YES

## 2024-10-01 SDOH — ECONOMIC STABILITY: TRANSPORTATION INSECURITY
IN THE PAST 12 MONTHS, HAS LACK OF RELIABLE TRANSPORTATION KEPT YOU FROM MEDICAL APPOINTMENTS, MEETINGS, WORK OR FROM GETTING THINGS NEEDED FOR DAILY LIVING?: PATIENT UNABLE TO ANSWER

## 2024-10-01 SDOH — ECONOMIC STABILITY: GENERAL

## 2024-10-01 SDOH — HEALTH STABILITY: GENERAL
BECAUSE OF A PHYSICAL, MENTAL, OR EMOTIONAL CONDITION, DO YOU HAVE SERIOUS DIFFICULTY CONCENTRATING, REMEMBERING OR MAKING DECISIONS?: YES

## 2024-10-01 SDOH — ECONOMIC STABILITY: HOUSING INSECURITY: WHAT IS YOUR LIVING SITUATION TODAY?: I HAVE A STEADY PLACE TO LIVE

## 2024-10-01 SDOH — ECONOMIC STABILITY: GENERAL
IN THE PAST YEAR, HAVE YOU OR ANY FAMILY MEMBERS YOU LIVE WITH BEEN UNABLE TO GET ANY OF THE FOLLOWING WHEN IT WAS REALLY NEEDED? CHECK ALL THAT APPLY.: PATIENT UNABLE TO ANSWER

## 2024-10-01 SDOH — HEALTH STABILITY: PHYSICAL HEALTH: DO YOU HAVE SERIOUS DIFFICULTY WALKING OR CLIMBING STAIRS?: YES

## 2024-10-01 SDOH — ECONOMIC STABILITY: TRANSPORTATION INSECURITY
IN THE PAST 12 MONTHS, HAS LACK OF RELIABLE TRANSPORTATION KEPT YOU FROM MEDICAL APPOINTMENTS, MEETINGS, WORK OR FROM GETTING THINGS NEEDED FOR DAILY LIVING?: NO

## 2024-10-01 SDOH — SOCIAL STABILITY: SOCIAL NETWORK
HOW OFTEN DO YOU SEE OR TALK TO PEOPLE THAT YOU CARE ABOUT AND FEEL CLOSE TO? (FOR EXAMPLE: TALKING TO FRIENDS ON THE PHONE, VISITING FRIENDS OR FAMILY, GOING TO CHURCH OR CLUB MEETINGS): 5 OR MORE TIMES A WEEK

## 2024-10-01 SDOH — HEALTH STABILITY: GENERAL
BECAUSE OF A PHYSICAL, MENTAL, OR EMOTIONAL CONDITION, DO YOU HAVE SERIOUS DIFFICULTY CONCENTRATING, REMEMBERING OR MAKING DECISIONS?: NO

## 2024-10-01 SDOH — ECONOMIC STABILITY: FOOD INSECURITY: WITHIN THE PAST 12 MONTHS, THE FOOD YOU BOUGHT JUST DIDN'T LAST AND YOU DIDN'T HAVE MONEY TO GET MORE.: NEVER TRUE

## 2024-10-01 SDOH — SOCIAL STABILITY: SOCIAL INSECURITY: HOW OFTEN DOES ANYONE, INCLUDING FAMILY AND FRIENDS, SCREAM OR CURSE AT YOU?: NEVER

## 2024-10-01 SDOH — HEALTH STABILITY: PHYSICAL HEALTH: DO YOU HAVE DIFFICULTY DRESSING OR BATHING?: YES

## 2024-10-01 SDOH — SOCIAL STABILITY: SOCIAL INSECURITY: HOW OFTEN DOES ANYONE, INCLUDING FAMILY AND FRIENDS, INSULT OR TALK DOWN TO YOU?: NEVER

## 2024-10-01 SDOH — SOCIAL STABILITY: SOCIAL INSECURITY: HOW OFTEN DOES ANYONE, INCLUDING FAMILY AND FRIENDS, THREATEN YOU WITH HARM?: NEVER

## 2024-10-01 SDOH — ECONOMIC STABILITY: GENERAL: WOULD YOU LIKE HELP WITH ANY OF THE FOLLOWING NEEDS?: I DON'T WANT HELP WITH ANY OF THESE

## 2024-10-01 SDOH — ECONOMIC STABILITY: INCOME INSECURITY
IN THE PAST 12 MONTHS, HAS THE ELECTRIC, GAS, OIL, OR WATER COMPANY THREATENED TO SHUT OFF SERVICE IN YOUR HOME?: PATIENT UNABLE TO ANSWER

## 2024-10-01 SDOH — ECONOMIC STABILITY: HOUSING INSECURITY: WHAT IS YOUR LIVING SITUATION TODAY?: LONG TERM CARE FACILITY

## 2024-10-01 SDOH — ECONOMIC STABILITY: HOUSING INSECURITY: DO YOU HAVE PROBLEMS WITH ANY OF THE FOLLOWING?: NONE OF THE ABOVE

## 2024-10-01 SDOH — SOCIAL STABILITY: SOCIAL INSECURITY: HOW OFTEN DOES ANYONE, INCLUDING FAMILY AND FRIENDS, PHYSICALLY HURT YOU?: NEVER

## 2024-10-01 ASSESSMENT — PAIN SCALES - BEHAVIORAL PAIN SCALE (BPS)
BPS_SCORE: 3
BPS_SCORE: 3
BPS_SCORE: 5

## 2024-10-01 ASSESSMENT — LIFESTYLE VARIABLES
HOW MANY STANDARD DRINKS CONTAINING ALCOHOL DO YOU HAVE ON A TYPICAL DAY: 0,1 OR 2
HOW OFTEN DO YOU HAVE A DRINK CONTAINING ALCOHOL: NEVER
ALCOHOL_USE_STATUS: NO OR LOW RISK WITH VALIDATED TOOL
AUDIT-C TOTAL SCORE: 0
HOW OFTEN DO YOU HAVE A DRINK CONTAINING ALCOHOL: NEVER
HOW OFTEN DO YOU HAVE 6 OR MORE DRINKS ON ONE OCCASION: NEVER

## 2024-10-01 ASSESSMENT — ACTIVITIES OF DAILY LIVING (ADL)
TOILETING: DEPENDENT
ADL_SHORT_OF_BREATH: NO
RECENT_DECLINE_ADL: NO
RECENT_DECLINE_ADL: NO
ADL_BEFORE_ADMISSION: NEEDS/REQUIRES ASSISTANCE
ADL_SCORE: 0
FEEDING: DEPENDENT
DRESSING: DEPENDENT
BATHING: DEPENDENT

## 2024-10-02 LAB
ALBUMIN SERPL-MCNC: 2.2 G/DL (ref 3.4–5)
ALBUMIN/GLOB SERPL: 0.5 {RATIO} (ref 1–2.4)
ALP SERPL-CCNC: 55 UNITS/L (ref 45–117)
ALT SERPL-CCNC: 33 UNITS/L
ANION GAP SERPL CALC-SCNC: 5 MMOL/L (ref 7–19)
AST SERPL-CCNC: 26 UNITS/L
BACTERIA BLD CULT: NORMAL
BACTERIA BLD CULT: NORMAL
BASOPHILS # BLD: 0 K/MCL (ref 0–0.3)
BASOPHILS NFR BLD: 0 %
BILIRUB SERPL-MCNC: 0.3 MG/DL (ref 0.2–1)
BUN SERPL-MCNC: 16 MG/DL (ref 6–20)
BUN/CREAT SERPL: 59 (ref 7–25)
CALCIUM SERPL-MCNC: 8 MG/DL (ref 8.4–10.2)
CERULOPLASMIN SERPL-MCNC: 21.2 MG/DL (ref 20–60)
CHLORIDE SERPL-SCNC: 108 MMOL/L (ref 97–110)
CO2 SERPL-SCNC: 31 MMOL/L (ref 21–32)
CREAT SERPL-MCNC: 0.27 MG/DL (ref 0.51–0.95)
DEPRECATED RDW RBC: 42.8 FL (ref 39–50)
EGFRCR SERPLBLD CKD-EPI 2021: >90 ML/MIN/{1.73_M2}
EOSINOPHIL # BLD: 0.1 K/MCL (ref 0–0.5)
EOSINOPHIL NFR BLD: 2 %
ERYTHROCYTE [DISTWIDTH] IN BLOOD: 11.8 % (ref 11–15)
FASTING DURATION TIME PATIENT: ABNORMAL H
FERRITIN SERPL-MCNC: 1186 NG/ML (ref 8–252)
GLOBULIN SER-MCNC: 4.2 G/DL (ref 2–4)
GLUCOSE BLDC GLUCOMTR-MCNC: 245 MG/DL (ref 70–99)
GLUCOSE BLDC GLUCOMTR-MCNC: 286 MG/DL (ref 70–99)
GLUCOSE BLDC GLUCOMTR-MCNC: 291 MG/DL (ref 70–99)
GLUCOSE BLDC GLUCOMTR-MCNC: 300 MG/DL (ref 70–99)
GLUCOSE SERPL-MCNC: 242 MG/DL (ref 70–99)
HCT VFR BLD CALC: 27.9 % (ref 36–46.5)
HCYS SERPL-SCNC: <2 MCMOL/L
HGB BLD-MCNC: 9.2 G/DL (ref 12–15.5)
IMM GRANULOCYTES # BLD AUTO: 0 K/MCL (ref 0–0.2)
IMM GRANULOCYTES # BLD: 1 %
INR PPP: 1.3
LYMPHOCYTES # BLD: 1.5 K/MCL (ref 1–4.8)
LYMPHOCYTES NFR BLD: 21 %
MAGNESIUM SERPL-MCNC: 1.8 MG/DL (ref 1.7–2.4)
MCH RBC QN AUTO: 33.5 PG (ref 26–34)
MCHC RBC AUTO-ENTMCNC: 33 G/DL (ref 32–36.5)
MCV RBC AUTO: 101.5 FL (ref 78–100)
MONOCYTES # BLD: 0.5 K/MCL (ref 0.3–0.9)
MONOCYTES NFR BLD: 7 %
NEUTROPHILS # BLD: 4.9 K/MCL (ref 1.8–7.7)
NEUTROPHILS NFR BLD: 69 %
NRBC BLD MANUAL-RTO: 0 /100 WBC
PATH REV BLD -IMP: YES
PATH REV: ABNORMAL
PHOSPHATE SERPL-MCNC: 1.7 MG/DL (ref 2.4–4.7)
PLATELET # BLD AUTO: 64 K/MCL (ref 140–450)
POTASSIUM SERPL-SCNC: 3.5 MMOL/L (ref 3.4–5.1)
PROCALCITONIN SERPL IA-MCNC: 0.66 NG/ML
PROT SERPL-MCNC: 6.4 G/DL (ref 6.4–8.2)
PROTHROMBIN TIME: 13.7 SEC (ref 9.7–11.8)
RBC # BLD: 2.75 MIL/MCL (ref 4–5.2)
SODIUM SERPL-SCNC: 140 MMOL/L (ref 135–145)
WBC # BLD: 7.1 K/MCL (ref 4.2–11)

## 2024-10-02 PROCEDURE — 10002803 HB RX 637: Performed by: INTERNAL MEDICINE

## 2024-10-02 PROCEDURE — 87205 SMEAR GRAM STAIN: CPT | Performed by: NURSE PRACTITIONER

## 2024-10-02 PROCEDURE — 10002803 HB RX 637: Performed by: STUDENT IN AN ORGANIZED HEALTH CARE EDUCATION/TRAINING PROGRAM

## 2024-10-02 PROCEDURE — 10004651 HB RX, NO CHARGE ITEM

## 2024-10-02 PROCEDURE — 10002801 HB RX 250 W/O HCPCS: Performed by: STUDENT IN AN ORGANIZED HEALTH CARE EDUCATION/TRAINING PROGRAM

## 2024-10-02 PROCEDURE — 85025 COMPLETE CBC W/AUTO DIFF WBC: CPT

## 2024-10-02 PROCEDURE — 82390 ASSAY OF CERULOPLASMIN: CPT

## 2024-10-02 PROCEDURE — 10002803 HB RX 637

## 2024-10-02 PROCEDURE — 10002800 HB RX 250 W HCPCS: Performed by: NURSE PRACTITIONER

## 2024-10-02 PROCEDURE — 83735 ASSAY OF MAGNESIUM: CPT

## 2024-10-02 PROCEDURE — 36415 COLL VENOUS BLD VENIPUNCTURE: CPT

## 2024-10-02 PROCEDURE — 83921 ORGANIC ACID SINGLE QUANT: CPT

## 2024-10-02 PROCEDURE — 84100 ASSAY OF PHOSPHORUS: CPT

## 2024-10-02 PROCEDURE — 85610 PROTHROMBIN TIME: CPT

## 2024-10-02 PROCEDURE — 10002807 HB RX 258: Performed by: NURSE PRACTITIONER

## 2024-10-02 PROCEDURE — 82525 ASSAY OF COPPER: CPT

## 2024-10-02 PROCEDURE — 10002801 HB RX 250 W/O HCPCS

## 2024-10-02 PROCEDURE — 10002800 HB RX 250 W HCPCS

## 2024-10-02 PROCEDURE — 80053 COMPREHEN METABOLIC PANEL: CPT

## 2024-10-02 PROCEDURE — 82728 ASSAY OF FERRITIN: CPT

## 2024-10-02 PROCEDURE — 83090 ASSAY OF HOMOCYSTEINE: CPT

## 2024-10-02 PROCEDURE — 96372 THER/PROPH/DIAG INJ SC/IM: CPT

## 2024-10-02 PROCEDURE — 94003 VENT MGMT INPAT SUBQ DAY: CPT

## 2024-10-02 PROCEDURE — 94640 AIRWAY INHALATION TREATMENT: CPT

## 2024-10-02 PROCEDURE — 84145 PROCALCITONIN (PCT): CPT | Performed by: INTERNAL MEDICINE

## 2024-10-02 PROCEDURE — 10006031 HB ROOM CHARGE TELEMETRY

## 2024-10-02 PROCEDURE — 99233 SBSQ HOSP IP/OBS HIGH 50: CPT

## 2024-10-02 PROCEDURE — 10002801 HB RX 250 W/O HCPCS: Performed by: INTERNAL MEDICINE

## 2024-10-02 RX ORDER — LORAZEPAM 1 MG/1
1 TABLET ORAL EVERY 6 HOURS
Status: DISCONTINUED | OUTPATIENT
Start: 2024-10-02 | End: 2024-10-11 | Stop reason: HOSPADM

## 2024-10-02 RX ADMIN — SODIUM CHLORIDE, PRESERVATIVE FREE 2 ML: 5 INJECTION INTRAVENOUS at 21:45

## 2024-10-02 RX ADMIN — VANCOMYCIN HYDROCHLORIDE 500 MG: KIT at 18:00

## 2024-10-02 RX ADMIN — FAMOTIDINE 10 MG: 20 TABLET ORAL at 21:39

## 2024-10-02 RX ADMIN — VANCOMYCIN HYDROCHLORIDE 500 MG: KIT at 14:07

## 2024-10-02 RX ADMIN — SODIUM CHLORIDE, PRESERVATIVE FREE 2 ML: 5 INJECTION INTRAVENOUS at 09:12

## 2024-10-02 RX ADMIN — LEVETIRACETAM 750 MG: 100 SOLUTION ORAL at 21:38

## 2024-10-02 RX ADMIN — LEVETIRACETAM 750 MG: 100 SOLUTION ORAL at 09:05

## 2024-10-02 RX ADMIN — PANCRELIPASE 1 CAPSULE: 30000; 6000; 19000 CAPSULE, DELAYED RELEASE PELLETS ORAL at 21:39

## 2024-10-02 RX ADMIN — IPRATROPIUM BROMIDE 0.5 MG: 0.5 SOLUTION RESPIRATORY (INHALATION) at 07:29

## 2024-10-02 RX ADMIN — VALPROIC ACID 250 MG: 250 SOLUTION ORAL at 05:00

## 2024-10-02 RX ADMIN — PANCRELIPASE 1 CAPSULE: 30000; 6000; 19000 CAPSULE, DELAYED RELEASE PELLETS ORAL at 14:07

## 2024-10-02 RX ADMIN — VANCOMYCIN HYDROCHLORIDE 500 MG: KIT at 21:38

## 2024-10-02 RX ADMIN — FAMOTIDINE 10 MG: 20 TABLET ORAL at 09:05

## 2024-10-02 RX ADMIN — METRONIDAZOLE 500 MG: 5 INJECTION, SOLUTION INTRAVENOUS at 05:04

## 2024-10-02 RX ADMIN — LORAZEPAM 1 MG: 1 TABLET ORAL at 09:05

## 2024-10-02 RX ADMIN — CEFEPIME 2000 MG: 2 INJECTION, POWDER, FOR SOLUTION INTRAVENOUS at 21:51

## 2024-10-02 RX ADMIN — VALPROIC ACID 250 MG: 250 SOLUTION ORAL at 21:39

## 2024-10-02 RX ADMIN — IPRATROPIUM BROMIDE 0.5 MG: 0.5 SOLUTION RESPIRATORY (INHALATION) at 19:53

## 2024-10-02 RX ADMIN — LACOSAMIDE 150 MG: 100 SOLUTION ORAL at 21:37

## 2024-10-02 RX ADMIN — INSULIN LISPRO 8 UNITS: 100 INJECTION, SOLUTION INTRAVENOUS; SUBCUTANEOUS at 00:42

## 2024-10-02 RX ADMIN — ACETAMINOPHEN 650 MG: 325 TABLET ORAL at 11:35

## 2024-10-02 RX ADMIN — CEFEPIME 2000 MG: 2 INJECTION, POWDER, FOR SOLUTION INTRAVENOUS at 15:16

## 2024-10-02 RX ADMIN — INSULIN LISPRO 6 UNITS: 100 INJECTION, SOLUTION INTRAVENOUS; SUBCUTANEOUS at 17:01

## 2024-10-02 RX ADMIN — VALPROIC ACID 250 MG: 250 SOLUTION ORAL at 14:07

## 2024-10-02 RX ADMIN — OXYCODONE HYDROCHLORIDE 2.5 MG: 5 TABLET ORAL at 21:39

## 2024-10-02 RX ADMIN — METRONIDAZOLE 500 MG: 5 INJECTION, SOLUTION INTRAVENOUS at 21:57

## 2024-10-02 RX ADMIN — ACETAMINOPHEN 650 MG: 325 TABLET ORAL at 04:49

## 2024-10-02 RX ADMIN — IPRATROPIUM BROMIDE 0.5 MG: 0.5 SOLUTION RESPIRATORY (INHALATION) at 10:57

## 2024-10-02 RX ADMIN — VANCOMYCIN HYDROCHLORIDE 500 MG: KIT at 11:09

## 2024-10-02 RX ADMIN — LORAZEPAM 1 MG: 1 TABLET ORAL at 21:39

## 2024-10-02 RX ADMIN — CEFEPIME 2000 MG: 2 INJECTION, POWDER, FOR SOLUTION INTRAVENOUS at 11:14

## 2024-10-02 RX ADMIN — LACOSAMIDE 150 MG: 100 SOLUTION ORAL at 09:05

## 2024-10-02 RX ADMIN — INSULIN LISPRO 6 UNITS: 100 INJECTION, SOLUTION INTRAVENOUS; SUBCUTANEOUS at 12:15

## 2024-10-02 RX ADMIN — INSULIN LISPRO 4 UNITS: 100 INJECTION, SOLUTION INTRAVENOUS; SUBCUTANEOUS at 05:00

## 2024-10-02 RX ADMIN — IPRATROPIUM BROMIDE 0.5 MG: 0.5 SOLUTION RESPIRATORY (INHALATION) at 16:10

## 2024-10-02 RX ADMIN — METRONIDAZOLE 500 MG: 5 INJECTION, SOLUTION INTRAVENOUS at 14:12

## 2024-10-02 RX ADMIN — PANCRELIPASE 1 CAPSULE: 30000; 6000; 19000 CAPSULE, DELAYED RELEASE PELLETS ORAL at 09:05

## 2024-10-02 RX ADMIN — LORAZEPAM 1 MG: 1 TABLET ORAL at 14:07

## 2024-10-02 RX ADMIN — ACETAMINOPHEN 650 MG: 325 TABLET ORAL at 00:42

## 2024-10-02 RX ADMIN — OXYCODONE HYDROCHLORIDE 2.5 MG: 5 TABLET ORAL at 09:05

## 2024-10-02 ASSESSMENT — PAIN SCALES - BEHAVIORAL PAIN SCALE (BPS)
BPS_SCORE: 3
BPS_SCORE: 3
BPS_SCORE: 4
BPS_SCORE: 4
BPS_SCORE: 6
BPS_SCORE: 3
BPS_SCORE: 6
BPS_SCORE: 4

## 2024-10-03 LAB
ALBUMIN SERPL-MCNC: 1.9 G/DL (ref 3.4–5)
ALBUMIN/GLOB SERPL: 0.5 {RATIO} (ref 1–2.4)
ALP SERPL-CCNC: 48 UNITS/L (ref 45–117)
ALT SERPL-CCNC: 31 UNITS/L
ANION GAP SERPL CALC-SCNC: 7 MMOL/L (ref 7–19)
AST SERPL-CCNC: 43 UNITS/L
BACTERIA SPT AEROBE CULT: NORMAL
BASOPHILS # BLD: 0 K/MCL (ref 0–0.3)
BASOPHILS NFR BLD: 0 %
BILIRUB SERPL-MCNC: 0.3 MG/DL (ref 0.2–1)
BUN SERPL-MCNC: 15 MG/DL (ref 6–20)
BUN/CREAT SERPL: 71 (ref 7–25)
CALCIUM SERPL-MCNC: 7.8 MG/DL (ref 8.4–10.2)
CHLORIDE SERPL-SCNC: 107 MMOL/L (ref 97–110)
CO2 SERPL-SCNC: 30 MMOL/L (ref 21–32)
CREAT SERPL-MCNC: 0.21 MG/DL (ref 0.51–0.95)
DEPRECATED RDW RBC: 41.9 FL (ref 39–50)
EGFRCR SERPLBLD CKD-EPI 2021: >90 ML/MIN/{1.73_M2}
EOSINOPHIL # BLD: 0.1 K/MCL (ref 0–0.5)
EOSINOPHIL NFR BLD: 2 %
ERYTHROCYTE [DISTWIDTH] IN BLOOD: 11.8 % (ref 11–15)
FASTING DURATION TIME PATIENT: ABNORMAL H
FERRITIN SERPL-MCNC: 988 NG/ML (ref 8–252)
GLOBULIN SER-MCNC: 3.8 G/DL (ref 2–4)
GLUCOSE BLDC GLUCOMTR-MCNC: 176 MG/DL (ref 70–99)
GLUCOSE BLDC GLUCOMTR-MCNC: 249 MG/DL (ref 70–99)
GLUCOSE BLDC GLUCOMTR-MCNC: 257 MG/DL (ref 70–99)
GLUCOSE BLDC GLUCOMTR-MCNC: 321 MG/DL (ref 70–99)
GLUCOSE SERPL-MCNC: 253 MG/DL (ref 70–99)
GRAM STN SPEC: NORMAL
HCT VFR BLD CALC: 23.9 % (ref 36–46.5)
HGB BLD-MCNC: 8.3 G/DL (ref 12–15.5)
IMM GRANULOCYTES # BLD AUTO: 0 K/MCL (ref 0–0.2)
IMM GRANULOCYTES # BLD: 0 %
INR PPP: 1.3
LYMPHOCYTES # BLD: 0.9 K/MCL (ref 1–4.8)
LYMPHOCYTES NFR BLD: 16 %
MAGNESIUM SERPL-MCNC: 1.8 MG/DL (ref 1.7–2.4)
MCH RBC QN AUTO: 34.3 PG (ref 26–34)
MCHC RBC AUTO-ENTMCNC: 34.7 G/DL (ref 32–36.5)
MCV RBC AUTO: 98.8 FL (ref 78–100)
MONOCYTES # BLD: 0.5 K/MCL (ref 0.3–0.9)
MONOCYTES NFR BLD: 9 %
NEUTROPHILS # BLD: 4 K/MCL (ref 1.8–7.7)
NEUTROPHILS NFR BLD: 73 %
NRBC BLD MANUAL-RTO: 1 /100 WBC
PHOSPHATE SERPL-MCNC: 2 MG/DL (ref 2.4–4.7)
PLATELET # BLD AUTO: 79 K/MCL (ref 140–450)
POTASSIUM SERPL-SCNC: 3.7 MMOL/L (ref 3.4–5.1)
PROT SERPL-MCNC: 5.7 G/DL (ref 6.4–8.2)
PROTHROMBIN TIME: 13.7 SEC (ref 9.7–11.8)
RBC # BLD: 2.42 MIL/MCL (ref 4–5.2)
SODIUM SERPL-SCNC: 140 MMOL/L (ref 135–145)
WBC # BLD: 5.6 K/MCL (ref 4.2–11)

## 2024-10-03 PROCEDURE — 94003 VENT MGMT INPAT SUBQ DAY: CPT

## 2024-10-03 PROCEDURE — 10002803 HB RX 637

## 2024-10-03 PROCEDURE — 10002801 HB RX 250 W/O HCPCS

## 2024-10-03 PROCEDURE — 10002801 HB RX 250 W/O HCPCS: Performed by: STUDENT IN AN ORGANIZED HEALTH CARE EDUCATION/TRAINING PROGRAM

## 2024-10-03 PROCEDURE — 85025 COMPLETE CBC W/AUTO DIFF WBC: CPT

## 2024-10-03 PROCEDURE — 10002803 HB RX 637: Performed by: INTERNAL MEDICINE

## 2024-10-03 PROCEDURE — 10002801 HB RX 250 W/O HCPCS: Performed by: INTERNAL MEDICINE

## 2024-10-03 PROCEDURE — 36415 COLL VENOUS BLD VENIPUNCTURE: CPT

## 2024-10-03 PROCEDURE — 10002803 HB RX 637: Performed by: STUDENT IN AN ORGANIZED HEALTH CARE EDUCATION/TRAINING PROGRAM

## 2024-10-03 PROCEDURE — 10004651 HB RX, NO CHARGE ITEM

## 2024-10-03 PROCEDURE — 84100 ASSAY OF PHOSPHORUS: CPT

## 2024-10-03 PROCEDURE — 10002800 HB RX 250 W HCPCS: Performed by: NURSE PRACTITIONER

## 2024-10-03 PROCEDURE — 99233 SBSQ HOSP IP/OBS HIGH 50: CPT

## 2024-10-03 PROCEDURE — 80053 COMPREHEN METABOLIC PANEL: CPT

## 2024-10-03 PROCEDURE — 96372 THER/PROPH/DIAG INJ SC/IM: CPT

## 2024-10-03 PROCEDURE — 82728 ASSAY OF FERRITIN: CPT

## 2024-10-03 PROCEDURE — 10006031 HB ROOM CHARGE TELEMETRY

## 2024-10-03 PROCEDURE — 10002807 HB RX 258: Performed by: NURSE PRACTITIONER

## 2024-10-03 PROCEDURE — 94640 AIRWAY INHALATION TREATMENT: CPT

## 2024-10-03 PROCEDURE — 83735 ASSAY OF MAGNESIUM: CPT

## 2024-10-03 PROCEDURE — 10002800 HB RX 250 W HCPCS

## 2024-10-03 PROCEDURE — 85610 PROTHROMBIN TIME: CPT

## 2024-10-03 RX ADMIN — INSULIN LISPRO 8 UNITS: 100 INJECTION, SOLUTION INTRAVENOUS; SUBCUTANEOUS at 01:15

## 2024-10-03 RX ADMIN — VANCOMYCIN HYDROCHLORIDE 500 MG: KIT at 09:00

## 2024-10-03 RX ADMIN — LORAZEPAM 1 MG: 1 TABLET ORAL at 13:05

## 2024-10-03 RX ADMIN — VALPROIC ACID 250 MG: 250 SOLUTION ORAL at 05:49

## 2024-10-03 RX ADMIN — LORAZEPAM 1 MG: 1 TABLET ORAL at 20:51

## 2024-10-03 RX ADMIN — LEVETIRACETAM 750 MG: 100 SOLUTION ORAL at 09:00

## 2024-10-03 RX ADMIN — VALPROIC ACID 250 MG: 250 SOLUTION ORAL at 21:00

## 2024-10-03 RX ADMIN — CEFEPIME 2000 MG: 2 INJECTION, POWDER, FOR SOLUTION INTRAVENOUS at 21:03

## 2024-10-03 RX ADMIN — FAMOTIDINE 10 MG: 20 TABLET ORAL at 08:58

## 2024-10-03 RX ADMIN — CEFEPIME 2000 MG: 2 INJECTION, POWDER, FOR SOLUTION INTRAVENOUS at 13:04

## 2024-10-03 RX ADMIN — INSULIN LISPRO 6 UNITS: 100 INJECTION, SOLUTION INTRAVENOUS; SUBCUTANEOUS at 17:54

## 2024-10-03 RX ADMIN — METRONIDAZOLE 500 MG: 5 INJECTION, SOLUTION INTRAVENOUS at 13:05

## 2024-10-03 RX ADMIN — APIXABAN 2.5 MG: 2.5 TABLET, FILM COATED ORAL at 20:51

## 2024-10-03 RX ADMIN — IPRATROPIUM BROMIDE 0.5 MG: 0.5 SOLUTION RESPIRATORY (INHALATION) at 15:24

## 2024-10-03 RX ADMIN — PANCRELIPASE 1 CAPSULE: 30000; 6000; 19000 CAPSULE, DELAYED RELEASE PELLETS ORAL at 20:52

## 2024-10-03 RX ADMIN — IPRATROPIUM BROMIDE 0.5 MG: 0.5 SOLUTION RESPIRATORY (INHALATION) at 11:42

## 2024-10-03 RX ADMIN — IPRATROPIUM BROMIDE 0.5 MG: 0.5 SOLUTION RESPIRATORY (INHALATION) at 19:16

## 2024-10-03 RX ADMIN — LACOSAMIDE 150 MG: 100 SOLUTION ORAL at 20:52

## 2024-10-03 RX ADMIN — IPRATROPIUM BROMIDE 0.5 MG: 0.5 SOLUTION RESPIRATORY (INHALATION) at 08:41

## 2024-10-03 RX ADMIN — SODIUM CHLORIDE, PRESERVATIVE FREE 2 ML: 5 INJECTION INTRAVENOUS at 20:52

## 2024-10-03 RX ADMIN — CEFEPIME 2000 MG: 2 INJECTION, POWDER, FOR SOLUTION INTRAVENOUS at 05:45

## 2024-10-03 RX ADMIN — METRONIDAZOLE 500 MG: 5 INJECTION, SOLUTION INTRAVENOUS at 05:44

## 2024-10-03 RX ADMIN — SODIUM CHLORIDE, PRESERVATIVE FREE 2 ML: 5 INJECTION INTRAVENOUS at 09:00

## 2024-10-03 RX ADMIN — OXYCODONE HYDROCHLORIDE 2.5 MG: 5 TABLET ORAL at 20:51

## 2024-10-03 RX ADMIN — INSULIN LISPRO 2 UNITS: 100 INJECTION, SOLUTION INTRAVENOUS; SUBCUTANEOUS at 05:49

## 2024-10-03 RX ADMIN — FAMOTIDINE 10 MG: 20 TABLET ORAL at 21:00

## 2024-10-03 RX ADMIN — PANCRELIPASE 1 CAPSULE: 30000; 6000; 19000 CAPSULE, DELAYED RELEASE PELLETS ORAL at 09:00

## 2024-10-03 RX ADMIN — PANCRELIPASE 1 CAPSULE: 30000; 6000; 19000 CAPSULE, DELAYED RELEASE PELLETS ORAL at 13:05

## 2024-10-03 RX ADMIN — OXYCODONE HYDROCHLORIDE 2.5 MG: 5 TABLET ORAL at 08:58

## 2024-10-03 RX ADMIN — LEVETIRACETAM 750 MG: 100 SOLUTION ORAL at 20:51

## 2024-10-03 RX ADMIN — METRONIDAZOLE 500 MG: 5 INJECTION, SOLUTION INTRAVENOUS at 21:02

## 2024-10-03 RX ADMIN — VANCOMYCIN HYDROCHLORIDE 500 MG: KIT at 17:54

## 2024-10-03 RX ADMIN — LORAZEPAM 1 MG: 1 TABLET ORAL at 08:58

## 2024-10-03 RX ADMIN — ACETAMINOPHEN 650 MG: 325 TABLET ORAL at 02:06

## 2024-10-03 RX ADMIN — LORAZEPAM 1 MG: 1 TABLET ORAL at 01:52

## 2024-10-03 RX ADMIN — LACOSAMIDE 150 MG: 100 SOLUTION ORAL at 09:00

## 2024-10-03 RX ADMIN — VANCOMYCIN HYDROCHLORIDE 500 MG: KIT at 12:19

## 2024-10-03 RX ADMIN — VALPROIC ACID 250 MG: 250 SOLUTION ORAL at 13:05

## 2024-10-03 RX ADMIN — ACETAMINOPHEN 650 MG: 325 TABLET ORAL at 11:22

## 2024-10-03 RX ADMIN — INSULIN LISPRO 4 UNITS: 100 INJECTION, SOLUTION INTRAVENOUS; SUBCUTANEOUS at 12:19

## 2024-10-03 ASSESSMENT — PAIN SCALES - BEHAVIORAL PAIN SCALE (BPS)
BPS_SCORE: 3
BPS_SCORE: 5
BPS_SCORE: 5

## 2024-10-04 LAB
ALBUMIN SERPL-MCNC: 2.1 G/DL (ref 3.4–5)
ALBUMIN/GLOB SERPL: 0.5 {RATIO} (ref 1–2.4)
ALP SERPL-CCNC: 54 UNITS/L (ref 45–117)
ALT SERPL-CCNC: 35 UNITS/L
ANION GAP SERPL CALC-SCNC: 7 MMOL/L (ref 7–19)
AST SERPL-CCNC: 29 UNITS/L
BASOPHILS # BLD: 0 K/MCL (ref 0–0.3)
BASOPHILS NFR BLD: 0 %
BILIRUB SERPL-MCNC: 0.3 MG/DL (ref 0.2–1)
BUN SERPL-MCNC: 14 MG/DL (ref 6–20)
BUN/CREAT SERPL: 61 (ref 7–25)
CALCIUM SERPL-MCNC: 8.2 MG/DL (ref 8.4–10.2)
CHLORIDE SERPL-SCNC: 107 MMOL/L (ref 97–110)
CO2 SERPL-SCNC: 28 MMOL/L (ref 21–32)
COPPER SERPL-MCNC: 64 MCG/DL (ref 80–155)
CREAT SERPL-MCNC: 0.23 MG/DL (ref 0.51–0.95)
DEPRECATED RDW RBC: 42.6 FL (ref 39–50)
EGFRCR SERPLBLD CKD-EPI 2021: >90 ML/MIN/{1.73_M2}
EOSINOPHIL # BLD: 0.2 K/MCL (ref 0–0.5)
EOSINOPHIL NFR BLD: 4 %
ERYTHROCYTE [DISTWIDTH] IN BLOOD: 11.6 % (ref 11–15)
FASTING DURATION TIME PATIENT: ABNORMAL H
FERRITIN SERPL-MCNC: 1176 NG/ML (ref 8–252)
GLOBULIN SER-MCNC: 4.4 G/DL (ref 2–4)
GLUCOSE BLDC GLUCOMTR-MCNC: 220 MG/DL (ref 70–99)
GLUCOSE BLDC GLUCOMTR-MCNC: 247 MG/DL (ref 70–99)
GLUCOSE BLDC GLUCOMTR-MCNC: 263 MG/DL (ref 70–99)
GLUCOSE BLDC GLUCOMTR-MCNC: 295 MG/DL (ref 70–99)
GLUCOSE SERPL-MCNC: 286 MG/DL (ref 70–99)
HCT VFR BLD CALC: 25.2 % (ref 36–46.5)
HGB BLD-MCNC: 8.6 G/DL (ref 12–15.5)
IMM GRANULOCYTES # BLD AUTO: 0 K/MCL (ref 0–0.2)
IMM GRANULOCYTES # BLD: 0 %
LYMPHOCYTES # BLD: 1.2 K/MCL (ref 1–4.8)
LYMPHOCYTES NFR BLD: 25 %
MAGNESIUM SERPL-MCNC: 1.9 MG/DL (ref 1.7–2.4)
MCH RBC QN AUTO: 34.4 PG (ref 26–34)
MCHC RBC AUTO-ENTMCNC: 34.1 G/DL (ref 32–36.5)
MCV RBC AUTO: 100.8 FL (ref 78–100)
MONOCYTES # BLD: 0.5 K/MCL (ref 0.3–0.9)
MONOCYTES NFR BLD: 10 %
NEUTROPHILS # BLD: 2.9 K/MCL (ref 1.8–7.7)
NEUTROPHILS NFR BLD: 61 %
NRBC BLD MANUAL-RTO: 0 /100 WBC
PHOSPHATE SERPL-MCNC: 2.1 MG/DL (ref 2.4–4.7)
PLATELET # BLD AUTO: 86 K/MCL (ref 140–450)
POTASSIUM SERPL-SCNC: 3.5 MMOL/L (ref 3.4–5.1)
PROT SERPL-MCNC: 6.5 G/DL (ref 6.4–8.2)
RBC # BLD: 2.5 MIL/MCL (ref 4–5.2)
REF LAB TEST NAME: NORMAL
SERVICE CMNT-IMP: NORMAL
SODIUM SERPL-SCNC: 138 MMOL/L (ref 135–145)
WBC # BLD: 4.7 K/MCL (ref 4.2–11)

## 2024-10-04 PROCEDURE — 94003 VENT MGMT INPAT SUBQ DAY: CPT

## 2024-10-04 PROCEDURE — 10002800 HB RX 250 W HCPCS

## 2024-10-04 PROCEDURE — 10002803 HB RX 637

## 2024-10-04 PROCEDURE — 10002807 HB RX 258: Performed by: NURSE PRACTITIONER

## 2024-10-04 PROCEDURE — 82728 ASSAY OF FERRITIN: CPT

## 2024-10-04 PROCEDURE — 83735 ASSAY OF MAGNESIUM: CPT

## 2024-10-04 PROCEDURE — 96372 THER/PROPH/DIAG INJ SC/IM: CPT

## 2024-10-04 PROCEDURE — 10004651 HB RX, NO CHARGE ITEM

## 2024-10-04 PROCEDURE — 84100 ASSAY OF PHOSPHORUS: CPT

## 2024-10-04 PROCEDURE — 10002801 HB RX 250 W/O HCPCS: Performed by: INTERNAL MEDICINE

## 2024-10-04 PROCEDURE — 80053 COMPREHEN METABOLIC PANEL: CPT

## 2024-10-04 PROCEDURE — 10000002 HB ROOM CHARGE MED SURG

## 2024-10-04 PROCEDURE — 10002800 HB RX 250 W HCPCS: Performed by: NURSE PRACTITIONER

## 2024-10-04 PROCEDURE — 85025 COMPLETE CBC W/AUTO DIFF WBC: CPT

## 2024-10-04 PROCEDURE — 10002801 HB RX 250 W/O HCPCS: Performed by: STUDENT IN AN ORGANIZED HEALTH CARE EDUCATION/TRAINING PROGRAM

## 2024-10-04 PROCEDURE — 10002803 HB RX 637: Performed by: INTERNAL MEDICINE

## 2024-10-04 PROCEDURE — 10002801 HB RX 250 W/O HCPCS

## 2024-10-04 PROCEDURE — 94640 AIRWAY INHALATION TREATMENT: CPT

## 2024-10-04 PROCEDURE — 36415 COLL VENOUS BLD VENIPUNCTURE: CPT

## 2024-10-04 RX ADMIN — APIXABAN 2.5 MG: 2.5 TABLET, FILM COATED ORAL at 08:42

## 2024-10-04 RX ADMIN — IPRATROPIUM BROMIDE 0.5 MG: 0.5 SOLUTION RESPIRATORY (INHALATION) at 11:22

## 2024-10-04 RX ADMIN — VALPROIC ACID 250 MG: 250 SOLUTION ORAL at 20:59

## 2024-10-04 RX ADMIN — INSULIN LISPRO 4 UNITS: 100 INJECTION, SOLUTION INTRAVENOUS; SUBCUTANEOUS at 06:10

## 2024-10-04 RX ADMIN — METRONIDAZOLE 500 MG: 5 INJECTION, SOLUTION INTRAVENOUS at 20:57

## 2024-10-04 RX ADMIN — VALPROIC ACID 250 MG: 250 SOLUTION ORAL at 13:02

## 2024-10-04 RX ADMIN — OXYCODONE HYDROCHLORIDE 2.5 MG: 5 TABLET ORAL at 08:42

## 2024-10-04 RX ADMIN — OXYCODONE HYDROCHLORIDE 2.5 MG: 5 TABLET ORAL at 06:31

## 2024-10-04 RX ADMIN — INSULIN LISPRO 4 UNITS: 100 INJECTION, SOLUTION INTRAVENOUS; SUBCUTANEOUS at 17:15

## 2024-10-04 RX ADMIN — METRONIDAZOLE 500 MG: 5 INJECTION, SOLUTION INTRAVENOUS at 13:07

## 2024-10-04 RX ADMIN — LACOSAMIDE 150 MG: 100 SOLUTION ORAL at 21:10

## 2024-10-04 RX ADMIN — FAMOTIDINE 10 MG: 20 TABLET ORAL at 08:43

## 2024-10-04 RX ADMIN — VALPROIC ACID 250 MG: 250 SOLUTION ORAL at 06:10

## 2024-10-04 RX ADMIN — ACETAMINOPHEN 650 MG: 325 TABLET ORAL at 11:29

## 2024-10-04 RX ADMIN — LORAZEPAM 1 MG: 1 TABLET ORAL at 20:58

## 2024-10-04 RX ADMIN — INSULIN LISPRO 6 UNITS: 100 INJECTION, SOLUTION INTRAVENOUS; SUBCUTANEOUS at 13:14

## 2024-10-04 RX ADMIN — LORAZEPAM 1 MG: 1 TABLET ORAL at 02:05

## 2024-10-04 RX ADMIN — SODIUM CHLORIDE, PRESERVATIVE FREE 2 ML: 5 INJECTION INTRAVENOUS at 21:03

## 2024-10-04 RX ADMIN — LORAZEPAM 1 MG: 1 TABLET ORAL at 13:02

## 2024-10-04 RX ADMIN — PANCRELIPASE 1 CAPSULE: 30000; 6000; 19000 CAPSULE, DELAYED RELEASE PELLETS ORAL at 20:59

## 2024-10-04 RX ADMIN — LORAZEPAM 1 MG: 1 TABLET ORAL at 07:47

## 2024-10-04 RX ADMIN — LEVETIRACETAM 750 MG: 100 SOLUTION ORAL at 20:59

## 2024-10-04 RX ADMIN — IPRATROPIUM BROMIDE 0.5 MG: 0.5 SOLUTION RESPIRATORY (INHALATION) at 19:27

## 2024-10-04 RX ADMIN — PANCRELIPASE 1 CAPSULE: 30000; 6000; 19000 CAPSULE, DELAYED RELEASE PELLETS ORAL at 08:42

## 2024-10-04 RX ADMIN — PANCRELIPASE 1 CAPSULE: 30000; 6000; 19000 CAPSULE, DELAYED RELEASE PELLETS ORAL at 13:02

## 2024-10-04 RX ADMIN — FAMOTIDINE 10 MG: 20 TABLET ORAL at 20:58

## 2024-10-04 RX ADMIN — SODIUM CHLORIDE, PRESERVATIVE FREE 2 ML: 5 INJECTION INTRAVENOUS at 08:43

## 2024-10-04 RX ADMIN — CEFEPIME 2000 MG: 2 INJECTION, POWDER, FOR SOLUTION INTRAVENOUS at 14:37

## 2024-10-04 RX ADMIN — IPRATROPIUM BROMIDE 0.5 MG: 0.5 SOLUTION RESPIRATORY (INHALATION) at 15:58

## 2024-10-04 RX ADMIN — CEFEPIME 2000 MG: 2 INJECTION, POWDER, FOR SOLUTION INTRAVENOUS at 20:56

## 2024-10-04 RX ADMIN — INSULIN LISPRO 6 UNITS: 100 INJECTION, SOLUTION INTRAVENOUS; SUBCUTANEOUS at 00:51

## 2024-10-04 RX ADMIN — LACOSAMIDE 150 MG: 100 SOLUTION ORAL at 08:42

## 2024-10-04 RX ADMIN — OXYCODONE HYDROCHLORIDE 2.5 MG: 5 TABLET ORAL at 21:01

## 2024-10-04 RX ADMIN — METRONIDAZOLE 500 MG: 5 INJECTION, SOLUTION INTRAVENOUS at 06:13

## 2024-10-04 RX ADMIN — CEFEPIME 2000 MG: 2 INJECTION, POWDER, FOR SOLUTION INTRAVENOUS at 06:14

## 2024-10-04 RX ADMIN — ACETAMINOPHEN 650 MG: 325 TABLET ORAL at 00:51

## 2024-10-04 RX ADMIN — LEVETIRACETAM 750 MG: 100 SOLUTION ORAL at 08:42

## 2024-10-04 RX ADMIN — APIXABAN 2.5 MG: 2.5 TABLET, FILM COATED ORAL at 21:00

## 2024-10-04 RX ADMIN — IPRATROPIUM BROMIDE 0.5 MG: 0.5 SOLUTION RESPIRATORY (INHALATION) at 07:21

## 2024-10-04 ASSESSMENT — PAIN SCALES - BEHAVIORAL PAIN SCALE (BPS)
BPS_SCORE: 3
BPS_SCORE: 6
BPS_SCORE: 3
BPS_SCORE: 3

## 2024-10-05 VITALS
BODY MASS INDEX: 15.13 KG/M2 | RESPIRATION RATE: 20 BRPM | WEIGHT: 85.4 LBS | HEART RATE: 98 BPM | SYSTOLIC BLOOD PRESSURE: 144 MMHG | DIASTOLIC BLOOD PRESSURE: 76 MMHG | TEMPERATURE: 98.8 F | HEIGHT: 63 IN | OXYGEN SATURATION: 100 %

## 2024-10-05 LAB
ALBUMIN SERPL-MCNC: 2.3 G/DL (ref 3.4–5)
ALBUMIN/GLOB SERPL: 0.5 {RATIO} (ref 1–2.4)
ALP SERPL-CCNC: 56 UNITS/L (ref 45–117)
ALT SERPL-CCNC: 44 UNITS/L
ANION GAP SERPL CALC-SCNC: 9 MMOL/L (ref 7–19)
AST SERPL-CCNC: 34 UNITS/L
BACTERIA BLD CULT: NORMAL
BACTERIA BLD CULT: NORMAL
BASOPHILS # BLD: 0 K/MCL (ref 0–0.3)
BASOPHILS NFR BLD: 0 %
BILIRUB SERPL-MCNC: 0.3 MG/DL (ref 0.2–1)
BUN SERPL-MCNC: 13 MG/DL (ref 6–20)
BUN/CREAT SERPL: 62 (ref 7–25)
CALCIUM SERPL-MCNC: 8.5 MG/DL (ref 8.4–10.2)
CHLORIDE SERPL-SCNC: 105 MMOL/L (ref 97–110)
CO2 SERPL-SCNC: 28 MMOL/L (ref 21–32)
CREAT SERPL-MCNC: 0.21 MG/DL (ref 0.51–0.95)
DEPRECATED RDW RBC: 41.4 FL (ref 39–50)
EGFRCR SERPLBLD CKD-EPI 2021: >90 ML/MIN/{1.73_M2}
EOSINOPHIL # BLD: 0.2 K/MCL (ref 0–0.5)
EOSINOPHIL NFR BLD: 2 %
ERYTHROCYTE [DISTWIDTH] IN BLOOD: 11.6 % (ref 11–15)
FASTING DURATION TIME PATIENT: ABNORMAL H
FERRITIN SERPL-MCNC: 1182 NG/ML (ref 8–252)
GLOBULIN SER-MCNC: 4.7 G/DL (ref 2–4)
GLUCOSE BLDC GLUCOMTR-MCNC: 212 MG/DL (ref 70–99)
GLUCOSE BLDC GLUCOMTR-MCNC: 260 MG/DL (ref 70–99)
GLUCOSE BLDC GLUCOMTR-MCNC: 261 MG/DL (ref 70–99)
GLUCOSE BLDC GLUCOMTR-MCNC: 288 MG/DL (ref 70–99)
GLUCOSE SERPL-MCNC: 212 MG/DL (ref 70–99)
HCT VFR BLD CALC: 24.6 % (ref 36–46.5)
HGB BLD-MCNC: 8.2 G/DL (ref 12–15.5)
IMM GRANULOCYTES # BLD AUTO: 0 K/MCL (ref 0–0.2)
IMM GRANULOCYTES # BLD: 1 %
LYMPHOCYTES # BLD: 1.4 K/MCL (ref 1–4.8)
LYMPHOCYTES NFR BLD: 23 %
MAGNESIUM SERPL-MCNC: 1.9 MG/DL (ref 1.7–2.4)
MCH RBC QN AUTO: 33.1 PG (ref 26–34)
MCHC RBC AUTO-ENTMCNC: 33.3 G/DL (ref 32–36.5)
MCV RBC AUTO: 99.2 FL (ref 78–100)
METHYLMALONATE SERPL-SCNC: 0.23 UMOL/L (ref 0–0.4)
MONOCYTES # BLD: 0.6 K/MCL (ref 0.3–0.9)
MONOCYTES NFR BLD: 10 %
NEUTROPHILS # BLD: 3.9 K/MCL (ref 1.8–7.7)
NEUTROPHILS NFR BLD: 64 %
NRBC BLD MANUAL-RTO: 0 /100 WBC
PHOSPHATE SERPL-MCNC: 1.5 MG/DL (ref 2.4–4.7)
PLATELET # BLD AUTO: 107 K/MCL (ref 140–450)
POTASSIUM SERPL-SCNC: 3.8 MMOL/L (ref 3.4–5.1)
PROT SERPL-MCNC: 7 G/DL (ref 6.4–8.2)
RBC # BLD: 2.48 MIL/MCL (ref 4–5.2)
SODIUM SERPL-SCNC: 138 MMOL/L (ref 135–145)
WBC # BLD: 6.1 K/MCL (ref 4.2–11)

## 2024-10-05 PROCEDURE — 85025 COMPLETE CBC W/AUTO DIFF WBC: CPT

## 2024-10-05 PROCEDURE — 10002803 HB RX 637

## 2024-10-05 PROCEDURE — 36415 COLL VENOUS BLD VENIPUNCTURE: CPT

## 2024-10-05 PROCEDURE — 10004651 HB RX, NO CHARGE ITEM

## 2024-10-05 PROCEDURE — 96372 THER/PROPH/DIAG INJ SC/IM: CPT

## 2024-10-05 PROCEDURE — 10002807 HB RX 258: Performed by: NURSE PRACTITIONER

## 2024-10-05 PROCEDURE — 84100 ASSAY OF PHOSPHORUS: CPT

## 2024-10-05 PROCEDURE — 80053 COMPREHEN METABOLIC PANEL: CPT

## 2024-10-05 PROCEDURE — 10002800 HB RX 250 W HCPCS

## 2024-10-05 PROCEDURE — 10002800 HB RX 250 W HCPCS: Performed by: NURSE PRACTITIONER

## 2024-10-05 PROCEDURE — 94003 VENT MGMT INPAT SUBQ DAY: CPT

## 2024-10-05 PROCEDURE — 10002803 HB RX 637: Performed by: INTERNAL MEDICINE

## 2024-10-05 PROCEDURE — 10002801 HB RX 250 W/O HCPCS

## 2024-10-05 PROCEDURE — 83735 ASSAY OF MAGNESIUM: CPT

## 2024-10-05 PROCEDURE — 10000002 HB ROOM CHARGE MED SURG

## 2024-10-05 PROCEDURE — 94640 AIRWAY INHALATION TREATMENT: CPT

## 2024-10-05 PROCEDURE — 10002803 HB RX 637: Performed by: NURSE PRACTITIONER

## 2024-10-05 PROCEDURE — 10002801 HB RX 250 W/O HCPCS: Performed by: INTERNAL MEDICINE

## 2024-10-05 PROCEDURE — 10002801 HB RX 250 W/O HCPCS: Performed by: STUDENT IN AN ORGANIZED HEALTH CARE EDUCATION/TRAINING PROGRAM

## 2024-10-05 PROCEDURE — 82728 ASSAY OF FERRITIN: CPT

## 2024-10-05 RX ORDER — VANCOMYCIN HYDROCHLORIDE 250 MG/5ML
500 POWDER, FOR SOLUTION ORAL 4 TIMES DAILY
Status: DISCONTINUED | OUTPATIENT
Start: 2024-10-05 | End: 2024-10-11 | Stop reason: HOSPADM

## 2024-10-05 RX ADMIN — METRONIDAZOLE 500 MG: 5 INJECTION, SOLUTION INTRAVENOUS at 05:34

## 2024-10-05 RX ADMIN — LEVETIRACETAM 750 MG: 100 SOLUTION ORAL at 21:43

## 2024-10-05 RX ADMIN — ACETAMINOPHEN 650 MG: 325 TABLET ORAL at 11:42

## 2024-10-05 RX ADMIN — METRONIDAZOLE 500 MG: 5 INJECTION, SOLUTION INTRAVENOUS at 21:58

## 2024-10-05 RX ADMIN — LORAZEPAM 1 MG: 1 TABLET ORAL at 13:36

## 2024-10-05 RX ADMIN — IPRATROPIUM BROMIDE 0.5 MG: 0.5 SOLUTION RESPIRATORY (INHALATION) at 08:21

## 2024-10-05 RX ADMIN — LORAZEPAM 1 MG: 1 TABLET ORAL at 07:42

## 2024-10-05 RX ADMIN — PANCRELIPASE 1 CAPSULE: 30000; 6000; 19000 CAPSULE, DELAYED RELEASE PELLETS ORAL at 21:46

## 2024-10-05 RX ADMIN — CEFEPIME 2000 MG: 2 INJECTION, POWDER, FOR SOLUTION INTRAVENOUS at 14:37

## 2024-10-05 RX ADMIN — CEFEPIME 2000 MG: 2 INJECTION, POWDER, FOR SOLUTION INTRAVENOUS at 05:33

## 2024-10-05 RX ADMIN — SODIUM CHLORIDE, PRESERVATIVE FREE 2 ML: 5 INJECTION INTRAVENOUS at 08:23

## 2024-10-05 RX ADMIN — IPRATROPIUM BROMIDE 0.5 MG: 0.5 SOLUTION RESPIRATORY (INHALATION) at 22:05

## 2024-10-05 RX ADMIN — INSULIN LISPRO 4 UNITS: 100 INJECTION, SOLUTION INTRAVENOUS; SUBCUTANEOUS at 05:48

## 2024-10-05 RX ADMIN — OXYCODONE HYDROCHLORIDE 2.5 MG: 5 TABLET ORAL at 08:22

## 2024-10-05 RX ADMIN — VANCOMYCIN HYDROCHLORIDE 500 MG: KIT at 21:45

## 2024-10-05 RX ADMIN — LEVETIRACETAM 750 MG: 100 SOLUTION ORAL at 08:23

## 2024-10-05 RX ADMIN — IPRATROPIUM BROMIDE 0.5 MG: 0.5 SOLUTION RESPIRATORY (INHALATION) at 11:53

## 2024-10-05 RX ADMIN — APIXABAN 2.5 MG: 2.5 TABLET, FILM COATED ORAL at 21:47

## 2024-10-05 RX ADMIN — METRONIDAZOLE 500 MG: 5 INJECTION, SOLUTION INTRAVENOUS at 13:46

## 2024-10-05 RX ADMIN — VANCOMYCIN HYDROCHLORIDE 500 MG: KIT at 17:21

## 2024-10-05 RX ADMIN — APIXABAN 2.5 MG: 2.5 TABLET, FILM COATED ORAL at 08:22

## 2024-10-05 RX ADMIN — OXYCODONE HYDROCHLORIDE 2.5 MG: 5 TABLET ORAL at 21:48

## 2024-10-05 RX ADMIN — PANCRELIPASE 1 CAPSULE: 30000; 6000; 19000 CAPSULE, DELAYED RELEASE PELLETS ORAL at 08:21

## 2024-10-05 RX ADMIN — LORAZEPAM 1 MG: 1 TABLET ORAL at 21:47

## 2024-10-05 RX ADMIN — VALPROIC ACID 250 MG: 250 SOLUTION ORAL at 13:47

## 2024-10-05 RX ADMIN — VALPROIC ACID 250 MG: 250 SOLUTION ORAL at 05:34

## 2024-10-05 RX ADMIN — INSULIN LISPRO 6 UNITS: 100 INJECTION, SOLUTION INTRAVENOUS; SUBCUTANEOUS at 17:21

## 2024-10-05 RX ADMIN — LACOSAMIDE 150 MG: 100 SOLUTION ORAL at 21:44

## 2024-10-05 RX ADMIN — INSULIN LISPRO 8 UNITS: 100 INJECTION, SOLUTION INTRAVENOUS; SUBCUTANEOUS at 01:54

## 2024-10-05 RX ADMIN — CEFEPIME 2000 MG: 2 INJECTION, POWDER, FOR SOLUTION INTRAVENOUS at 21:59

## 2024-10-05 RX ADMIN — VALPROIC ACID 250 MG: 250 SOLUTION ORAL at 21:45

## 2024-10-05 RX ADMIN — FAMOTIDINE 10 MG: 20 TABLET ORAL at 21:46

## 2024-10-05 RX ADMIN — LACOSAMIDE 150 MG: 100 SOLUTION ORAL at 08:21

## 2024-10-05 RX ADMIN — INSULIN LISPRO 6 UNITS: 100 INJECTION, SOLUTION INTRAVENOUS; SUBCUTANEOUS at 12:37

## 2024-10-05 RX ADMIN — LORAZEPAM 1 MG: 1 TABLET ORAL at 01:55

## 2024-10-05 RX ADMIN — IPRATROPIUM BROMIDE 0.5 MG: 0.5 SOLUTION RESPIRATORY (INHALATION) at 17:01

## 2024-10-05 RX ADMIN — PANCRELIPASE 1 CAPSULE: 30000; 6000; 19000 CAPSULE, DELAYED RELEASE PELLETS ORAL at 13:47

## 2024-10-05 RX ADMIN — FAMOTIDINE 10 MG: 20 TABLET ORAL at 08:22

## 2024-10-05 RX ADMIN — SODIUM CHLORIDE, PRESERVATIVE FREE 2 ML: 5 INJECTION INTRAVENOUS at 21:51

## 2024-10-05 ASSESSMENT — PAIN SCALES - BEHAVIORAL PAIN SCALE (BPS)
BPS_SCORE: 5
BPS_SCORE: 3

## 2024-10-06 LAB
ALBUMIN SERPL-MCNC: 2.1 G/DL (ref 3.4–5)
ALBUMIN/GLOB SERPL: 0.5 {RATIO} (ref 1–2.4)
ALP SERPL-CCNC: 62 UNITS/L (ref 45–117)
ALT SERPL-CCNC: 44 UNITS/L
ANION GAP SERPL CALC-SCNC: 6 MMOL/L (ref 7–19)
AST SERPL-CCNC: 37 UNITS/L
BACTERIA BLD CULT: NORMAL
BACTERIA BLD CULT: NORMAL
BASOPHILS # BLD: 0 K/MCL (ref 0–0.3)
BASOPHILS NFR BLD: 0 %
BILIRUB SERPL-MCNC: 0.4 MG/DL (ref 0.2–1)
BUN SERPL-MCNC: 12 MG/DL (ref 6–20)
BUN/CREAT SERPL: 55 (ref 7–25)
CALCIUM SERPL-MCNC: 8.3 MG/DL (ref 8.4–10.2)
CHLORIDE SERPL-SCNC: 106 MMOL/L (ref 97–110)
CO2 SERPL-SCNC: 29 MMOL/L (ref 21–32)
CREAT SERPL-MCNC: 0.22 MG/DL (ref 0.51–0.95)
DEPRECATED RDW RBC: 41.1 FL (ref 39–50)
EGFRCR SERPLBLD CKD-EPI 2021: >90 ML/MIN/{1.73_M2}
EOSINOPHIL # BLD: 0.1 K/MCL (ref 0–0.5)
EOSINOPHIL NFR BLD: 2 %
ERYTHROCYTE [DISTWIDTH] IN BLOOD: 11.6 % (ref 11–15)
FASTING DURATION TIME PATIENT: ABNORMAL H
FERRITIN SERPL-MCNC: 1113 NG/ML (ref 8–252)
GLOBULIN SER-MCNC: 4.5 G/DL (ref 2–4)
GLUCOSE BLDC GLUCOMTR-MCNC: 231 MG/DL (ref 70–99)
GLUCOSE BLDC GLUCOMTR-MCNC: 248 MG/DL (ref 70–99)
GLUCOSE BLDC GLUCOMTR-MCNC: 269 MG/DL (ref 70–99)
GLUCOSE BLDC GLUCOMTR-MCNC: 284 MG/DL (ref 70–99)
GLUCOSE SERPL-MCNC: 274 MG/DL (ref 70–99)
HCT VFR BLD CALC: 23.7 % (ref 36–46.5)
HGB BLD-MCNC: 7.9 G/DL (ref 12–15.5)
IMM GRANULOCYTES # BLD AUTO: 0 K/MCL (ref 0–0.2)
IMM GRANULOCYTES # BLD: 1 %
LYMPHOCYTES # BLD: 1 K/MCL (ref 1–4.8)
LYMPHOCYTES NFR BLD: 17 %
MAGNESIUM SERPL-MCNC: 1.7 MG/DL (ref 1.7–2.4)
MCH RBC QN AUTO: 32.9 PG (ref 26–34)
MCHC RBC AUTO-ENTMCNC: 33.3 G/DL (ref 32–36.5)
MCV RBC AUTO: 98.8 FL (ref 78–100)
MONOCYTES # BLD: 0.4 K/MCL (ref 0.3–0.9)
MONOCYTES NFR BLD: 7 %
NEUTROPHILS # BLD: 4.2 K/MCL (ref 1.8–7.7)
NEUTROPHILS NFR BLD: 73 %
NRBC BLD MANUAL-RTO: 0 /100 WBC
PHOSPHATE SERPL-MCNC: 1.7 MG/DL (ref 2.4–4.7)
PLATELET # BLD AUTO: 107 K/MCL (ref 140–450)
POTASSIUM SERPL-SCNC: 3.7 MMOL/L (ref 3.4–5.1)
PROT SERPL-MCNC: 6.6 G/DL (ref 6.4–8.2)
RBC # BLD: 2.4 MIL/MCL (ref 4–5.2)
SODIUM SERPL-SCNC: 137 MMOL/L (ref 135–145)
WBC # BLD: 5.7 K/MCL (ref 4.2–11)

## 2024-10-06 PROCEDURE — 83735 ASSAY OF MAGNESIUM: CPT

## 2024-10-06 PROCEDURE — 94640 AIRWAY INHALATION TREATMENT: CPT

## 2024-10-06 PROCEDURE — 10004651 HB RX, NO CHARGE ITEM

## 2024-10-06 PROCEDURE — 10002803 HB RX 637

## 2024-10-06 PROCEDURE — 10002803 HB RX 637: Performed by: INTERNAL MEDICINE

## 2024-10-06 PROCEDURE — 84100 ASSAY OF PHOSPHORUS: CPT

## 2024-10-06 PROCEDURE — 36415 COLL VENOUS BLD VENIPUNCTURE: CPT

## 2024-10-06 PROCEDURE — 85025 COMPLETE CBC W/AUTO DIFF WBC: CPT

## 2024-10-06 PROCEDURE — 10002800 HB RX 250 W HCPCS: Performed by: NURSE PRACTITIONER

## 2024-10-06 PROCEDURE — 10002807 HB RX 258: Performed by: NURSE PRACTITIONER

## 2024-10-06 PROCEDURE — 10002800 HB RX 250 W HCPCS

## 2024-10-06 PROCEDURE — 10002801 HB RX 250 W/O HCPCS

## 2024-10-06 PROCEDURE — 82728 ASSAY OF FERRITIN: CPT

## 2024-10-06 PROCEDURE — 10002801 HB RX 250 W/O HCPCS: Performed by: INTERNAL MEDICINE

## 2024-10-06 PROCEDURE — 10002801 HB RX 250 W/O HCPCS: Performed by: STUDENT IN AN ORGANIZED HEALTH CARE EDUCATION/TRAINING PROGRAM

## 2024-10-06 PROCEDURE — 94003 VENT MGMT INPAT SUBQ DAY: CPT

## 2024-10-06 PROCEDURE — 80053 COMPREHEN METABOLIC PANEL: CPT

## 2024-10-06 PROCEDURE — 96372 THER/PROPH/DIAG INJ SC/IM: CPT

## 2024-10-06 PROCEDURE — 10002803 HB RX 637: Performed by: NURSE PRACTITIONER

## 2024-10-06 PROCEDURE — 10000002 HB ROOM CHARGE MED SURG

## 2024-10-06 RX ORDER — LORAZEPAM 2 MG/ML
0.5 INJECTION INTRAMUSCULAR ONCE
Status: DISCONTINUED | OUTPATIENT
Start: 2024-10-06 | End: 2024-10-11 | Stop reason: HOSPADM

## 2024-10-06 RX ADMIN — APIXABAN 2.5 MG: 2.5 TABLET, FILM COATED ORAL at 08:50

## 2024-10-06 RX ADMIN — IPRATROPIUM BROMIDE 0.5 MG: 0.5 SOLUTION RESPIRATORY (INHALATION) at 20:43

## 2024-10-06 RX ADMIN — LORAZEPAM 1 MG: 1 TABLET ORAL at 01:44

## 2024-10-06 RX ADMIN — LACOSAMIDE 150 MG: 10 SOLUTION ORAL at 20:57

## 2024-10-06 RX ADMIN — INSULIN LISPRO 4 UNITS: 100 INJECTION, SOLUTION INTRAVENOUS; SUBCUTANEOUS at 11:35

## 2024-10-06 RX ADMIN — APIXABAN 2.5 MG: 2.5 TABLET, FILM COATED ORAL at 20:56

## 2024-10-06 RX ADMIN — VANCOMYCIN HYDROCHLORIDE 500 MG: KIT at 11:35

## 2024-10-06 RX ADMIN — ACETAMINOPHEN 650 MG: 325 TABLET ORAL at 09:19

## 2024-10-06 RX ADMIN — LORAZEPAM 1 MG: 1 TABLET ORAL at 08:50

## 2024-10-06 RX ADMIN — VALPROIC ACID 250 MG: 250 SOLUTION ORAL at 13:48

## 2024-10-06 RX ADMIN — METRONIDAZOLE 500 MG: 5 INJECTION, SOLUTION INTRAVENOUS at 13:53

## 2024-10-06 RX ADMIN — LEVETIRACETAM 750 MG: 100 SOLUTION ORAL at 08:50

## 2024-10-06 RX ADMIN — IPRATROPIUM BROMIDE 0.5 MG: 0.5 SOLUTION RESPIRATORY (INHALATION) at 11:15

## 2024-10-06 RX ADMIN — CEFEPIME 2000 MG: 2 INJECTION, POWDER, FOR SOLUTION INTRAVENOUS at 13:52

## 2024-10-06 RX ADMIN — FAMOTIDINE 10 MG: 20 TABLET ORAL at 20:56

## 2024-10-06 RX ADMIN — LORAZEPAM 1 MG: 1 TABLET ORAL at 20:56

## 2024-10-06 RX ADMIN — VANCOMYCIN HYDROCHLORIDE 500 MG: KIT at 20:57

## 2024-10-06 RX ADMIN — OXYCODONE HYDROCHLORIDE 2.5 MG: 5 TABLET ORAL at 20:56

## 2024-10-06 RX ADMIN — OXYCODONE HYDROCHLORIDE 2.5 MG: 5 TABLET ORAL at 08:50

## 2024-10-06 RX ADMIN — PANCRELIPASE 1 CAPSULE: 30000; 6000; 19000 CAPSULE, DELAYED RELEASE PELLETS ORAL at 08:50

## 2024-10-06 RX ADMIN — LORAZEPAM 1 MG: 1 TABLET ORAL at 13:48

## 2024-10-06 RX ADMIN — LACOSAMIDE 150 MG: 10 SOLUTION ORAL at 09:21

## 2024-10-06 RX ADMIN — PANCRELIPASE 1 CAPSULE: 30000; 6000; 19000 CAPSULE, DELAYED RELEASE PELLETS ORAL at 20:57

## 2024-10-06 RX ADMIN — LEVETIRACETAM 750 MG: 100 SOLUTION ORAL at 20:57

## 2024-10-06 RX ADMIN — VALPROIC ACID 250 MG: 250 SOLUTION ORAL at 22:31

## 2024-10-06 RX ADMIN — IPRATROPIUM BROMIDE 0.5 MG: 0.5 SOLUTION RESPIRATORY (INHALATION) at 16:00

## 2024-10-06 RX ADMIN — VALPROIC ACID 250 MG: 250 SOLUTION ORAL at 05:20

## 2024-10-06 RX ADMIN — INSULIN LISPRO 6 UNITS: 100 INJECTION, SOLUTION INTRAVENOUS; SUBCUTANEOUS at 00:26

## 2024-10-06 RX ADMIN — FAMOTIDINE 10 MG: 20 TABLET ORAL at 08:50

## 2024-10-06 RX ADMIN — VANCOMYCIN HYDROCHLORIDE 500 MG: KIT at 18:04

## 2024-10-06 RX ADMIN — METRONIDAZOLE 500 MG: 5 INJECTION, SOLUTION INTRAVENOUS at 22:33

## 2024-10-06 RX ADMIN — CEFEPIME 2000 MG: 2 INJECTION, POWDER, FOR SOLUTION INTRAVENOUS at 05:21

## 2024-10-06 RX ADMIN — IPRATROPIUM BROMIDE 0.5 MG: 0.5 SOLUTION RESPIRATORY (INHALATION) at 07:45

## 2024-10-06 RX ADMIN — METRONIDAZOLE 500 MG: 5 INJECTION, SOLUTION INTRAVENOUS at 05:20

## 2024-10-06 RX ADMIN — INSULIN LISPRO 6 UNITS: 100 INJECTION, SOLUTION INTRAVENOUS; SUBCUTANEOUS at 18:04

## 2024-10-06 RX ADMIN — CEFEPIME 2000 MG: 2 INJECTION, POWDER, FOR SOLUTION INTRAVENOUS at 22:34

## 2024-10-06 RX ADMIN — SODIUM CHLORIDE, PRESERVATIVE FREE 2 ML: 5 INJECTION INTRAVENOUS at 09:00

## 2024-10-06 RX ADMIN — ACETAMINOPHEN 650 MG: 325 TABLET ORAL at 04:35

## 2024-10-06 RX ADMIN — INSULIN LISPRO 4 UNITS: 100 INJECTION, SOLUTION INTRAVENOUS; SUBCUTANEOUS at 06:12

## 2024-10-06 RX ADMIN — PANCRELIPASE 1 CAPSULE: 30000; 6000; 19000 CAPSULE, DELAYED RELEASE PELLETS ORAL at 13:48

## 2024-10-06 ASSESSMENT — PAIN SCALES - BEHAVIORAL PAIN SCALE (BPS)
BPS_SCORE: 4
BPS_SCORE: 3
BPS_SCORE: 8
BPS_SCORE: 8
BPS_SCORE: 3
BPS_SCORE: 3

## 2024-10-06 ASSESSMENT — PAIN SCALES - GENERAL
PAINLEVEL_OUTOF10: 4
PAINLEVEL_OUTOF10: 0

## 2024-10-07 LAB
ALBUMIN SERPL-MCNC: 2 G/DL (ref 3.4–5)
ALBUMIN SERPL-MCNC: 2.1 G/DL (ref 3.4–5)
ALBUMIN/GLOB SERPL: 0.4 {RATIO} (ref 1–2.4)
ALBUMIN/GLOB SERPL: 0.4 {RATIO} (ref 1–2.4)
ALP SERPL-CCNC: 61 UNITS/L (ref 45–117)
ALP SERPL-CCNC: 70 UNITS/L (ref 45–117)
ALT SERPL-CCNC: 43 UNITS/L
ALT SERPL-CCNC: 46 UNITS/L
ANION GAP SERPL CALC-SCNC: 4 MMOL/L (ref 7–19)
ANION GAP SERPL CALC-SCNC: 7 MMOL/L (ref 7–19)
AST SERPL-CCNC: 33 UNITS/L
AST SERPL-CCNC: 89 UNITS/L
ATRIAL RATE (BPM): 112
BASOPHILS # BLD: 0 K/MCL (ref 0–0.3)
BASOPHILS # BLD: 0 K/MCL (ref 0–0.3)
BASOPHILS NFR BLD: 0 %
BASOPHILS NFR BLD: 0 %
BILIRUB SERPL-MCNC: 0.3 MG/DL (ref 0.2–1)
BILIRUB SERPL-MCNC: 0.4 MG/DL (ref 0.2–1)
BUN SERPL-MCNC: 11 MG/DL (ref 6–20)
BUN SERPL-MCNC: 13 MG/DL (ref 6–20)
BUN/CREAT SERPL: 59 (ref 7–25)
BUN/CREAT SERPL: 65 (ref 7–25)
CALCIUM SERPL-MCNC: 8.4 MG/DL (ref 8.4–10.2)
CALCIUM SERPL-MCNC: 8.6 MG/DL (ref 8.4–10.2)
CHLORIDE SERPL-SCNC: 104 MMOL/L (ref 97–110)
CHLORIDE SERPL-SCNC: 106 MMOL/L (ref 97–110)
CO2 SERPL-SCNC: 29 MMOL/L (ref 21–32)
CO2 SERPL-SCNC: 32 MMOL/L (ref 21–32)
CREAT SERPL-MCNC: 0.17 MG/DL (ref 0.51–0.95)
CREAT SERPL-MCNC: 0.22 MG/DL (ref 0.51–0.95)
DEPRECATED RDW RBC: 42 FL (ref 39–50)
DEPRECATED RDW RBC: 42.5 FL (ref 39–50)
EGFRCR SERPLBLD CKD-EPI 2021: >90 ML/MIN/{1.73_M2}
EGFRCR SERPLBLD CKD-EPI 2021: >90 ML/MIN/{1.73_M2}
EOSINOPHIL # BLD: 0.1 K/MCL (ref 0–0.5)
EOSINOPHIL # BLD: 0.1 K/MCL (ref 0–0.5)
EOSINOPHIL NFR BLD: 1 %
EOSINOPHIL NFR BLD: 1 %
ERYTHROCYTE [DISTWIDTH] IN BLOOD: 11.9 % (ref 11–15)
ERYTHROCYTE [DISTWIDTH] IN BLOOD: 12.3 % (ref 11–15)
FASTING DURATION TIME PATIENT: ABNORMAL H
FASTING DURATION TIME PATIENT: ABNORMAL H
FERRITIN SERPL-MCNC: 1018 NG/ML (ref 8–252)
GLOBULIN SER-MCNC: 5.1 G/DL (ref 2–4)
GLOBULIN SER-MCNC: 5.1 G/DL (ref 2–4)
GLUCOSE BLDC GLUCOMTR-MCNC: 135 MG/DL (ref 70–99)
GLUCOSE BLDC GLUCOMTR-MCNC: 235 MG/DL (ref 70–99)
GLUCOSE BLDC GLUCOMTR-MCNC: 238 MG/DL (ref 70–99)
GLUCOSE BLDC GLUCOMTR-MCNC: 260 MG/DL (ref 70–99)
GLUCOSE BLDC GLUCOMTR-MCNC: 266 MG/DL (ref 70–99)
GLUCOSE BLDC GLUCOMTR-MCNC: 298 MG/DL (ref 70–99)
GLUCOSE SERPL-MCNC: 168 MG/DL (ref 70–99)
GLUCOSE SERPL-MCNC: 229 MG/DL (ref 70–99)
HCT VFR BLD CALC: 24 % (ref 36–46.5)
HCT VFR BLD CALC: 24.4 % (ref 36–46.5)
HGB BLD-MCNC: 8.1 G/DL (ref 12–15.5)
HGB BLD-MCNC: 8.2 G/DL (ref 12–15.5)
IMM GRANULOCYTES # BLD AUTO: 0 K/MCL (ref 0–0.2)
IMM GRANULOCYTES # BLD AUTO: 0 K/MCL (ref 0–0.2)
IMM GRANULOCYTES # BLD: 0 %
IMM GRANULOCYTES # BLD: 1 %
LYMPHOCYTES # BLD: 1 K/MCL (ref 1–4.8)
LYMPHOCYTES # BLD: 1.3 K/MCL (ref 1–4.8)
LYMPHOCYTES NFR BLD: 19 %
LYMPHOCYTES NFR BLD: 21 %
MAGNESIUM SERPL-MCNC: 1.7 MG/DL (ref 1.7–2.4)
MAGNESIUM SERPL-MCNC: 2.1 MG/DL (ref 1.7–2.4)
MCH RBC QN AUTO: 33.6 PG (ref 26–34)
MCH RBC QN AUTO: 34 PG (ref 26–34)
MCHC RBC AUTO-ENTMCNC: 33.6 G/DL (ref 32–36.5)
MCHC RBC AUTO-ENTMCNC: 33.8 G/DL (ref 32–36.5)
MCV RBC AUTO: 100 FL (ref 78–100)
MCV RBC AUTO: 100.8 FL (ref 78–100)
MONOCYTES # BLD: 0.4 K/MCL (ref 0.3–0.9)
MONOCYTES # BLD: 0.6 K/MCL (ref 0.3–0.9)
MONOCYTES NFR BLD: 8 %
MONOCYTES NFR BLD: 9 %
NEUTROPHILS # BLD: 3.5 K/MCL (ref 1.8–7.7)
NEUTROPHILS # BLD: 4.8 K/MCL (ref 1.8–7.7)
NEUTROPHILS NFR BLD: 69 %
NEUTROPHILS NFR BLD: 71 %
NRBC BLD MANUAL-RTO: 0 /100 WBC
NRBC BLD MANUAL-RTO: 0 /100 WBC
P AXIS (DEGREES): 47
PHOSPHATE SERPL-MCNC: 2.1 MG/DL (ref 2.4–4.7)
PHOSPHATE SERPL-MCNC: 2.3 MG/DL (ref 2.4–4.7)
PLATELET # BLD AUTO: 121 K/MCL (ref 140–450)
PLATELET # BLD AUTO: 123 K/MCL (ref 140–450)
POTASSIUM SERPL-SCNC: 3.5 MMOL/L (ref 3.4–5.1)
POTASSIUM SERPL-SCNC: 4.6 MMOL/L (ref 3.4–5.1)
PR-INTERVAL (MSEC): 138
PROT SERPL-MCNC: 7.1 G/DL (ref 6.4–8.2)
PROT SERPL-MCNC: 7.2 G/DL (ref 6.4–8.2)
QRS-INTERVAL (MSEC): 70
QT-INTERVAL (MSEC): 310
QTC: 423
R AXIS (DEGREES): 64
RBC # BLD: 2.38 MIL/MCL (ref 4–5.2)
RBC # BLD: 2.44 MIL/MCL (ref 4–5.2)
REPORT TEXT: NORMAL
SODIUM SERPL-SCNC: 136 MMOL/L (ref 135–145)
SODIUM SERPL-SCNC: 137 MMOL/L (ref 135–145)
T AXIS (DEGREES): 41
TROPONIN I SERPL DL<=0.01 NG/ML-MCNC: 5 NG/L
VENTRICULAR RATE EKG/MIN (BPM): 112
WBC # BLD: 5 K/MCL (ref 4.2–11)
WBC # BLD: 6.8 K/MCL (ref 4.2–11)

## 2024-10-07 PROCEDURE — 13003353 HB PATIENT LVL 3 SUPPLIES IP (PT WND)

## 2024-10-07 PROCEDURE — 10002801 HB RX 250 W/O HCPCS

## 2024-10-07 PROCEDURE — 10004651 HB RX, NO CHARGE ITEM

## 2024-10-07 PROCEDURE — 10002803 HB RX 637

## 2024-10-07 PROCEDURE — 10002803 HB RX 637: Performed by: INTERNAL MEDICINE

## 2024-10-07 PROCEDURE — 10002800 HB RX 250 W HCPCS: Performed by: NURSE PRACTITIONER

## 2024-10-07 PROCEDURE — 10002803 HB RX 637: Performed by: NURSE PRACTITIONER

## 2024-10-07 PROCEDURE — 94003 VENT MGMT INPAT SUBQ DAY: CPT

## 2024-10-07 PROCEDURE — 83735 ASSAY OF MAGNESIUM: CPT

## 2024-10-07 PROCEDURE — 85025 COMPLETE CBC W/AUTO DIFF WBC: CPT

## 2024-10-07 PROCEDURE — 94640 AIRWAY INHALATION TREATMENT: CPT

## 2024-10-07 PROCEDURE — 80053 COMPREHEN METABOLIC PANEL: CPT

## 2024-10-07 PROCEDURE — 93005 ELECTROCARDIOGRAM TRACING: CPT

## 2024-10-07 PROCEDURE — 10004281 HB COUNTER-STAFF TIME PER 15 MIN

## 2024-10-07 PROCEDURE — 10000002 HB ROOM CHARGE MED SURG

## 2024-10-07 PROCEDURE — 84100 ASSAY OF PHOSPHORUS: CPT

## 2024-10-07 PROCEDURE — 93010 ELECTROCARDIOGRAM REPORT: CPT | Performed by: INTERNAL MEDICINE

## 2024-10-07 PROCEDURE — 36415 COLL VENOUS BLD VENIPUNCTURE: CPT

## 2024-10-07 PROCEDURE — 10002801 HB RX 250 W/O HCPCS: Performed by: INTERNAL MEDICINE

## 2024-10-07 PROCEDURE — 10002807 HB RX 258: Performed by: NURSE PRACTITIONER

## 2024-10-07 PROCEDURE — 10002801 HB RX 250 W/O HCPCS: Performed by: STUDENT IN AN ORGANIZED HEALTH CARE EDUCATION/TRAINING PROGRAM

## 2024-10-07 PROCEDURE — 82728 ASSAY OF FERRITIN: CPT

## 2024-10-07 PROCEDURE — 84484 ASSAY OF TROPONIN QUANT: CPT

## 2024-10-07 PROCEDURE — 96372 THER/PROPH/DIAG INJ SC/IM: CPT

## 2024-10-07 PROCEDURE — 10002800 HB RX 250 W HCPCS

## 2024-10-07 RX ADMIN — CEFEPIME 2000 MG: 2 INJECTION, POWDER, FOR SOLUTION INTRAVENOUS at 13:11

## 2024-10-07 RX ADMIN — CEFEPIME 2000 MG: 2 INJECTION, POWDER, FOR SOLUTION INTRAVENOUS at 21:43

## 2024-10-07 RX ADMIN — LACOSAMIDE 150 MG: 10 SOLUTION ORAL at 20:54

## 2024-10-07 RX ADMIN — METRONIDAZOLE 500 MG: 5 INJECTION, SOLUTION INTRAVENOUS at 13:09

## 2024-10-07 RX ADMIN — VALPROIC ACID 250 MG: 250 SOLUTION ORAL at 06:51

## 2024-10-07 RX ADMIN — APIXABAN 2.5 MG: 2.5 TABLET, FILM COATED ORAL at 20:53

## 2024-10-07 RX ADMIN — INSULIN LISPRO 6 UNITS: 100 INJECTION, SOLUTION INTRAVENOUS; SUBCUTANEOUS at 13:04

## 2024-10-07 RX ADMIN — VALPROIC ACID 250 MG: 250 SOLUTION ORAL at 21:38

## 2024-10-07 RX ADMIN — VANCOMYCIN HYDROCHLORIDE 500 MG: KIT at 18:48

## 2024-10-07 RX ADMIN — SODIUM CHLORIDE, PRESERVATIVE FREE 2 ML: 5 INJECTION INTRAVENOUS at 09:51

## 2024-10-07 RX ADMIN — ACETAMINOPHEN 650 MG: 325 TABLET ORAL at 11:46

## 2024-10-07 RX ADMIN — SODIUM CHLORIDE, PRESERVATIVE FREE 2 ML: 5 INJECTION INTRAVENOUS at 21:47

## 2024-10-07 RX ADMIN — VANCOMYCIN HYDROCHLORIDE 500 MG: KIT at 13:03

## 2024-10-07 RX ADMIN — INSULIN LISPRO 6 UNITS: 100 INJECTION, SOLUTION INTRAVENOUS; SUBCUTANEOUS at 01:20

## 2024-10-07 RX ADMIN — VANCOMYCIN HYDROCHLORIDE 500 MG: KIT at 20:56

## 2024-10-07 RX ADMIN — IPRATROPIUM BROMIDE 0.5 MG: 0.5 SOLUTION RESPIRATORY (INHALATION) at 10:59

## 2024-10-07 RX ADMIN — VANCOMYCIN HYDROCHLORIDE 500 MG: KIT at 10:37

## 2024-10-07 RX ADMIN — LORAZEPAM 1 MG: 1 TABLET ORAL at 01:20

## 2024-10-07 RX ADMIN — METRONIDAZOLE 500 MG: 5 INJECTION, SOLUTION INTRAVENOUS at 06:55

## 2024-10-07 RX ADMIN — LORAZEPAM 1 MG: 1 TABLET ORAL at 13:04

## 2024-10-07 RX ADMIN — PANCRELIPASE 1 CAPSULE: 30000; 6000; 19000 CAPSULE, DELAYED RELEASE PELLETS ORAL at 09:51

## 2024-10-07 RX ADMIN — OXYCODONE HYDROCHLORIDE 2.5 MG: 5 TABLET ORAL at 21:37

## 2024-10-07 RX ADMIN — LORAZEPAM 1 MG: 1 TABLET ORAL at 09:50

## 2024-10-07 RX ADMIN — IPRATROPIUM BROMIDE 0.5 MG: 0.5 SOLUTION RESPIRATORY (INHALATION) at 08:35

## 2024-10-07 RX ADMIN — LEVETIRACETAM 750 MG: 100 SOLUTION ORAL at 20:48

## 2024-10-07 RX ADMIN — IPRATROPIUM BROMIDE 0.5 MG: 0.5 SOLUTION RESPIRATORY (INHALATION) at 18:22

## 2024-10-07 RX ADMIN — LORAZEPAM 1 MG: 1 TABLET ORAL at 20:51

## 2024-10-07 RX ADMIN — LACOSAMIDE 150 MG: 10 SOLUTION ORAL at 12:30

## 2024-10-07 RX ADMIN — INSULIN LISPRO 6 UNITS: 100 INJECTION, SOLUTION INTRAVENOUS; SUBCUTANEOUS at 18:48

## 2024-10-07 RX ADMIN — FAMOTIDINE 10 MG: 20 TABLET ORAL at 20:52

## 2024-10-07 RX ADMIN — CEFEPIME 2000 MG: 2 INJECTION, POWDER, FOR SOLUTION INTRAVENOUS at 06:54

## 2024-10-07 RX ADMIN — OXYCODONE HYDROCHLORIDE 2.5 MG: 5 TABLET ORAL at 09:50

## 2024-10-07 RX ADMIN — IPRATROPIUM BROMIDE 0.5 MG: 0.5 SOLUTION RESPIRATORY (INHALATION) at 15:26

## 2024-10-07 RX ADMIN — POTASSIUM & SODIUM PHOSPHATES POWDER PACK 280-160-250 MG 2 PACKET: 280-160-250 PACK at 23:20

## 2024-10-07 RX ADMIN — LEVETIRACETAM 750 MG: 100 SOLUTION ORAL at 09:50

## 2024-10-07 RX ADMIN — PANCRELIPASE 1 CAPSULE: 30000; 6000; 19000 CAPSULE, DELAYED RELEASE PELLETS ORAL at 20:53

## 2024-10-07 RX ADMIN — METRONIDAZOLE 500 MG: 5 INJECTION, SOLUTION INTRAVENOUS at 21:47

## 2024-10-07 RX ADMIN — APIXABAN 2.5 MG: 2.5 TABLET, FILM COATED ORAL at 09:50

## 2024-10-07 RX ADMIN — FAMOTIDINE 10 MG: 20 TABLET ORAL at 09:51

## 2024-10-07 RX ADMIN — PANCRELIPASE 1 CAPSULE: 30000; 6000; 19000 CAPSULE, DELAYED RELEASE PELLETS ORAL at 13:04

## 2024-10-07 RX ADMIN — VALPROIC ACID 250 MG: 250 SOLUTION ORAL at 13:04

## 2024-10-07 ASSESSMENT — PAIN SCALES - BEHAVIORAL PAIN SCALE (BPS)
BPS_SCORE: 5
BPS_SCORE: 6
BPS_SCORE: 4
BPS_SCORE: 6
BPS_SCORE: 8

## 2024-10-08 ENCOUNTER — APPOINTMENT (OUTPATIENT)
Dept: GENERAL RADIOLOGY | Age: 34
DRG: 720 | End: 2024-10-08
Attending: NURSE PRACTITIONER

## 2024-10-08 LAB
ALBUMIN SERPL-MCNC: 2.2 G/DL (ref 3.4–5)
ALBUMIN/GLOB SERPL: 0.4 {RATIO} (ref 1–2.4)
ALP SERPL-CCNC: 68 UNITS/L (ref 45–117)
ALT SERPL-CCNC: 41 UNITS/L
ANION GAP SERPL CALC-SCNC: 6 MMOL/L (ref 7–19)
AST SERPL-CCNC: 29 UNITS/L
BASOPHILS # BLD: 0 K/MCL (ref 0–0.3)
BASOPHILS NFR BLD: 0 %
BILIRUB SERPL-MCNC: 0.3 MG/DL (ref 0.2–1)
BUN SERPL-MCNC: 11 MG/DL (ref 6–20)
BUN/CREAT SERPL: 46 (ref 7–25)
CALCIUM SERPL-MCNC: 8.4 MG/DL (ref 8.4–10.2)
CHLORIDE SERPL-SCNC: 102 MMOL/L (ref 97–110)
CO2 SERPL-SCNC: 30 MMOL/L (ref 21–32)
CREAT SERPL-MCNC: 0.24 MG/DL (ref 0.51–0.95)
DEPRECATED RDW RBC: 42.9 FL (ref 39–50)
EGFRCR SERPLBLD CKD-EPI 2021: >90 ML/MIN/{1.73_M2}
EOSINOPHIL # BLD: 0 K/MCL (ref 0–0.5)
EOSINOPHIL NFR BLD: 1 %
ERYTHROCYTE [DISTWIDTH] IN BLOOD: 12.3 % (ref 11–15)
FASTING DURATION TIME PATIENT: ABNORMAL H
FERRITIN SERPL-MCNC: 1005 NG/ML (ref 8–252)
GLOBULIN SER-MCNC: 5.1 G/DL (ref 2–4)
GLUCOSE BLDC GLUCOMTR-MCNC: 237 MG/DL (ref 70–99)
GLUCOSE BLDC GLUCOMTR-MCNC: 238 MG/DL (ref 70–99)
GLUCOSE BLDC GLUCOMTR-MCNC: 255 MG/DL (ref 70–99)
GLUCOSE SERPL-MCNC: 270 MG/DL (ref 70–99)
HCT VFR BLD CALC: 24.8 % (ref 36–46.5)
HGB BLD-MCNC: 8.2 G/DL (ref 12–15.5)
IMM GRANULOCYTES # BLD AUTO: 0 K/MCL (ref 0–0.2)
IMM GRANULOCYTES # BLD: 0 %
LYMPHOCYTES # BLD: 1.2 K/MCL (ref 1–4.8)
LYMPHOCYTES NFR BLD: 18 %
MAGNESIUM SERPL-MCNC: 1.6 MG/DL (ref 1.7–2.4)
MCH RBC QN AUTO: 33.6 PG (ref 26–34)
MCHC RBC AUTO-ENTMCNC: 33.1 G/DL (ref 32–36.5)
MCV RBC AUTO: 101.6 FL (ref 78–100)
MONOCYTES # BLD: 0.6 K/MCL (ref 0.3–0.9)
MONOCYTES NFR BLD: 9 %
NEUTROPHILS # BLD: 4.9 K/MCL (ref 1.8–7.7)
NEUTROPHILS NFR BLD: 72 %
NRBC BLD MANUAL-RTO: 0 /100 WBC
PHOSPHATE SERPL-MCNC: 2.5 MG/DL (ref 2.4–4.7)
PLATELET # BLD AUTO: 140 K/MCL (ref 140–450)
POTASSIUM SERPL-SCNC: 3.8 MMOL/L (ref 3.4–5.1)
PROT SERPL-MCNC: 7.3 G/DL (ref 6.4–8.2)
RAINBOW EXTRA TUBES HOLD SPECIMEN: NORMAL
RBC # BLD: 2.44 MIL/MCL (ref 4–5.2)
SODIUM SERPL-SCNC: 134 MMOL/L (ref 135–145)
WBC # BLD: 6.8 K/MCL (ref 4.2–11)

## 2024-10-08 PROCEDURE — 10002803 HB RX 637

## 2024-10-08 PROCEDURE — 10002801 HB RX 250 W/O HCPCS: Performed by: STUDENT IN AN ORGANIZED HEALTH CARE EDUCATION/TRAINING PROGRAM

## 2024-10-08 PROCEDURE — 80053 COMPREHEN METABOLIC PANEL: CPT

## 2024-10-08 PROCEDURE — 96372 THER/PROPH/DIAG INJ SC/IM: CPT

## 2024-10-08 PROCEDURE — 85025 COMPLETE CBC W/AUTO DIFF WBC: CPT

## 2024-10-08 PROCEDURE — 10002800 HB RX 250 W HCPCS

## 2024-10-08 PROCEDURE — 10002803 HB RX 637: Performed by: INTERNAL MEDICINE

## 2024-10-08 PROCEDURE — 83735 ASSAY OF MAGNESIUM: CPT

## 2024-10-08 PROCEDURE — 10002803 HB RX 637: Performed by: NURSE PRACTITIONER

## 2024-10-08 PROCEDURE — 10002800 HB RX 250 W HCPCS: Performed by: NURSE PRACTITIONER

## 2024-10-08 PROCEDURE — 10002801 HB RX 250 W/O HCPCS

## 2024-10-08 PROCEDURE — 94003 VENT MGMT INPAT SUBQ DAY: CPT

## 2024-10-08 PROCEDURE — 84100 ASSAY OF PHOSPHORUS: CPT

## 2024-10-08 PROCEDURE — 71045 X-RAY EXAM CHEST 1 VIEW: CPT

## 2024-10-08 PROCEDURE — 10000002 HB ROOM CHARGE MED SURG

## 2024-10-08 PROCEDURE — 82728 ASSAY OF FERRITIN: CPT

## 2024-10-08 PROCEDURE — 94640 AIRWAY INHALATION TREATMENT: CPT

## 2024-10-08 PROCEDURE — 10004651 HB RX, NO CHARGE ITEM

## 2024-10-08 PROCEDURE — 10002807 HB RX 258: Performed by: NURSE PRACTITIONER

## 2024-10-08 PROCEDURE — 10002801 HB RX 250 W/O HCPCS: Performed by: INTERNAL MEDICINE

## 2024-10-08 PROCEDURE — 36415 COLL VENOUS BLD VENIPUNCTURE: CPT

## 2024-10-08 RX ORDER — LORAZEPAM 2 MG/ML
0.5 INJECTION INTRAMUSCULAR
Status: DISCONTINUED | OUTPATIENT
Start: 2024-10-08 | End: 2024-10-11 | Stop reason: HOSPADM

## 2024-10-08 RX ADMIN — LORAZEPAM 1 MG: 1 TABLET ORAL at 10:13

## 2024-10-08 RX ADMIN — LEVETIRACETAM 750 MG: 100 SOLUTION ORAL at 10:17

## 2024-10-08 RX ADMIN — INSULIN LISPRO 4 UNITS: 100 INJECTION, SOLUTION INTRAVENOUS; SUBCUTANEOUS at 06:54

## 2024-10-08 RX ADMIN — LORAZEPAM 1 MG: 1 TABLET ORAL at 01:34

## 2024-10-08 RX ADMIN — LACOSAMIDE 150 MG: 10 SOLUTION ORAL at 21:42

## 2024-10-08 RX ADMIN — VANCOMYCIN HYDROCHLORIDE 500 MG: KIT at 10:12

## 2024-10-08 RX ADMIN — PANCRELIPASE 1 CAPSULE: 30000; 6000; 19000 CAPSULE, DELAYED RELEASE PELLETS ORAL at 14:04

## 2024-10-08 RX ADMIN — VANCOMYCIN HYDROCHLORIDE 500 MG: KIT at 17:40

## 2024-10-08 RX ADMIN — VALPROIC ACID 250 MG: 250 SOLUTION ORAL at 14:04

## 2024-10-08 RX ADMIN — OXYCODONE HYDROCHLORIDE 2.5 MG: 5 TABLET ORAL at 21:41

## 2024-10-08 RX ADMIN — INSULIN LISPRO 4 UNITS: 100 INJECTION, SOLUTION INTRAVENOUS; SUBCUTANEOUS at 12:02

## 2024-10-08 RX ADMIN — IPRATROPIUM BROMIDE 0.5 MG: 0.5 SOLUTION RESPIRATORY (INHALATION) at 11:23

## 2024-10-08 RX ADMIN — FAMOTIDINE 10 MG: 20 TABLET ORAL at 10:13

## 2024-10-08 RX ADMIN — INSULIN LISPRO 6 UNITS: 100 INJECTION, SOLUTION INTRAVENOUS; SUBCUTANEOUS at 17:40

## 2024-10-08 RX ADMIN — IPRATROPIUM BROMIDE 0.5 MG: 0.5 SOLUTION RESPIRATORY (INHALATION) at 15:54

## 2024-10-08 RX ADMIN — LEVETIRACETAM 750 MG: 100 SOLUTION ORAL at 21:42

## 2024-10-08 RX ADMIN — VANCOMYCIN HYDROCHLORIDE 500 MG: KIT at 13:01

## 2024-10-08 RX ADMIN — CEFEPIME 2000 MG: 2 INJECTION, POWDER, FOR SOLUTION INTRAVENOUS at 05:25

## 2024-10-08 RX ADMIN — INSULIN LISPRO 4 UNITS: 100 INJECTION, SOLUTION INTRAVENOUS; SUBCUTANEOUS at 00:45

## 2024-10-08 RX ADMIN — METRONIDAZOLE 500 MG: 5 INJECTION, SOLUTION INTRAVENOUS at 05:27

## 2024-10-08 RX ADMIN — METRONIDAZOLE 500 MG: 5 INJECTION, SOLUTION INTRAVENOUS at 21:46

## 2024-10-08 RX ADMIN — OXYCODONE HYDROCHLORIDE 2.5 MG: 5 TABLET ORAL at 10:13

## 2024-10-08 RX ADMIN — IPRATROPIUM BROMIDE 0.5 MG: 0.5 SOLUTION RESPIRATORY (INHALATION) at 07:47

## 2024-10-08 RX ADMIN — VALPROIC ACID 250 MG: 250 SOLUTION ORAL at 21:42

## 2024-10-08 RX ADMIN — APIXABAN 2.5 MG: 2.5 TABLET, FILM COATED ORAL at 21:41

## 2024-10-08 RX ADMIN — LORAZEPAM 1 MG: 1 TABLET ORAL at 13:01

## 2024-10-08 RX ADMIN — IPRATROPIUM BROMIDE 0.5 MG: 0.5 SOLUTION RESPIRATORY (INHALATION) at 20:13

## 2024-10-08 RX ADMIN — LACOSAMIDE 150 MG: 10 SOLUTION ORAL at 10:13

## 2024-10-08 RX ADMIN — FAMOTIDINE 10 MG: 20 TABLET ORAL at 21:44

## 2024-10-08 RX ADMIN — LORAZEPAM 1 MG: 1 TABLET ORAL at 19:24

## 2024-10-08 RX ADMIN — VALPROIC ACID 250 MG: 250 SOLUTION ORAL at 05:19

## 2024-10-08 RX ADMIN — PANCRELIPASE 1 CAPSULE: 30000; 6000; 19000 CAPSULE, DELAYED RELEASE PELLETS ORAL at 10:13

## 2024-10-08 RX ADMIN — VANCOMYCIN HYDROCHLORIDE 500 MG: KIT at 22:57

## 2024-10-08 RX ADMIN — ACETAMINOPHEN 650 MG: 325 TABLET ORAL at 13:01

## 2024-10-08 RX ADMIN — ACETAMINOPHEN 650 MG: 325 TABLET ORAL at 05:19

## 2024-10-08 RX ADMIN — APIXABAN 2.5 MG: 2.5 TABLET, FILM COATED ORAL at 10:19

## 2024-10-08 RX ADMIN — PANCRELIPASE 1 CAPSULE: 30000; 6000; 19000 CAPSULE, DELAYED RELEASE PELLETS ORAL at 21:41

## 2024-10-08 RX ADMIN — METRONIDAZOLE 500 MG: 5 INJECTION, SOLUTION INTRAVENOUS at 14:05

## 2024-10-08 ASSESSMENT — PAIN SCALES - BEHAVIORAL PAIN SCALE (BPS)
BPS_SCORE: 5
BPS_SCORE: 3
BPS_SCORE: 5
BPS_SCORE: 5

## 2024-10-08 ASSESSMENT — PAIN SCALES - GENERAL: PAINLEVEL_OUTOF10: 0

## 2024-10-09 ENCOUNTER — APPOINTMENT (OUTPATIENT)
Dept: CARDIOLOGY | Age: 34
DRG: 720 | End: 2024-10-09

## 2024-10-09 LAB
ALBUMIN SERPL-MCNC: 2.5 G/DL (ref 3.4–5)
ALBUMIN/GLOB SERPL: 0.5 {RATIO} (ref 1–2.4)
ALP SERPL-CCNC: 81 UNITS/L (ref 45–117)
ALT SERPL-CCNC: 39 UNITS/L
ANION GAP SERPL CALC-SCNC: 6 MMOL/L (ref 7–19)
AORTIC VALVE AREA (AVA): 0.81
AORTIC VALVE AREA: 1.78
AST SERPL-CCNC: 26 UNITS/L
AV MEAN GRADIENT (AVMG): 2
AV MEAN VELOCITY (AVMV): 0.73
AV PEAK GRADIENT (AVPG): 4
AV PEAK VELOCITY (AVPV): 1.04
AV STENOSIS SEVERITY TEXT: NORMAL
AVI LVOT PEAK GRADIENT (LVOTMG): 0.9
BASOPHILS # BLD: 0 K/MCL (ref 0–0.3)
BASOPHILS NFR BLD: 0 %
BILIRUB SERPL-MCNC: 0.5 MG/DL (ref 0.2–1)
BUN SERPL-MCNC: 9 MG/DL (ref 6–20)
BUN/CREAT SERPL: 35 (ref 7–25)
CALCIUM SERPL-MCNC: 8.7 MG/DL (ref 8.4–10.2)
CHLORIDE SERPL-SCNC: 103 MMOL/L (ref 97–110)
CO2 SERPL-SCNC: 32 MMOL/L (ref 21–32)
CREAT SERPL-MCNC: 0.26 MG/DL (ref 0.51–0.95)
DEPRECATED RDW RBC: 44.2 FL (ref 39–50)
E WAVE DECELARATION TIME (MDT): 7
EGFRCR SERPLBLD CKD-EPI 2021: >90 ML/MIN/{1.73_M2}
EOSINOPHIL # BLD: 0 K/MCL (ref 0–0.5)
EOSINOPHIL NFR BLD: 1 %
ERYTHROCYTE [DISTWIDTH] IN BLOOD: 12.7 % (ref 11–15)
FASTING DURATION TIME PATIENT: ABNORMAL H
FERRITIN SERPL-MCNC: 1015 NG/ML (ref 8–252)
GLOBULIN SER-MCNC: 5.2 G/DL (ref 2–4)
GLUCOSE BLDC GLUCOMTR-MCNC: 121 MG/DL (ref 70–99)
GLUCOSE BLDC GLUCOMTR-MCNC: 202 MG/DL (ref 70–99)
GLUCOSE BLDC GLUCOMTR-MCNC: 272 MG/DL (ref 70–99)
GLUCOSE BLDC GLUCOMTR-MCNC: 283 MG/DL (ref 70–99)
GLUCOSE SERPL-MCNC: 132 MG/DL (ref 70–99)
HCT VFR BLD CALC: 25.6 % (ref 36–46.5)
HGB BLD-MCNC: 8.6 G/DL (ref 12–15.5)
IMM GRANULOCYTES # BLD AUTO: 0 K/MCL (ref 0–0.2)
IMM GRANULOCYTES # BLD: 1 %
LEFT INTERNAL DIMENSION IN SYSTOLE (LVSD): 0.9
LEFT VENTRICULAR INTERNAL DIMENSION IN DIASTOLE (LVDD): 2.7
LEFT VENTRICULAR POSTERIOR WALL IN END DIASTOLE (LVPW): 3.9
LV EF: NORMAL %
LVOT 2D (LVOTD): 13.6
LVOT VTI (LVOTVTI): 0.86
LYMPHOCYTES # BLD: 1.8 K/MCL (ref 1–4.8)
LYMPHOCYTES NFR BLD: 21 %
MAGNESIUM SERPL-MCNC: 1.7 MG/DL (ref 1.7–2.4)
MCH RBC QN AUTO: 33.9 PG (ref 26–34)
MCHC RBC AUTO-ENTMCNC: 33.6 G/DL (ref 32–36.5)
MCV RBC AUTO: 100.8 FL (ref 78–100)
MONOCYTES # BLD: 0.7 K/MCL (ref 0.3–0.9)
MONOCYTES NFR BLD: 8 %
MV E TISSUE VEL MED (MESV): 12.5
MV E WAVE VEL/E TISSUE VEL MED(MSR): 11.6
MV PEAK A VELOCITY (MVPAV): 113
MV PEAK E VELOCITY (MVPEV): 0.75
NEUTROPHILS # BLD: 5.8 K/MCL (ref 1.8–7.7)
NEUTROPHILS NFR BLD: 69 %
NRBC BLD MANUAL-RTO: 0 /100 WBC
PLATELET # BLD AUTO: 168 K/MCL (ref 140–450)
POTASSIUM SERPL-SCNC: 3.7 MMOL/L (ref 3.4–5.1)
PROT SERPL-MCNC: 7.7 G/DL (ref 6.4–8.2)
RBC # BLD: 2.54 MIL/MCL (ref 4–5.2)
RV END SYSTOLIC LONGITUDINAL STRAIN FREE WALL (RVGS): 1.7
SODIUM SERPL-SCNC: 137 MMOL/L (ref 135–145)
WBC # BLD: 8.3 K/MCL (ref 4.2–11)

## 2024-10-09 PROCEDURE — 10002803 HB RX 637

## 2024-10-09 PROCEDURE — 36415 COLL VENOUS BLD VENIPUNCTURE: CPT

## 2024-10-09 PROCEDURE — 10002803 HB RX 637: Performed by: INTERNAL MEDICINE

## 2024-10-09 PROCEDURE — 10002805 HB CONTRAST AGENT: Performed by: INTERNAL MEDICINE

## 2024-10-09 PROCEDURE — 10002803 HB RX 637: Performed by: NURSE PRACTITIONER

## 2024-10-09 PROCEDURE — 10000002 HB ROOM CHARGE MED SURG

## 2024-10-09 PROCEDURE — 82728 ASSAY OF FERRITIN: CPT

## 2024-10-09 PROCEDURE — 83735 ASSAY OF MAGNESIUM: CPT

## 2024-10-09 PROCEDURE — 10002801 HB RX 250 W/O HCPCS: Performed by: STUDENT IN AN ORGANIZED HEALTH CARE EDUCATION/TRAINING PROGRAM

## 2024-10-09 PROCEDURE — 94640 AIRWAY INHALATION TREATMENT: CPT

## 2024-10-09 PROCEDURE — 10002801 HB RX 250 W/O HCPCS

## 2024-10-09 PROCEDURE — 96372 THER/PROPH/DIAG INJ SC/IM: CPT

## 2024-10-09 PROCEDURE — 80053 COMPREHEN METABOLIC PANEL: CPT

## 2024-10-09 PROCEDURE — 93306 TTE W/DOPPLER COMPLETE: CPT

## 2024-10-09 PROCEDURE — 85025 COMPLETE CBC W/AUTO DIFF WBC: CPT

## 2024-10-09 PROCEDURE — 10002801 HB RX 250 W/O HCPCS: Performed by: INTERNAL MEDICINE

## 2024-10-09 PROCEDURE — 94003 VENT MGMT INPAT SUBQ DAY: CPT

## 2024-10-09 PROCEDURE — 10002800 HB RX 250 W HCPCS

## 2024-10-09 PROCEDURE — 10004651 HB RX, NO CHARGE ITEM

## 2024-10-09 RX ORDER — IBUPROFEN 600 MG/1
600 TABLET, FILM COATED ORAL EVERY 6 HOURS PRN
Qty: 10 TABLET | Refills: 0 | Status: SHIPPED | OUTPATIENT
Start: 2024-10-09 | End: 2024-10-12

## 2024-10-09 RX ORDER — OFLOXACIN 3 MG/ML
3 SOLUTION/ DROPS OPHTHALMIC 3 TIMES DAILY
Qty: 5 ML | Refills: 0 | Status: SHIPPED | OUTPATIENT
Start: 2024-10-09 | End: 2024-10-16

## 2024-10-09 RX ORDER — VANCOMYCIN HYDROCHLORIDE 250 MG/5ML
500 POWDER, FOR SOLUTION ORAL 4 TIMES DAILY
Qty: 110 ML | Refills: 0 | Status: SHIPPED | OUTPATIENT
Start: 2024-10-10 | End: 2024-10-11

## 2024-10-09 RX ORDER — POLYETHYLENE GLYCOL 3350 17 G/17G
17 POWDER, FOR SOLUTION ORAL DAILY PRN
Qty: 10 PACKET | Refills: 0 | Status: SHIPPED | OUTPATIENT
Start: 2024-10-09

## 2024-10-09 RX ADMIN — FAMOTIDINE 10 MG: 20 TABLET ORAL at 08:41

## 2024-10-09 RX ADMIN — METRONIDAZOLE 500 MG: 5 INJECTION, SOLUTION INTRAVENOUS at 06:12

## 2024-10-09 RX ADMIN — FAMOTIDINE 10 MG: 20 TABLET ORAL at 20:43

## 2024-10-09 RX ADMIN — LORAZEPAM 1 MG: 1 TABLET ORAL at 02:57

## 2024-10-09 RX ADMIN — IPRATROPIUM BROMIDE 0.5 MG: 0.5 SOLUTION RESPIRATORY (INHALATION) at 07:56

## 2024-10-09 RX ADMIN — LEVETIRACETAM 750 MG: 100 SOLUTION ORAL at 20:42

## 2024-10-09 RX ADMIN — LEVETIRACETAM 750 MG: 100 SOLUTION ORAL at 08:41

## 2024-10-09 RX ADMIN — LORAZEPAM 1 MG: 1 TABLET ORAL at 07:33

## 2024-10-09 RX ADMIN — IPRATROPIUM BROMIDE 0.5 MG: 0.5 SOLUTION RESPIRATORY (INHALATION) at 11:13

## 2024-10-09 RX ADMIN — PERFLUTREN 2 ML: 6.52 INJECTION, SUSPENSION INTRAVENOUS at 15:19

## 2024-10-09 RX ADMIN — IPRATROPIUM BROMIDE 0.5 MG: 0.5 SOLUTION RESPIRATORY (INHALATION) at 19:52

## 2024-10-09 RX ADMIN — PANCRELIPASE 1 CAPSULE: 30000; 6000; 19000 CAPSULE, DELAYED RELEASE PELLETS ORAL at 20:43

## 2024-10-09 RX ADMIN — SODIUM CHLORIDE, PRESERVATIVE FREE 2 ML: 5 INJECTION INTRAVENOUS at 20:48

## 2024-10-09 RX ADMIN — LORAZEPAM 1 MG: 1 TABLET ORAL at 13:10

## 2024-10-09 RX ADMIN — APIXABAN 2.5 MG: 2.5 TABLET, FILM COATED ORAL at 08:41

## 2024-10-09 RX ADMIN — INSULIN LISPRO 6 UNITS: 100 INJECTION, SOLUTION INTRAVENOUS; SUBCUTANEOUS at 01:01

## 2024-10-09 RX ADMIN — IPRATROPIUM BROMIDE 0.5 MG: 0.5 SOLUTION RESPIRATORY (INHALATION) at 16:19

## 2024-10-09 RX ADMIN — VALPROIC ACID 250 MG: 250 SOLUTION ORAL at 13:10

## 2024-10-09 RX ADMIN — PANCRELIPASE 1 CAPSULE: 30000; 6000; 19000 CAPSULE, DELAYED RELEASE PELLETS ORAL at 08:41

## 2024-10-09 RX ADMIN — APIXABAN 2.5 MG: 2.5 TABLET, FILM COATED ORAL at 20:43

## 2024-10-09 RX ADMIN — SODIUM CHLORIDE, PRESERVATIVE FREE 2 ML: 5 INJECTION INTRAVENOUS at 09:55

## 2024-10-09 RX ADMIN — INSULIN LISPRO 4 UNITS: 100 INJECTION, SOLUTION INTRAVENOUS; SUBCUTANEOUS at 18:14

## 2024-10-09 RX ADMIN — LORAZEPAM 1 MG: 1 TABLET ORAL at 19:16

## 2024-10-09 RX ADMIN — LACOSAMIDE 150 MG: 10 SOLUTION ORAL at 08:40

## 2024-10-09 RX ADMIN — LACOSAMIDE 150 MG: 10 SOLUTION ORAL at 20:43

## 2024-10-09 RX ADMIN — OXYCODONE HYDROCHLORIDE 2.5 MG: 5 TABLET ORAL at 08:42

## 2024-10-09 RX ADMIN — VALPROIC ACID 250 MG: 250 SOLUTION ORAL at 06:10

## 2024-10-09 RX ADMIN — VANCOMYCIN HYDROCHLORIDE 500 MG: KIT at 08:40

## 2024-10-09 RX ADMIN — VALPROIC ACID 250 MG: 250 SOLUTION ORAL at 20:51

## 2024-10-09 RX ADMIN — OXYCODONE HYDROCHLORIDE 2.5 MG: 5 TABLET ORAL at 20:44

## 2024-10-09 RX ADMIN — VANCOMYCIN HYDROCHLORIDE 500 MG: KIT at 12:22

## 2024-10-09 RX ADMIN — PANCRELIPASE 1 CAPSULE: 30000; 6000; 19000 CAPSULE, DELAYED RELEASE PELLETS ORAL at 13:10

## 2024-10-09 RX ADMIN — VANCOMYCIN HYDROCHLORIDE 500 MG: KIT at 18:14

## 2024-10-09 RX ADMIN — INSULIN LISPRO 6 UNITS: 100 INJECTION, SOLUTION INTRAVENOUS; SUBCUTANEOUS at 12:22

## 2024-10-09 RX ADMIN — VANCOMYCIN HYDROCHLORIDE 500 MG: KIT at 20:42

## 2024-10-09 ASSESSMENT — PAIN SCALES - BEHAVIORAL PAIN SCALE (BPS)
BPS_SCORE: 3
BPS_SCORE: 3

## 2024-10-10 ENCOUNTER — APPOINTMENT (OUTPATIENT)
Dept: GENERAL RADIOLOGY | Age: 34
DRG: 720 | End: 2024-10-10
Attending: INTERNAL MEDICINE

## 2024-10-10 LAB
ALBUMIN SERPL-MCNC: 2.3 G/DL (ref 3.4–5)
ALBUMIN/GLOB SERPL: 0.5 {RATIO} (ref 1–2.4)
ALP SERPL-CCNC: 77 UNITS/L (ref 45–117)
ALT SERPL-CCNC: 33 UNITS/L
ANION GAP SERPL CALC-SCNC: 6 MMOL/L (ref 7–19)
AST SERPL-CCNC: 21 UNITS/L
B PARAPERT DNA SPEC QL NAA+PROBE: NOT DETECTED
B PERT.PT PRMT NPH QL NAA+NON-PROBE: NOT DETECTED
BASOPHILS # BLD: 0 K/MCL (ref 0–0.3)
BASOPHILS NFR BLD: 0 %
BILIRUB SERPL-MCNC: 0.4 MG/DL (ref 0.2–1)
BUN SERPL-MCNC: 11 MG/DL (ref 6–20)
BUN/CREAT SERPL: 48 (ref 7–25)
C PNEUM DNA NPH QL NAA+NON-PROBE: NOT DETECTED
CALCIUM SERPL-MCNC: 8.6 MG/DL (ref 8.4–10.2)
CHLORIDE SERPL-SCNC: 101 MMOL/L (ref 97–110)
CO2 SERPL-SCNC: 32 MMOL/L (ref 21–32)
CREAT SERPL-MCNC: 0.23 MG/DL (ref 0.51–0.95)
DEPRECATED RDW RBC: 45.5 FL (ref 39–50)
EGFRCR SERPLBLD CKD-EPI 2021: >90 ML/MIN/{1.73_M2}
EOSINOPHIL # BLD: 0.1 K/MCL (ref 0–0.5)
EOSINOPHIL NFR BLD: 1 %
ERYTHROCYTE [DISTWIDTH] IN BLOOD: 13.1 % (ref 11–15)
FASTING DURATION TIME PATIENT: ABNORMAL H
FERRITIN SERPL-MCNC: 918 NG/ML (ref 8–252)
FLUAV RNA NPH QL NAA+NON-PROBE: NOT DETECTED
FLUBV RNA NPH QL NAA+NON-PROBE: NOT DETECTED
GLOBULIN SER-MCNC: 5.1 G/DL (ref 2–4)
GLUCOSE BLDC GLUCOMTR-MCNC: 209 MG/DL (ref 70–99)
GLUCOSE BLDC GLUCOMTR-MCNC: 235 MG/DL (ref 70–99)
GLUCOSE BLDC GLUCOMTR-MCNC: 264 MG/DL (ref 70–99)
GLUCOSE BLDC GLUCOMTR-MCNC: 271 MG/DL (ref 70–99)
GLUCOSE BLDC GLUCOMTR-MCNC: 288 MG/DL (ref 70–99)
GLUCOSE SERPL-MCNC: 243 MG/DL (ref 70–99)
HADV DNA NPH QL NAA+NON-PROBE: NOT DETECTED
HCOV 229E RNA NPH QL NAA+NON-PROBE: NOT DETECTED
HCOV HKU1 RNA NPH QL NAA+NON-PROBE: NOT DETECTED
HCOV NL63 RNA NPH QL NAA+NON-PROBE: NOT DETECTED
HCOV OC43 RNA NPH QL NAA+NON-PROBE: NOT DETECTED
HCT VFR BLD CALC: 25.5 % (ref 36–46.5)
HGB BLD-MCNC: 8.4 G/DL (ref 12–15.5)
HMPV RNA NPH QL NAA+NON-PROBE: NOT DETECTED
HPIV1 RNA NPH QL NAA+NON-PROBE: NOT DETECTED
HPIV2 RNA NPH QL NAA+NON-PROBE: NOT DETECTED
HPIV3 RNA NPH QL NAA+NON-PROBE: NOT DETECTED
HPIV4 RNA NPH QL NAA+NON-PROBE: NOT DETECTED
IMM GRANULOCYTES # BLD AUTO: 0.1 K/MCL (ref 0–0.2)
IMM GRANULOCYTES # BLD: 1 %
LYMPHOCYTES # BLD: 1.5 K/MCL (ref 1–4.8)
LYMPHOCYTES NFR BLD: 18 %
M PNEUMO DNA NPH QL NAA+NON-PROBE: NOT DETECTED
MAGNESIUM SERPL-MCNC: 1.7 MG/DL (ref 1.7–2.4)
MCH RBC QN AUTO: 33.2 PG (ref 26–34)
MCHC RBC AUTO-ENTMCNC: 32.9 G/DL (ref 32–36.5)
MCV RBC AUTO: 100.8 FL (ref 78–100)
MONOCYTES # BLD: 0.5 K/MCL (ref 0.3–0.9)
MONOCYTES NFR BLD: 6 %
NEUTROPHILS # BLD: 6 K/MCL (ref 1.8–7.7)
NEUTROPHILS NFR BLD: 74 %
NRBC BLD MANUAL-RTO: 0 /100 WBC
PLATELET # BLD AUTO: 167 K/MCL (ref 140–450)
POTASSIUM SERPL-SCNC: 3.9 MMOL/L (ref 3.4–5.1)
PROCALCITONIN SERPL IA-MCNC: 0.08 NG/ML
PROT SERPL-MCNC: 7.4 G/DL (ref 6.4–8.2)
RBC # BLD: 2.53 MIL/MCL (ref 4–5.2)
RSV RNA NPH QL NAA+NON-PROBE: NOT DETECTED
RV+EV RNA NPH QL NAA+NON-PROBE: NOT DETECTED
SARS-COV-2 RNA RESP QL NAA+PROBE: NOT DETECTED
SODIUM SERPL-SCNC: 135 MMOL/L (ref 135–145)
WBC # BLD: 8.1 K/MCL (ref 4.2–11)

## 2024-10-10 PROCEDURE — 94002 VENT MGMT INPAT INIT DAY: CPT

## 2024-10-10 PROCEDURE — 85025 COMPLETE CBC W/AUTO DIFF WBC: CPT

## 2024-10-10 PROCEDURE — 71045 X-RAY EXAM CHEST 1 VIEW: CPT

## 2024-10-10 PROCEDURE — 36415 COLL VENOUS BLD VENIPUNCTURE: CPT

## 2024-10-10 PROCEDURE — 94640 AIRWAY INHALATION TREATMENT: CPT

## 2024-10-10 PROCEDURE — 10002803 HB RX 637

## 2024-10-10 PROCEDURE — 96372 THER/PROPH/DIAG INJ SC/IM: CPT

## 2024-10-10 PROCEDURE — 0202U NFCT DS 22 TRGT SARS-COV-2: CPT | Performed by: INTERNAL MEDICINE

## 2024-10-10 PROCEDURE — 82728 ASSAY OF FERRITIN: CPT

## 2024-10-10 PROCEDURE — 10002803 HB RX 637: Performed by: NURSE PRACTITIONER

## 2024-10-10 PROCEDURE — 94003 VENT MGMT INPAT SUBQ DAY: CPT

## 2024-10-10 PROCEDURE — 84145 PROCALCITONIN (PCT): CPT | Performed by: INTERNAL MEDICINE

## 2024-10-10 PROCEDURE — 10002800 HB RX 250 W HCPCS

## 2024-10-10 PROCEDURE — 80053 COMPREHEN METABOLIC PANEL: CPT

## 2024-10-10 PROCEDURE — 83735 ASSAY OF MAGNESIUM: CPT

## 2024-10-10 PROCEDURE — 10002801 HB RX 250 W/O HCPCS: Performed by: INTERNAL MEDICINE

## 2024-10-10 PROCEDURE — 87040 BLOOD CULTURE FOR BACTERIA: CPT | Performed by: INTERNAL MEDICINE

## 2024-10-10 PROCEDURE — 10000002 HB ROOM CHARGE MED SURG

## 2024-10-10 PROCEDURE — 10002801 HB RX 250 W/O HCPCS

## 2024-10-10 PROCEDURE — 10004651 HB RX, NO CHARGE ITEM

## 2024-10-10 PROCEDURE — 10002803 HB RX 637: Performed by: INTERNAL MEDICINE

## 2024-10-10 RX ADMIN — INSULIN LISPRO 4 UNITS: 100 INJECTION, SOLUTION INTRAVENOUS; SUBCUTANEOUS at 05:57

## 2024-10-10 RX ADMIN — INSULIN LISPRO 6 UNITS: 100 INJECTION, SOLUTION INTRAVENOUS; SUBCUTANEOUS at 01:14

## 2024-10-10 RX ADMIN — LACOSAMIDE 150 MG: 10 SOLUTION ORAL at 21:14

## 2024-10-10 RX ADMIN — FAMOTIDINE 10 MG: 20 TABLET ORAL at 21:14

## 2024-10-10 RX ADMIN — LEVETIRACETAM 750 MG: 100 SOLUTION ORAL at 21:13

## 2024-10-10 RX ADMIN — VANCOMYCIN HYDROCHLORIDE 500 MG: KIT at 21:13

## 2024-10-10 RX ADMIN — APIXABAN 2.5 MG: 2.5 TABLET, FILM COATED ORAL at 21:13

## 2024-10-10 RX ADMIN — VANCOMYCIN HYDROCHLORIDE 500 MG: KIT at 12:02

## 2024-10-10 RX ADMIN — VALPROIC ACID 250 MG: 250 SOLUTION ORAL at 21:13

## 2024-10-10 RX ADMIN — INSULIN LISPRO 6 UNITS: 100 INJECTION, SOLUTION INTRAVENOUS; SUBCUTANEOUS at 12:03

## 2024-10-10 RX ADMIN — SODIUM CHLORIDE, PRESERVATIVE FREE 2 ML: 5 INJECTION INTRAVENOUS at 08:33

## 2024-10-10 RX ADMIN — VALPROIC ACID 250 MG: 250 SOLUTION ORAL at 05:57

## 2024-10-10 RX ADMIN — LACOSAMIDE 150 MG: 10 SOLUTION ORAL at 09:06

## 2024-10-10 RX ADMIN — IPRATROPIUM BROMIDE 0.5 MG: 0.5 SOLUTION RESPIRATORY (INHALATION) at 06:21

## 2024-10-10 RX ADMIN — LEVETIRACETAM 750 MG: 100 SOLUTION ORAL at 08:32

## 2024-10-10 RX ADMIN — VANCOMYCIN HYDROCHLORIDE 500 MG: KIT at 18:29

## 2024-10-10 RX ADMIN — PANCRELIPASE 1 CAPSULE: 30000; 6000; 19000 CAPSULE, DELAYED RELEASE PELLETS ORAL at 08:32

## 2024-10-10 RX ADMIN — OXYCODONE HYDROCHLORIDE 2.5 MG: 5 TABLET ORAL at 21:14

## 2024-10-10 RX ADMIN — LORAZEPAM 1 MG: 1 TABLET ORAL at 21:13

## 2024-10-10 RX ADMIN — FAMOTIDINE 10 MG: 20 TABLET ORAL at 09:07

## 2024-10-10 RX ADMIN — INSULIN LISPRO 6 UNITS: 100 INJECTION, SOLUTION INTRAVENOUS; SUBCUTANEOUS at 18:29

## 2024-10-10 RX ADMIN — LORAZEPAM 1 MG: 1 TABLET ORAL at 08:32

## 2024-10-10 RX ADMIN — IPRATROPIUM BROMIDE 0.5 MG: 0.5 SOLUTION RESPIRATORY (INHALATION) at 15:33

## 2024-10-10 RX ADMIN — SODIUM CHLORIDE, PRESERVATIVE FREE 2 ML: 5 INJECTION INTRAVENOUS at 21:20

## 2024-10-10 RX ADMIN — VALPROIC ACID 250 MG: 250 SOLUTION ORAL at 13:29

## 2024-10-10 RX ADMIN — OXYCODONE HYDROCHLORIDE 2.5 MG: 5 TABLET ORAL at 08:32

## 2024-10-10 RX ADMIN — LORAZEPAM 1 MG: 1 TABLET ORAL at 13:29

## 2024-10-10 RX ADMIN — IPRATROPIUM BROMIDE 0.5 MG: 0.5 SOLUTION RESPIRATORY (INHALATION) at 10:51

## 2024-10-10 RX ADMIN — PANCRELIPASE 1 CAPSULE: 30000; 6000; 19000 CAPSULE, DELAYED RELEASE PELLETS ORAL at 13:29

## 2024-10-10 RX ADMIN — APIXABAN 2.5 MG: 2.5 TABLET, FILM COATED ORAL at 08:32

## 2024-10-10 RX ADMIN — VANCOMYCIN HYDROCHLORIDE 500 MG: KIT at 08:32

## 2024-10-10 RX ADMIN — LORAZEPAM 1 MG: 1 TABLET ORAL at 01:14

## 2024-10-10 RX ADMIN — PANCRELIPASE 1 CAPSULE: 30000; 6000; 19000 CAPSULE, DELAYED RELEASE PELLETS ORAL at 21:14

## 2024-10-10 RX ADMIN — IPRATROPIUM BROMIDE 0.5 MG: 0.5 SOLUTION RESPIRATORY (INHALATION) at 19:38

## 2024-10-10 ASSESSMENT — PAIN SCALES - BEHAVIORAL PAIN SCALE (BPS)
BPS_SCORE: 3
BPS_SCORE: 5
BPS_SCORE: 3

## 2024-10-11 VITALS
TEMPERATURE: 96.8 F | WEIGHT: 93.4 LBS | OXYGEN SATURATION: 100 % | HEIGHT: 63 IN | BODY MASS INDEX: 16.55 KG/M2 | SYSTOLIC BLOOD PRESSURE: 105 MMHG | RESPIRATION RATE: 12 BRPM | DIASTOLIC BLOOD PRESSURE: 64 MMHG | HEART RATE: 81 BPM

## 2024-10-11 LAB
ALBUMIN SERPL-MCNC: 2.4 G/DL (ref 3.4–5)
ALBUMIN/GLOB SERPL: 0.4 {RATIO} (ref 1–2.4)
ALP SERPL-CCNC: 84 UNITS/L (ref 45–117)
ALT SERPL-CCNC: 48 UNITS/L
ANION GAP SERPL CALC-SCNC: 8 MMOL/L (ref 7–19)
AST SERPL-CCNC: 37 UNITS/L
BASOPHILS # BLD: 0 K/MCL (ref 0–0.3)
BASOPHILS NFR BLD: 0 %
BILIRUB SERPL-MCNC: 0.4 MG/DL (ref 0.2–1)
BUN SERPL-MCNC: 13 MG/DL (ref 6–20)
BUN/CREAT SERPL: 57 (ref 7–25)
CALCIUM SERPL-MCNC: 9.1 MG/DL (ref 8.4–10.2)
CHLORIDE SERPL-SCNC: 97 MMOL/L (ref 97–110)
CO2 SERPL-SCNC: 32 MMOL/L (ref 21–32)
CREAT SERPL-MCNC: 0.23 MG/DL (ref 0.51–0.95)
DEPRECATED RDW RBC: 46.8 FL (ref 39–50)
EGFRCR SERPLBLD CKD-EPI 2021: >90 ML/MIN/{1.73_M2}
EOSINOPHIL # BLD: 0.1 K/MCL (ref 0–0.5)
EOSINOPHIL NFR BLD: 1 %
ERYTHROCYTE [DISTWIDTH] IN BLOOD: 13 % (ref 11–15)
FASTING DURATION TIME PATIENT: ABNORMAL H
FERRITIN SERPL-MCNC: 1140 NG/ML (ref 8–252)
GLOBULIN SER-MCNC: 6.1 G/DL (ref 2–4)
GLUCOSE BLDC GLUCOMTR-MCNC: 245 MG/DL (ref 70–99)
GLUCOSE BLDC GLUCOMTR-MCNC: 274 MG/DL (ref 70–99)
GLUCOSE SERPL-MCNC: 280 MG/DL (ref 70–99)
HCT VFR BLD CALC: 28.3 % (ref 36–46.5)
HGB BLD-MCNC: 9.4 G/DL (ref 12–15.5)
IMM GRANULOCYTES # BLD AUTO: 0 K/MCL (ref 0–0.2)
IMM GRANULOCYTES # BLD: 0 %
LYMPHOCYTES # BLD: 1.5 K/MCL (ref 1–4.8)
LYMPHOCYTES NFR BLD: 21 %
MAGNESIUM SERPL-MCNC: 1.8 MG/DL (ref 1.7–2.4)
MCH RBC QN AUTO: 33.5 PG (ref 26–34)
MCHC RBC AUTO-ENTMCNC: 33.2 G/DL (ref 32–36.5)
MCV RBC AUTO: 100.7 FL (ref 78–100)
MONOCYTES # BLD: 0.6 K/MCL (ref 0.3–0.9)
MONOCYTES NFR BLD: 8 %
NEUTROPHILS # BLD: 4.8 K/MCL (ref 1.8–7.7)
NEUTROPHILS NFR BLD: 70 %
NRBC BLD MANUAL-RTO: 0 /100 WBC
PLATELET # BLD AUTO: 210 K/MCL (ref 140–450)
POTASSIUM SERPL-SCNC: 4.3 MMOL/L (ref 3.4–5.1)
PROT SERPL-MCNC: 8.5 G/DL (ref 6.4–8.2)
RBC # BLD: 2.81 MIL/MCL (ref 4–5.2)
SODIUM SERPL-SCNC: 133 MMOL/L (ref 135–145)
WBC # BLD: 7 K/MCL (ref 4.2–11)

## 2024-10-11 PROCEDURE — 10002803 HB RX 637

## 2024-10-11 PROCEDURE — 82728 ASSAY OF FERRITIN: CPT

## 2024-10-11 PROCEDURE — 10002803 HB RX 637: Performed by: NURSE PRACTITIONER

## 2024-10-11 PROCEDURE — 94640 AIRWAY INHALATION TREATMENT: CPT

## 2024-10-11 PROCEDURE — 85025 COMPLETE CBC W/AUTO DIFF WBC: CPT

## 2024-10-11 PROCEDURE — 83735 ASSAY OF MAGNESIUM: CPT

## 2024-10-11 PROCEDURE — 10002801 HB RX 250 W/O HCPCS

## 2024-10-11 PROCEDURE — 10004651 HB RX, NO CHARGE ITEM

## 2024-10-11 PROCEDURE — 10002803 HB RX 637: Performed by: INTERNAL MEDICINE

## 2024-10-11 PROCEDURE — 10002800 HB RX 250 W HCPCS

## 2024-10-11 PROCEDURE — 10002801 HB RX 250 W/O HCPCS: Performed by: INTERNAL MEDICINE

## 2024-10-11 PROCEDURE — 80053 COMPREHEN METABOLIC PANEL: CPT

## 2024-10-11 PROCEDURE — 94003 VENT MGMT INPAT SUBQ DAY: CPT

## 2024-10-11 PROCEDURE — 96372 THER/PROPH/DIAG INJ SC/IM: CPT

## 2024-10-11 PROCEDURE — 36415 COLL VENOUS BLD VENIPUNCTURE: CPT

## 2024-10-11 RX ORDER — VANCOMYCIN HYDROCHLORIDE 250 MG/5ML
500 POWDER, FOR SOLUTION ORAL 4 TIMES DAILY
Qty: 80 ML | Refills: 0 | Status: SHIPPED
Start: 2024-10-11 | End: 2024-10-13

## 2024-10-11 RX ADMIN — VANCOMYCIN HYDROCHLORIDE 500 MG: KIT at 12:38

## 2024-10-11 RX ADMIN — LORAZEPAM 1 MG: 1 TABLET ORAL at 13:07

## 2024-10-11 RX ADMIN — SODIUM CHLORIDE, PRESERVATIVE FREE 2 ML: 5 INJECTION INTRAVENOUS at 08:44

## 2024-10-11 RX ADMIN — VALPROIC ACID 250 MG: 250 SOLUTION ORAL at 05:57

## 2024-10-11 RX ADMIN — ACETAMINOPHEN 650 MG: 325 TABLET ORAL at 06:29

## 2024-10-11 RX ADMIN — OXYCODONE HYDROCHLORIDE 2.5 MG: 5 TABLET ORAL at 08:36

## 2024-10-11 RX ADMIN — LORAZEPAM 1 MG: 1 TABLET ORAL at 02:23

## 2024-10-11 RX ADMIN — FAMOTIDINE 10 MG: 20 TABLET ORAL at 08:37

## 2024-10-11 RX ADMIN — IPRATROPIUM BROMIDE 0.5 MG: 0.5 SOLUTION RESPIRATORY (INHALATION) at 07:25

## 2024-10-11 RX ADMIN — VALPROIC ACID 250 MG: 250 SOLUTION ORAL at 13:07

## 2024-10-11 RX ADMIN — IPRATROPIUM BROMIDE 0.5 MG: 0.5 SOLUTION RESPIRATORY (INHALATION) at 11:14

## 2024-10-11 RX ADMIN — LACOSAMIDE 150 MG: 10 SOLUTION ORAL at 08:34

## 2024-10-11 RX ADMIN — LEVETIRACETAM 750 MG: 100 SOLUTION ORAL at 08:36

## 2024-10-11 RX ADMIN — INSULIN LISPRO 4 UNITS: 100 INJECTION, SOLUTION INTRAVENOUS; SUBCUTANEOUS at 05:57

## 2024-10-11 RX ADMIN — PANCRELIPASE 1 CAPSULE: 30000; 6000; 19000 CAPSULE, DELAYED RELEASE PELLETS ORAL at 13:07

## 2024-10-11 RX ADMIN — LORAZEPAM 1 MG: 1 TABLET ORAL at 07:28

## 2024-10-11 RX ADMIN — INSULIN LISPRO 6 UNITS: 100 INJECTION, SOLUTION INTRAVENOUS; SUBCUTANEOUS at 12:38

## 2024-10-11 RX ADMIN — INSULIN LISPRO 6 UNITS: 100 INJECTION, SOLUTION INTRAVENOUS; SUBCUTANEOUS at 00:32

## 2024-10-11 RX ADMIN — VANCOMYCIN HYDROCHLORIDE 500 MG: KIT at 08:35

## 2024-10-11 RX ADMIN — PANCRELIPASE 1 CAPSULE: 30000; 6000; 19000 CAPSULE, DELAYED RELEASE PELLETS ORAL at 08:35

## 2024-10-11 RX ADMIN — APIXABAN 2.5 MG: 2.5 TABLET, FILM COATED ORAL at 08:35

## 2024-10-11 ASSESSMENT — PAIN SCALES - BEHAVIORAL PAIN SCALE (BPS)
BPS_SCORE: 3
BPS_SCORE: 5

## 2024-10-12 LAB
BACTERIA BLD CULT: NORMAL
BACTERIA BLD CULT: NORMAL

## 2024-10-15 LAB
BACTERIA BLD CULT: NORMAL
BACTERIA BLD CULT: NORMAL

## 2024-10-30 ENCOUNTER — HOSPITAL ENCOUNTER (INPATIENT)
Age: 34
Discharge: NURSING, REHAB OR LONG-TERM CARE | DRG: 720 | End: 2024-10-30
Attending: EMERGENCY MEDICINE | Admitting: INTERNAL MEDICINE

## 2024-10-30 DIAGNOSIS — D64.9 ANEMIA, UNSPECIFIED TYPE: Primary | ICD-10-CM

## 2024-10-30 DIAGNOSIS — K94.23 PEG TUBE MALFUNCTION  (CMD): ICD-10-CM

## 2024-10-30 DIAGNOSIS — E87.5 HYPERKALEMIA: ICD-10-CM

## 2024-10-30 LAB
ABO + RH BLD: NORMAL
ALBUMIN SERPL-MCNC: 1.6 G/DL (ref 3.4–5)
ALBUMIN/GLOB SERPL: 0.2 {RATIO} (ref 1–2.4)
ALP SERPL-CCNC: 69 UNITS/L (ref 45–117)
ALT SERPL-CCNC: 24 UNITS/L
ANION GAP SERPL CALC-SCNC: 8 MMOL/L (ref 7–19)
AST SERPL-CCNC: 37 UNITS/L
BASOPHILS # BLD: 0 K/MCL (ref 0–0.3)
BASOPHILS NFR BLD: 0 %
BILIRUB SERPL-MCNC: 0.3 MG/DL (ref 0.2–1)
BLD GP AB SCN SERPL QL GEL: NEGATIVE
BLOOD EXPIRATION DATE: NORMAL
BUN SERPL-MCNC: 18 MG/DL (ref 6–20)
BUN/CREAT SERPL: 46 (ref 7–25)
CALCIUM SERPL-MCNC: 7.6 MG/DL (ref 8.4–10.2)
CHLORIDE SERPL-SCNC: 103 MMOL/L (ref 97–110)
CO2 SERPL-SCNC: 27 MMOL/L (ref 21–32)
CREAT SERPL-MCNC: 0.39 MG/DL (ref 0.51–0.95)
CROSSMATCH RESULT: NORMAL
DEPRECATED RDW RBC: 52.5 FL (ref 39–50)
DISPENSE STATUS: NORMAL
EGFRCR SERPLBLD CKD-EPI 2021: >90 ML/MIN/{1.73_M2}
EOSINOPHIL # BLD: 0.1 K/MCL (ref 0–0.5)
EOSINOPHIL NFR BLD: 1 %
ERYTHROCYTE [DISTWIDTH] IN BLOOD: 14.1 % (ref 11–15)
FASTING DURATION TIME PATIENT: ABNORMAL H
GLOBULIN SER-MCNC: 7.4 G/DL (ref 2–4)
GLUCOSE BLDC GLUCOMTR-MCNC: 174 MG/DL (ref 70–99)
GLUCOSE BLDC GLUCOMTR-MCNC: 323 MG/DL (ref 70–99)
GLUCOSE SERPL-MCNC: 184 MG/DL (ref 70–99)
HCT VFR BLD CALC: 17.3 % (ref 36–46.5)
HGB BLD-MCNC: 5.5 G/DL (ref 12–15.5)
IMM GRANULOCYTES # BLD AUTO: 0 K/MCL (ref 0–0.2)
IMM GRANULOCYTES # BLD: 1 %
ISBT BLOOD TYPE: 5100
ISSUE DATE/TIME: NORMAL
LYMPHOCYTES # BLD: 1.4 K/MCL (ref 1–4.8)
LYMPHOCYTES NFR BLD: 23 %
MCH RBC QN AUTO: 32.9 PG (ref 26–34)
MCHC RBC AUTO-ENTMCNC: 31.8 G/DL (ref 32–36.5)
MCV RBC AUTO: 103.6 FL (ref 78–100)
MONOCYTES # BLD: 0.7 K/MCL (ref 0.3–0.9)
MONOCYTES NFR BLD: 12 %
NEUTROPHILS # BLD: 3.8 K/MCL (ref 1.8–7.7)
NEUTROPHILS NFR BLD: 63 %
NRBC BLD MANUAL-RTO: 0 /100 WBC
PLATELET # BLD AUTO: 201 K/MCL (ref 140–450)
POTASSIUM SERPL-SCNC: 5.4 MMOL/L (ref 3.4–5.1)
PRODUCT CODE: NORMAL
PRODUCT DESCRIPTION: NORMAL
PRODUCT ID: NORMAL
PROT SERPL-MCNC: 9 G/DL (ref 6.4–8.2)
RBC # BLD: 1.67 MIL/MCL (ref 4–5.2)
SODIUM SERPL-SCNC: 133 MMOL/L (ref 135–145)
TYPE AND SCREEN EXPIRATION DATE: NORMAL
UNIT BLOOD TYPE: NORMAL
UNIT NUMBER: NORMAL
WBC # BLD: 6 K/MCL (ref 4.2–11)

## 2024-10-30 PROCEDURE — 10002800 HB RX 250 W HCPCS

## 2024-10-30 PROCEDURE — 10004180 HB COUNTER-TRANSPORT

## 2024-10-30 PROCEDURE — 5A1955Z RESPIRATORY VENTILATION, GREATER THAN 96 CONSECUTIVE HOURS: ICD-10-PCS | Performed by: INTERNAL MEDICINE

## 2024-10-30 PROCEDURE — 99285 EMERGENCY DEPT VISIT HI MDM: CPT

## 2024-10-30 PROCEDURE — 93005 ELECTROCARDIOGRAM TRACING: CPT

## 2024-10-30 PROCEDURE — 94002 VENT MGMT INPAT INIT DAY: CPT

## 2024-10-30 PROCEDURE — 82962 GLUCOSE BLOOD TEST: CPT

## 2024-10-30 PROCEDURE — 85025 COMPLETE CBC W/AUTO DIFF WBC: CPT | Performed by: EMERGENCY MEDICINE

## 2024-10-30 PROCEDURE — 10002807 HB RX 258

## 2024-10-30 PROCEDURE — 86850 RBC ANTIBODY SCREEN: CPT

## 2024-10-30 PROCEDURE — P9040 RBC LEUKOREDUCED IRRADIATED: HCPCS

## 2024-10-30 PROCEDURE — 10006027 HB SUPPLY 278

## 2024-10-30 PROCEDURE — 93010 ELECTROCARDIOGRAM REPORT: CPT | Performed by: INTERNAL MEDICINE

## 2024-10-30 PROCEDURE — 86923 COMPATIBILITY TEST ELECTRIC: CPT

## 2024-10-30 PROCEDURE — 10006031 HB ROOM CHARGE TELEMETRY

## 2024-10-30 PROCEDURE — 36415 COLL VENOUS BLD VENIPUNCTURE: CPT

## 2024-10-30 PROCEDURE — 80053 COMPREHEN METABOLIC PANEL: CPT | Performed by: EMERGENCY MEDICINE

## 2024-10-30 PROCEDURE — 10002801 HB RX 250 W/O HCPCS

## 2024-10-30 RX ORDER — SODIUM CHLORIDE 9 MG/ML
INJECTION, SOLUTION INTRAVENOUS CONTINUOUS PRN
Status: ACTIVE | OUTPATIENT
Start: 2024-10-30 | End: 2024-10-31

## 2024-10-30 RX ORDER — FUROSEMIDE 10 MG/ML
20 INJECTION INTRAMUSCULAR; INTRAVENOUS ONCE
Status: COMPLETED | OUTPATIENT
Start: 2024-10-30 | End: 2024-10-30

## 2024-10-30 RX ORDER — DEXTROSE MONOHYDRATE 25 G/50ML
25 INJECTION, SOLUTION INTRAVENOUS ONCE
Status: COMPLETED | OUTPATIENT
Start: 2024-10-30 | End: 2024-10-30

## 2024-10-30 RX ADMIN — SODIUM CHLORIDE 5 UNITS: 9 INJECTION, SOLUTION INTRAVENOUS at 22:34

## 2024-10-30 RX ADMIN — DEXTROSE MONOHYDRATE 25 G: 25 INJECTION, SOLUTION INTRAVENOUS at 22:31

## 2024-10-30 RX ADMIN — FUROSEMIDE 20 MG: 10 INJECTION, SOLUTION INTRAMUSCULAR; INTRAVENOUS at 22:36

## 2024-10-31 ENCOUNTER — APPOINTMENT (OUTPATIENT)
Dept: GENERAL RADIOLOGY | Age: 34
DRG: 720 | End: 2024-10-31
Attending: INTERNAL MEDICINE

## 2024-10-31 LAB
ALBUMIN SERPL-MCNC: 1.8 G/DL (ref 3.4–5)
ALBUMIN/GLOB SERPL: 0.2 {RATIO} (ref 1–2.4)
ALP SERPL-CCNC: 75 UNITS/L (ref 45–117)
ALT SERPL-CCNC: 21 UNITS/L
ANION GAP SERPL CALC-SCNC: 11 MMOL/L (ref 7–19)
ANION GAP SERPL CALC-SCNC: 12 MMOL/L (ref 7–19)
APPEARANCE UR: CLEAR
AST SERPL-CCNC: 28 UNITS/L
ATRIAL RATE (BPM): 96
BACTERIA #/AREA URNS HPF: ABNORMAL /HPF
BASOPHILS # BLD: 0 K/MCL (ref 0–0.3)
BASOPHILS NFR BLD: 0 %
BILIRUB SERPL-MCNC: 0.8 MG/DL (ref 0.2–1)
BILIRUB UR QL STRIP: NEGATIVE
BUN SERPL-MCNC: 14 MG/DL (ref 6–20)
BUN SERPL-MCNC: 15 MG/DL (ref 6–20)
BUN/CREAT SERPL: 40 (ref 7–25)
BUN/CREAT SERPL: 42 (ref 7–25)
CALCIUM SERPL-MCNC: 7.9 MG/DL (ref 8.4–10.2)
CALCIUM SERPL-MCNC: 7.9 MG/DL (ref 8.4–10.2)
CHLORIDE SERPL-SCNC: 99 MMOL/L (ref 97–110)
CHLORIDE SERPL-SCNC: 99 MMOL/L (ref 97–110)
CO2 SERPL-SCNC: 26 MMOL/L (ref 21–32)
CO2 SERPL-SCNC: 27 MMOL/L (ref 21–32)
COLOR UR: YELLOW
CREAT SERPL-MCNC: 0.35 MG/DL (ref 0.51–0.95)
CREAT SERPL-MCNC: 0.36 MG/DL (ref 0.51–0.95)
DEPRECATED RDW RBC: 60.3 FL (ref 39–50)
EGFRCR SERPLBLD CKD-EPI 2021: >90 ML/MIN/{1.73_M2}
EGFRCR SERPLBLD CKD-EPI 2021: >90 ML/MIN/{1.73_M2}
EOSINOPHIL # BLD: 0 K/MCL (ref 0–0.5)
EOSINOPHIL NFR BLD: 0 %
ERYTHROCYTE [DISTWIDTH] IN BLOOD: 17.4 % (ref 11–15)
FASTING DURATION TIME PATIENT: ABNORMAL H
FASTING DURATION TIME PATIENT: ABNORMAL H
GLOBULIN SER-MCNC: 7.9 G/DL (ref 2–4)
GLUCOSE BLDC GLUCOMTR-MCNC: 115 MG/DL (ref 70–99)
GLUCOSE BLDC GLUCOMTR-MCNC: 158 MG/DL (ref 70–99)
GLUCOSE BLDC GLUCOMTR-MCNC: 186 MG/DL (ref 70–99)
GLUCOSE BLDC GLUCOMTR-MCNC: 192 MG/DL (ref 70–99)
GLUCOSE BLDC GLUCOMTR-MCNC: 234 MG/DL (ref 70–99)
GLUCOSE BLDC GLUCOMTR-MCNC: 245 MG/DL (ref 70–99)
GLUCOSE SERPL-MCNC: 171 MG/DL (ref 70–99)
GLUCOSE SERPL-MCNC: 194 MG/DL (ref 70–99)
GLUCOSE UR STRIP-MCNC: NEGATIVE MG/DL
HCT VFR BLD CALC: 27.6 % (ref 36–46.5)
HGB BLD-MCNC: 9 G/DL (ref 12–15.5)
HGB UR QL STRIP: ABNORMAL
HYALINE CASTS #/AREA URNS LPF: ABNORMAL /LPF
IMM GRANULOCYTES # BLD AUTO: 0.1 K/MCL (ref 0–0.2)
IMM GRANULOCYTES # BLD: 1 %
KETONES UR STRIP-MCNC: ABNORMAL MG/DL
LEUKOCYTE ESTERASE UR QL STRIP: NEGATIVE
LYMPHOCYTES # BLD: 0.9 K/MCL (ref 1–4.8)
LYMPHOCYTES NFR BLD: 14 %
MAGNESIUM SERPL-MCNC: 1.7 MG/DL (ref 1.7–2.4)
MCH RBC QN AUTO: 30.9 PG (ref 26–34)
MCHC RBC AUTO-ENTMCNC: 32.6 G/DL (ref 32–36.5)
MCV RBC AUTO: 94.8 FL (ref 78–100)
MONOCYTES # BLD: 0.7 K/MCL (ref 0.3–0.9)
MONOCYTES NFR BLD: 11 %
MRSA DNA SPEC QL NAA+PROBE: NOT DETECTED
MUCOUS THREADS URNS QL MICRO: PRESENT
NEUTROPHILS # BLD: 4.7 K/MCL (ref 1.8–7.7)
NEUTROPHILS NFR BLD: 74 %
NITRITE UR QL STRIP: NEGATIVE
NRBC BLD MANUAL-RTO: 0 /100 WBC
P AXIS (DEGREES): 69
PH UR STRIP: 6 [PH] (ref 5–7)
PHOSPHATE SERPL-MCNC: 4.1 MG/DL (ref 2.4–4.7)
PLATELET # BLD AUTO: 226 K/MCL (ref 140–450)
POTASSIUM SERPL-SCNC: 4.7 MMOL/L (ref 3.4–5.1)
POTASSIUM SERPL-SCNC: 4.9 MMOL/L (ref 3.4–5.1)
POTASSIUM SERPL-SCNC: 5.1 MMOL/L (ref 3.4–5.1)
PR-INTERVAL (MSEC): 158
PROT SERPL-MCNC: 9.7 G/DL (ref 6.4–8.2)
PROT UR STRIP-MCNC: 300 MG/DL
QRS-INTERVAL (MSEC): 94
QT-INTERVAL (MSEC): 352
QTC: 445
R AXIS (DEGREES): 70
RAINBOW EXTRA TUBES HOLD SPECIMEN: NORMAL
RBC # BLD: 2.91 MIL/MCL (ref 4–5.2)
RBC #/AREA URNS HPF: ABNORMAL /HPF
REPORT TEXT: NORMAL
SODIUM SERPL-SCNC: 132 MMOL/L (ref 135–145)
SODIUM SERPL-SCNC: 132 MMOL/L (ref 135–145)
SP GR UR STRIP: 1.02 (ref 1–1.03)
SQUAMOUS #/AREA URNS HPF: ABNORMAL /HPF
T AXIS (DEGREES): 63
UROBILINOGEN UR STRIP-MCNC: 0.2 MG/DL
VENTRICULAR RATE EKG/MIN (BPM): 96
WBC # BLD: 6.4 K/MCL (ref 4.2–11)
WBC #/AREA URNS HPF: ABNORMAL /HPF

## 2024-10-31 PROCEDURE — 10000002 HB ROOM CHARGE MED SURG

## 2024-10-31 PROCEDURE — 36415 COLL VENOUS BLD VENIPUNCTURE: CPT | Performed by: INTERNAL MEDICINE

## 2024-10-31 PROCEDURE — 80053 COMPREHEN METABOLIC PANEL: CPT | Performed by: INTERNAL MEDICINE

## 2024-10-31 PROCEDURE — 10002801 HB RX 250 W/O HCPCS: Performed by: INTERNAL MEDICINE

## 2024-10-31 PROCEDURE — 10004651 HB RX, NO CHARGE ITEM: Performed by: INTERNAL MEDICINE

## 2024-10-31 PROCEDURE — 10002800 HB RX 250 W HCPCS: Performed by: INTERNAL MEDICINE

## 2024-10-31 PROCEDURE — 94640 AIRWAY INHALATION TREATMENT: CPT

## 2024-10-31 PROCEDURE — 87641 MR-STAPH DNA AMP PROBE: CPT | Performed by: INTERNAL MEDICINE

## 2024-10-31 PROCEDURE — 10002805 HB CONTRAST AGENT: Performed by: INTERNAL MEDICINE

## 2024-10-31 PROCEDURE — 87070 CULTURE OTHR SPECIMN AEROBIC: CPT | Performed by: INTERNAL MEDICINE

## 2024-10-31 PROCEDURE — 81001 URINALYSIS AUTO W/SCOPE: CPT | Performed by: INTERNAL MEDICINE

## 2024-10-31 PROCEDURE — 85025 COMPLETE CBC W/AUTO DIFF WBC: CPT | Performed by: INTERNAL MEDICINE

## 2024-10-31 PROCEDURE — 84100 ASSAY OF PHOSPHORUS: CPT | Performed by: INTERNAL MEDICINE

## 2024-10-31 PROCEDURE — 83735 ASSAY OF MAGNESIUM: CPT | Performed by: INTERNAL MEDICINE

## 2024-10-31 PROCEDURE — 71045 X-RAY EXAM CHEST 1 VIEW: CPT

## 2024-10-31 PROCEDURE — 82962 GLUCOSE BLOOD TEST: CPT

## 2024-10-31 PROCEDURE — 10004281 HB COUNTER-STAFF TIME PER 15 MIN

## 2024-10-31 PROCEDURE — 87040 BLOOD CULTURE FOR BACTERIA: CPT | Performed by: INTERNAL MEDICINE

## 2024-10-31 PROCEDURE — 96372 THER/PROPH/DIAG INJ SC/IM: CPT | Performed by: INTERNAL MEDICINE

## 2024-10-31 PROCEDURE — 74018 RADEX ABDOMEN 1 VIEW: CPT

## 2024-10-31 PROCEDURE — 10002803 HB RX 637: Performed by: INTERNAL MEDICINE

## 2024-10-31 PROCEDURE — 80048 BASIC METABOLIC PNL TOTAL CA: CPT | Performed by: INTERNAL MEDICINE

## 2024-10-31 PROCEDURE — 10002807 HB RX 258: Performed by: INTERNAL MEDICINE

## 2024-10-31 PROCEDURE — 84132 ASSAY OF SERUM POTASSIUM: CPT

## 2024-10-31 PROCEDURE — 94003 VENT MGMT INPAT SUBQ DAY: CPT

## 2024-10-31 RX ORDER — LACOSAMIDE 10 MG/ML
150 SOLUTION ORAL EVERY 12 HOURS SCHEDULED
Status: DISCONTINUED | OUTPATIENT
Start: 2024-10-31 | End: 2024-11-09 | Stop reason: HOSPADM

## 2024-10-31 RX ORDER — LANOLIN ALCOHOL/MO/W.PET/CERES
400 CREAM (GRAM) TOPICAL DAILY
Status: DISCONTINUED | OUTPATIENT
Start: 2024-10-31 | End: 2024-11-09 | Stop reason: HOSPADM

## 2024-10-31 RX ORDER — IBUPROFEN 600 MG/1
600 TABLET, FILM COATED ORAL EVERY 6 HOURS PRN
Status: ON HOLD | COMMUNITY
End: 2024-11-09 | Stop reason: HOSPADM

## 2024-10-31 RX ORDER — 0.9 % SODIUM CHLORIDE 0.9 %
10 VIAL (ML) INJECTION PRN
Status: DISCONTINUED | OUTPATIENT
Start: 2024-10-31 | End: 2024-11-09 | Stop reason: HOSPADM

## 2024-10-31 RX ORDER — OXYCODONE HYDROCHLORIDE 5 MG/1
2.5 TABLET ORAL EVERY 12 HOURS PRN
Status: DISCONTINUED | OUTPATIENT
Start: 2024-10-31 | End: 2024-11-09 | Stop reason: HOSPADM

## 2024-10-31 RX ORDER — LORAZEPAM 1 MG/1
1 TABLET ORAL 4 TIMES DAILY
Status: DISCONTINUED | OUTPATIENT
Start: 2024-10-31 | End: 2024-11-03

## 2024-10-31 RX ORDER — 0.9 % SODIUM CHLORIDE 0.9 %
2 VIAL (ML) INJECTION EVERY 12 HOURS SCHEDULED
Status: DISCONTINUED | OUTPATIENT
Start: 2024-10-31 | End: 2024-11-09 | Stop reason: HOSPADM

## 2024-10-31 RX ORDER — 0.9 % SODIUM CHLORIDE 0.9 %
10 VIAL (ML) INJECTION EVERY 12 HOURS SCHEDULED
Status: DISCONTINUED | OUTPATIENT
Start: 2024-10-31 | End: 2024-11-09 | Stop reason: HOSPADM

## 2024-10-31 RX ORDER — METOPROLOL TARTRATE 25 MG/1
12.5 TABLET, FILM COATED ORAL 2 TIMES DAILY
COMMUNITY

## 2024-10-31 RX ORDER — VANCOMYCIN HYDROCHLORIDE 250 MG/5ML
125 POWDER, FOR SOLUTION ORAL EVERY 12 HOURS SCHEDULED
Status: DISCONTINUED | OUTPATIENT
Start: 2024-10-31 | End: 2024-11-09 | Stop reason: HOSPADM

## 2024-10-31 RX ORDER — METOPROLOL TARTRATE 25 MG/1
12.5 TABLET, FILM COATED ORAL 2 TIMES DAILY
Status: DISCONTINUED | OUTPATIENT
Start: 2024-10-31 | End: 2024-10-31

## 2024-10-31 RX ORDER — DEXTROSE MONOHYDRATE 25 G/50ML
25 INJECTION, SOLUTION INTRAVENOUS PRN
Status: DISCONTINUED | OUTPATIENT
Start: 2024-10-31 | End: 2024-11-09 | Stop reason: HOSPADM

## 2024-10-31 RX ORDER — FOLIC ACID 1 MG/1
0.5 TABLET ORAL DAILY
Status: DISCONTINUED | OUTPATIENT
Start: 2024-10-31 | End: 2024-11-09 | Stop reason: HOSPADM

## 2024-10-31 RX ORDER — INSULIN LISPRO 100 [IU]/ML
INJECTION, SOLUTION INTRAVENOUS; SUBCUTANEOUS
COMMUNITY

## 2024-10-31 RX ORDER — GABAPENTIN 250 MG/5ML
2 SOLUTION ORAL 2 TIMES DAILY
COMMUNITY

## 2024-10-31 RX ORDER — DEXTROSE MONOHYDRATE 25 G/50ML
12.5 INJECTION, SOLUTION INTRAVENOUS PRN
Status: DISCONTINUED | OUTPATIENT
Start: 2024-10-31 | End: 2024-11-09 | Stop reason: HOSPADM

## 2024-10-31 RX ORDER — SODIUM CHLORIDE 1 G/1
1 TABLET ORAL 3 TIMES DAILY
COMMUNITY

## 2024-10-31 RX ORDER — METOPROLOL TARTRATE 25 MG/1
25 TABLET, FILM COATED ORAL 2 TIMES DAILY
Status: DISCONTINUED | OUTPATIENT
Start: 2024-10-31 | End: 2024-11-09 | Stop reason: HOSPADM

## 2024-10-31 RX ORDER — LEVETIRACETAM 100 MG/ML
750 SOLUTION ORAL 2 TIMES DAILY
Status: DISCONTINUED | OUTPATIENT
Start: 2024-10-31 | End: 2024-11-09 | Stop reason: HOSPADM

## 2024-10-31 RX ORDER — INSULIN LISPRO 100 [IU]/ML
5 INJECTION, SOLUTION INTRAVENOUS; SUBCUTANEOUS EVERY 6 HOURS
Status: ON HOLD | COMMUNITY
End: 2024-11-09 | Stop reason: HOSPADM

## 2024-10-31 RX ORDER — POLYETHYLENE GLYCOL 3350 17 G/17G
17 POWDER, FOR SOLUTION ORAL DAILY PRN
Status: DISCONTINUED | OUTPATIENT
Start: 2024-10-31 | End: 2024-11-09 | Stop reason: HOSPADM

## 2024-10-31 RX ORDER — FAMOTIDINE 20 MG/1
10 TABLET, FILM COATED ORAL 2 TIMES DAILY
Status: DISCONTINUED | OUTPATIENT
Start: 2024-10-31 | End: 2024-11-09 | Stop reason: HOSPADM

## 2024-10-31 RX ORDER — GABAPENTIN 250 MG/5ML
100 SOLUTION ORAL 2 TIMES DAILY
Status: DISCONTINUED | OUTPATIENT
Start: 2024-10-31 | End: 2024-11-09 | Stop reason: HOSPADM

## 2024-10-31 RX ORDER — VANCOMYCIN HYDROCHLORIDE 125 MG/1
125 CAPSULE ORAL EVERY 12 HOURS SCHEDULED
Status: DISCONTINUED | OUTPATIENT
Start: 2024-10-31 | End: 2024-10-31

## 2024-10-31 RX ORDER — ACETAMINOPHEN 325 MG/1
325 TABLET ORAL EVERY 6 HOURS PRN
Status: DISCONTINUED | OUTPATIENT
Start: 2024-10-31 | End: 2024-11-09 | Stop reason: HOSPADM

## 2024-10-31 RX ORDER — GENTAMICIN SULFATE 1 MG/G
CREAM TOPICAL
Status: ON HOLD | COMMUNITY
End: 2024-11-09 | Stop reason: HOSPADM

## 2024-10-31 RX ORDER — LORAZEPAM 2 MG/ML
0.5 INJECTION INTRAMUSCULAR EVERY 4 HOURS PRN
Status: DISCONTINUED | OUTPATIENT
Start: 2024-10-31 | End: 2024-11-09 | Stop reason: HOSPADM

## 2024-10-31 RX ORDER — SULFAMETHOXAZOLE AND TRIMETHOPRIM 400; 80 MG/1; MG/1
1 TABLET ORAL EVERY 12 HOURS
Status: ON HOLD | COMMUNITY
Start: 2024-10-28 | End: 2024-11-09 | Stop reason: HOSPADM

## 2024-10-31 RX ORDER — CEFEPIME HYDROCHLORIDE 1 G/1
1 INJECTION, POWDER, FOR SOLUTION INTRAMUSCULAR; INTRAVENOUS EVERY 12 HOURS SCHEDULED
Status: ON HOLD | COMMUNITY
Start: 2024-10-24 | End: 2024-11-09 | Stop reason: HOSPADM

## 2024-10-31 RX ADMIN — VANCOMYCIN HYDROCHLORIDE 125 MG: KIT at 14:19

## 2024-10-31 RX ADMIN — LACOSAMIDE 150 MG: 10 SOLUTION ORAL at 20:15

## 2024-10-31 RX ADMIN — VANCOMYCIN HYDROCHLORIDE 750 MG: 750 INJECTION, POWDER, LYOPHILIZED, FOR SOLUTION INTRAVENOUS at 14:17

## 2024-10-31 RX ADMIN — IPRATROPIUM BROMIDE 0.5 MG: 0.5 SOLUTION RESPIRATORY (INHALATION) at 13:44

## 2024-10-31 RX ADMIN — PIPERACILLIN AND TAZOBACTAM 3.38 G: 3; .375 INJECTION, POWDER, FOR SOLUTION INTRAVENOUS at 14:19

## 2024-10-31 RX ADMIN — ACETAMINOPHEN 325 MG: 325 TABLET ORAL at 10:57

## 2024-10-31 RX ADMIN — FAMOTIDINE 10 MG: 20 TABLET ORAL at 20:15

## 2024-10-31 RX ADMIN — VALPROIC ACID 250 MG: 250 SOLUTION ORAL at 21:08

## 2024-10-31 RX ADMIN — IPRATROPIUM BROMIDE 0.5 MG: 0.5 SOLUTION RESPIRATORY (INHALATION) at 07:53

## 2024-10-31 RX ADMIN — PIPERACILLIN SODIUM AND TAZOBACTAM SODIUM 3.38 G: 3; .375 INJECTION, POWDER, FOR SOLUTION INTRAVENOUS at 21:07

## 2024-10-31 RX ADMIN — PANCRELIPASE 1 CAPSULE: 30000; 6000; 19000 CAPSULE, DELAYED RELEASE PELLETS ORAL at 16:49

## 2024-10-31 RX ADMIN — METOPROLOL TARTRATE 25 MG: 25 TABLET, FILM COATED ORAL at 20:14

## 2024-10-31 RX ADMIN — LACOSAMIDE 150 MG: 10 SOLUTION ORAL at 10:57

## 2024-10-31 RX ADMIN — LEVETIRACETAM 750 MG: 100 SOLUTION ORAL at 10:57

## 2024-10-31 RX ADMIN — FOLIC ACID 0.5 MG: 1 TABLET ORAL at 10:57

## 2024-10-31 RX ADMIN — VANCOMYCIN HYDROCHLORIDE 125 MG: KIT at 20:15

## 2024-10-31 RX ADMIN — LORAZEPAM 1 MG: 1 TABLET ORAL at 14:19

## 2024-10-31 RX ADMIN — GABAPENTIN 100 MG: 250 SOLUTION ORAL at 20:15

## 2024-10-31 RX ADMIN — Medication 400 MG: at 10:57

## 2024-10-31 RX ADMIN — LEVETIRACETAM 750 MG: 100 SOLUTION ORAL at 20:14

## 2024-10-31 RX ADMIN — IOHEXOL 25 ML: 350 INJECTION, SOLUTION INTRAVENOUS at 08:54

## 2024-10-31 RX ADMIN — PANCRELIPASE 1 CAPSULE: 30000; 6000; 19000 CAPSULE, DELAYED RELEASE PELLETS ORAL at 11:18

## 2024-10-31 RX ADMIN — INSULIN LISPRO 2 UNITS: 100 INJECTION, SOLUTION INTRAVENOUS; SUBCUTANEOUS at 17:51

## 2024-10-31 RX ADMIN — GABAPENTIN 100 MG: 250 SOLUTION ORAL at 10:57

## 2024-10-31 RX ADMIN — FAMOTIDINE 10 MG: 20 TABLET ORAL at 10:57

## 2024-10-31 RX ADMIN — LORAZEPAM 1 MG: 1 TABLET ORAL at 11:18

## 2024-10-31 RX ADMIN — SODIUM CHLORIDE, PRESERVATIVE FREE 10 ML: 5 INJECTION INTRAVENOUS at 11:41

## 2024-10-31 RX ADMIN — OXYCODONE HYDROCHLORIDE 2.5 MG: 5 TABLET ORAL at 15:00

## 2024-10-31 RX ADMIN — LORAZEPAM 1 MG: 1 TABLET ORAL at 20:15

## 2024-10-31 RX ADMIN — SODIUM CHLORIDE, PRESERVATIVE FREE 10 ML: 5 INJECTION INTRAVENOUS at 20:15

## 2024-10-31 RX ADMIN — LORAZEPAM 1 MG: 1 TABLET ORAL at 16:49

## 2024-10-31 RX ADMIN — METOPROLOL TARTRATE 25 MG: 25 TABLET, FILM COATED ORAL at 15:24

## 2024-10-31 RX ADMIN — VALPROIC ACID 250 MG: 250 SOLUTION ORAL at 14:19

## 2024-10-31 RX ADMIN — IPRATROPIUM BROMIDE 0.5 MG: 0.5 SOLUTION RESPIRATORY (INHALATION) at 18:49

## 2024-10-31 RX ADMIN — SODIUM CHLORIDE, PRESERVATIVE FREE 2 ML: 5 INJECTION INTRAVENOUS at 08:57

## 2024-10-31 RX ADMIN — SODIUM CHLORIDE, PRESERVATIVE FREE 2 ML: 5 INJECTION INTRAVENOUS at 20:20

## 2024-10-31 SDOH — ECONOMIC STABILITY: FOOD INSECURITY
WITHIN THE PAST 12 MONTHS, THE FOOD YOU BOUGHT JUST DIDN'T LAST AND YOU DIDN'T HAVE MONEY TO GET MORE.: PATIENT UNABLE TO ANSWER

## 2024-10-31 SDOH — ECONOMIC STABILITY: HOUSING INSECURITY: DO YOU HAVE PROBLEMS WITH ANY OF THE FOLLOWING?: PATIENT UNABLE TO ANSWER

## 2024-10-31 SDOH — ECONOMIC STABILITY: HOUSING INSECURITY: WHAT IS YOUR LIVING SITUATION TODAY?: PATIENT UNABLE TO ANSWER

## 2024-10-31 SDOH — SOCIAL STABILITY: SOCIAL NETWORK
HOW OFTEN DO YOU SEE OR TALK TO PEOPLE THAT YOU CARE ABOUT AND FEEL CLOSE TO? (FOR EXAMPLE: TALKING TO FRIENDS ON THE PHONE, VISITING FRIENDS OR FAMILY, GOING TO CHURCH OR CLUB MEETINGS): PATIENT UNABLE TO ANSWER

## 2024-10-31 SDOH — ECONOMIC STABILITY: HOUSING INSECURITY: WHAT IS YOUR LIVING SITUATION TODAY?: I HAVE A STEADY PLACE TO LIVE

## 2024-10-31 SDOH — SOCIAL STABILITY: SOCIAL INSECURITY: HOW OFTEN DOES ANYONE, INCLUDING FAMILY AND FRIENDS, INSULT OR TALK DOWN TO YOU?: PATIENT UNABLE TO ANSWER

## 2024-10-31 SDOH — SOCIAL STABILITY: SOCIAL INSECURITY: HOW OFTEN DOES ANYONE, INCLUDING FAMILY AND FRIENDS, THREATEN YOU WITH HARM?: PATIENT UNABLE TO ANSWER

## 2024-10-31 SDOH — SOCIAL STABILITY: SOCIAL INSECURITY: HOW OFTEN DOES ANYONE, INCLUDING FAMILY AND FRIENDS, SCREAM OR CURSE AT YOU?: PATIENT UNABLE TO ANSWER

## 2024-10-31 SDOH — SOCIAL STABILITY: SOCIAL INSECURITY: HOW OFTEN DOES ANYONE, INCLUDING FAMILY AND FRIENDS, PHYSICALLY HURT YOU?: PATIENT UNABLE TO ANSWER

## 2024-10-31 ASSESSMENT — PATIENT HEALTH QUESTIONNAIRE - PHQ9: IS PATIENT ABLE TO COMPLETE PHQ2 OR PHQ9: NO, PATIENT WILL NEVER BE ABLE TO COMPLETE

## 2024-10-31 ASSESSMENT — ACTIVITIES OF DAILY LIVING (ADL)
ADL_BEFORE_ADMISSION: NEEDS/REQUIRES ASSISTANCE
ADL_SCORE: 0
DRESSING: DEPENDENT
FEEDING: DEPENDENT
BATHING: DEPENDENT
TOILETING: DEPENDENT

## 2024-10-31 ASSESSMENT — LIFESTYLE VARIABLES
HOW MANY STANDARD DRINKS CONTAINING ALCOHOL DO YOU HAVE ON A TYPICAL DAY: 0,1 OR 2
AUDIT-C TOTAL SCORE: 0
HOW OFTEN DO YOU HAVE A DRINK CONTAINING ALCOHOL: NEVER
HOW OFTEN DO YOU HAVE 6 OR MORE DRINKS ON ONE OCCASION: NEVER
ALCOHOL_USE_STATUS: NO OR LOW RISK WITH VALIDATED TOOL

## 2024-10-31 ASSESSMENT — PAIN SCALES - BEHAVIORAL PAIN SCALE (BPS)
BPS_SCORE: 6
BPS_SCORE: 3

## 2024-11-01 LAB
ALBUMIN SERPL-MCNC: 1.7 G/DL (ref 3.4–5)
ALBUMIN/GLOB SERPL: 0.2 {RATIO} (ref 1–2.4)
ALP SERPL-CCNC: 70 UNITS/L (ref 45–117)
ALT SERPL-CCNC: 29 UNITS/L
ANION GAP SERPL CALC-SCNC: 10 MMOL/L (ref 7–19)
AST SERPL-CCNC: 50 UNITS/L
BACTERIA SPT AEROBE CULT: ABNORMAL
BACTERIA SPT AEROBE CULT: ABNORMAL
BASOPHILS # BLD: 0 K/MCL (ref 0–0.3)
BASOPHILS NFR BLD: 0 %
BILIRUB SERPL-MCNC: 0.4 MG/DL (ref 0.2–1)
BUN SERPL-MCNC: 13 MG/DL (ref 6–20)
BUN/CREAT SERPL: 38 (ref 7–25)
C DIFF TOX B TCDB STL QL NAA+PROBE: NOT DETECTED
CALCIUM SERPL-MCNC: 7.6 MG/DL (ref 8.4–10.2)
CHLORIDE SERPL-SCNC: 101 MMOL/L (ref 97–110)
CO2 SERPL-SCNC: 24 MMOL/L (ref 21–32)
CREAT SERPL-MCNC: 0.34 MG/DL (ref 0.51–0.95)
CREAT SERPL-MCNC: 0.35 MG/DL (ref 0.51–0.95)
DEPRECATED RDW RBC: 60.6 FL (ref 39–50)
EGFRCR SERPLBLD CKD-EPI 2021: >90 ML/MIN/{1.73_M2}
EGFRCR SERPLBLD CKD-EPI 2021: >90 ML/MIN/{1.73_M2}
EOSINOPHIL # BLD: 0 K/MCL (ref 0–0.5)
EOSINOPHIL NFR BLD: 1 %
ERYTHROCYTE [DISTWIDTH] IN BLOOD: 17.2 % (ref 11–15)
FASTING DURATION TIME PATIENT: ABNORMAL H
GLOBULIN SER-MCNC: 7.7 G/DL (ref 2–4)
GLUCOSE BLDC GLUCOMTR-MCNC: 168 MG/DL (ref 70–99)
GLUCOSE BLDC GLUCOMTR-MCNC: 168 MG/DL (ref 70–99)
GLUCOSE BLDC GLUCOMTR-MCNC: 194 MG/DL (ref 70–99)
GLUCOSE BLDC GLUCOMTR-MCNC: 197 MG/DL (ref 70–99)
GLUCOSE BLDC GLUCOMTR-MCNC: 221 MG/DL (ref 70–99)
GLUCOSE SERPL-MCNC: 194 MG/DL (ref 70–99)
GRAM STN SPEC: ABNORMAL
HCT VFR BLD CALC: 26.2 % (ref 36–46.5)
HGB BLD-MCNC: 8.5 G/DL (ref 12–15.5)
IMM GRANULOCYTES # BLD AUTO: 0 K/MCL (ref 0–0.2)
IMM GRANULOCYTES # BLD: 0 %
LYMPHOCYTES # BLD: 1.2 K/MCL (ref 1–4.8)
LYMPHOCYTES NFR BLD: 23 %
MCH RBC QN AUTO: 31.5 PG (ref 26–34)
MCHC RBC AUTO-ENTMCNC: 32.4 G/DL (ref 32–36.5)
MCV RBC AUTO: 97 FL (ref 78–100)
MONOCYTES # BLD: 0.5 K/MCL (ref 0.3–0.9)
MONOCYTES NFR BLD: 9 %
NEUTROPHILS # BLD: 3.4 K/MCL (ref 1.8–7.7)
NEUTROPHILS NFR BLD: 67 %
NRBC BLD MANUAL-RTO: 0 /100 WBC
PLATELET # BLD AUTO: 202 K/MCL (ref 140–450)
POTASSIUM SERPL-SCNC: 4.6 MMOL/L (ref 3.4–5.1)
PROT SERPL-MCNC: 9.4 G/DL (ref 6.4–8.2)
RBC # BLD: 2.7 MIL/MCL (ref 4–5.2)
SODIUM SERPL-SCNC: 130 MMOL/L (ref 135–145)
WBC # BLD: 5.2 K/MCL (ref 4.2–11)

## 2024-11-01 PROCEDURE — 80053 COMPREHEN METABOLIC PANEL: CPT | Performed by: NURSE PRACTITIONER

## 2024-11-01 PROCEDURE — 10002800 HB RX 250 W HCPCS: Performed by: INTERNAL MEDICINE

## 2024-11-01 PROCEDURE — 94640 AIRWAY INHALATION TREATMENT: CPT

## 2024-11-01 PROCEDURE — 85025 COMPLETE CBC W/AUTO DIFF WBC: CPT | Performed by: NURSE PRACTITIONER

## 2024-11-01 PROCEDURE — 10002807 HB RX 258: Performed by: INTERNAL MEDICINE

## 2024-11-01 PROCEDURE — 94003 VENT MGMT INPAT SUBQ DAY: CPT

## 2024-11-01 PROCEDURE — 10002803 HB RX 637: Performed by: INTERNAL MEDICINE

## 2024-11-01 PROCEDURE — 10002801 HB RX 250 W/O HCPCS: Performed by: INTERNAL MEDICINE

## 2024-11-01 PROCEDURE — 10000002 HB ROOM CHARGE MED SURG

## 2024-11-01 PROCEDURE — 87493 C DIFF AMPLIFIED PROBE: CPT | Performed by: INTERNAL MEDICINE

## 2024-11-01 PROCEDURE — 36415 COLL VENOUS BLD VENIPUNCTURE: CPT | Performed by: INTERNAL MEDICINE

## 2024-11-01 PROCEDURE — 82565 ASSAY OF CREATININE: CPT | Performed by: INTERNAL MEDICINE

## 2024-11-01 PROCEDURE — 10004651 HB RX, NO CHARGE ITEM: Performed by: INTERNAL MEDICINE

## 2024-11-01 PROCEDURE — 96372 THER/PROPH/DIAG INJ SC/IM: CPT | Performed by: INTERNAL MEDICINE

## 2024-11-01 RX ORDER — HYDRALAZINE HYDROCHLORIDE 20 MG/ML
10 INJECTION INTRAMUSCULAR; INTRAVENOUS EVERY 6 HOURS PRN
Status: DISCONTINUED | OUTPATIENT
Start: 2024-11-01 | End: 2024-11-09 | Stop reason: HOSPADM

## 2024-11-01 RX ADMIN — IPRATROPIUM BROMIDE 0.5 MG: 0.5 SOLUTION RESPIRATORY (INHALATION) at 08:26

## 2024-11-01 RX ADMIN — LORAZEPAM 1 MG: 1 TABLET ORAL at 08:34

## 2024-11-01 RX ADMIN — IPRATROPIUM BROMIDE 0.5 MG: 0.5 SOLUTION RESPIRATORY (INHALATION) at 20:21

## 2024-11-01 RX ADMIN — SODIUM CHLORIDE, PRESERVATIVE FREE 10 ML: 5 INJECTION INTRAVENOUS at 20:35

## 2024-11-01 RX ADMIN — LORAZEPAM 1 MG: 1 TABLET ORAL at 12:56

## 2024-11-01 RX ADMIN — FAMOTIDINE 10 MG: 20 TABLET ORAL at 08:33

## 2024-11-01 RX ADMIN — INSULIN LISPRO 2 UNITS: 100 INJECTION, SOLUTION INTRAVENOUS; SUBCUTANEOUS at 06:24

## 2024-11-01 RX ADMIN — METOPROLOL TARTRATE 25 MG: 25 TABLET, FILM COATED ORAL at 08:33

## 2024-11-01 RX ADMIN — ACETAMINOPHEN 325 MG: 325 TABLET ORAL at 17:46

## 2024-11-01 RX ADMIN — PIPERACILLIN SODIUM AND TAZOBACTAM SODIUM 3.38 G: 3; .375 INJECTION, POWDER, FOR SOLUTION INTRAVENOUS at 05:21

## 2024-11-01 RX ADMIN — METOPROLOL TARTRATE 25 MG: 25 TABLET, FILM COATED ORAL at 20:28

## 2024-11-01 RX ADMIN — GABAPENTIN 100 MG: 250 SOLUTION ORAL at 20:27

## 2024-11-01 RX ADMIN — INSULIN LISPRO 2 UNITS: 100 INJECTION, SOLUTION INTRAVENOUS; SUBCUTANEOUS at 12:55

## 2024-11-01 RX ADMIN — SODIUM CHLORIDE, PRESERVATIVE FREE 2 ML: 5 INJECTION INTRAVENOUS at 20:35

## 2024-11-01 RX ADMIN — PANCRELIPASE 1 CAPSULE: 30000; 6000; 19000 CAPSULE, DELAYED RELEASE PELLETS ORAL at 08:32

## 2024-11-01 RX ADMIN — Medication 400 MG: at 08:33

## 2024-11-01 RX ADMIN — LEVETIRACETAM 750 MG: 100 SOLUTION ORAL at 08:34

## 2024-11-01 RX ADMIN — LACOSAMIDE 150 MG: 10 SOLUTION ORAL at 08:30

## 2024-11-01 RX ADMIN — VANCOMYCIN HYDROCHLORIDE 125 MG: KIT at 20:27

## 2024-11-01 RX ADMIN — GABAPENTIN 100 MG: 250 SOLUTION ORAL at 08:33

## 2024-11-01 RX ADMIN — LORAZEPAM 1 MG: 1 TABLET ORAL at 17:46

## 2024-11-01 RX ADMIN — LACOSAMIDE 150 MG: 10 SOLUTION ORAL at 20:27

## 2024-11-01 RX ADMIN — VALPROIC ACID 250 MG: 250 SOLUTION ORAL at 21:04

## 2024-11-01 RX ADMIN — SODIUM CHLORIDE, PRESERVATIVE FREE 2 ML: 5 INJECTION INTRAVENOUS at 08:46

## 2024-11-01 RX ADMIN — VANCOMYCIN HYDROCHLORIDE 125 MG: KIT at 08:33

## 2024-11-01 RX ADMIN — FAMOTIDINE 10 MG: 20 TABLET ORAL at 20:28

## 2024-11-01 RX ADMIN — PIPERACILLIN SODIUM AND TAZOBACTAM SODIUM 3.38 G: 3; .375 INJECTION, POWDER, FOR SOLUTION INTRAVENOUS at 13:02

## 2024-11-01 RX ADMIN — PANCRELIPASE 1 CAPSULE: 30000; 6000; 19000 CAPSULE, DELAYED RELEASE PELLETS ORAL at 17:46

## 2024-11-01 RX ADMIN — INSULIN LISPRO 2 UNITS: 100 INJECTION, SOLUTION INTRAVENOUS; SUBCUTANEOUS at 17:46

## 2024-11-01 RX ADMIN — IPRATROPIUM BROMIDE 0.5 MG: 0.5 SOLUTION RESPIRATORY (INHALATION) at 13:51

## 2024-11-01 RX ADMIN — VALPROIC ACID 250 MG: 250 SOLUTION ORAL at 13:00

## 2024-11-01 RX ADMIN — LEVETIRACETAM 750 MG: 100 SOLUTION ORAL at 20:27

## 2024-11-01 RX ADMIN — FOLIC ACID 0.5 MG: 1 TABLET ORAL at 08:34

## 2024-11-01 RX ADMIN — SODIUM CHLORIDE, PRESERVATIVE FREE 10 ML: 5 INJECTION INTRAVENOUS at 08:34

## 2024-11-01 RX ADMIN — VALPROIC ACID 250 MG: 250 SOLUTION ORAL at 05:19

## 2024-11-01 RX ADMIN — PIPERACILLIN SODIUM AND TAZOBACTAM SODIUM 3.38 G: 3; .375 INJECTION, POWDER, FOR SOLUTION INTRAVENOUS at 21:06

## 2024-11-01 RX ADMIN — LORAZEPAM 1 MG: 1 TABLET ORAL at 20:28

## 2024-11-01 RX ADMIN — PANCRELIPASE 1 CAPSULE: 30000; 6000; 19000 CAPSULE, DELAYED RELEASE PELLETS ORAL at 12:55

## 2024-11-01 ASSESSMENT — PAIN SCALES - BEHAVIORAL PAIN SCALE (BPS)
BPS_SCORE: 3
BPS_SCORE: 3

## 2024-11-02 VITALS
RESPIRATION RATE: 18 BRPM | DIASTOLIC BLOOD PRESSURE: 78 MMHG | OXYGEN SATURATION: 100 % | HEIGHT: 63 IN | SYSTOLIC BLOOD PRESSURE: 114 MMHG | BODY MASS INDEX: 12.54 KG/M2 | HEART RATE: 80 BPM | TEMPERATURE: 98.6 F | WEIGHT: 70.77 LBS

## 2024-11-02 LAB
ANION GAP SERPL CALC-SCNC: 8 MMOL/L (ref 7–19)
BACTERIA BLD CULT: NORMAL
BASOPHILS # BLD: 0 K/MCL (ref 0–0.3)
BASOPHILS NFR BLD: 0 %
BUN SERPL-MCNC: 14 MG/DL (ref 6–20)
BUN/CREAT SERPL: 58 (ref 7–25)
CALCIUM SERPL-MCNC: 7.7 MG/DL (ref 8.4–10.2)
CHLORIDE SERPL-SCNC: 103 MMOL/L (ref 97–110)
CO2 SERPL-SCNC: 26 MMOL/L (ref 21–32)
CREAT SERPL-MCNC: 0.24 MG/DL (ref 0.51–0.95)
DEPRECATED RDW RBC: 56.8 FL (ref 39–50)
EGFRCR SERPLBLD CKD-EPI 2021: >90 ML/MIN/{1.73_M2}
EOSINOPHIL # BLD: 0 K/MCL (ref 0–0.5)
EOSINOPHIL NFR BLD: 1 %
ERYTHROCYTE [DISTWIDTH] IN BLOOD: 16.8 % (ref 11–15)
FASTING DURATION TIME PATIENT: ABNORMAL H
GLUCOSE BLDC GLUCOMTR-MCNC: 183 MG/DL (ref 70–99)
GLUCOSE BLDC GLUCOMTR-MCNC: 197 MG/DL (ref 70–99)
GLUCOSE BLDC GLUCOMTR-MCNC: 236 MG/DL (ref 70–99)
GLUCOSE BLDC GLUCOMTR-MCNC: 250 MG/DL (ref 70–99)
GLUCOSE SERPL-MCNC: 179 MG/DL (ref 70–99)
HCT VFR BLD CALC: 28.5 % (ref 36–46.5)
HGB BLD-MCNC: 9.4 G/DL (ref 12–15.5)
IMM GRANULOCYTES # BLD AUTO: 0 K/MCL (ref 0–0.2)
IMM GRANULOCYTES # BLD: 0 %
LYMPHOCYTES # BLD: 0.9 K/MCL (ref 1–4.8)
LYMPHOCYTES NFR BLD: 20 %
MCH RBC QN AUTO: 31 PG (ref 26–34)
MCHC RBC AUTO-ENTMCNC: 33 G/DL (ref 32–36.5)
MCV RBC AUTO: 94.1 FL (ref 78–100)
MONOCYTES # BLD: 0.4 K/MCL (ref 0.3–0.9)
MONOCYTES NFR BLD: 8 %
NEUTROPHILS # BLD: 3.2 K/MCL (ref 1.8–7.7)
NEUTROPHILS NFR BLD: 71 %
NRBC BLD MANUAL-RTO: 0 /100 WBC
PLATELET # BLD AUTO: 204 K/MCL (ref 140–450)
POTASSIUM SERPL-SCNC: 4.4 MMOL/L (ref 3.4–5.1)
RBC # BLD: 3.03 MIL/MCL (ref 4–5.2)
SODIUM SERPL-SCNC: 133 MMOL/L (ref 135–145)
WBC # BLD: 4.6 K/MCL (ref 4.2–11)

## 2024-11-02 PROCEDURE — 10002807 HB RX 258: Performed by: INTERNAL MEDICINE

## 2024-11-02 PROCEDURE — 10002803 HB RX 637: Performed by: INTERNAL MEDICINE

## 2024-11-02 PROCEDURE — 85025 COMPLETE CBC W/AUTO DIFF WBC: CPT | Performed by: INTERNAL MEDICINE

## 2024-11-02 PROCEDURE — 10004651 HB RX, NO CHARGE ITEM: Performed by: INTERNAL MEDICINE

## 2024-11-02 PROCEDURE — 94640 AIRWAY INHALATION TREATMENT: CPT

## 2024-11-02 PROCEDURE — 10000002 HB ROOM CHARGE MED SURG

## 2024-11-02 PROCEDURE — 96372 THER/PROPH/DIAG INJ SC/IM: CPT | Performed by: INTERNAL MEDICINE

## 2024-11-02 PROCEDURE — 10002801 HB RX 250 W/O HCPCS: Performed by: INTERNAL MEDICINE

## 2024-11-02 PROCEDURE — 80048 BASIC METABOLIC PNL TOTAL CA: CPT | Performed by: INTERNAL MEDICINE

## 2024-11-02 PROCEDURE — 10002800 HB RX 250 W HCPCS: Performed by: INTERNAL MEDICINE

## 2024-11-02 PROCEDURE — 94003 VENT MGMT INPAT SUBQ DAY: CPT

## 2024-11-02 PROCEDURE — 36415 COLL VENOUS BLD VENIPUNCTURE: CPT | Performed by: INTERNAL MEDICINE

## 2024-11-02 RX ORDER — SODIUM CHLORIDE 1 G/1
1 TABLET ORAL
Status: DISCONTINUED | OUTPATIENT
Start: 2024-11-02 | End: 2024-11-09 | Stop reason: HOSPADM

## 2024-11-02 RX ADMIN — LORAZEPAM 1 MG: 1 TABLET ORAL at 13:05

## 2024-11-02 RX ADMIN — FAMOTIDINE 10 MG: 20 TABLET ORAL at 08:17

## 2024-11-02 RX ADMIN — Medication 400 MG: at 08:17

## 2024-11-02 RX ADMIN — LACOSAMIDE 150 MG: 10 SOLUTION ORAL at 20:57

## 2024-11-02 RX ADMIN — IPRATROPIUM BROMIDE 0.5 MG: 0.5 SOLUTION RESPIRATORY (INHALATION) at 18:59

## 2024-11-02 RX ADMIN — SODIUM CHLORIDE, PRESERVATIVE FREE 10 ML: 5 INJECTION INTRAVENOUS at 20:58

## 2024-11-02 RX ADMIN — METOPROLOL TARTRATE 25 MG: 25 TABLET, FILM COATED ORAL at 08:17

## 2024-11-02 RX ADMIN — VALPROIC ACID 250 MG: 250 SOLUTION ORAL at 21:01

## 2024-11-02 RX ADMIN — ACETAMINOPHEN 325 MG: 325 TABLET ORAL at 13:08

## 2024-11-02 RX ADMIN — FAMOTIDINE 10 MG: 20 TABLET ORAL at 20:57

## 2024-11-02 RX ADMIN — IPRATROPIUM BROMIDE 0.5 MG: 0.5 SOLUTION RESPIRATORY (INHALATION) at 07:27

## 2024-11-02 RX ADMIN — LEVETIRACETAM 750 MG: 100 SOLUTION ORAL at 08:17

## 2024-11-02 RX ADMIN — LEVETIRACETAM 750 MG: 100 SOLUTION ORAL at 20:57

## 2024-11-02 RX ADMIN — SODIUM CHLORIDE, PRESERVATIVE FREE 10 ML: 5 INJECTION INTRAVENOUS at 08:25

## 2024-11-02 RX ADMIN — VALPROIC ACID 250 MG: 250 SOLUTION ORAL at 13:04

## 2024-11-02 RX ADMIN — VANCOMYCIN HYDROCHLORIDE 125 MG: KIT at 20:57

## 2024-11-02 RX ADMIN — GABAPENTIN 100 MG: 250 SOLUTION ORAL at 20:57

## 2024-11-02 RX ADMIN — FOLIC ACID 0.5 MG: 1 TABLET ORAL at 08:17

## 2024-11-02 RX ADMIN — INSULIN LISPRO 2 UNITS: 100 INJECTION, SOLUTION INTRAVENOUS; SUBCUTANEOUS at 05:49

## 2024-11-02 RX ADMIN — SODIUM CHLORIDE TAB 1 GM 1000 MG: 1 TAB at 16:29

## 2024-11-02 RX ADMIN — GABAPENTIN 100 MG: 250 SOLUTION ORAL at 08:17

## 2024-11-02 RX ADMIN — INSULIN LISPRO 4 UNITS: 100 INJECTION, SOLUTION INTRAVENOUS; SUBCUTANEOUS at 11:11

## 2024-11-02 RX ADMIN — INSULIN LISPRO 2 UNITS: 100 INJECTION, SOLUTION INTRAVENOUS; SUBCUTANEOUS at 18:18

## 2024-11-02 RX ADMIN — LORAZEPAM 1 MG: 1 TABLET ORAL at 16:29

## 2024-11-02 RX ADMIN — OXYCODONE HYDROCHLORIDE 2.5 MG: 5 TABLET ORAL at 16:29

## 2024-11-02 RX ADMIN — IPRATROPIUM BROMIDE 0.5 MG: 0.5 SOLUTION RESPIRATORY (INHALATION) at 12:48

## 2024-11-02 RX ADMIN — PANCRELIPASE 1 CAPSULE: 30000; 6000; 19000 CAPSULE, DELAYED RELEASE PELLETS ORAL at 16:29

## 2024-11-02 RX ADMIN — LORAZEPAM 1 MG: 1 TABLET ORAL at 08:17

## 2024-11-02 RX ADMIN — PANCRELIPASE 1 CAPSULE: 30000; 6000; 19000 CAPSULE, DELAYED RELEASE PELLETS ORAL at 13:05

## 2024-11-02 RX ADMIN — VANCOMYCIN HYDROCHLORIDE 125 MG: KIT at 08:17

## 2024-11-02 RX ADMIN — INSULIN LISPRO 4 UNITS: 100 INJECTION, SOLUTION INTRAVENOUS; SUBCUTANEOUS at 00:00

## 2024-11-02 RX ADMIN — SODIUM CHLORIDE, PRESERVATIVE FREE 2 ML: 5 INJECTION INTRAVENOUS at 21:02

## 2024-11-02 RX ADMIN — VALPROIC ACID 250 MG: 250 SOLUTION ORAL at 05:23

## 2024-11-02 RX ADMIN — PIPERACILLIN SODIUM AND TAZOBACTAM SODIUM 3.38 G: 3; .375 INJECTION, POWDER, FOR SOLUTION INTRAVENOUS at 05:26

## 2024-11-02 RX ADMIN — SODIUM CHLORIDE, PRESERVATIVE FREE 2 ML: 5 INJECTION INTRAVENOUS at 08:26

## 2024-11-02 RX ADMIN — PIPERACILLIN SODIUM AND TAZOBACTAM SODIUM 3.38 G: 3; .375 INJECTION, POWDER, FOR SOLUTION INTRAVENOUS at 21:05

## 2024-11-02 RX ADMIN — METOPROLOL TARTRATE 25 MG: 25 TABLET, FILM COATED ORAL at 20:57

## 2024-11-02 RX ADMIN — LACOSAMIDE 150 MG: 10 SOLUTION ORAL at 08:17

## 2024-11-02 RX ADMIN — PANCRELIPASE 1 CAPSULE: 30000; 6000; 19000 CAPSULE, DELAYED RELEASE PELLETS ORAL at 08:17

## 2024-11-02 RX ADMIN — LORAZEPAM 1 MG: 1 TABLET ORAL at 20:57

## 2024-11-02 RX ADMIN — PIPERACILLIN SODIUM AND TAZOBACTAM SODIUM 3.38 G: 3; .375 INJECTION, POWDER, FOR SOLUTION INTRAVENOUS at 13:13

## 2024-11-02 ASSESSMENT — PAIN SCALES - BEHAVIORAL PAIN SCALE (BPS)
BPS_SCORE: 4
BPS_SCORE: 3

## 2024-11-03 LAB
ANION GAP SERPL CALC-SCNC: 10 MMOL/L (ref 7–19)
BASOPHILS # BLD: 0 K/MCL (ref 0–0.3)
BASOPHILS NFR BLD: 0 %
BUN SERPL-MCNC: 13 MG/DL (ref 6–20)
BUN/CREAT SERPL: 65 (ref 7–25)
CALCIUM SERPL-MCNC: 7.2 MG/DL (ref 8.4–10.2)
CHLORIDE SERPL-SCNC: 104 MMOL/L (ref 97–110)
CO2 SERPL-SCNC: 25 MMOL/L (ref 21–32)
CREAT SERPL-MCNC: 0.2 MG/DL (ref 0.51–0.95)
DEPRECATED RDW RBC: 57.5 FL (ref 39–50)
EGFRCR SERPLBLD CKD-EPI 2021: >90 ML/MIN/{1.73_M2}
EOSINOPHIL # BLD: 0 K/MCL (ref 0–0.5)
EOSINOPHIL NFR BLD: 1 %
ERYTHROCYTE [DISTWIDTH] IN BLOOD: 16.5 % (ref 11–15)
FASTING DURATION TIME PATIENT: ABNORMAL H
GLUCOSE BLDC GLUCOMTR-MCNC: 151 MG/DL (ref 70–99)
GLUCOSE BLDC GLUCOMTR-MCNC: 199 MG/DL (ref 70–99)
GLUCOSE BLDC GLUCOMTR-MCNC: 202 MG/DL (ref 70–99)
GLUCOSE BLDC GLUCOMTR-MCNC: 255 MG/DL (ref 70–99)
GLUCOSE SERPL-MCNC: 196 MG/DL (ref 70–99)
HCT VFR BLD CALC: 23.5 % (ref 36–46.5)
HGB BLD-MCNC: 7.5 G/DL (ref 12–15.5)
IMM GRANULOCYTES # BLD AUTO: 0 K/MCL (ref 0–0.2)
IMM GRANULOCYTES # BLD: 0 %
LYMPHOCYTES # BLD: 1 K/MCL (ref 1–4.8)
LYMPHOCYTES NFR BLD: 17 %
MCH RBC QN AUTO: 31.1 PG (ref 26–34)
MCHC RBC AUTO-ENTMCNC: 31.9 G/DL (ref 32–36.5)
MCV RBC AUTO: 97.5 FL (ref 78–100)
MONOCYTES # BLD: 0.5 K/MCL (ref 0.3–0.9)
MONOCYTES NFR BLD: 8 %
NEUTROPHILS # BLD: 4.7 K/MCL (ref 1.8–7.7)
NEUTROPHILS NFR BLD: 74 %
NRBC BLD MANUAL-RTO: 0 /100 WBC
PLATELET # BLD AUTO: 148 K/MCL (ref 140–450)
POTASSIUM SERPL-SCNC: 4.5 MMOL/L (ref 3.4–5.1)
RBC # BLD: 2.41 MIL/MCL (ref 4–5.2)
SODIUM SERPL-SCNC: 134 MMOL/L (ref 135–145)
WBC # BLD: 6.3 K/MCL (ref 4.2–11)

## 2024-11-03 PROCEDURE — 10002803 HB RX 637: Performed by: INTERNAL MEDICINE

## 2024-11-03 PROCEDURE — 36415 COLL VENOUS BLD VENIPUNCTURE: CPT | Performed by: INTERNAL MEDICINE

## 2024-11-03 PROCEDURE — 96372 THER/PROPH/DIAG INJ SC/IM: CPT | Performed by: INTERNAL MEDICINE

## 2024-11-03 PROCEDURE — 10002801 HB RX 250 W/O HCPCS: Performed by: INTERNAL MEDICINE

## 2024-11-03 PROCEDURE — 94003 VENT MGMT INPAT SUBQ DAY: CPT

## 2024-11-03 PROCEDURE — 10002800 HB RX 250 W HCPCS: Performed by: INTERNAL MEDICINE

## 2024-11-03 PROCEDURE — 10002807 HB RX 258: Performed by: INTERNAL MEDICINE

## 2024-11-03 PROCEDURE — 85025 COMPLETE CBC W/AUTO DIFF WBC: CPT | Performed by: INTERNAL MEDICINE

## 2024-11-03 PROCEDURE — 94640 AIRWAY INHALATION TREATMENT: CPT

## 2024-11-03 PROCEDURE — 10000002 HB ROOM CHARGE MED SURG

## 2024-11-03 PROCEDURE — 80048 BASIC METABOLIC PNL TOTAL CA: CPT | Performed by: INTERNAL MEDICINE

## 2024-11-03 PROCEDURE — 10004651 HB RX, NO CHARGE ITEM: Performed by: INTERNAL MEDICINE

## 2024-11-03 RX ORDER — LORAZEPAM 1 MG/1
1 TABLET ORAL EVERY 6 HOURS
Status: DISCONTINUED | OUTPATIENT
Start: 2024-11-03 | End: 2024-11-09 | Stop reason: HOSPADM

## 2024-11-03 RX ADMIN — SODIUM CHLORIDE, PRESERVATIVE FREE 2 ML: 5 INJECTION INTRAVENOUS at 20:25

## 2024-11-03 RX ADMIN — INSULIN LISPRO 2 UNITS: 100 INJECTION, SOLUTION INTRAVENOUS; SUBCUTANEOUS at 11:41

## 2024-11-03 RX ADMIN — FAMOTIDINE 10 MG: 20 TABLET ORAL at 20:25

## 2024-11-03 RX ADMIN — PIPERACILLIN SODIUM AND TAZOBACTAM SODIUM 3.38 G: 3; .375 INJECTION, POWDER, FOR SOLUTION INTRAVENOUS at 05:11

## 2024-11-03 RX ADMIN — VALPROIC ACID 250 MG: 250 SOLUTION ORAL at 05:28

## 2024-11-03 RX ADMIN — GABAPENTIN 100 MG: 250 SOLUTION ORAL at 08:13

## 2024-11-03 RX ADMIN — SODIUM CHLORIDE TAB 1 GM 1000 MG: 1 TAB at 11:41

## 2024-11-03 RX ADMIN — OXYCODONE HYDROCHLORIDE 2.5 MG: 5 TABLET ORAL at 11:41

## 2024-11-03 RX ADMIN — SODIUM CHLORIDE TAB 1 GM 1000 MG: 1 TAB at 17:02

## 2024-11-03 RX ADMIN — METOPROLOL TARTRATE 25 MG: 25 TABLET, FILM COATED ORAL at 08:13

## 2024-11-03 RX ADMIN — FAMOTIDINE 10 MG: 20 TABLET ORAL at 08:13

## 2024-11-03 RX ADMIN — FOLIC ACID 0.5 MG: 1 TABLET ORAL at 08:13

## 2024-11-03 RX ADMIN — ACETAMINOPHEN 325 MG: 325 TABLET ORAL at 20:13

## 2024-11-03 RX ADMIN — SODIUM CHLORIDE, PRESERVATIVE FREE 10 ML: 5 INJECTION INTRAVENOUS at 08:14

## 2024-11-03 RX ADMIN — IPRATROPIUM BROMIDE 0.5 MG: 0.5 SOLUTION RESPIRATORY (INHALATION) at 08:47

## 2024-11-03 RX ADMIN — LORAZEPAM 1 MG: 1 TABLET ORAL at 17:02

## 2024-11-03 RX ADMIN — PANCRELIPASE 1 CAPSULE: 30000; 6000; 19000 CAPSULE, DELAYED RELEASE PELLETS ORAL at 11:41

## 2024-11-03 RX ADMIN — INSULIN LISPRO 2 UNITS: 100 INJECTION, SOLUTION INTRAVENOUS; SUBCUTANEOUS at 17:27

## 2024-11-03 RX ADMIN — ACETAMINOPHEN 325 MG: 325 TABLET ORAL at 04:19

## 2024-11-03 RX ADMIN — LEVETIRACETAM 750 MG: 100 SOLUTION ORAL at 20:14

## 2024-11-03 RX ADMIN — LORAZEPAM 1 MG: 1 TABLET ORAL at 11:41

## 2024-11-03 RX ADMIN — INSULIN LISPRO 6 UNITS: 100 INJECTION, SOLUTION INTRAVENOUS; SUBCUTANEOUS at 06:32

## 2024-11-03 RX ADMIN — PIPERACILLIN SODIUM AND TAZOBACTAM SODIUM 3.38 G: 3; .375 INJECTION, POWDER, FOR SOLUTION INTRAVENOUS at 14:59

## 2024-11-03 RX ADMIN — PANCRELIPASE 1 CAPSULE: 30000; 6000; 19000 CAPSULE, DELAYED RELEASE PELLETS ORAL at 08:13

## 2024-11-03 RX ADMIN — LEVETIRACETAM 750 MG: 100 SOLUTION ORAL at 08:13

## 2024-11-03 RX ADMIN — PANCRELIPASE 1 CAPSULE: 30000; 6000; 19000 CAPSULE, DELAYED RELEASE PELLETS ORAL at 17:02

## 2024-11-03 RX ADMIN — VANCOMYCIN HYDROCHLORIDE 125 MG: KIT at 20:15

## 2024-11-03 RX ADMIN — LORAZEPAM 1 MG: 1 TABLET ORAL at 08:13

## 2024-11-03 RX ADMIN — GABAPENTIN 100 MG: 250 SOLUTION ORAL at 20:14

## 2024-11-03 RX ADMIN — PIPERACILLIN SODIUM AND TAZOBACTAM SODIUM 3.38 G: 3; .375 INJECTION, POWDER, FOR SOLUTION INTRAVENOUS at 22:33

## 2024-11-03 RX ADMIN — Medication 400 MG: at 08:13

## 2024-11-03 RX ADMIN — SODIUM CHLORIDE TAB 1 GM 1000 MG: 1 TAB at 08:13

## 2024-11-03 RX ADMIN — LORAZEPAM 1 MG: 1 TABLET ORAL at 22:28

## 2024-11-03 RX ADMIN — SODIUM CHLORIDE, PRESERVATIVE FREE 2 ML: 5 INJECTION INTRAVENOUS at 08:23

## 2024-11-03 RX ADMIN — VALPROIC ACID 250 MG: 250 SOLUTION ORAL at 22:28

## 2024-11-03 RX ADMIN — SODIUM CHLORIDE, PRESERVATIVE FREE 10 ML: 5 INJECTION INTRAVENOUS at 20:15

## 2024-11-03 RX ADMIN — IPRATROPIUM BROMIDE 0.5 MG: 0.5 SOLUTION RESPIRATORY (INHALATION) at 13:41

## 2024-11-03 RX ADMIN — LACOSAMIDE 150 MG: 10 SOLUTION ORAL at 08:13

## 2024-11-03 RX ADMIN — METOPROLOL TARTRATE 25 MG: 25 TABLET, FILM COATED ORAL at 20:13

## 2024-11-03 RX ADMIN — VANCOMYCIN HYDROCHLORIDE 125 MG: KIT at 08:13

## 2024-11-03 RX ADMIN — INSULIN LISPRO 6 UNITS: 100 INJECTION, SOLUTION INTRAVENOUS; SUBCUTANEOUS at 00:28

## 2024-11-03 RX ADMIN — LACOSAMIDE 150 MG: 10 SOLUTION ORAL at 20:14

## 2024-11-03 RX ADMIN — VALPROIC ACID 250 MG: 250 SOLUTION ORAL at 14:56

## 2024-11-03 ASSESSMENT — PAIN SCALES - BEHAVIORAL PAIN SCALE (BPS)
BPS_SCORE: 4
BPS_SCORE: 3
BPS_SCORE: 3

## 2024-11-04 ENCOUNTER — APPOINTMENT (OUTPATIENT)
Dept: GENERAL RADIOLOGY | Age: 34
DRG: 720 | End: 2024-11-04
Attending: STUDENT IN AN ORGANIZED HEALTH CARE EDUCATION/TRAINING PROGRAM

## 2024-11-04 LAB
ANION GAP SERPL CALC-SCNC: 8 MMOL/L (ref 7–19)
BUN SERPL-MCNC: 16 MG/DL (ref 6–20)
BUN/CREAT SERPL: 47 (ref 7–25)
CALCIUM SERPL-MCNC: 7.5 MG/DL (ref 8.4–10.2)
CHLORIDE SERPL-SCNC: 106 MMOL/L (ref 97–110)
CO2 SERPL-SCNC: 27 MMOL/L (ref 21–32)
CREAT SERPL-MCNC: 0.34 MG/DL (ref 0.51–0.95)
EGFRCR SERPLBLD CKD-EPI 2021: >90 ML/MIN/{1.73_M2}
FASTING DURATION TIME PATIENT: ABNORMAL H
GLUCOSE BLDC GLUCOMTR-MCNC: 115 MG/DL (ref 70–99)
GLUCOSE BLDC GLUCOMTR-MCNC: 179 MG/DL (ref 70–99)
GLUCOSE BLDC GLUCOMTR-MCNC: 200 MG/DL (ref 70–99)
GLUCOSE BLDC GLUCOMTR-MCNC: 217 MG/DL (ref 70–99)
GLUCOSE SERPL-MCNC: 180 MG/DL (ref 70–99)
MAGNESIUM SERPL-MCNC: 1.6 MG/DL (ref 1.7–2.4)
PHOSPHATE SERPL-MCNC: 3.8 MG/DL (ref 2.4–4.7)
POTASSIUM SERPL-SCNC: 4.1 MMOL/L (ref 3.4–5.1)
SODIUM SERPL-SCNC: 137 MMOL/L (ref 135–145)

## 2024-11-04 PROCEDURE — 10002803 HB RX 637: Performed by: INTERNAL MEDICINE

## 2024-11-04 PROCEDURE — 83735 ASSAY OF MAGNESIUM: CPT

## 2024-11-04 PROCEDURE — 10004281 HB COUNTER-STAFF TIME PER 15 MIN

## 2024-11-04 PROCEDURE — 80048 BASIC METABOLIC PNL TOTAL CA: CPT

## 2024-11-04 PROCEDURE — 49465 FLUORO EXAM OF G/COLON TUBE: CPT

## 2024-11-04 PROCEDURE — 10002807 HB RX 258: Performed by: INTERNAL MEDICINE

## 2024-11-04 PROCEDURE — 10002800 HB RX 250 W HCPCS: Performed by: NURSE PRACTITIONER

## 2024-11-04 PROCEDURE — 10002800 HB RX 250 W HCPCS: Performed by: INTERNAL MEDICINE

## 2024-11-04 PROCEDURE — 10002805 HB CONTRAST AGENT: Performed by: INTERNAL MEDICINE

## 2024-11-04 PROCEDURE — 13003347 HB PATIENT LVL 2 SUPPLIES IP WND

## 2024-11-04 PROCEDURE — C9254 INJECTION, LACOSAMIDE: HCPCS | Performed by: INTERNAL MEDICINE

## 2024-11-04 PROCEDURE — 10002801 HB RX 250 W/O HCPCS: Performed by: INTERNAL MEDICINE

## 2024-11-04 PROCEDURE — 94003 VENT MGMT INPAT SUBQ DAY: CPT

## 2024-11-04 PROCEDURE — 94640 AIRWAY INHALATION TREATMENT: CPT

## 2024-11-04 PROCEDURE — 84100 ASSAY OF PHOSPHORUS: CPT

## 2024-11-04 PROCEDURE — 10000002 HB ROOM CHARGE MED SURG

## 2024-11-04 PROCEDURE — 36415 COLL VENOUS BLD VENIPUNCTURE: CPT

## 2024-11-04 PROCEDURE — 10004651 HB RX, NO CHARGE ITEM: Performed by: INTERNAL MEDICINE

## 2024-11-04 PROCEDURE — 99253 IP/OBS CNSLTJ NEW/EST LOW 45: CPT | Performed by: INTERNAL MEDICINE

## 2024-11-04 PROCEDURE — 96372 THER/PROPH/DIAG INJ SC/IM: CPT | Performed by: INTERNAL MEDICINE

## 2024-11-04 RX ORDER — METOPROLOL TARTRATE 1 MG/ML
25 INJECTION, SOLUTION INTRAVENOUS ONCE
Status: DISCONTINUED | OUTPATIENT
Start: 2024-11-04 | End: 2024-11-04 | Stop reason: CLARIF

## 2024-11-04 RX ORDER — LEVETIRACETAM 500 MG/5ML
750 INJECTION, SOLUTION, CONCENTRATE INTRAVENOUS ONCE
Status: COMPLETED | OUTPATIENT
Start: 2024-11-04 | End: 2024-11-04

## 2024-11-04 RX ORDER — SULFAMETHOXAZOLE AND TRIMETHOPRIM 800; 160 MG/1; MG/1
1 TABLET ORAL EVERY 12 HOURS SCHEDULED
Status: DISCONTINUED | OUTPATIENT
Start: 2024-11-04 | End: 2024-11-06

## 2024-11-04 RX ORDER — LACOSAMIDE 10 MG/ML
150 INJECTION, SOLUTION INTRAVENOUS ONCE
Status: COMPLETED | OUTPATIENT
Start: 2024-11-04 | End: 2024-11-04

## 2024-11-04 RX ORDER — LORAZEPAM 2 MG/ML
1 INJECTION INTRAMUSCULAR ONCE
Status: COMPLETED | OUTPATIENT
Start: 2024-11-04 | End: 2024-11-04

## 2024-11-04 RX ADMIN — LEVETIRACETAM 750 MG: 100 INJECTION, SOLUTION INTRAVENOUS at 12:01

## 2024-11-04 RX ADMIN — VALPROIC ACID 250 MG: 250 SOLUTION ORAL at 21:35

## 2024-11-04 RX ADMIN — VANCOMYCIN HYDROCHLORIDE 125 MG: KIT at 14:31

## 2024-11-04 RX ADMIN — INSULIN LISPRO 4 UNITS: 100 INJECTION, SOLUTION INTRAVENOUS; SUBCUTANEOUS at 23:41

## 2024-11-04 RX ADMIN — CEFTAZIDIME, AVIBACTAM 2.5 G: 2; .5 POWDER, FOR SOLUTION INTRAVENOUS at 21:49

## 2024-11-04 RX ADMIN — IPRATROPIUM BROMIDE 0.5 MG: 0.5 SOLUTION RESPIRATORY (INHALATION) at 12:34

## 2024-11-04 RX ADMIN — PANCRELIPASE 1 CAPSULE: 30000; 6000; 19000 CAPSULE, DELAYED RELEASE PELLETS ORAL at 14:31

## 2024-11-04 RX ADMIN — SULFAMETHOXAZOLE AND TRIMETHOPRIM 1 TABLET: 800; 160 TABLET ORAL at 21:35

## 2024-11-04 RX ADMIN — VANCOMYCIN HYDROCHLORIDE 125 MG: KIT at 21:34

## 2024-11-04 RX ADMIN — SODIUM CHLORIDE, PRESERVATIVE FREE 2 ML: 5 INJECTION INTRAVENOUS at 09:00

## 2024-11-04 RX ADMIN — LACOSAMIDE 150 MG: 10 SOLUTION ORAL at 21:34

## 2024-11-04 RX ADMIN — FOLIC ACID 0.5 MG: 1 TABLET ORAL at 14:32

## 2024-11-04 RX ADMIN — Medication 400 MG: at 14:32

## 2024-11-04 RX ADMIN — LORAZEPAM 1 MG: 1 TABLET ORAL at 18:03

## 2024-11-04 RX ADMIN — VALPROIC ACID 250 MG: 250 SOLUTION ORAL at 14:32

## 2024-11-04 RX ADMIN — SODIUM CHLORIDE, PRESERVATIVE FREE 10 ML: 5 INJECTION INTRAVENOUS at 09:00

## 2024-11-04 RX ADMIN — PIPERACILLIN SODIUM AND TAZOBACTAM SODIUM 3.38 G: 3; .375 INJECTION, POWDER, FOR SOLUTION INTRAVENOUS at 06:19

## 2024-11-04 RX ADMIN — INSULIN LISPRO 2 UNITS: 100 INJECTION, SOLUTION INTRAVENOUS; SUBCUTANEOUS at 18:04

## 2024-11-04 RX ADMIN — IPRATROPIUM BROMIDE 0.5 MG: 0.5 SOLUTION RESPIRATORY (INHALATION) at 18:09

## 2024-11-04 RX ADMIN — PANCRELIPASE 1 CAPSULE: 30000; 6000; 19000 CAPSULE, DELAYED RELEASE PELLETS ORAL at 18:04

## 2024-11-04 RX ADMIN — GABAPENTIN 100 MG: 250 SOLUTION ORAL at 21:34

## 2024-11-04 RX ADMIN — GABAPENTIN 100 MG: 250 SOLUTION ORAL at 14:32

## 2024-11-04 RX ADMIN — CEFTAZIDIME, AVIBACTAM 2.5 G: 2; .5 POWDER, FOR SOLUTION INTRAVENOUS at 13:39

## 2024-11-04 RX ADMIN — OXYCODONE HYDROCHLORIDE 2.5 MG: 5 TABLET ORAL at 03:34

## 2024-11-04 RX ADMIN — LACOSAMIDE 150 MG: 10 INJECTION, SOLUTION INTRAVENOUS at 12:05

## 2024-11-04 RX ADMIN — SODIUM CHLORIDE, PRESERVATIVE FREE 10 ML: 5 INJECTION INTRAVENOUS at 21:38

## 2024-11-04 RX ADMIN — ACETAMINOPHEN 325 MG: 325 TABLET ORAL at 14:32

## 2024-11-04 RX ADMIN — LORAZEPAM 1 MG: 1 TABLET ORAL at 05:54

## 2024-11-04 RX ADMIN — SODIUM CHLORIDE TAB 1 GM 1000 MG: 1 TAB at 14:33

## 2024-11-04 RX ADMIN — SULFAMETHOXAZOLE AND TRIMETHOPRIM 1 TABLET: 800; 160 TABLET ORAL at 14:33

## 2024-11-04 RX ADMIN — FAMOTIDINE 10 MG: 20 TABLET ORAL at 14:31

## 2024-11-04 RX ADMIN — INSULIN LISPRO 4 UNITS: 100 INJECTION, SOLUTION INTRAVENOUS; SUBCUTANEOUS at 00:01

## 2024-11-04 RX ADMIN — METOPROLOL TARTRATE 25 MG: 25 TABLET, FILM COATED ORAL at 21:35

## 2024-11-04 RX ADMIN — INSULIN LISPRO 4 UNITS: 100 INJECTION, SOLUTION INTRAVENOUS; SUBCUTANEOUS at 05:54

## 2024-11-04 RX ADMIN — OXYCODONE HYDROCHLORIDE 2.5 MG: 5 TABLET ORAL at 14:28

## 2024-11-04 RX ADMIN — FAMOTIDINE 10 MG: 20 TABLET ORAL at 21:35

## 2024-11-04 RX ADMIN — LORAZEPAM 1 MG: 1 TABLET ORAL at 23:42

## 2024-11-04 RX ADMIN — VALPROIC ACID 250 MG: 250 SOLUTION ORAL at 06:06

## 2024-11-04 RX ADMIN — HYDRALAZINE HYDROCHLORIDE 10 MG: 20 INJECTION INTRAMUSCULAR; INTRAVENOUS at 12:04

## 2024-11-04 RX ADMIN — LORAZEPAM 1 MG: 2 INJECTION INTRAMUSCULAR; INTRAVENOUS at 12:02

## 2024-11-04 RX ADMIN — SODIUM CHLORIDE TAB 1 GM 1000 MG: 1 TAB at 18:04

## 2024-11-04 RX ADMIN — SODIUM CHLORIDE, PRESERVATIVE FREE 2 ML: 5 INJECTION INTRAVENOUS at 21:38

## 2024-11-04 RX ADMIN — LEVETIRACETAM 750 MG: 100 SOLUTION ORAL at 21:35

## 2024-11-04 RX ADMIN — IOHEXOL 25 ML: 350 INJECTION, SOLUTION INTRAVENOUS at 13:19

## 2024-11-04 ASSESSMENT — PAIN SCALES - BEHAVIORAL PAIN SCALE (BPS)
BPS_SCORE: 3
BPS_SCORE: 6
BPS_SCORE: 3
BPS_SCORE: 3

## 2024-11-05 LAB
ANION GAP SERPL CALC-SCNC: 10 MMOL/L (ref 7–19)
BACTERIA BLD CULT: NORMAL
BASOPHILS # BLD: 0 K/MCL (ref 0–0.3)
BASOPHILS NFR BLD: 0 %
BUN SERPL-MCNC: 19 MG/DL (ref 6–20)
BUN/CREAT SERPL: 58 (ref 7–25)
CALCIUM SERPL-MCNC: 7.3 MG/DL (ref 8.4–10.2)
CHLORIDE SERPL-SCNC: 106 MMOL/L (ref 97–110)
CO2 SERPL-SCNC: 25 MMOL/L (ref 21–32)
CREAT SERPL-MCNC: 0.33 MG/DL (ref 0.51–0.95)
DEPRECATED RDW RBC: 63.2 FL (ref 39–50)
EGFRCR SERPLBLD CKD-EPI 2021: >90 ML/MIN/{1.73_M2}
EOSINOPHIL # BLD: 0.1 K/MCL (ref 0–0.5)
EOSINOPHIL NFR BLD: 1 %
ERYTHROCYTE [DISTWIDTH] IN BLOOD: 17.6 % (ref 11–15)
FASTING DURATION TIME PATIENT: ABNORMAL H
GLUCOSE BLDC GLUCOMTR-MCNC: 145 MG/DL (ref 70–99)
GLUCOSE BLDC GLUCOMTR-MCNC: 182 MG/DL (ref 70–99)
GLUCOSE BLDC GLUCOMTR-MCNC: 215 MG/DL (ref 70–99)
GLUCOSE BLDC GLUCOMTR-MCNC: 217 MG/DL (ref 70–99)
GLUCOSE SERPL-MCNC: 193 MG/DL (ref 70–99)
HCT VFR BLD CALC: 22 % (ref 36–46.5)
HGB BLD-MCNC: 7 G/DL (ref 12–15.5)
IMM GRANULOCYTES # BLD AUTO: 0 K/MCL (ref 0–0.2)
IMM GRANULOCYTES # BLD: 0 %
LYMPHOCYTES # BLD: 1.1 K/MCL (ref 1–4.8)
LYMPHOCYTES NFR BLD: 26 %
MAGNESIUM SERPL-MCNC: 1.7 MG/DL (ref 1.7–2.4)
MCH RBC QN AUTO: 32 PG (ref 26–34)
MCHC RBC AUTO-ENTMCNC: 31.8 G/DL (ref 32–36.5)
MCV RBC AUTO: 100.5 FL (ref 78–100)
MONOCYTES # BLD: 0.5 K/MCL (ref 0.3–0.9)
MONOCYTES NFR BLD: 11 %
NEUTROPHILS # BLD: 2.6 K/MCL (ref 1.8–7.7)
NEUTROPHILS NFR BLD: 62 %
NRBC BLD MANUAL-RTO: 0 /100 WBC
PHOSPHATE SERPL-MCNC: 3.8 MG/DL (ref 2.4–4.7)
PLATELET # BLD AUTO: 127 K/MCL (ref 140–450)
POTASSIUM SERPL-SCNC: 4.7 MMOL/L (ref 3.4–5.1)
RAINBOW EXTRA TUBES HOLD SPECIMEN: NORMAL
RBC # BLD: 2.19 MIL/MCL (ref 4–5.2)
SODIUM SERPL-SCNC: 136 MMOL/L (ref 135–145)
WBC # BLD: 4.2 K/MCL (ref 4.2–11)

## 2024-11-05 PROCEDURE — 36415 COLL VENOUS BLD VENIPUNCTURE: CPT

## 2024-11-05 PROCEDURE — 96372 THER/PROPH/DIAG INJ SC/IM: CPT | Performed by: INTERNAL MEDICINE

## 2024-11-05 PROCEDURE — 10002801 HB RX 250 W/O HCPCS: Performed by: INTERNAL MEDICINE

## 2024-11-05 PROCEDURE — 10002800 HB RX 250 W HCPCS: Performed by: INTERNAL MEDICINE

## 2024-11-05 PROCEDURE — 87040 BLOOD CULTURE FOR BACTERIA: CPT | Performed by: INTERNAL MEDICINE

## 2024-11-05 PROCEDURE — 85025 COMPLETE CBC W/AUTO DIFF WBC: CPT | Performed by: INTERNAL MEDICINE

## 2024-11-05 PROCEDURE — 10004651 HB RX, NO CHARGE ITEM: Performed by: INTERNAL MEDICINE

## 2024-11-05 PROCEDURE — 10000002 HB ROOM CHARGE MED SURG

## 2024-11-05 PROCEDURE — 10002803 HB RX 637: Performed by: INTERNAL MEDICINE

## 2024-11-05 PROCEDURE — 94003 VENT MGMT INPAT SUBQ DAY: CPT

## 2024-11-05 PROCEDURE — 10002807 HB RX 258: Performed by: INTERNAL MEDICINE

## 2024-11-05 PROCEDURE — 83735 ASSAY OF MAGNESIUM: CPT

## 2024-11-05 PROCEDURE — 94640 AIRWAY INHALATION TREATMENT: CPT

## 2024-11-05 PROCEDURE — 80048 BASIC METABOLIC PNL TOTAL CA: CPT

## 2024-11-05 PROCEDURE — 84100 ASSAY OF PHOSPHORUS: CPT

## 2024-11-05 RX ADMIN — Medication 400 MG: at 08:55

## 2024-11-05 RX ADMIN — VANCOMYCIN HYDROCHLORIDE 125 MG: KIT at 22:18

## 2024-11-05 RX ADMIN — LORAZEPAM 1 MG: 1 TABLET ORAL at 11:55

## 2024-11-05 RX ADMIN — VALPROIC ACID 250 MG: 250 SOLUTION ORAL at 13:57

## 2024-11-05 RX ADMIN — SULFAMETHOXAZOLE AND TRIMETHOPRIM 1 TABLET: 800; 160 TABLET ORAL at 22:19

## 2024-11-05 RX ADMIN — PANCRELIPASE 1 CAPSULE: 30000; 6000; 19000 CAPSULE, DELAYED RELEASE PELLETS ORAL at 11:55

## 2024-11-05 RX ADMIN — INSULIN LISPRO 4 UNITS: 100 INJECTION, SOLUTION INTRAVENOUS; SUBCUTANEOUS at 05:22

## 2024-11-05 RX ADMIN — SODIUM CHLORIDE, PRESERVATIVE FREE 10 ML: 5 INJECTION INTRAVENOUS at 23:30

## 2024-11-05 RX ADMIN — VALPROIC ACID 250 MG: 250 SOLUTION ORAL at 05:22

## 2024-11-05 RX ADMIN — LACOSAMIDE 150 MG: 10 SOLUTION ORAL at 22:18

## 2024-11-05 RX ADMIN — LORAZEPAM 1 MG: 1 TABLET ORAL at 22:19

## 2024-11-05 RX ADMIN — LEVETIRACETAM 750 MG: 100 SOLUTION ORAL at 08:53

## 2024-11-05 RX ADMIN — SODIUM CHLORIDE TAB 1 GM 1000 MG: 1 TAB at 11:55

## 2024-11-05 RX ADMIN — CEFTAZIDIME, AVIBACTAM 2.5 G: 2; .5 POWDER, FOR SOLUTION INTRAVENOUS at 14:11

## 2024-11-05 RX ADMIN — FAMOTIDINE 10 MG: 20 TABLET ORAL at 22:19

## 2024-11-05 RX ADMIN — INSULIN LISPRO 2 UNITS: 100 INJECTION, SOLUTION INTRAVENOUS; SUBCUTANEOUS at 11:56

## 2024-11-05 RX ADMIN — LORAZEPAM 1 MG: 1 TABLET ORAL at 05:22

## 2024-11-05 RX ADMIN — SODIUM CHLORIDE TAB 1 GM 1000 MG: 1 TAB at 18:29

## 2024-11-05 RX ADMIN — VALPROIC ACID 250 MG: 250 SOLUTION ORAL at 22:18

## 2024-11-05 RX ADMIN — SODIUM CHLORIDE, PRESERVATIVE FREE 2 ML: 5 INJECTION INTRAVENOUS at 23:30

## 2024-11-05 RX ADMIN — PANCRELIPASE 1 CAPSULE: 30000; 6000; 19000 CAPSULE, DELAYED RELEASE PELLETS ORAL at 18:31

## 2024-11-05 RX ADMIN — LEVETIRACETAM 750 MG: 100 SOLUTION ORAL at 22:18

## 2024-11-05 RX ADMIN — LACOSAMIDE 150 MG: 10 SOLUTION ORAL at 08:59

## 2024-11-05 RX ADMIN — SODIUM CHLORIDE, PRESERVATIVE FREE 2 ML: 5 INJECTION INTRAVENOUS at 09:01

## 2024-11-05 RX ADMIN — FOLIC ACID 0.5 MG: 1 TABLET ORAL at 08:51

## 2024-11-05 RX ADMIN — PANCRELIPASE 1 CAPSULE: 30000; 6000; 19000 CAPSULE, DELAYED RELEASE PELLETS ORAL at 08:50

## 2024-11-05 RX ADMIN — GABAPENTIN 100 MG: 250 SOLUTION ORAL at 22:18

## 2024-11-05 RX ADMIN — METOPROLOL TARTRATE 25 MG: 25 TABLET, FILM COATED ORAL at 08:51

## 2024-11-05 RX ADMIN — ACETAMINOPHEN 325 MG: 325 TABLET ORAL at 08:57

## 2024-11-05 RX ADMIN — METOPROLOL TARTRATE 25 MG: 25 TABLET, FILM COATED ORAL at 22:19

## 2024-11-05 RX ADMIN — FAMOTIDINE 10 MG: 20 TABLET ORAL at 08:56

## 2024-11-05 RX ADMIN — GABAPENTIN 100 MG: 250 SOLUTION ORAL at 09:09

## 2024-11-05 RX ADMIN — SULFAMETHOXAZOLE AND TRIMETHOPRIM 1 TABLET: 800; 160 TABLET ORAL at 08:55

## 2024-11-05 RX ADMIN — VANCOMYCIN HYDROCHLORIDE 125 MG: KIT at 08:58

## 2024-11-05 RX ADMIN — IPRATROPIUM BROMIDE 0.5 MG: 0.5 SOLUTION RESPIRATORY (INHALATION) at 18:05

## 2024-11-05 RX ADMIN — IPRATROPIUM BROMIDE 0.5 MG: 0.5 SOLUTION RESPIRATORY (INHALATION) at 06:09

## 2024-11-05 RX ADMIN — LORAZEPAM 1 MG: 1 TABLET ORAL at 18:32

## 2024-11-05 RX ADMIN — IPRATROPIUM BROMIDE 0.5 MG: 0.5 SOLUTION RESPIRATORY (INHALATION) at 12:01

## 2024-11-05 RX ADMIN — SODIUM CHLORIDE TAB 1 GM 1000 MG: 1 TAB at 08:51

## 2024-11-05 RX ADMIN — CEFTAZIDIME, AVIBACTAM 2.5 G: 2; .5 POWDER, FOR SOLUTION INTRAVENOUS at 05:39

## 2024-11-05 RX ADMIN — SODIUM CHLORIDE, PRESERVATIVE FREE 10 ML: 5 INJECTION INTRAVENOUS at 09:01

## 2024-11-05 RX ADMIN — CEFTAZIDIME, AVIBACTAM 2.5 G: 2; .5 POWDER, FOR SOLUTION INTRAVENOUS at 23:25

## 2024-11-05 ASSESSMENT — PAIN SCALES - BEHAVIORAL PAIN SCALE (BPS)
BPS_SCORE: 3
BPS_SCORE: 3

## 2024-11-06 PROBLEM — Z93.1 GASTROSTOMY STATUS  (CMD): Status: ACTIVE | Noted: 2019-09-20

## 2024-11-06 PROBLEM — Z99.11 DEPENDENCE ON RESPIRATOR (VENTILATOR) STATUS  (CMD): Status: ACTIVE | Noted: 2019-09-20
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
## 2024-11-06 PROBLEM — D76.1: Status: RESOLVED | Noted: 2019-09-20 | Resolved: 2024-11-06

## 2024-11-06 PROBLEM — I10 ESSENTIAL (PRIMARY) HYPERTENSION: Status: RESOLVED | Noted: 2019-09-20 | Resolved: 2024-11-06

## 2024-11-06 PROBLEM — J96.10 CHRONIC RESPIRATORY FAILURE (CMD): Status: ACTIVE | Noted: 2019-04-16

## 2024-11-06 PROBLEM — Z87.01 PERSONAL HISTORY OF PNEUMONIA (RECURRENT): Status: ACTIVE | Noted: 2019-09-20

## 2024-11-06 PROBLEM — A04.72 C. DIFFICILE DIARRHEA: Status: RESOLVED | Noted: 2019-02-04 | Resolved: 2024-11-06

## 2024-11-06 PROBLEM — K21.9 GASTRO-ESOPHAGEAL REFLUX DISEASE WITHOUT ESOPHAGITIS: Status: RESOLVED | Noted: 2020-01-03 | Resolved: 2024-11-06

## 2024-11-06 PROBLEM — K21.9 GASTRO-ESOPHAGEAL REFLUX DISEASE WITHOUT ESOPHAGITIS: Status: ACTIVE | Noted: 2020-01-03

## 2024-11-06 PROBLEM — Z93.0 TRACHEOSTOMY STATUS  (CMD): Status: RESOLVED | Noted: 2019-09-20 | Resolved: 2024-11-06

## 2024-11-06 PROBLEM — Z16.35 RESISTANCE TO MULTIPLE ANTIMICROBIAL DRUGS: Status: ACTIVE | Noted: 2019-09-20

## 2024-11-06 PROBLEM — D76.1: Status: ACTIVE | Noted: 2019-09-20

## 2024-11-06 PROBLEM — G93.1: Status: ACTIVE | Noted: 2018-12-23

## 2024-11-06 PROBLEM — J18.9 HOSPITAL-ACQUIRED PNEUMONIA: Status: ACTIVE | Noted: 2019-02-04

## 2024-11-06 PROBLEM — G40.919 EPILEPSY, UNSPECIFIED, INTRACTABLE, WITHOUT STATUS EPILEPTICUS (CMD): Status: ACTIVE | Noted: 2019-09-20

## 2024-11-06 PROBLEM — I48.91 UNSPECIFIED ATRIAL FIBRILLATION  (CMD): Status: RESOLVED | Noted: 2019-09-20 | Resolved: 2024-11-06

## 2024-11-06 PROBLEM — J18.9 HOSPITAL-ACQUIRED PNEUMONIA: Status: RESOLVED | Noted: 2019-02-04 | Resolved: 2024-11-06

## 2024-11-06 PROBLEM — Z87.01 PERSONAL HISTORY OF PNEUMONIA (RECURRENT): Status: RESOLVED | Noted: 2019-09-20 | Resolved: 2024-11-06

## 2024-11-06 PROBLEM — I48.91 UNSPECIFIED ATRIAL FIBRILLATION  (CMD): Status: ACTIVE | Noted: 2019-09-20

## 2024-11-06 PROBLEM — D89.813 GRAFT-VERSUS-HOST DISEASE (CMD): Status: ACTIVE | Noted: 2018-12-14

## 2024-11-06 PROBLEM — R64 CACHEXIA  (CMD): Status: ACTIVE | Noted: 2019-09-20

## 2024-11-06 PROBLEM — I10 ESSENTIAL (PRIMARY) HYPERTENSION: Status: ACTIVE | Noted: 2019-09-20

## 2024-11-06 PROBLEM — G40.919 EPILEPSY, UNSPECIFIED, INTRACTABLE, WITHOUT STATUS EPILEPTICUS (CMD): Status: RESOLVED | Noted: 2019-09-20 | Resolved: 2024-11-06

## 2024-11-06 PROBLEM — Z87.820 PERSONAL HISTORY OF TRAUMATIC BRAIN INJURY: Status: ACTIVE | Noted: 2023-07-14

## 2024-11-06 PROBLEM — Z93.0 TRACHEOSTOMY STATUS  (CMD): Status: ACTIVE | Noted: 2019-09-20

## 2024-11-06 PROBLEM — R41.82 AMS (ALTERED MENTAL STATUS): Status: RESOLVED | Noted: 2024-09-27 | Resolved: 2024-11-06
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
## 2024-11-06 PROBLEM — R13.12 DYSPHAGIA, OROPHARYNGEAL PHASE: Status: ACTIVE | Noted: 2021-08-17

## 2024-11-06 PROBLEM — A04.72 C. DIFFICILE DIARRHEA: Status: ACTIVE | Noted: 2019-02-04

## 2024-11-06 LAB
ANION GAP SERPL CALC-SCNC: 8 MMOL/L (ref 7–19)
BACTERIA SPT AEROBE CULT: ABNORMAL
BASOPHILS # BLD: 0 K/MCL (ref 0–0.3)
BASOPHILS NFR BLD: 0 %
BUN SERPL-MCNC: 15 MG/DL (ref 6–20)
BUN/CREAT SERPL: 56 (ref 7–25)
CALCIUM SERPL-MCNC: 7.5 MG/DL (ref 8.4–10.2)
CHLORIDE SERPL-SCNC: 104 MMOL/L (ref 97–110)
CO2 SERPL-SCNC: 26 MMOL/L (ref 21–32)
CREAT SERPL-MCNC: 0.27 MG/DL (ref 0.51–0.95)
DEPRECATED RDW RBC: 62.2 FL (ref 39–50)
EGFRCR SERPLBLD CKD-EPI 2021: >90 ML/MIN/{1.73_M2}
EOSINOPHIL # BLD: 0.1 K/MCL (ref 0–0.5)
EOSINOPHIL NFR BLD: 2 %
ERYTHROCYTE [DISTWIDTH] IN BLOOD: 17.4 % (ref 11–15)
FASTING DURATION TIME PATIENT: ABNORMAL H
FERRITIN SERPL-MCNC: 982 NG/ML (ref 8–252)
GLUCOSE BLDC GLUCOMTR-MCNC: 175 MG/DL (ref 70–99)
GLUCOSE BLDC GLUCOMTR-MCNC: 189 MG/DL (ref 70–99)
GLUCOSE BLDC GLUCOMTR-MCNC: 190 MG/DL (ref 70–99)
GLUCOSE BLDC GLUCOMTR-MCNC: 211 MG/DL (ref 70–99)
GLUCOSE SERPL-MCNC: 192 MG/DL (ref 70–99)
GRAM STN SPEC: ABNORMAL
HAPTOGLOB SERPL-MCNC: 228 MG/DL (ref 30–200)
HCT VFR BLD CALC: 20.7 % (ref 36–46.5)
HCT VFR BLD CALC: 21.2 % (ref 36–46.5)
HGB BLD-MCNC: 6.6 G/DL (ref 12–15.5)
HGB BLD-MCNC: 6.7 G/DL (ref 12–15.5)
HGB RETIC QN AUTO: 35.3 PG (ref 28.6–36.3)
IMM GRANULOCYTES # BLD AUTO: 0 K/MCL (ref 0–0.2)
IMM GRANULOCYTES # BLD: 0 %
IMM RETICS NFR: 10.9 % (ref 1.5–16)
IRON SATN MFR SERPL: 17 % (ref 15–45)
IRON SERPL-MCNC: 35 MCG/DL (ref 50–170)
LDH SERPL L TO P-CCNC: 223 UNITS/L (ref 82–240)
LYMPHOCYTES # BLD: 1.2 K/MCL (ref 1–4.8)
LYMPHOCYTES NFR BLD: 26 %
MAGNESIUM SERPL-MCNC: 1.6 MG/DL (ref 1.7–2.4)
MCH RBC QN AUTO: 31.8 PG (ref 26–34)
MCHC RBC AUTO-ENTMCNC: 32.4 G/DL (ref 32–36.5)
MCV RBC AUTO: 98.1 FL (ref 78–100)
MONOCYTES # BLD: 0.5 K/MCL (ref 0.3–0.9)
MONOCYTES NFR BLD: 12 %
NEUTROPHILS # BLD: 2.8 K/MCL (ref 1.8–7.7)
NEUTROPHILS NFR BLD: 60 %
NRBC BLD MANUAL-RTO: 0 /100 WBC
PHOSPHATE SERPL-MCNC: 3 MG/DL (ref 2.4–4.7)
PLATELET # BLD AUTO: 136 K/MCL (ref 140–450)
POTASSIUM SERPL-SCNC: 5.1 MMOL/L (ref 3.4–5.1)
RBC # BLD: 2.11 MIL/MCL (ref 4–5.2)
RETICS #: 73 K/MCL (ref 10–120)
RETICS/RBC NFR: 3.4 % (ref 0.3–2.5)
SODIUM SERPL-SCNC: 133 MMOL/L (ref 135–145)
TIBC SERPL-MCNC: 211 MCG/DL (ref 250–450)
WBC # BLD: 4.6 K/MCL (ref 4.2–11)

## 2024-11-06 PROCEDURE — 85025 COMPLETE CBC W/AUTO DIFF WBC: CPT | Performed by: INTERNAL MEDICINE

## 2024-11-06 PROCEDURE — 10002801 HB RX 250 W/O HCPCS: Performed by: INTERNAL MEDICINE

## 2024-11-06 PROCEDURE — 80048 BASIC METABOLIC PNL TOTAL CA: CPT

## 2024-11-06 PROCEDURE — 83615 LACTATE (LD) (LDH) ENZYME: CPT

## 2024-11-06 PROCEDURE — 82728 ASSAY OF FERRITIN: CPT

## 2024-11-06 PROCEDURE — 10004281 HB COUNTER-STAFF TIME PER 15 MIN

## 2024-11-06 PROCEDURE — 85046 RETICYTE/HGB CONCENTRATE: CPT

## 2024-11-06 PROCEDURE — 83735 ASSAY OF MAGNESIUM: CPT

## 2024-11-06 PROCEDURE — 99254 IP/OBS CNSLTJ NEW/EST MOD 60: CPT

## 2024-11-06 PROCEDURE — 94003 VENT MGMT INPAT SUBQ DAY: CPT

## 2024-11-06 PROCEDURE — 36415 COLL VENOUS BLD VENIPUNCTURE: CPT

## 2024-11-06 PROCEDURE — 96372 THER/PROPH/DIAG INJ SC/IM: CPT | Performed by: INTERNAL MEDICINE

## 2024-11-06 PROCEDURE — 10002803 HB RX 637: Performed by: INTERNAL MEDICINE

## 2024-11-06 PROCEDURE — 85014 HEMATOCRIT: CPT | Performed by: INTERNAL MEDICINE

## 2024-11-06 PROCEDURE — 13003346 HB PATIENT LVL 1 SUPPLIES IP WND

## 2024-11-06 PROCEDURE — 83540 ASSAY OF IRON: CPT

## 2024-11-06 PROCEDURE — 83010 ASSAY OF HAPTOGLOBIN QUANT: CPT

## 2024-11-06 PROCEDURE — 94640 AIRWAY INHALATION TREATMENT: CPT

## 2024-11-06 PROCEDURE — 83521 IG LIGHT CHAINS FREE EACH: CPT | Performed by: INTERNAL MEDICINE

## 2024-11-06 PROCEDURE — 10002800 HB RX 250 W HCPCS: Performed by: INTERNAL MEDICINE

## 2024-11-06 PROCEDURE — 84100 ASSAY OF PHOSPHORUS: CPT

## 2024-11-06 PROCEDURE — 10000002 HB ROOM CHARGE MED SURG

## 2024-11-06 PROCEDURE — 10004651 HB RX, NO CHARGE ITEM: Performed by: INTERNAL MEDICINE

## 2024-11-06 PROCEDURE — 10002807 HB RX 258: Performed by: INTERNAL MEDICINE

## 2024-11-06 RX ORDER — MINOCYCLINE HYDROCHLORIDE 100 MG/1
200 CAPSULE ORAL EVERY 12 HOURS SCHEDULED
Status: DISCONTINUED | OUTPATIENT
Start: 2024-11-06 | End: 2024-11-09 | Stop reason: HOSPADM

## 2024-11-06 RX ADMIN — FOLIC ACID 0.5 MG: 1 TABLET ORAL at 09:31

## 2024-11-06 RX ADMIN — GABAPENTIN 100 MG: 250 SOLUTION ORAL at 21:00

## 2024-11-06 RX ADMIN — SODIUM CHLORIDE TAB 1 GM 1000 MG: 1 TAB at 09:31

## 2024-11-06 RX ADMIN — INSULIN LISPRO 4 UNITS: 100 INJECTION, SOLUTION INTRAVENOUS; SUBCUTANEOUS at 00:59

## 2024-11-06 RX ADMIN — VALPROIC ACID 250 MG: 250 SOLUTION ORAL at 21:00

## 2024-11-06 RX ADMIN — VANCOMYCIN HYDROCHLORIDE 125 MG: KIT at 21:00

## 2024-11-06 RX ADMIN — SODIUM CHLORIDE, PRESERVATIVE FREE 2 ML: 5 INJECTION INTRAVENOUS at 21:10

## 2024-11-06 RX ADMIN — CEFEPIME 1000 MG: 1 INJECTION, POWDER, FOR SOLUTION INTRAMUSCULAR; INTRAVENOUS at 14:24

## 2024-11-06 RX ADMIN — METOPROLOL TARTRATE 25 MG: 25 TABLET, FILM COATED ORAL at 21:01

## 2024-11-06 RX ADMIN — METOPROLOL TARTRATE 25 MG: 25 TABLET, FILM COATED ORAL at 09:39

## 2024-11-06 RX ADMIN — PANCRELIPASE 1 CAPSULE: 30000; 6000; 19000 CAPSULE, DELAYED RELEASE PELLETS ORAL at 11:17

## 2024-11-06 RX ADMIN — LORAZEPAM 1 MG: 1 TABLET ORAL at 18:01

## 2024-11-06 RX ADMIN — LACOSAMIDE 150 MG: 10 SOLUTION ORAL at 20:59

## 2024-11-06 RX ADMIN — INSULIN LISPRO 2 UNITS: 100 INJECTION, SOLUTION INTRAVENOUS; SUBCUTANEOUS at 18:01

## 2024-11-06 RX ADMIN — CEFTAZIDIME, AVIBACTAM 2.5 G: 2; .5 POWDER, FOR SOLUTION INTRAVENOUS at 06:44

## 2024-11-06 RX ADMIN — SODIUM CHLORIDE, PRESERVATIVE FREE 2 ML: 5 INJECTION INTRAVENOUS at 09:34

## 2024-11-06 RX ADMIN — Medication 400 MG: at 09:32

## 2024-11-06 RX ADMIN — IPRATROPIUM BROMIDE 0.5 MG: 0.5 SOLUTION RESPIRATORY (INHALATION) at 07:30

## 2024-11-06 RX ADMIN — SULFAMETHOXAZOLE AND TRIMETHOPRIM 1 TABLET: 800; 160 TABLET ORAL at 09:32

## 2024-11-06 RX ADMIN — VALPROIC ACID 250 MG: 250 SOLUTION ORAL at 06:41

## 2024-11-06 RX ADMIN — SODIUM CHLORIDE, PRESERVATIVE FREE 10 ML: 5 INJECTION INTRAVENOUS at 21:10

## 2024-11-06 RX ADMIN — LACOSAMIDE 150 MG: 10 SOLUTION ORAL at 09:31

## 2024-11-06 RX ADMIN — SODIUM CHLORIDE TAB 1 GM 1000 MG: 1 TAB at 18:01

## 2024-11-06 RX ADMIN — LORAZEPAM 1 MG: 1 TABLET ORAL at 06:41

## 2024-11-06 RX ADMIN — OXYCODONE HYDROCHLORIDE 2.5 MG: 5 TABLET ORAL at 11:07

## 2024-11-06 RX ADMIN — VANCOMYCIN HYDROCHLORIDE 125 MG: KIT at 09:32

## 2024-11-06 RX ADMIN — LORAZEPAM 1 MG: 1 TABLET ORAL at 23:10

## 2024-11-06 RX ADMIN — PANCRELIPASE 1 CAPSULE: 30000; 6000; 19000 CAPSULE, DELAYED RELEASE PELLETS ORAL at 09:32

## 2024-11-06 RX ADMIN — SODIUM CHLORIDE, PRESERVATIVE FREE 10 ML: 5 INJECTION INTRAVENOUS at 09:34

## 2024-11-06 RX ADMIN — IPRATROPIUM BROMIDE 0.5 MG: 0.5 SOLUTION RESPIRATORY (INHALATION) at 18:12

## 2024-11-06 RX ADMIN — INSULIN LISPRO 2 UNITS: 100 INJECTION, SOLUTION INTRAVENOUS; SUBCUTANEOUS at 06:42

## 2024-11-06 RX ADMIN — INSULIN LISPRO 4 UNITS: 100 INJECTION, SOLUTION INTRAVENOUS; SUBCUTANEOUS at 12:19

## 2024-11-06 RX ADMIN — FAMOTIDINE 10 MG: 20 TABLET ORAL at 09:32

## 2024-11-06 RX ADMIN — LEVETIRACETAM 750 MG: 100 SOLUTION ORAL at 09:32

## 2024-11-06 RX ADMIN — LEVETIRACETAM 750 MG: 100 SOLUTION ORAL at 20:58

## 2024-11-06 RX ADMIN — MINOCYCLINE HYDROCHLORIDE 200 MG: 100 CAPSULE ORAL at 21:00

## 2024-11-06 RX ADMIN — FAMOTIDINE 10 MG: 20 TABLET ORAL at 21:01

## 2024-11-06 RX ADMIN — CEFEPIME 1000 MG: 1 INJECTION, POWDER, FOR SOLUTION INTRAMUSCULAR; INTRAVENOUS at 21:09

## 2024-11-06 RX ADMIN — VALPROIC ACID 250 MG: 250 SOLUTION ORAL at 14:22

## 2024-11-06 RX ADMIN — GABAPENTIN 100 MG: 250 SOLUTION ORAL at 09:32

## 2024-11-06 RX ADMIN — IPRATROPIUM BROMIDE 0.5 MG: 0.5 SOLUTION RESPIRATORY (INHALATION) at 12:41

## 2024-11-06 RX ADMIN — LORAZEPAM 1 MG: 1 TABLET ORAL at 11:17

## 2024-11-06 RX ADMIN — SODIUM CHLORIDE TAB 1 GM 1000 MG: 1 TAB at 11:17

## 2024-11-06 RX ADMIN — ACETAMINOPHEN 325 MG: 325 TABLET ORAL at 11:07

## 2024-11-06 RX ADMIN — PANCRELIPASE 1 CAPSULE: 30000; 6000; 19000 CAPSULE, DELAYED RELEASE PELLETS ORAL at 18:01

## 2024-11-06 ASSESSMENT — PAIN SCALES - BEHAVIORAL PAIN SCALE (BPS)
BPS_SCORE: 3
BPS_SCORE: 3

## 2024-11-07 LAB
ABO + RH BLD: NORMAL
ALBUMIN SERPL-MCNC: 1.7 G/DL (ref 3.4–5)
ALP SERPL-CCNC: 66 UNITS/L (ref 45–117)
ALT SERPL-CCNC: 34 UNITS/L
ANION GAP SERPL CALC-SCNC: 10 MMOL/L (ref 7–19)
AST SERPL-CCNC: 38 UNITS/L
BASOPHILS # BLD: 0 K/MCL (ref 0–0.3)
BASOPHILS NFR BLD: 0 %
BILIRUB CONJ SERPL-MCNC: 0.1 MG/DL (ref 0–0.2)
BILIRUB SERPL-MCNC: <0.2 MG/DL (ref 0.2–1)
BLD GP AB SCN SERPL QL GEL: NEGATIVE
BLOOD EXPIRATION DATE: NORMAL
BUN SERPL-MCNC: 14 MG/DL (ref 6–20)
BUN/CREAT SERPL: 50 (ref 7–25)
CALCIUM SERPL-MCNC: 7.6 MG/DL (ref 8.4–10.2)
CHLORIDE SERPL-SCNC: 102 MMOL/L (ref 97–110)
CO2 SERPL-SCNC: 24 MMOL/L (ref 21–32)
CREAT SERPL-MCNC: 0.28 MG/DL (ref 0.51–0.95)
CROSSMATCH RESULT: NORMAL
DEPRECATED RDW RBC: 62.3 FL (ref 39–50)
DISPENSE STATUS: NORMAL
EGFRCR SERPLBLD CKD-EPI 2021: >90 ML/MIN/{1.73_M2}
EOSINOPHIL # BLD: 0.1 K/MCL (ref 0–0.5)
EOSINOPHIL NFR BLD: 2 %
ERYTHROCYTE [DISTWIDTH] IN BLOOD: 17.3 % (ref 11–15)
FASTING DURATION TIME PATIENT: ABNORMAL H
GLUCOSE BLDC GLUCOMTR-MCNC: 139 MG/DL (ref 70–99)
GLUCOSE BLDC GLUCOMTR-MCNC: 161 MG/DL (ref 70–99)
GLUCOSE BLDC GLUCOMTR-MCNC: 172 MG/DL (ref 70–99)
GLUCOSE SERPL-MCNC: 181 MG/DL (ref 70–99)
HCT VFR BLD CALC: 19.8 % (ref 36–46.5)
HGB BLD-MCNC: 6.3 G/DL (ref 12–15.5)
IMM GRANULOCYTES # BLD AUTO: 0 K/MCL (ref 0–0.2)
IMM GRANULOCYTES # BLD: 1 %
ISBT BLOOD TYPE: 5100
ISSUE DATE/TIME: NORMAL
LYMPHOCYTES # BLD: 0.9 K/MCL (ref 1–4.8)
LYMPHOCYTES NFR BLD: 18 %
MAGNESIUM SERPL-MCNC: 1.5 MG/DL (ref 1.7–2.4)
MCH RBC QN AUTO: 31.3 PG (ref 26–34)
MCHC RBC AUTO-ENTMCNC: 31.8 G/DL (ref 32–36.5)
MCV RBC AUTO: 98.5 FL (ref 78–100)
MONOCYTES # BLD: 0.8 K/MCL (ref 0.3–0.9)
MONOCYTES NFR BLD: 15 %
NEUTROPHILS # BLD: 3.4 K/MCL (ref 1.8–7.7)
NEUTROPHILS NFR BLD: 64 %
NRBC BLD MANUAL-RTO: 0 /100 WBC
PATH REV BLD -IMP: YES
PATH REV: ABNORMAL
PHOSPHATE SERPL-MCNC: 3.5 MG/DL (ref 2.4–4.7)
PLATELET # BLD AUTO: 131 K/MCL (ref 140–450)
POTASSIUM SERPL-SCNC: 5.3 MMOL/L (ref 3.4–5.1)
PRODUCT CODE: NORMAL
PRODUCT DESCRIPTION: NORMAL
PRODUCT ID: NORMAL
PROT SERPL-MCNC: 8.9 G/DL (ref 6.4–8.2)
RBC # BLD: 2.01 MIL/MCL (ref 4–5.2)
SODIUM SERPL-SCNC: 131 MMOL/L (ref 135–145)
TYPE AND SCREEN EXPIRATION DATE: NORMAL
UNIT BLOOD TYPE: NORMAL
UNIT NUMBER: NORMAL
WBC # BLD: 5.2 K/MCL (ref 4.2–11)

## 2024-11-07 PROCEDURE — 80048 BASIC METABOLIC PNL TOTAL CA: CPT

## 2024-11-07 PROCEDURE — 10002801 HB RX 250 W/O HCPCS: Performed by: INTERNAL MEDICINE

## 2024-11-07 PROCEDURE — 10002803 HB RX 637: Performed by: INTERNAL MEDICINE

## 2024-11-07 PROCEDURE — 96372 THER/PROPH/DIAG INJ SC/IM: CPT | Performed by: INTERNAL MEDICINE

## 2024-11-07 PROCEDURE — 10002807 HB RX 258: Performed by: INTERNAL MEDICINE

## 2024-11-07 PROCEDURE — 36415 COLL VENOUS BLD VENIPUNCTURE: CPT

## 2024-11-07 PROCEDURE — 86923 COMPATIBILITY TEST ELECTRIC: CPT

## 2024-11-07 PROCEDURE — 83735 ASSAY OF MAGNESIUM: CPT

## 2024-11-07 PROCEDURE — 10002800 HB RX 250 W HCPCS: Performed by: INTERNAL MEDICINE

## 2024-11-07 PROCEDURE — 94640 AIRWAY INHALATION TREATMENT: CPT

## 2024-11-07 PROCEDURE — 80076 HEPATIC FUNCTION PANEL: CPT

## 2024-11-07 PROCEDURE — 86901 BLOOD TYPING SEROLOGIC RH(D): CPT | Performed by: INTERNAL MEDICINE

## 2024-11-07 PROCEDURE — 85025 COMPLETE CBC W/AUTO DIFF WBC: CPT

## 2024-11-07 PROCEDURE — 10000002 HB ROOM CHARGE MED SURG

## 2024-11-07 PROCEDURE — 84100 ASSAY OF PHOSPHORUS: CPT

## 2024-11-07 PROCEDURE — 94003 VENT MGMT INPAT SUBQ DAY: CPT

## 2024-11-07 PROCEDURE — 10004651 HB RX, NO CHARGE ITEM: Performed by: INTERNAL MEDICINE

## 2024-11-07 RX ORDER — SODIUM CHLORIDE 9 MG/ML
INJECTION, SOLUTION INTRAVENOUS CONTINUOUS PRN
Status: ACTIVE | OUTPATIENT
Start: 2024-11-07 | End: 2024-11-08

## 2024-11-07 RX ADMIN — SODIUM CHLORIDE TAB 1 GM 1000 MG: 1 TAB at 17:09

## 2024-11-07 RX ADMIN — LORAZEPAM 1 MG: 1 TABLET ORAL at 13:12

## 2024-11-07 RX ADMIN — GABAPENTIN 100 MG: 250 SOLUTION ORAL at 20:58

## 2024-11-07 RX ADMIN — SODIUM CHLORIDE, PRESERVATIVE FREE 10 ML: 5 INJECTION INTRAVENOUS at 09:04

## 2024-11-07 RX ADMIN — SODIUM CHLORIDE TAB 1 GM 1000 MG: 1 TAB at 09:03

## 2024-11-07 RX ADMIN — INSULIN LISPRO 2 UNITS: 100 INJECTION, SOLUTION INTRAVENOUS; SUBCUTANEOUS at 17:09

## 2024-11-07 RX ADMIN — IPRATROPIUM BROMIDE 0.5 MG: 0.5 SOLUTION RESPIRATORY (INHALATION) at 20:42

## 2024-11-07 RX ADMIN — VANCOMYCIN HYDROCHLORIDE 125 MG: KIT at 09:03

## 2024-11-07 RX ADMIN — ACETAMINOPHEN 325 MG: 325 TABLET ORAL at 09:14

## 2024-11-07 RX ADMIN — MINOCYCLINE HYDROCHLORIDE 200 MG: 100 CAPSULE ORAL at 09:02

## 2024-11-07 RX ADMIN — LORAZEPAM 1 MG: 1 TABLET ORAL at 06:54

## 2024-11-07 RX ADMIN — SODIUM CHLORIDE TAB 1 GM 1000 MG: 1 TAB at 13:12

## 2024-11-07 RX ADMIN — FAMOTIDINE 10 MG: 20 TABLET ORAL at 09:03

## 2024-11-07 RX ADMIN — LEVETIRACETAM 750 MG: 100 SOLUTION ORAL at 20:59

## 2024-11-07 RX ADMIN — ACETAMINOPHEN 325 MG: 325 TABLET ORAL at 15:35

## 2024-11-07 RX ADMIN — PANCRELIPASE 1 CAPSULE: 30000; 6000; 19000 CAPSULE, DELAYED RELEASE PELLETS ORAL at 13:12

## 2024-11-07 RX ADMIN — LORAZEPAM 1 MG: 1 TABLET ORAL at 17:09

## 2024-11-07 RX ADMIN — VALPROIC ACID 250 MG: 250 SOLUTION ORAL at 13:12

## 2024-11-07 RX ADMIN — PANCRELIPASE 1 CAPSULE: 30000; 6000; 19000 CAPSULE, DELAYED RELEASE PELLETS ORAL at 17:09

## 2024-11-07 RX ADMIN — FAMOTIDINE 10 MG: 20 TABLET ORAL at 20:55

## 2024-11-07 RX ADMIN — VALPROIC ACID 250 MG: 250 SOLUTION ORAL at 21:11

## 2024-11-07 RX ADMIN — IPRATROPIUM BROMIDE 0.5 MG: 0.5 SOLUTION RESPIRATORY (INHALATION) at 13:26

## 2024-11-07 RX ADMIN — LACOSAMIDE 150 MG: 10 SOLUTION ORAL at 09:02

## 2024-11-07 RX ADMIN — CEFEPIME 1000 MG: 1 INJECTION, POWDER, FOR SOLUTION INTRAMUSCULAR; INTRAVENOUS at 22:59

## 2024-11-07 RX ADMIN — SODIUM CHLORIDE, PRESERVATIVE FREE 2 ML: 5 INJECTION INTRAVENOUS at 09:04

## 2024-11-07 RX ADMIN — Medication 400 MG: at 09:03

## 2024-11-07 RX ADMIN — VALPROIC ACID 250 MG: 250 SOLUTION ORAL at 06:54

## 2024-11-07 RX ADMIN — PANCRELIPASE 1 CAPSULE: 30000; 6000; 19000 CAPSULE, DELAYED RELEASE PELLETS ORAL at 09:02

## 2024-11-07 RX ADMIN — VANCOMYCIN HYDROCHLORIDE 125 MG: KIT at 20:59

## 2024-11-07 RX ADMIN — IPRATROPIUM BROMIDE 0.5 MG: 0.5 SOLUTION RESPIRATORY (INHALATION) at 08:00

## 2024-11-07 RX ADMIN — CEFEPIME 1000 MG: 1 INJECTION, POWDER, FOR SOLUTION INTRAMUSCULAR; INTRAVENOUS at 13:20

## 2024-11-07 RX ADMIN — CEFEPIME 1000 MG: 1 INJECTION, POWDER, FOR SOLUTION INTRAMUSCULAR; INTRAVENOUS at 06:58

## 2024-11-07 RX ADMIN — INSULIN LISPRO 2 UNITS: 100 INJECTION, SOLUTION INTRAVENOUS; SUBCUTANEOUS at 06:54

## 2024-11-07 RX ADMIN — INSULIN LISPRO 2 UNITS: 100 INJECTION, SOLUTION INTRAVENOUS; SUBCUTANEOUS at 01:05

## 2024-11-07 RX ADMIN — SODIUM CHLORIDE, PRESERVATIVE FREE 2 ML: 5 INJECTION INTRAVENOUS at 21:09

## 2024-11-07 RX ADMIN — METOPROLOL TARTRATE 25 MG: 25 TABLET, FILM COATED ORAL at 09:03

## 2024-11-07 RX ADMIN — MINOCYCLINE HYDROCHLORIDE 200 MG: 100 CAPSULE ORAL at 20:55

## 2024-11-07 RX ADMIN — FOLIC ACID 0.5 MG: 1 TABLET ORAL at 09:02

## 2024-11-07 RX ADMIN — LEVETIRACETAM 750 MG: 100 SOLUTION ORAL at 09:02

## 2024-11-07 RX ADMIN — LACOSAMIDE 150 MG: 10 SOLUTION ORAL at 20:57

## 2024-11-07 RX ADMIN — LORAZEPAM 1 MG: 1 TABLET ORAL at 22:51

## 2024-11-07 RX ADMIN — GABAPENTIN 100 MG: 250 SOLUTION ORAL at 09:03

## 2024-11-07 RX ADMIN — SODIUM CHLORIDE, PRESERVATIVE FREE 10 ML: 5 INJECTION INTRAVENOUS at 21:09

## 2024-11-07 RX ADMIN — METOPROLOL TARTRATE 25 MG: 25 TABLET, FILM COATED ORAL at 20:55

## 2024-11-07 ASSESSMENT — PAIN SCALES - BEHAVIORAL PAIN SCALE (BPS)
BPS_SCORE: 3
BPS_SCORE: 3

## 2024-11-08 ENCOUNTER — APPOINTMENT (OUTPATIENT)
Dept: GENERAL RADIOLOGY | Age: 34
DRG: 720 | End: 2024-11-08
Attending: INTERNAL MEDICINE

## 2024-11-08 LAB
ANION GAP SERPL CALC-SCNC: 10 MMOL/L (ref 7–19)
BASOPHILS # BLD: 0 K/MCL (ref 0–0.3)
BASOPHILS NFR BLD: 0 %
BUN SERPL-MCNC: 20 MG/DL (ref 6–20)
BUN/CREAT SERPL: 77 (ref 7–25)
CALCIUM SERPL-MCNC: 7.9 MG/DL (ref 8.4–10.2)
CHLORIDE SERPL-SCNC: 104 MMOL/L (ref 97–110)
CO2 SERPL-SCNC: 25 MMOL/L (ref 21–32)
CREAT SERPL-MCNC: 0.26 MG/DL (ref 0.51–0.95)
DEPRECATED RDW RBC: 67.2 FL (ref 39–50)
EGFRCR SERPLBLD CKD-EPI 2021: >90 ML/MIN/{1.73_M2}
EOSINOPHIL # BLD: 0.1 K/MCL (ref 0–0.5)
EOSINOPHIL NFR BLD: 2 %
ERYTHROCYTE [DISTWIDTH] IN BLOOD: 20.1 % (ref 11–15)
FASTING DURATION TIME PATIENT: ABNORMAL H
GLUCOSE BLDC GLUCOMTR-MCNC: 147 MG/DL (ref 70–99)
GLUCOSE BLDC GLUCOMTR-MCNC: 174 MG/DL (ref 70–99)
GLUCOSE BLDC GLUCOMTR-MCNC: 175 MG/DL (ref 70–99)
GLUCOSE BLDC GLUCOMTR-MCNC: 194 MG/DL (ref 70–99)
GLUCOSE BLDC GLUCOMTR-MCNC: 196 MG/DL (ref 70–99)
GLUCOSE SERPL-MCNC: 170 MG/DL (ref 70–99)
HCT VFR BLD CALC: 29.1 % (ref 36–46.5)
HGB BLD-MCNC: 9.6 G/DL (ref 12–15.5)
IMM GRANULOCYTES # BLD AUTO: 0 K/MCL (ref 0–0.2)
IMM GRANULOCYTES # BLD: 0 %
LYMPHOCYTES # BLD: 1.5 K/MCL (ref 1–4.8)
LYMPHOCYTES NFR BLD: 30 %
MAGNESIUM SERPL-MCNC: 1.6 MG/DL (ref 1.7–2.4)
MCH RBC QN AUTO: 30.3 PG (ref 26–34)
MCHC RBC AUTO-ENTMCNC: 33 G/DL (ref 32–36.5)
MCV RBC AUTO: 91.8 FL (ref 78–100)
MONOCYTES # BLD: 0.7 K/MCL (ref 0.3–0.9)
MONOCYTES NFR BLD: 13 %
NEUTROPHILS # BLD: 2.8 K/MCL (ref 1.8–7.7)
NEUTROPHILS NFR BLD: 55 %
NRBC BLD MANUAL-RTO: 0 /100 WBC
PHOSPHATE SERPL-MCNC: 3.8 MG/DL (ref 2.4–4.7)
PLATELET # BLD AUTO: 140 K/MCL (ref 140–450)
POTASSIUM SERPL-SCNC: 4.8 MMOL/L (ref 3.4–5.1)
RBC # BLD: 3.17 MIL/MCL (ref 4–5.2)
SODIUM SERPL-SCNC: 134 MMOL/L (ref 135–145)
WBC # BLD: 5.1 K/MCL (ref 4.2–11)

## 2024-11-08 PROCEDURE — 10002803 HB RX 637: Performed by: INTERNAL MEDICINE

## 2024-11-08 PROCEDURE — 80048 BASIC METABOLIC PNL TOTAL CA: CPT

## 2024-11-08 PROCEDURE — 10000002 HB ROOM CHARGE MED SURG

## 2024-11-08 PROCEDURE — 36415 COLL VENOUS BLD VENIPUNCTURE: CPT

## 2024-11-08 PROCEDURE — 96372 THER/PROPH/DIAG INJ SC/IM: CPT | Performed by: INTERNAL MEDICINE

## 2024-11-08 PROCEDURE — 83735 ASSAY OF MAGNESIUM: CPT

## 2024-11-08 PROCEDURE — 13003346 HB PATIENT LVL 1 SUPPLIES IP WND

## 2024-11-08 PROCEDURE — 84100 ASSAY OF PHOSPHORUS: CPT

## 2024-11-08 PROCEDURE — 71045 X-RAY EXAM CHEST 1 VIEW: CPT

## 2024-11-08 PROCEDURE — 10004651 HB RX, NO CHARGE ITEM: Performed by: INTERNAL MEDICINE

## 2024-11-08 PROCEDURE — 10002800 HB RX 250 W HCPCS: Performed by: INTERNAL MEDICINE

## 2024-11-08 PROCEDURE — 94640 AIRWAY INHALATION TREATMENT: CPT

## 2024-11-08 PROCEDURE — 10004281 HB COUNTER-STAFF TIME PER 15 MIN

## 2024-11-08 PROCEDURE — 10002800 HB RX 250 W HCPCS: Performed by: NURSE PRACTITIONER

## 2024-11-08 PROCEDURE — 10002801 HB RX 250 W/O HCPCS: Performed by: INTERNAL MEDICINE

## 2024-11-08 PROCEDURE — 85025 COMPLETE CBC W/AUTO DIFF WBC: CPT | Performed by: INTERNAL MEDICINE

## 2024-11-08 PROCEDURE — 94003 VENT MGMT INPAT SUBQ DAY: CPT

## 2024-11-08 PROCEDURE — 10002807 HB RX 258: Performed by: INTERNAL MEDICINE

## 2024-11-08 RX ADMIN — PANCRELIPASE 1 CAPSULE: 30000; 6000; 19000 CAPSULE, DELAYED RELEASE PELLETS ORAL at 16:51

## 2024-11-08 RX ADMIN — SODIUM CHLORIDE, PRESERVATIVE FREE 10 ML: 5 INJECTION INTRAVENOUS at 20:16

## 2024-11-08 RX ADMIN — SODIUM CHLORIDE TAB 1 GM 1000 MG: 1 TAB at 16:51

## 2024-11-08 RX ADMIN — INSULIN LISPRO 2 UNITS: 100 INJECTION, SOLUTION INTRAVENOUS; SUBCUTANEOUS at 00:46

## 2024-11-08 RX ADMIN — VANCOMYCIN HYDROCHLORIDE 125 MG: KIT at 08:16

## 2024-11-08 RX ADMIN — LACOSAMIDE 150 MG: 10 SOLUTION ORAL at 20:16

## 2024-11-08 RX ADMIN — INSULIN LISPRO 2 UNITS: 100 INJECTION, SOLUTION INTRAVENOUS; SUBCUTANEOUS at 06:50

## 2024-11-08 RX ADMIN — SODIUM CHLORIDE, PRESERVATIVE FREE 2 ML: 5 INJECTION INTRAVENOUS at 08:18

## 2024-11-08 RX ADMIN — SODIUM CHLORIDE, PRESERVATIVE FREE 2 ML: 5 INJECTION INTRAVENOUS at 20:17

## 2024-11-08 RX ADMIN — LORAZEPAM 1 MG: 1 TABLET ORAL at 22:00

## 2024-11-08 RX ADMIN — INSULIN LISPRO 2 UNITS: 100 INJECTION, SOLUTION INTRAVENOUS; SUBCUTANEOUS at 18:23

## 2024-11-08 RX ADMIN — IPRATROPIUM BROMIDE 0.5 MG: 0.5 SOLUTION RESPIRATORY (INHALATION) at 07:24

## 2024-11-08 RX ADMIN — LORAZEPAM 1 MG: 1 TABLET ORAL at 11:03

## 2024-11-08 RX ADMIN — ACETAMINOPHEN 325 MG: 325 TABLET ORAL at 20:16

## 2024-11-08 RX ADMIN — HYDRALAZINE HYDROCHLORIDE 10 MG: 20 INJECTION INTRAMUSCULAR; INTRAVENOUS at 11:03

## 2024-11-08 RX ADMIN — FAMOTIDINE 10 MG: 20 TABLET ORAL at 08:17

## 2024-11-08 RX ADMIN — SODIUM CHLORIDE TAB 1 GM 1000 MG: 1 TAB at 08:16

## 2024-11-08 RX ADMIN — CEFEPIME 1000 MG: 1 INJECTION, POWDER, FOR SOLUTION INTRAMUSCULAR; INTRAVENOUS at 06:09

## 2024-11-08 RX ADMIN — LEVETIRACETAM 750 MG: 100 SOLUTION ORAL at 08:16

## 2024-11-08 RX ADMIN — PANCRELIPASE 1 CAPSULE: 30000; 6000; 19000 CAPSULE, DELAYED RELEASE PELLETS ORAL at 12:59

## 2024-11-08 RX ADMIN — FOLIC ACID 0.5 MG: 1 TABLET ORAL at 08:17

## 2024-11-08 RX ADMIN — VALPROIC ACID 250 MG: 250 SOLUTION ORAL at 12:59

## 2024-11-08 RX ADMIN — SODIUM CHLORIDE TAB 1 GM 1000 MG: 1 TAB at 12:59

## 2024-11-08 RX ADMIN — VALPROIC ACID 250 MG: 250 SOLUTION ORAL at 05:28

## 2024-11-08 RX ADMIN — METOPROLOL TARTRATE 25 MG: 25 TABLET, FILM COATED ORAL at 08:17

## 2024-11-08 RX ADMIN — LACOSAMIDE 150 MG: 10 SOLUTION ORAL at 08:16

## 2024-11-08 RX ADMIN — OXYCODONE HYDROCHLORIDE 2.5 MG: 5 TABLET ORAL at 11:03

## 2024-11-08 RX ADMIN — LEVETIRACETAM 750 MG: 100 SOLUTION ORAL at 20:16

## 2024-11-08 RX ADMIN — GABAPENTIN 100 MG: 250 SOLUTION ORAL at 08:16

## 2024-11-08 RX ADMIN — CEFEPIME 1000 MG: 1 INJECTION, POWDER, FOR SOLUTION INTRAMUSCULAR; INTRAVENOUS at 22:03

## 2024-11-08 RX ADMIN — GABAPENTIN 100 MG: 250 SOLUTION ORAL at 20:16

## 2024-11-08 RX ADMIN — Medication 400 MG: at 08:16

## 2024-11-08 RX ADMIN — FAMOTIDINE 10 MG: 20 TABLET ORAL at 20:17

## 2024-11-08 RX ADMIN — SODIUM CHLORIDE, PRESERVATIVE FREE 10 ML: 5 INJECTION INTRAVENOUS at 08:18

## 2024-11-08 RX ADMIN — METOPROLOL TARTRATE 25 MG: 25 TABLET, FILM COATED ORAL at 20:17

## 2024-11-08 RX ADMIN — VALPROIC ACID 250 MG: 250 SOLUTION ORAL at 22:00

## 2024-11-08 RX ADMIN — MINOCYCLINE HYDROCHLORIDE 200 MG: 100 CAPSULE ORAL at 20:16

## 2024-11-08 RX ADMIN — CEFEPIME 1000 MG: 1 INJECTION, POWDER, FOR SOLUTION INTRAMUSCULAR; INTRAVENOUS at 16:49

## 2024-11-08 RX ADMIN — ACETAMINOPHEN 325 MG: 325 TABLET ORAL at 08:17

## 2024-11-08 RX ADMIN — LORAZEPAM 1 MG: 1 TABLET ORAL at 16:51

## 2024-11-08 RX ADMIN — VANCOMYCIN HYDROCHLORIDE 125 MG: KIT at 20:16

## 2024-11-08 RX ADMIN — LORAZEPAM 1 MG: 1 TABLET ORAL at 04:20

## 2024-11-08 RX ADMIN — PANCRELIPASE 1 CAPSULE: 30000; 6000; 19000 CAPSULE, DELAYED RELEASE PELLETS ORAL at 08:17

## 2024-11-08 RX ADMIN — IPRATROPIUM BROMIDE 0.5 MG: 0.5 SOLUTION RESPIRATORY (INHALATION) at 18:21

## 2024-11-08 RX ADMIN — MINOCYCLINE HYDROCHLORIDE 200 MG: 100 CAPSULE ORAL at 08:17

## 2024-11-08 ASSESSMENT — PAIN SCALES - BEHAVIORAL PAIN SCALE (BPS)
BPS_SCORE: 3
BPS_SCORE: 3

## 2024-11-09 VITALS
OXYGEN SATURATION: 86 % | SYSTOLIC BLOOD PRESSURE: 154 MMHG | WEIGHT: 80.47 LBS | HEIGHT: 63 IN | HEART RATE: 95 BPM | RESPIRATION RATE: 16 BRPM | BODY MASS INDEX: 14.26 KG/M2 | DIASTOLIC BLOOD PRESSURE: 94 MMHG | TEMPERATURE: 100.2 F

## 2024-11-09 LAB
ANION GAP SERPL CALC-SCNC: 9 MMOL/L (ref 7–19)
BACTERIA BLD CULT: NORMAL
BACTERIA BLD CULT: NORMAL
BUN SERPL-MCNC: 24 MG/DL (ref 6–20)
BUN/CREAT SERPL: 109 (ref 7–25)
CALCIUM SERPL-MCNC: 7.8 MG/DL (ref 8.4–10.2)
CHLORIDE SERPL-SCNC: 106 MMOL/L (ref 97–110)
CO2 SERPL-SCNC: 26 MMOL/L (ref 21–32)
CREAT SERPL-MCNC: 0.22 MG/DL (ref 0.51–0.95)
EGFRCR SERPLBLD CKD-EPI 2021: >90 ML/MIN/{1.73_M2}
FASTING DURATION TIME PATIENT: ABNORMAL H
GLUCOSE BLDC GLUCOMTR-MCNC: 146 MG/DL (ref 70–99)
GLUCOSE BLDC GLUCOMTR-MCNC: 150 MG/DL (ref 70–99)
GLUCOSE BLDC GLUCOMTR-MCNC: 160 MG/DL (ref 70–99)
GLUCOSE SERPL-MCNC: 151 MG/DL (ref 70–99)
MAGNESIUM SERPL-MCNC: 1.6 MG/DL (ref 1.7–2.4)
PHOSPHATE SERPL-MCNC: 4.4 MG/DL (ref 2.4–4.7)
POTASSIUM SERPL-SCNC: 5.1 MMOL/L (ref 3.4–5.1)
RAINBOW EXTRA TUBES HOLD SPECIMEN: NORMAL
SODIUM SERPL-SCNC: 136 MMOL/L (ref 135–145)

## 2024-11-09 PROCEDURE — 10002801 HB RX 250 W/O HCPCS: Performed by: INTERNAL MEDICINE

## 2024-11-09 PROCEDURE — 10002800 HB RX 250 W HCPCS: Performed by: NURSE PRACTITIONER

## 2024-11-09 PROCEDURE — 10002807 HB RX 258: Performed by: INTERNAL MEDICINE

## 2024-11-09 PROCEDURE — 80048 BASIC METABOLIC PNL TOTAL CA: CPT

## 2024-11-09 PROCEDURE — 94640 AIRWAY INHALATION TREATMENT: CPT

## 2024-11-09 PROCEDURE — 10002800 HB RX 250 W HCPCS: Performed by: INTERNAL MEDICINE

## 2024-11-09 PROCEDURE — 10002803 HB RX 637: Performed by: INTERNAL MEDICINE

## 2024-11-09 PROCEDURE — 84100 ASSAY OF PHOSPHORUS: CPT

## 2024-11-09 PROCEDURE — 83735 ASSAY OF MAGNESIUM: CPT

## 2024-11-09 PROCEDURE — 96372 THER/PROPH/DIAG INJ SC/IM: CPT | Performed by: INTERNAL MEDICINE

## 2024-11-09 PROCEDURE — 94667 MNPJ CHEST WALL 1ST: CPT

## 2024-11-09 PROCEDURE — 36415 COLL VENOUS BLD VENIPUNCTURE: CPT

## 2024-11-09 PROCEDURE — 10004651 HB RX, NO CHARGE ITEM: Performed by: INTERNAL MEDICINE

## 2024-11-09 PROCEDURE — 94003 VENT MGMT INPAT SUBQ DAY: CPT

## 2024-11-09 RX ORDER — VANCOMYCIN HYDROCHLORIDE 250 MG/5ML
125 POWDER, FOR SOLUTION ORAL EVERY 12 HOURS SCHEDULED
Qty: 35 ML | Refills: 0 | Status: SHIPPED | OUTPATIENT
Start: 2024-11-09 | End: 2024-11-16

## 2024-11-09 RX ORDER — GABAPENTIN 250 MG/5ML
100 SOLUTION ORAL 2 TIMES DAILY
Qty: 120 ML | Refills: 0 | Status: SHIPPED | OUTPATIENT
Start: 2024-11-09 | End: 2024-12-09

## 2024-11-09 RX ORDER — MINOCYCLINE HYDROCHLORIDE 100 MG/1
200 CAPSULE ORAL EVERY 12 HOURS SCHEDULED
Qty: 16 CAPSULE | Refills: 0 | Status: SHIPPED | OUTPATIENT
Start: 2024-11-09 | End: 2024-11-13

## 2024-11-09 RX ADMIN — VALPROIC ACID 250 MG: 250 SOLUTION ORAL at 06:06

## 2024-11-09 RX ADMIN — PANCRELIPASE 1 CAPSULE: 30000; 6000; 19000 CAPSULE, DELAYED RELEASE PELLETS ORAL at 13:00

## 2024-11-09 RX ADMIN — LORAZEPAM 1 MG: 1 TABLET ORAL at 05:50

## 2024-11-09 RX ADMIN — IPRATROPIUM BROMIDE 0.5 MG: 0.5 SOLUTION RESPIRATORY (INHALATION) at 12:17

## 2024-11-09 RX ADMIN — GABAPENTIN 100 MG: 250 SOLUTION ORAL at 08:09

## 2024-11-09 RX ADMIN — Medication 400 MG: at 08:10

## 2024-11-09 RX ADMIN — METOPROLOL TARTRATE 25 MG: 25 TABLET, FILM COATED ORAL at 09:09

## 2024-11-09 RX ADMIN — VALPROIC ACID 250 MG: 250 SOLUTION ORAL at 13:00

## 2024-11-09 RX ADMIN — FOLIC ACID 0.5 MG: 1 TABLET ORAL at 08:12

## 2024-11-09 RX ADMIN — SODIUM CHLORIDE TAB 1 GM 1000 MG: 1 TAB at 17:22

## 2024-11-09 RX ADMIN — SODIUM CHLORIDE TAB 1 GM 1000 MG: 1 TAB at 13:00

## 2024-11-09 RX ADMIN — SODIUM CHLORIDE, PRESERVATIVE FREE 2 ML: 5 INJECTION INTRAVENOUS at 08:14

## 2024-11-09 RX ADMIN — INSULIN LISPRO 2 UNITS: 100 INJECTION, SOLUTION INTRAVENOUS; SUBCUTANEOUS at 06:06

## 2024-11-09 RX ADMIN — CEFEPIME 1000 MG: 1 INJECTION, POWDER, FOR SOLUTION INTRAMUSCULAR; INTRAVENOUS at 14:09

## 2024-11-09 RX ADMIN — SODIUM CHLORIDE, PRESERVATIVE FREE 10 ML: 5 INJECTION INTRAVENOUS at 08:14

## 2024-11-09 RX ADMIN — PANCRELIPASE 1 CAPSULE: 30000; 6000; 19000 CAPSULE, DELAYED RELEASE PELLETS ORAL at 17:22

## 2024-11-09 RX ADMIN — IPRATROPIUM BROMIDE 0.5 MG: 0.5 SOLUTION RESPIRATORY (INHALATION) at 18:29

## 2024-11-09 RX ADMIN — VANCOMYCIN HYDROCHLORIDE 125 MG: KIT at 08:07

## 2024-11-09 RX ADMIN — LEVETIRACETAM 750 MG: 100 SOLUTION ORAL at 08:11

## 2024-11-09 RX ADMIN — LORAZEPAM 1 MG: 1 TABLET ORAL at 17:22

## 2024-11-09 RX ADMIN — HYDRALAZINE HYDROCHLORIDE 10 MG: 20 INJECTION INTRAMUSCULAR; INTRAVENOUS at 09:08

## 2024-11-09 RX ADMIN — LORAZEPAM 1 MG: 1 TABLET ORAL at 10:31

## 2024-11-09 RX ADMIN — FAMOTIDINE 10 MG: 20 TABLET ORAL at 08:10

## 2024-11-09 RX ADMIN — INSULIN LISPRO 2 UNITS: 100 INJECTION, SOLUTION INTRAVENOUS; SUBCUTANEOUS at 13:00

## 2024-11-09 RX ADMIN — LACOSAMIDE 150 MG: 10 SOLUTION ORAL at 08:07

## 2024-11-09 RX ADMIN — SODIUM CHLORIDE TAB 1 GM 1000 MG: 1 TAB at 08:07

## 2024-11-09 RX ADMIN — IPRATROPIUM BROMIDE 0.5 MG: 0.5 SOLUTION RESPIRATORY (INHALATION) at 06:23

## 2024-11-09 RX ADMIN — PANCRELIPASE 1 CAPSULE: 30000; 6000; 19000 CAPSULE, DELAYED RELEASE PELLETS ORAL at 08:06

## 2024-11-09 RX ADMIN — INSULIN LISPRO 2 UNITS: 100 INJECTION, SOLUTION INTRAVENOUS; SUBCUTANEOUS at 00:13

## 2024-11-09 RX ADMIN — OXYCODONE HYDROCHLORIDE 2.5 MG: 5 TABLET ORAL at 10:31

## 2024-11-09 RX ADMIN — MINOCYCLINE HYDROCHLORIDE 200 MG: 100 CAPSULE ORAL at 09:09

## 2024-11-09 RX ADMIN — CEFEPIME 1000 MG: 1 INJECTION, POWDER, FOR SOLUTION INTRAMUSCULAR; INTRAVENOUS at 06:09

## 2024-11-09 ASSESSMENT — PAIN SCALES - BEHAVIORAL PAIN SCALE (BPS)
BPS_SCORE: 3
BPS_SCORE: 4

## 2024-11-10 LAB
BACTERIA BLD CULT: NORMAL
BACTERIA BLD CULT: NORMAL

## 2024-11-12 LAB
ALBUMIN SERPL ELPH-MCNC: 2.2 G/DL (ref 3.5–4.9)
ALPHA 1: 0.5 G/DL (ref 0.2–0.4)
ALPHA2 GLOB SERPL ELPH-MCNC: 0.8 G/DL (ref 0.5–0.9)
B-GLOBULIN SERPL ELPH-MCNC: 0.9 G/DL (ref 0.7–1.2)
GAMMA GLOB SERPL ELPH-MCNC: 4.8 G/DL (ref 0.7–1.7)
GLOBULIN SER-MCNC: 6.9 G/DL (ref 2.1–4.2)
IGA SERPL-MCNC: 427 MG/DL (ref 70–400)
IGG SERPL-MCNC: 5170 MG/DL (ref 700–1600)
IGM SERPL-MCNC: 149 MG/DL (ref 40–230)
KAPPA LC FREE SER NEPH-MCNC: 23.02 MG/DL (ref 0.33–1.94)
KAPPA LC/LAMBDA SER: 1.23 {RATIO} (ref 0.26–1.65)
LAMBDA LC FREE SER NEPH-MCNC: 18.68 MG/DL (ref 0.57–2.63)
M PROTEIN 1 SERPL ELPH-MCNC: 0.8 G/DL
M PROTEIN 2 SERPL ELPH-MCNC: 0.7 G/DL
PATH INTERP BLD-IMP: ABNORMAL
PATH INTERP SPEC-IMP: ABNORMAL
PROT SERPL-MCNC: 9.1 G/DL (ref 6.4–8.2)

## 2024-11-22 PROCEDURE — 83935 ASSAY OF URINE OSMOLALITY: CPT | Performed by: CLINICAL MEDICAL LABORATORY

## 2024-11-22 PROCEDURE — PSEU8211 OSMOLALITY, URINE: Performed by: CLINICAL MEDICAL LABORATORY

## 2024-11-23 ENCOUNTER — LAB REQUISITION (OUTPATIENT)
Dept: LAB | Age: 34
End: 2024-11-23

## 2024-11-23 DIAGNOSIS — Z13.9 ENCOUNTER FOR SCREENING, UNSPECIFIED: ICD-10-CM

## 2024-11-24 LAB — OSMOLALITY UR: 438 MOSM/KG (ref 50–1200)

## (undated) NOTE — IP AVS SNAPSHOT
[address]            (For Outpatient Use Only) Initial Admit Date: 3/14/2019   Inpt/Obs Admit Date: Inpt: 3/14/19 / Obs: N/A   Discharge Date:    Carylon Kawasaki:  [de-identified]   MRN: [de-identified]   CSN: [id number]        ENCOUNTER  Patient

## (undated) NOTE — IP AVS SNAPSHOT
Patient Demographics     Address  [facility] [address] Phone  [phone] [facility])  [phone] [facility]      Emergency Contact(s)     Name Relation Home Work [address] Mother   [phone]      Allergies as of 3/20/2019  Rev 100 mg by Per G Tube route 2 (two) times daily. MULTIVITAL-M Tabs      1 tablet by Gastric Tube route daily.    Mai Hernandez MD         oxyCODONE HCl 5 MG/5ML Soln  Commonly known as:  roxiCODONE      2.5 mL (2.5 mg total) by Per G Tube route ev 0524 Given      [id number] Vancomycin HCl [facility]) 50 MG/ML oral solution 125 mg 03/20/19 1023 Given      [id number] Vancomycin HCl [facility]) 50 MG/ML oral solution 125 mg 03/20/19 1628 Given      [id number] acetaminophen (TYLENOL) 160 MG/5ML oral liquid 512 Vitals  149/95  (Abnormal)  Filed at 03/20/2019 1323   Pulse  93 Filed at 03/20/2019 1323   Resp  20 Filed at 03/20/2019 1132   Temp  97.7 °F (36.5 °C) Filed at 03/20/2019 1132   SpO2  99 % Filed at 03/20/2019 1132      Patient's Most Recent Weight       M RDW 15.8 11.0 - 15.0 % [facility]   RDW-SD 60.7 35.1 - 46.3 fL [facility]            Testing Performed By     Lab - Abbreviation Name Director Address Valid Date Range    [place] [address] LAB Vicki Caballero  [address] [2]   This antibiotic result is an TORY. No interpretation is currently available.                  Blood Culture FREQ X 2 [[id number]] Collected:  03/14/19 2100    Order Status:  Completed Lab Status:  Final result Updated:  03/19/19 2200    Specimen:  Blo Filed:  3/14/2019 11:20 PM Date of Service:  3/14/2019 11:08 PM Status:  Addendum    :  Wally Tuttle MD (Physician)    Related Notes:  Original Note by Wally Tuttle MD (Physician) filed at 3/14/2019 11:19 PM         [address] nebulization every 4 (four) hours as needed for Wheezing or Shortness of Breath. Disp:  Rfl:    baclofen 10 MG Oral Tab 15 mg by Per G Tube route 3 (three) times daily.  Disp:  Rfl:    gabapentin 100 MG Oral Cap 100 mg by Per G Tube route 2 (two) times florentin MCV  113.4*   PLT  141.0*   INR  1.23*       Recent Labs   Lab  03/14/19 2048   GLU  117*   BUN  27*   CREATSERUM  0.56   GFRAA  148   GFRNAA  128   CA  10.6*   ALB  1.9*   NA  139   K  4.7   CL  104   CO2  33.0*   ALKPHO  179*   AST  41*   ALT  20   [facility] Location [address] [place] [address] Attending Geremias Bernal MD   Hosp Day # 0 PCP Unknown Pcp     Chief Complaint: Fever, dyspnea     History of Present Illness: Tito Lopez is a 34year old female with anoxic brain injury - trach colar, g-tube, co Loperamide HCl 2 MG Oral Cap Take 2 mg by mouth 4 (four) times daily as needed for Diarrhea. Disp:  Rfl:    oxyCODONE HCl 5 MG/5ML Oral Solution 5 mg by Per G Tube route every 4 (four) hours as needed for Pain.  Disp:  Rfl:    Propranolol HCl 10 MG Oral Tab Recent Labs   Lab  03/14/19 2048   PTP  16.1*   INR  1.23*       Recent Labs   Lab  03/14/19 2048   TROP  <0.045       Imaging: Imaging data reviewed in Epic. ASSESSMENT / PLAN:     1. Rule out sepsis. Cover for MDRO PNA.  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 She has been at [facility], and recently transferred to [facility], and then [facility] due to fever. [AUGUSTO.1] She had been on oral vancomycin, levaquin at [facility], which had been carried over from  •  Metoclopramide HCl (REGLAN) injection 10 mg, 10 mg, Intravenous, Q8H PRN    No current facility-administered medications on file prior to encounter.    Current Outpatient Medications on File Prior to Encounter:  magnesium oxide 400 MG Oral Tab 400 mg by HEENT:[AUGUSTO.1] trach in place[AUGUSTO.2]. Neck: No lymphadenopathy. supple, no masses  Respiratory: Kimberly[AUGUSTO.1]r anterioraly[AUGUSTO.2]  Cardiovascular: S1, S2.  Regular rate and rhythm. No murmurs. Abdomen: Soft, nontender, nondistended. [AUGUSTO.1]  Gtube in place[AUGUSTO.2]. Stool now formed  -continue PO vancomycin, and get old records     4.  Anoxic brain injury  Contractures/palliative care involved in goals of care[AUGUSTO.2]        Jami Zapata MD  [facility]  [phone][BZ.3]    Electronicall